# Patient Record
Sex: FEMALE | Race: WHITE | NOT HISPANIC OR LATINO | Employment: UNEMPLOYED | URBAN - METROPOLITAN AREA
[De-identification: names, ages, dates, MRNs, and addresses within clinical notes are randomized per-mention and may not be internally consistent; named-entity substitution may affect disease eponyms.]

---

## 2017-02-23 LAB
HPV APTIMA: NEGATIVE
PAP RECOMMENDATION EXT: NORMAL
PAP SMEAR: NORMAL

## 2023-05-17 ENCOUNTER — HOSPITAL ENCOUNTER (EMERGENCY)
Facility: HOSPITAL | Age: 43
Discharge: HOME | End: 2023-05-18
Attending: EMERGENCY MEDICINE
Payer: MEDICAID

## 2023-05-17 DIAGNOSIS — K29.00 ACUTE SUPERFICIAL GASTRITIS, PRESENCE OF BLEEDING UNSPECIFIED: ICD-10-CM

## 2023-05-17 DIAGNOSIS — R11.2 NAUSEA AND VOMITING, UNSPECIFIED VOMITING TYPE: ICD-10-CM

## 2023-05-17 DIAGNOSIS — R10.9 ABDOMINAL PAIN: ICD-10-CM

## 2023-05-17 DIAGNOSIS — R10.13 EPIGASTRIC ABDOMINAL PAIN: Primary | ICD-10-CM

## 2023-05-17 LAB
BASOPHILS # BLD AUTO: 0.02 X10(3)/MCL
BASOPHILS NFR BLD AUTO: 0.2 %
CLOSTRIDIUM DIFFICILE GDH ANTIGEN (OHS): NEGATIVE
CLOSTRIDIUM DIFFICILE TOXIN A/B (OHS): NEGATIVE
COLOR STL: NORMAL
CONSISTENCY STL: NORMAL
EOSINOPHIL # BLD AUTO: 0.11 X10(3)/MCL (ref 0–0.9)
EOSINOPHIL NFR BLD AUTO: 1.3 %
ERYTHROCYTE [DISTWIDTH] IN BLOOD BY AUTOMATED COUNT: 13.3 % (ref 11.5–17)
FECAL LEUKOCYTE (OHS): NEGATIVE
HCT VFR BLD AUTO: 39.1 % (ref 37–47)
HEMOCCULT SP1 STL QL: NEGATIVE
HGB BLD-MCNC: 13 G/DL (ref 12–16)
IMM GRANULOCYTES # BLD AUTO: 0.02 X10(3)/MCL (ref 0–0.04)
IMM GRANULOCYTES NFR BLD AUTO: 0.2 %
LYMPHOCYTES # BLD AUTO: 2.8 X10(3)/MCL (ref 0.6–4.6)
LYMPHOCYTES NFR BLD AUTO: 33.5 %
MCH RBC QN AUTO: 28.4 PG (ref 27–31)
MCHC RBC AUTO-ENTMCNC: 33.2 G/DL (ref 33–36)
MCV RBC AUTO: 85.6 FL (ref 80–94)
MONOCYTES # BLD AUTO: 0.61 X10(3)/MCL (ref 0.1–1.3)
MONOCYTES NFR BLD AUTO: 7.3 %
NEUTROPHILS # BLD AUTO: 4.81 X10(3)/MCL (ref 2.1–9.2)
NEUTROPHILS NFR BLD AUTO: 57.5 %
NRBC BLD AUTO-RTO: 0 %
PLATELET # BLD AUTO: 221 X10(3)/MCL (ref 130–400)
PMV BLD AUTO: 9.9 FL (ref 7.4–10.4)
RBC # BLD AUTO: 4.57 X10(6)/MCL (ref 4.2–5.4)
WBC # SPEC AUTO: 8.37 X10(3)/MCL (ref 4.5–11.5)

## 2023-05-17 PROCEDURE — 85025 COMPLETE CBC W/AUTO DIFF WBC: CPT | Performed by: EMERGENCY MEDICINE

## 2023-05-17 PROCEDURE — 83690 ASSAY OF LIPASE: CPT | Performed by: EMERGENCY MEDICINE

## 2023-05-17 PROCEDURE — 82962 GLUCOSE BLOOD TEST: CPT

## 2023-05-17 PROCEDURE — 80053 COMPREHEN METABOLIC PANEL: CPT | Performed by: EMERGENCY MEDICINE

## 2023-05-17 PROCEDURE — 87077 CULTURE AEROBIC IDENTIFY: CPT | Performed by: EMERGENCY MEDICINE

## 2023-05-17 PROCEDURE — 99285 EMERGENCY DEPT VISIT HI MDM: CPT

## 2023-05-17 PROCEDURE — 82272 OCCULT BLD FECES 1-3 TESTS: CPT | Performed by: EMERGENCY MEDICINE

## 2023-05-17 PROCEDURE — 86318 IA INFECTIOUS AGENT ANTIBODY: CPT | Performed by: EMERGENCY MEDICINE

## 2023-05-17 PROCEDURE — 93005 ELECTROCARDIOGRAM TRACING: CPT

## 2023-05-17 PROCEDURE — 89055 LEUKOCYTE ASSESSMENT FECAL: CPT | Performed by: EMERGENCY MEDICINE

## 2023-05-18 VITALS
BODY MASS INDEX: 27.28 KG/M2 | SYSTOLIC BLOOD PRESSURE: 140 MMHG | OXYGEN SATURATION: 96 % | WEIGHT: 180 LBS | HEART RATE: 59 BPM | TEMPERATURE: 98 F | HEIGHT: 68 IN | DIASTOLIC BLOOD PRESSURE: 92 MMHG | RESPIRATION RATE: 17 BRPM

## 2023-05-18 LAB
ALBUMIN SERPL-MCNC: 4.1 G/DL (ref 3.5–5)
ALBUMIN/GLOB SERPL: 1.6 RATIO (ref 1.1–2)
ALP SERPL-CCNC: 39 UNIT/L (ref 40–150)
ALT SERPL-CCNC: 15 UNIT/L (ref 0–55)
AST SERPL-CCNC: 16 UNIT/L (ref 5–34)
BILIRUBIN DIRECT+TOT PNL SERPL-MCNC: 0.6 MG/DL
BUN SERPL-MCNC: 8.5 MG/DL (ref 7–18.7)
CALCIUM SERPL-MCNC: 9.5 MG/DL (ref 8.4–10.2)
CHLORIDE SERPL-SCNC: 104 MMOL/L (ref 98–107)
CO2 SERPL-SCNC: 28 MMOL/L (ref 22–29)
CREAT SERPL-MCNC: 0.69 MG/DL (ref 0.55–1.02)
GFR SERPLBLD CREATININE-BSD FMLA CKD-EPI: >60 MLS/MIN/1.73/M2
GLOBULIN SER-MCNC: 2.5 GM/DL (ref 2.4–3.5)
GLUCOSE SERPL-MCNC: 98 MG/DL (ref 74–100)
LIPASE SERPL-CCNC: 9 U/L
POCT GLUCOSE: 105 MG/DL (ref 70–110)
POTASSIUM SERPL-SCNC: 3.2 MMOL/L (ref 3.5–5.1)
PROT SERPL-MCNC: 6.6 GM/DL (ref 6.4–8.3)
SODIUM SERPL-SCNC: 141 MMOL/L (ref 136–145)

## 2023-05-18 PROCEDURE — 25000003 PHARM REV CODE 250: Performed by: EMERGENCY MEDICINE

## 2023-05-18 RX ORDER — FAMOTIDINE 10 MG/ML
40 INJECTION INTRAVENOUS
Status: DISCONTINUED | OUTPATIENT
Start: 2023-05-18 | End: 2023-05-18

## 2023-05-18 RX ORDER — SUCRALFATE 1 G/1
1 TABLET ORAL
Qty: 56 TABLET | Refills: 0 | Status: SHIPPED | OUTPATIENT
Start: 2023-05-18 | End: 2023-06-01

## 2023-05-18 RX ORDER — SUCRALFATE 1 G/1
1 TABLET ORAL ONCE
Status: COMPLETED | OUTPATIENT
Start: 2023-05-18 | End: 2023-05-18

## 2023-05-18 RX ORDER — PANTOPRAZOLE SODIUM 40 MG/1
40 TABLET, DELAYED RELEASE ORAL DAILY
Qty: 30 TABLET | Refills: 0 | Status: SHIPPED | OUTPATIENT
Start: 2023-05-18 | End: 2023-07-02 | Stop reason: ALTCHOICE

## 2023-05-18 RX ORDER — ONDANSETRON 2 MG/ML
4 INJECTION INTRAMUSCULAR; INTRAVENOUS
Status: DISCONTINUED | OUTPATIENT
Start: 2023-05-18 | End: 2023-05-18

## 2023-05-18 RX ORDER — FAMOTIDINE 20 MG/1
40 TABLET, FILM COATED ORAL
Status: COMPLETED | OUTPATIENT
Start: 2023-05-18 | End: 2023-05-18

## 2023-05-18 RX ADMIN — FAMOTIDINE 40 MG: 20 TABLET, FILM COATED ORAL at 01:05

## 2023-05-18 RX ADMIN — SUCRALFATE 1 G: 1 TABLET ORAL at 01:05

## 2023-05-18 NOTE — DISCHARGE INSTRUCTIONS
Avoid spicy, acidic food. Do not take any nsaids (ibuprofen, aleve, aspirin)  Your x ray did not show any signs of blockage

## 2023-05-18 NOTE — ED PROVIDER NOTES
Encounter Date: 5/17/2023       History     Chief Complaint   Patient presents with    Abdominal Pain     Pt c/o LUQ ABD pain, nausea, vomiting, and diarrhea for two weeks. Pt states the pain gets worse after eating. Describes her stool as black and tarry, and 1 episode of vomiting today the was blood tinged. + decreased appetite, weakness, dizziness, HA. Pt states her PCP told her to come to the hospital after being seen at [facility] 3 times in the past 2 weeks. Past surgical history of gallbladder removal in 2003. PMH HTN and DM.      42-year-old female with a history of chronic constipation diabetes and hypertension presents ED for evaluation of abdominal pain.  She is been seen in an outside ER multiple times in the past several weeks for dehydration.  She is had left upper quadrant abdominal pain worsening with eating for the past several days.  She is had decreased appetite and loose stools for the past couple of weeks and was admitted for dehydration at an outside facility.  She is been worked up extensively including multiple CT scans without acute abnormality noted.  States her stool was dark today prompting her to come to the ED for evaluation.  She also had an episode of vomiting that she was unsure if there was blood.  She denies any NSAID use or steroid use      Review of patient's allergies indicates:   Allergen Reactions    Vancomycin analogues Rash     History reviewed. No pertinent past medical history.  History reviewed. No pertinent surgical history.  History reviewed. No pertinent family history.     Review of Systems   Constitutional:  Positive for appetite change and fatigue. Negative for activity change, diaphoresis and fever.   HENT:  Negative for congestion, postnasal drip, rhinorrhea, sinus pain, sneezing and sore throat.    Respiratory:  Negative for cough, chest tightness, shortness of breath and wheezing.    Cardiovascular:  Negative for chest pain, palpitations and leg swelling.    Gastrointestinal:  Positive for abdominal pain, nausea and vomiting. Negative for abdominal distention and blood in stool.   Genitourinary:  Negative for decreased urine volume, difficulty urinating and dysuria.   Musculoskeletal: Negative.    Skin:  Negative for color change and pallor.   Neurological:  Negative for dizziness, speech difficulty, weakness, light-headedness and numbness.   All other systems reviewed and are negative.    Physical Exam     Initial Vitals [05/17/23 2234]   BP Pulse Resp Temp SpO2   (!) 140/87 74 17 98.4 °F (36.9 °C) 99 %      MAP       --         Physical Exam    Nursing note and vitals reviewed.  Constitutional: She appears well-developed and well-nourished. She is not diaphoretic. No distress.   HENT:   Head: Normocephalic and atraumatic.   Nose: Nose normal.   Mouth/Throat: Oropharynx is clear and moist.   Eyes: Conjunctivae and EOM are normal. Pupils are equal, round, and reactive to light.   Neck: Trachea normal. Neck supple.   Normal range of motion.  Cardiovascular:  Normal rate, regular rhythm, normal heart sounds and intact distal pulses.           No murmur heard.  Pulmonary/Chest: Breath sounds normal. No respiratory distress. She has no wheezes. She has no rhonchi. She has no rales. She exhibits no tenderness.   Abdominal: Abdomen is soft. Bowel sounds are normal. She exhibits no distension and no mass. There is no abdominal tenderness. There is no rebound and no guarding.   Musculoskeletal:         General: No tenderness or edema. Normal range of motion.      Cervical back: Normal range of motion and neck supple.      Lumbar back: Normal. Normal range of motion.     Neurological: She is alert and oriented to person, place, and time. She has normal strength. No cranial nerve deficit or sensory deficit.   Skin: Skin is warm and dry. Capillary refill takes less than 2 seconds. No abscess noted. No erythema. No pallor.   Psychiatric: She has a normal mood and affect. Her  behavior is normal. Judgment and thought content normal.       ED Course   Procedures  Labs Reviewed   COMPREHENSIVE METABOLIC PANEL - Abnormal; Notable for the following components:       Result Value    Potassium Level 3.2 (*)     Alkaline Phosphatase 39 (*)     All other components within normal limits   CLOSTRIDIUM DIFFICILE TOXIN A AND B, EIA - Normal   LIPASE - Normal   FECAL LEUKOCYTES - LACTOFERRIN ON  STOOL - Normal   STOOL CULTURE [facility]   CBC W/ AUTO DIFFERENTIAL    Narrative:     The following orders were created for panel order CBC auto differential.  Procedure                               Abnormality         Status                     ---------                               -----------         ------                     CBC with Differential[[id number]]                            Final result                 Please view results for these tests on the individual orders.   CBC WITH DIFFERENTIAL   OCCULT BLOOD X 3, STOOL   URINALYSIS, REFLEX TO URINE CULTURE   OCCULT BLOOD,STOOL,DIAGNOSTIC (1-3)    Narrative:     The following orders were created for panel order Occult Blood, Stool, Diagnostic (1-3).  Procedure                               Abnormality         Status                     ---------                               -----------         ------                     Occult blood x 3, stool[[id number]]                          Final result               Occult Blood, Stool 2nd ...[[id number]]                                                   Please view results for these tests on the individual orders.   OCCULT BLOOD, STOOL 2ND SPECIMEN   POCT GLUCOSE          Imaging Results              X-Ray Abdomen Flat And Erect (Preliminary result)  Result time 05/18/23 01:58:48      Wet Read by Megan Barrios MD (05/18/23 01:58:48, [facility] - Emergency Dept, Emergency Medicine)    Nonobstructive bowel gas pattern                                  X-Rays:   Independently Interpreted Readings:    Abdomen: Nonspecific bowel gas.  No free air under diaphragm.  No air fluid levels or signs of obstruction.   Medications   famotidine tablet 40 mg (40 mg Oral Given 5/18/23 0141)   sucralfate tablet 1 g (1 g Oral Given 5/18/23 0142)   Medical Decision Making  Given patient's presentation, differential diagnosis includes but is not limited to anemia, gastritis, pancreatitis, constipation,d ehydration, ileus  To evaluate these  possible etiologies cbc, cmp, lipase, stool studies, ekg, flat and erect xr were ordered and reviewed  All labs including stool studies unremarkable other tahn mild hypokalemia which is chronic  S/s consistent with gastritis. Given protonix and carafate which she toelrated. Discused dietary changes, management, ndsaid avoidance and f/u  Xr without signs of obstruction  She tolerated po and is comfortable with dc. Given iinfo for gi     Problems Addressed:  Abdominal pain: acute illness or injury that poses a threat to life or bodily functions  Acute superficial gastritis, presence of bleeding unspecified: acute illness or injury that poses a threat to life or bodily functions  Epigastric abdominal pain: acute illness or injury that poses a threat to life or bodily functions  Nausea and vomiting, unspecified vomiting type: acute illness or injury that poses a threat to life or bodily functions    Amount and/or Complexity of Data Reviewed  External Data Reviewed: notes.  Labs: ordered.  Radiology: ordered and independent interpretation performed.  ECG/medicine tests: ordered and independent interpretation performed. Decision-making details documented in ED Course.    Risk  OTC drugs.  Prescription drug management.          Medical Decision Making:   History:   Old Medical Records: I decided to obtain old medical records.  Old Records Summarized: records from another hospital.       <> Summary of Records: Workup with dx enteritis  Initial Assessment:   See hpi  Independently Interpreted  Test(s):   I have ordered and independently interpreted X-rays - see prior notes.  Clinical Tests:   Lab Tests: Ordered and Reviewed  The following lab test(s) were unremarkable: CBC and Lipase       <> Summary of Lab: Stool negative for blood, cmp with mild hypokalemia, chronic per pt  Radiological Study: Ordered and Reviewed  Medical Tests: Reviewed           ED Course as of 05/18/23 0210   Thu May 18, 2023   0136 Pt requested iv be removed and take meds po [BS]   0136 EKG performed at 10:38 p.m. rate of 62 normal sinus rhythm [BS]      ED Course User Index  [BS] Megan Barrios MD                 Clinical Impression:   Final diagnoses:  [R10.13] Epigastric abdominal pain (Primary)  [R10.9] Abdominal pain  [R11.2] Nausea and vomiting, unspecified vomiting type  [K29.00] Acute superficial gastritis, presence of bleeding unspecified        ED Disposition Condition    Discharge Stable          ED Prescriptions       Medication Sig Dispense Start Date End Date Auth. Provider    pantoprazole (PROTONIX) 40 MG tablet Take 1 tablet (40 mg total) by mouth once daily. 30 tablet 5/18/2023 6/17/2023 Megan Barrios MD    sucralfate (CARAFATE) 1 gram tablet Take 1 tablet (1 g total) by mouth 4 (four) times daily before meals and nightly. for 14 days 56 tablet 5/18/2023 6/1/2023 Megan Barrios MD          Follow-up Information       Follow up With Specialties Details Why Contact Info    your primary care doctor  Call today      oTbias Gomez MD Gastroenterology Schedule an appointment as soon as possible for a visit   [facility] [address] [address]  [phone]      [facility]  Emergency Dept Emergency Medicine  As needed, If symptoms worsen [address] [facility] [id number] [phone]             Megan Barrios MD  05/18/23 0210

## 2023-05-21 LAB — BACTERIA SPEC CULT: NORMAL

## 2023-05-24 ENCOUNTER — TELEPHONE (OUTPATIENT)
Dept: GASTROENTEROLOGY | Facility: CLINIC | Age: 43
End: 2023-05-24
Payer: MEDICAID

## 2023-05-24 NOTE — TELEPHONE ENCOUNTER
----- Message from Yasmeen Al sent at 5/24/2023  3:47 PM CDT -----  Contact: jaskaran Su has been hospitalized more than one time in the past few weeks ,pls call [phone] with various stomach issues

## 2023-06-07 ENCOUNTER — HOSPITAL ENCOUNTER (OUTPATIENT)
Dept: RADIOLOGY | Facility: HOSPITAL | Age: 43
Discharge: HOME | End: 2023-06-07
Attending: SURGERY
Payer: MEDICAID

## 2023-06-07 DIAGNOSIS — R10.12 ABDOMINAL PAIN, LEFT UPPER QUADRANT: ICD-10-CM

## 2023-06-07 DIAGNOSIS — R11.2 NAUSEA WITH VOMITING: ICD-10-CM

## 2023-06-07 DIAGNOSIS — K21.9 GASTROESOPHAGEAL REFLUX DISEASE: ICD-10-CM

## 2023-06-07 PROCEDURE — A9698 NON-RAD CONTRAST MATERIALNOC: HCPCS | Performed by: SURGERY

## 2023-06-07 PROCEDURE — 25500020 PHARM REV CODE 255: Performed by: SURGERY

## 2023-06-07 PROCEDURE — 74246 X-RAY XM UPR GI TRC 2CNTRST: CPT | Mod: TC

## 2023-06-07 PROCEDURE — 74250 X-RAY XM SM INT 1CNTRST STD: CPT | Mod: TC

## 2023-06-07 RX ADMIN — BARIUM SULFATE 140 ML: 980 POWDER, FOR SUSPENSION ORAL at 07:06

## 2023-06-08 ENCOUNTER — OFFICE VISIT (OUTPATIENT)
Dept: FAMILY MEDICINE | Facility: CLINIC | Age: 43
End: 2023-06-08
Payer: MEDICAID

## 2023-06-08 VITALS
TEMPERATURE: 99 F | BODY MASS INDEX: 26.05 KG/M2 | DIASTOLIC BLOOD PRESSURE: 84 MMHG | HEART RATE: 59 BPM | WEIGHT: 171.88 LBS | OXYGEN SATURATION: 97 % | HEIGHT: 68 IN | SYSTOLIC BLOOD PRESSURE: 121 MMHG

## 2023-06-08 DIAGNOSIS — R10.84 GENERALIZED ABDOMINAL PAIN: ICD-10-CM

## 2023-06-08 DIAGNOSIS — Z86.39 HISTORY OF THYROID NODULE: ICD-10-CM

## 2023-06-08 DIAGNOSIS — E03.9 HYPOTHYROIDISM, UNSPECIFIED TYPE: ICD-10-CM

## 2023-06-08 DIAGNOSIS — Z79.4 TYPE 2 DIABETES MELLITUS WITHOUT COMPLICATION, WITH LONG-TERM CURRENT USE OF INSULIN: ICD-10-CM

## 2023-06-08 DIAGNOSIS — R10.2 PELVIC PAIN: ICD-10-CM

## 2023-06-08 DIAGNOSIS — R11.0 NAUSEA: Primary | ICD-10-CM

## 2023-06-08 DIAGNOSIS — E11.9 TYPE 2 DIABETES MELLITUS WITHOUT COMPLICATION, WITH LONG-TERM CURRENT USE OF INSULIN: ICD-10-CM

## 2023-06-08 DIAGNOSIS — R00.1 BRADYCARDIA: ICD-10-CM

## 2023-06-08 DIAGNOSIS — Z00.00 ENCOUNTER FOR WELLNESS EXAMINATION: ICD-10-CM

## 2023-06-08 LAB
ALBUMIN SERPL-MCNC: 4.2 G/DL (ref 3.5–5)
ALBUMIN/GLOB SERPL: 1.7 RATIO (ref 1.1–2)
ALP SERPL-CCNC: 40 UNIT/L (ref 40–150)
ALT SERPL-CCNC: 19 UNIT/L (ref 0–55)
AMYLASE SERPL-CCNC: 89 UNIT/L (ref 25–125)
AST SERPL-CCNC: 21 UNIT/L (ref 5–34)
BASOPHILS # BLD AUTO: 0.03 X10(3)/MCL
BASOPHILS NFR BLD AUTO: 0.5 %
BILIRUBIN DIRECT+TOT PNL SERPL-MCNC: 0.7 MG/DL
BUN SERPL-MCNC: 10.7 MG/DL (ref 7–18.7)
CALCIUM SERPL-MCNC: 9.5 MG/DL (ref 8.4–10.2)
CHLORIDE SERPL-SCNC: 99 MMOL/L (ref 98–107)
CHOLEST SERPL-MCNC: 122 MG/DL
CHOLEST/HDLC SERPL: 3 {RATIO} (ref 0–5)
CO2 SERPL-SCNC: 32 MMOL/L (ref 22–29)
CREAT SERPL-MCNC: 0.76 MG/DL (ref 0.55–1.02)
CREAT UR-MCNC: 158.2 MG/DL (ref 47–110)
DEPRECATED CALCIDIOL+CALCIFEROL SERPL-MC: 22.6 NG/ML (ref 30–80)
EOSINOPHIL # BLD AUTO: 0.08 X10(3)/MCL (ref 0–0.9)
EOSINOPHIL NFR BLD AUTO: 1.3 %
ERYTHROCYTE [DISTWIDTH] IN BLOOD BY AUTOMATED COUNT: 14.3 % (ref 11.5–17)
EST. AVERAGE GLUCOSE BLD GHB EST-MCNC: 111.2 MG/DL
GFR SERPLBLD CREATININE-BSD FMLA CKD-EPI: >60 MLS/MIN/1.73/M2
GLOBULIN SER-MCNC: 2.5 GM/DL (ref 2.4–3.5)
GLUCOSE SERPL-MCNC: 171 MG/DL (ref 74–100)
HAV IGM SERPL QL IA: NONREACTIVE
HBA1C MFR BLD: 5.5 %
HBV CORE IGM SERPL QL IA: NONREACTIVE
HBV SURFACE AG SERPL QL IA: NONREACTIVE
HCT VFR BLD AUTO: 42.6 % (ref 37–47)
HCV AB SERPL QL IA: NONREACTIVE
HDLC SERPL-MCNC: 35 MG/DL (ref 35–60)
HGB BLD-MCNC: 13.8 G/DL (ref 12–16)
HIV 1+2 AB+HIV1 P24 AG SERPL QL IA: NONREACTIVE
IMM GRANULOCYTES # BLD AUTO: 0.01 X10(3)/MCL (ref 0–0.04)
IMM GRANULOCYTES NFR BLD AUTO: 0.2 %
LDLC SERPL CALC-MCNC: 77 MG/DL (ref 50–140)
LIPASE SERPL-CCNC: 51 U/L
LYMPHOCYTES # BLD AUTO: 1.36 X10(3)/MCL (ref 0.6–4.6)
LYMPHOCYTES NFR BLD AUTO: 22.5 %
MCH RBC QN AUTO: 28 PG (ref 27–31)
MCHC RBC AUTO-ENTMCNC: 32.4 G/DL (ref 33–36)
MCV RBC AUTO: 86.4 FL (ref 80–94)
MICROALBUMIN UR-MCNC: 14.5 UG/ML
MICROALBUMIN/CREAT RATIO PNL UR: 9.2 MG/GM CR (ref 0–30)
MONOCYTES # BLD AUTO: 0.39 X10(3)/MCL (ref 0.1–1.3)
MONOCYTES NFR BLD AUTO: 6.4 %
NEUTROPHILS # BLD AUTO: 4.18 X10(3)/MCL (ref 2.1–9.2)
NEUTROPHILS NFR BLD AUTO: 69.1 %
NRBC BLD AUTO-RTO: 0 %
PLATELET # BLD AUTO: 214 X10(3)/MCL (ref 130–400)
PMV BLD AUTO: 10.4 FL (ref 7.4–10.4)
POTASSIUM SERPL-SCNC: 3.5 MMOL/L (ref 3.5–5.1)
PROT SERPL-MCNC: 6.7 GM/DL (ref 6.4–8.3)
RBC # BLD AUTO: 4.93 X10(6)/MCL (ref 4.2–5.4)
SODIUM SERPL-SCNC: 141 MMOL/L (ref 136–145)
T PALLIDUM AB SER QL: NONREACTIVE
T4 FREE SERPL-MCNC: 0.94 NG/DL (ref 0.7–1.48)
TRIGL SERPL-MCNC: 51 MG/DL (ref 37–140)
TSH SERPL-ACNC: 28.96 UIU/ML (ref 0.35–4.94)
VLDLC SERPL CALC-MCNC: 10 MG/DL
WBC # SPEC AUTO: 6.05 X10(3)/MCL (ref 4.5–11.5)

## 2023-06-08 PROCEDURE — 87389 HIV-1 AG W/HIV-1&-2 AB AG IA: CPT

## 2023-06-08 PROCEDURE — 85025 COMPLETE CBC W/AUTO DIFF WBC: CPT

## 2023-06-08 PROCEDURE — 86780 TREPONEMA PALLIDUM: CPT

## 2023-06-08 PROCEDURE — 99386 PREV VISIT NEW AGE 40-64: CPT | Mod: S$PBB,,,

## 2023-06-08 PROCEDURE — 36415 COLL VENOUS BLD VENIPUNCTURE: CPT

## 2023-06-08 PROCEDURE — 3074F SYST BP LT 130 MM HG: CPT | Mod: CPTII,,,

## 2023-06-08 PROCEDURE — 84443 ASSAY THYROID STIM HORMONE: CPT

## 2023-06-08 PROCEDURE — 83036 HEMOGLOBIN GLYCOSYLATED A1C: CPT

## 2023-06-08 PROCEDURE — 3079F PR MOST RECENT DIASTOLIC BLOOD PRESSURE 80-89 MM HG: ICD-10-PCS | Mod: CPTII,,,

## 2023-06-08 PROCEDURE — 80061 LIPID PANEL: CPT

## 2023-06-08 PROCEDURE — 84439 ASSAY OF FREE THYROXINE: CPT

## 2023-06-08 PROCEDURE — 86376 MICROSOMAL ANTIBODY EACH: CPT

## 2023-06-08 PROCEDURE — 80053 COMPREHEN METABOLIC PANEL: CPT

## 2023-06-08 PROCEDURE — 3008F BODY MASS INDEX DOCD: CPT | Mod: CPTII,,,

## 2023-06-08 PROCEDURE — 82043 UR ALBUMIN QUANTITATIVE: CPT

## 2023-06-08 PROCEDURE — 99386 PR PREVENTIVE VISIT,NEW,40-64: ICD-10-PCS | Mod: S$PBB,,,

## 2023-06-08 PROCEDURE — 1159F MED LIST DOCD IN RCRD: CPT | Mod: CPTII,,,

## 2023-06-08 PROCEDURE — 99215 OFFICE O/P EST HI 40 MIN: CPT | Mod: PBBFAC,PN

## 2023-06-08 PROCEDURE — 80074 ACUTE HEPATITIS PANEL: CPT

## 2023-06-08 PROCEDURE — 3074F PR MOST RECENT SYSTOLIC BLOOD PRESSURE < 130 MM HG: ICD-10-PCS | Mod: CPTII,,,

## 2023-06-08 PROCEDURE — 82306 VITAMIN D 25 HYDROXY: CPT

## 2023-06-08 PROCEDURE — 83690 ASSAY OF LIPASE: CPT

## 2023-06-08 PROCEDURE — 4010F PR ACE/ARB THEARPY RXD/TAKEN: ICD-10-PCS | Mod: CPTII,,,

## 2023-06-08 PROCEDURE — 1159F PR MEDICATION LIST DOCUMENTED IN MEDICAL RECORD: ICD-10-PCS | Mod: CPTII,,,

## 2023-06-08 PROCEDURE — 3008F PR BODY MASS INDEX (BMI) DOCUMENTED: ICD-10-PCS | Mod: CPTII,,,

## 2023-06-08 PROCEDURE — 1160F PR REVIEW ALL MEDS BY PRESCRIBER/CLIN PHARMACIST DOCUMENTED: ICD-10-PCS | Mod: CPTII,,,

## 2023-06-08 PROCEDURE — 1160F RVW MEDS BY RX/DR IN RCRD: CPT | Mod: CPTII,,,

## 2023-06-08 PROCEDURE — 82150 ASSAY OF AMYLASE: CPT

## 2023-06-08 PROCEDURE — 3079F DIAST BP 80-89 MM HG: CPT | Mod: CPTII,,,

## 2023-06-08 PROCEDURE — 4010F ACE/ARB THERAPY RXD/TAKEN: CPT | Mod: CPTII,,,

## 2023-06-08 RX ORDER — DOXYCYCLINE HYCLATE 100 MG
TABLET ORAL
COMMUNITY
Start: 2023-06-05 | End: 2023-07-02 | Stop reason: ALTCHOICE

## 2023-06-08 RX ORDER — BUSPIRONE HYDROCHLORIDE 5 MG/1
TABLET ORAL
COMMUNITY
Start: 2023-06-05 | End: 2023-07-02 | Stop reason: ALTCHOICE

## 2023-06-08 RX ORDER — PEN NEEDLE, DIABETIC 30 GX3/16"
NEEDLE, DISPOSABLE MISCELLANEOUS
COMMUNITY
End: 2023-10-03

## 2023-06-08 RX ORDER — LEVOTHYROXINE SODIUM 100 UG/1
100 TABLET ORAL
COMMUNITY
Start: 2023-06-05 | End: 2023-07-12 | Stop reason: SDUPTHER

## 2023-06-08 RX ORDER — INSULIN GLARGINE 100 [IU]/ML
40 INJECTION, SOLUTION SUBCUTANEOUS NIGHTLY
COMMUNITY
Start: 2023-05-09 | End: 2023-09-13 | Stop reason: ALTCHOICE

## 2023-06-08 RX ORDER — METRONIDAZOLE 500 MG/1
TABLET ORAL
COMMUNITY
Start: 2023-06-05 | End: 2023-07-02 | Stop reason: ALTCHOICE

## 2023-06-08 NOTE — PROGRESS NOTES
"Patient Name: Fred Berman   : 1980  MRN: [id number]     Subjective:   Patient ID: Fred Berman is a 42 y.o. female.    Chief Complaint:   Chief Complaint   Patient presents with    Establish Care        HPI: 2023:  Patient presents to clinic today to establish care, previously follows Dr. Bulmaro George (requesting records today), who she has been following for a few years.  He is managing her hypothyroidism and type 2 diabetes, patient endorses recent change in weight where she lost 30 lb in 2 weeks, states that she was then inpatient at our [facility] for another 2 weeks where she had multiple diagnostic workups performed for her abdominal pain and inability to tolerate food.  Patient denies getting a formal diagnosis other than gastritis, states that her symptoms have not resolved since her discharge.  Patient is very concerned as she states that she is not eating and 40 days, patient highly anxious that she will die because of this.  Further exam reveals that patient is able to eat but states that she feels very nauseous, denies vomiting.  Endorses intense abdominal pain intermittently that is not relieved by eating or not eating.  States that nothing makes her abdominal pain better.  Patient states that she is only taking 100 mcg of her Synthroid currently due to her recent weight loss, feels as though something is off with her thyroid.  Denies any recent ultrasound of thyroid.  Patient also complains of a "pulsing" feeling in her abdomen. CT abdomen performed during her recent hospital stay noted no aortic aneurysm, did show A complex cystic lesion is noted along the inferior aspect of the left ovary measuring 3.4 x 2.2 cm. Adjacent fat stranding is noted. The uterus and right ovary are normal.  Patient states that she does have a gynecologist but they refusing to perform any further workup, she is amenable to referral to secondary gynecologist.  States that she frequently " "gets abscesses and would like her uterus taken out.       ROS:  Review of Systems   Constitutional:  Negative for chills, fever and weight loss.   HENT:  Negative for ear discharge, nosebleeds and tinnitus.    Eyes:  Negative for blurred vision, photophobia and pain.   Respiratory:  Negative for cough, shortness of breath, wheezing and stridor.    Cardiovascular:  Negative for chest pain, palpitations and orthopnea.   Gastrointestinal:  Positive for abdominal pain, nausea and vomiting. Negative for blood in stool, constipation and heartburn.   Genitourinary:  Negative for dysuria, frequency, hematuria and urgency.        Pelvic pain   Musculoskeletal:  Negative for falls and myalgias.   Skin:  Negative for itching and rash.   Neurological:  Negative for dizziness, sensory change, speech change, focal weakness, seizures, weakness and headaches.   Endo/Heme/Allergies:  Negative for environmental allergies. Does not bruise/bleed easily.   Psychiatric/Behavioral:  Negative for hallucinations and suicidal ideas.     History:     Past Medical History:   Diagnosis Date    Diabetes mellitus, type 2       Past Surgical History:   Procedure Laterality Date     SECTION      CHOLECYSTECTOMY      TUBAL LIGATION       History reviewed. No pertinent family history.   Social History     Tobacco Use    Smoking status: Former     Types: Cigarettes    Smokeless tobacco: Never   Substance and Sexual Activity    Alcohol use: Not Currently    Drug use: Never    Sexual activity: Not on file        Allergies:   Review of patient's allergies indicates:   Allergen Reactions    Vancomycin analogues Rash     Objective:     Vitals:    23 0923   BP: 121/84   Pulse: (!) 59   Temp: 98.8 °F (37.1 °C)   TempSrc: Oral   SpO2: 97%   Weight: 78 kg (171 lb 14.4 oz)   Height: 5' 8" (1.727 m)     Body mass index is 26.14 kg/m².     Physical Examination:   Physical Exam  Vitals reviewed.   Constitutional:       Appearance: Normal appearance. " She is normal weight.   HENT:      Head: Normocephalic.      Right Ear: Tympanic membrane, ear canal and external ear normal.      Left Ear: Tympanic membrane, ear canal and external ear normal.      Nose: Nose normal.      Mouth/Throat:      Mouth: Mucous membranes are moist.      Pharynx: Oropharynx is clear.   Eyes:      Extraocular Movements: Extraocular movements intact.      Conjunctiva/sclera: Conjunctivae normal.      Pupils: Pupils are equal, round, and reactive to light.   Cardiovascular:      Rate and Rhythm: Normal rate and regular rhythm.      Pulses: Normal pulses.      Heart sounds: Normal heart sounds.   Pulmonary:      Effort: Pulmonary effort is normal.      Breath sounds: Normal breath sounds.   Abdominal:      General: Abdomen is flat. Bowel sounds are normal. There is no distension or abdominal bruit.      Palpations: Abdomen is soft.      Tenderness: There is no abdominal tenderness. There is no right CVA tenderness, left CVA tenderness or guarding. Negative signs include Escalera's sign and McBurney's sign.      Hernia: No hernia is present.   Musculoskeletal:         General: Normal range of motion.      Cervical back: Normal range of motion and neck supple.   Skin:     General: Skin is warm and dry.   Neurological:      General: No focal deficit present.      Mental Status: She is alert and oriented to person, place, and time.   Psychiatric:         Mood and Affect: Mood normal.         Behavior: Behavior normal.       Assessment:     Problem List Items Addressed This Visit    None  Visit Diagnoses       Nausea    -  Primary    Relevant Orders    Ambulatory referral/consult to Gastroenterology    Lipase    Amylase    NM Gastric Emptying    Generalized abdominal pain        Relevant Orders    Lipase    Amylase    Ambulatory referral/consult to Gynecology    NM Gastric Emptying    Pelvic pain        Relevant Orders    Ambulatory referral/consult to Gynecology    Type 2 diabetes mellitus without  complication, with long-term current use of insulin        Relevant Medications    LANTUS SOLOSTAR U-100 INSULIN glargine 100 units/mL SubQ pen    Other Relevant Orders    Microalbumin/Creatinine Ratio, Urine    History of thyroid nodule        Relevant Orders    US Thyroid    THYROID PEROXIDASE (TPO)    Hypothyroidism, unspecified type        Relevant Orders    THYROID PEROXIDASE (TPO)    Bradycardia        Relevant Orders    Echo    Holter monitor - 48 hour    Encounter for wellness examination        Relevant Orders    TSH    T4, Free    Hemoglobin A1C    SYPHILIS ANTIBODY (WITH REFLEX RPR)    Hepatitis Panel, Acute    Lipid Panel    CBC Auto Differential    Comprehensive Metabolic Panel    HIV 1/2 Ag/Ab (4th Gen)    Vitamin D            Plan:   Fred was seen today for establish care.    Diagnoses and all orders for this visit:    Nausea  -     Ambulatory referral/consult to Gastroenterology; Future  -     Lipase  -     Amylase  -     NM Gastric Emptying; Future    Generalized abdominal pain  -     Lipase  -     Amylase  -     Ambulatory referral/consult to Gynecology; Future  -     NM Gastric Emptying; Future    Pelvic pain  -     Ambulatory referral/consult to Gynecology; Future    Type 2 diabetes mellitus without complication, with long-term current use of insulin  -     Microalbumin/Creatinine Ratio, Urine    History of thyroid nodule  -     US Thyroid; Future  -     THYROID PEROXIDASE (TPO)    Hypothyroidism, unspecified type  -     THYROID PEROXIDASE (TPO)    Bradycardia  -     Echo; Future  -     Holter monitor - 48 hour; Future    Encounter for wellness examination  -     TSH  -     T4, Free  -     Hemoglobin A1C  -     SYPHILIS ANTIBODY (WITH REFLEX RPR)  -     Hepatitis Panel, Acute  -     Lipid Panel  -     CBC Auto Differential  -     Comprehensive Metabolic Panel  -     HIV 1/2 Ag/Ab (4th Gen)  -     Vitamin D       Follow up in about 2 weeks (around 6/22/2023), or if symptoms worsen or fail to  improve, for review labs.     This note was created with the assistance of Dragon voice recognition software or phone dictation. There may be transcription errors as a result of using this technology however minimal. Effort has been made to assure accuracy of transcription but any obvious errors or omissions should be clarified with the author of the document      RED FLAGS/WARNING SYMPTOMS DISCUSSED WITH PATIENT THAT WOULD WARRANT EMERGENT MEDICAL ATTENTION. PATIENT VERBALIZED UNDERSTANDING.

## 2023-06-09 LAB — PATH REV: NORMAL

## 2023-06-10 LAB — THYROID PEROXIDASE QUANT (OLG): 6 IU/ML

## 2023-06-12 ENCOUNTER — HOSPITAL ENCOUNTER (OUTPATIENT)
Dept: CARDIOLOGY | Facility: HOSPITAL | Age: 43
Discharge: HOME | End: 2023-06-12
Payer: MEDICAID

## 2023-06-12 DIAGNOSIS — R00.1 BRADYCARDIA: ICD-10-CM

## 2023-06-12 PROCEDURE — 93225 XTRNL ECG REC<48 HRS REC: CPT

## 2023-06-12 PROCEDURE — 93227 XTRNL ECG REC<48 HR R&I: CPT | Mod: ,,, | Performed by: INTERNAL MEDICINE

## 2023-06-12 PROCEDURE — 93227 HOLTER MONITOR - 48 HOUR (CUPID ONLY): ICD-10-PCS | Mod: ,,, | Performed by: INTERNAL MEDICINE

## 2023-06-13 ENCOUNTER — PATIENT MESSAGE (OUTPATIENT)
Dept: FAMILY MEDICINE | Facility: CLINIC | Age: 43
End: 2023-06-13
Payer: MEDICAID

## 2023-06-13 DIAGNOSIS — Z86.39 HISTORY OF THYROID NODULE: Primary | ICD-10-CM

## 2023-06-16 ENCOUNTER — HOSPITAL ENCOUNTER (OUTPATIENT)
Dept: RADIOLOGY | Facility: HOSPITAL | Age: 43
Discharge: HOME | End: 2023-06-16
Payer: MEDICAID

## 2023-06-16 ENCOUNTER — HOSPITAL ENCOUNTER (OUTPATIENT)
Dept: CARDIOLOGY | Facility: HOSPITAL | Age: 43
Discharge: HOME | End: 2023-06-16
Payer: MEDICAID

## 2023-06-16 DIAGNOSIS — R10.84 GENERALIZED ABDOMINAL PAIN: ICD-10-CM

## 2023-06-16 DIAGNOSIS — R00.1 BRADYCARDIA: ICD-10-CM

## 2023-06-16 DIAGNOSIS — R11.0 NAUSEA: ICD-10-CM

## 2023-06-16 LAB
AORTIC VALVE CUSP SEPERATION: 1.91 CM
ASCENDING AORTA: 2.44 CM
AV INDEX (PROSTH): 0.86
AV MEAN GRADIENT: 3 MMHG
AV PEAK GRADIENT: 7 MMHG
AV VALVE AREA: 2.64 CM2
AV VELOCITY RATIO: 0.79
CV ECHO LV RWT: 0.3 CM
DOP CALC AO PEAK VEL: 1.28 M/S
DOP CALC AO VTI: 29.7 CM
DOP CALC LVOT AREA: 3.1 CM2
DOP CALC LVOT DIAMETER: 1.98 CM
DOP CALC LVOT PEAK VEL: 1.01 M/S
DOP CALC LVOT STROKE VOLUME: 78.48 CM3
DOP CALCLVOT PEAK VEL VTI: 25.5 CM
E WAVE DECELERATION TIME: 127 MSEC
ECHO LV POSTERIOR WALL: 0.74 CM (ref 0.6–1.1)
EJECTION FRACTION: 55 %
FRACTIONAL SHORTENING: 28 % (ref 28–44)
INTERVENTRICULAR SEPTUM: 0.88 CM (ref 0.6–1.1)
IVC DIAMETER: 1.89 CM
LEFT ATRIUM SIZE: 3.65 CM
LEFT ATRIUM VOLUME MOD: 35.7 CM3
LEFT INTERNAL DIMENSION IN SYSTOLE: 3.55 CM (ref 2.1–4)
LEFT VENTRICLE DIASTOLIC VOLUME: 114.4 ML
LEFT VENTRICLE SYSTOLIC VOLUME: 52.6 ML
LEFT VENTRICULAR INTERNAL DIMENSION IN DIASTOLE: 4.93 CM (ref 3.5–6)
LEFT VENTRICULAR MASS: 134.71 G
LVOT MG: 2.2 MMHG
LVOT MV: 0.69 CM/S
MV PEAK A VEL: 0.48 M/S
PISA TR MAX VEL: 1.13 M/S
RA PRESSURE: 8 MMHG
TR MAX PG: 5 MMHG
TRICUSPID ANNULAR PLANE SYSTOLIC EXCURSION: 2.71 CM
TV REST PULMONARY ARTERY PRESSURE: 13 MMHG

## 2023-06-16 PROCEDURE — 93306 TTE W/DOPPLER COMPLETE: CPT

## 2023-06-16 PROCEDURE — 78264 GASTRIC EMPTYING IMG STUDY: CPT | Mod: TC

## 2023-06-20 ENCOUNTER — PATIENT MESSAGE (OUTPATIENT)
Dept: FAMILY MEDICINE | Facility: CLINIC | Age: 43
End: 2023-06-20
Payer: MEDICAID

## 2023-06-21 PROBLEM — N70.93 TOA (TUBO-OVARIAN ABSCESS): Status: ACTIVE | Noted: 2023-06-21

## 2023-06-21 PROBLEM — R63.4 UNEXPLAINED WEIGHT LOSS: Status: ACTIVE | Noted: 2023-06-21

## 2023-06-21 PROBLEM — N94.6 DYSMENORRHEA: Status: ACTIVE | Noted: 2023-06-21

## 2023-06-21 PROBLEM — N81.9 PELVIC PROLAPSE: Status: ACTIVE | Noted: 2023-06-21

## 2023-06-29 LAB
OHS CV EVENT MONITOR DAY: 0
OHS CV HOLTER LENGTH DECIMAL HOURS: 48
OHS CV HOLTER LENGTH HOURS: 48
OHS CV HOLTER LENGTH MINUTES: 0
OHS CV HOLTER SINUS AVERAGE HR: 58
OHS CV HOLTER SINUS MAX HR: 87
OHS CV HOLTER SINUS MIN HR: 47

## 2023-07-02 ENCOUNTER — HOSPITAL ENCOUNTER (INPATIENT)
Facility: HOSPITAL | Age: 43
LOS: 6 days | Discharge: HOME | DRG: 074 | End: 2023-07-08
Attending: EMERGENCY MEDICINE | Admitting: INTERNAL MEDICINE
Payer: MEDICAID

## 2023-07-02 DIAGNOSIS — R42 DIZZINESS: Primary | ICD-10-CM

## 2023-07-02 DIAGNOSIS — R07.9 CHEST PAIN: ICD-10-CM

## 2023-07-02 PROBLEM — K31.84 GASTROPARESIS: Status: ACTIVE | Noted: 2023-07-02

## 2023-07-02 LAB
ALBUMIN SERPL-MCNC: 4.1 G/DL (ref 3.5–5)
ALBUMIN/GLOB SERPL: 1.1 RATIO (ref 1.1–2)
ALP SERPL-CCNC: 68 UNIT/L (ref 40–150)
ALT SERPL-CCNC: 53 UNIT/L (ref 0–55)
APPEARANCE UR: CLEAR
AST SERPL-CCNC: 38 UNIT/L (ref 5–34)
BACTERIA #/AREA URNS AUTO: NORMAL /HPF
BASOPHILS # BLD AUTO: 0.01 X10(3)/MCL
BASOPHILS NFR BLD AUTO: 0.2 %
BILIRUB UR QL STRIP.AUTO: NEGATIVE MG/DL
BILIRUBIN DIRECT+TOT PNL SERPL-MCNC: 0.9 MG/DL
BUN SERPL-MCNC: 15.5 MG/DL (ref 7–18.7)
CALCIUM SERPL-MCNC: 9.5 MG/DL (ref 8.4–10.2)
CHLORIDE SERPL-SCNC: 98 MMOL/L (ref 98–107)
CO2 SERPL-SCNC: 32 MMOL/L (ref 22–29)
COLOR UR: ABNORMAL
CREAT SERPL-MCNC: 0.7 MG/DL (ref 0.55–1.02)
EOSINOPHIL # BLD AUTO: 0.03 X10(3)/MCL (ref 0–0.9)
EOSINOPHIL NFR BLD AUTO: 0.6 %
ERYTHROCYTE [DISTWIDTH] IN BLOOD BY AUTOMATED COUNT: 13.7 % (ref 11.5–17)
GFR SERPLBLD CREATININE-BSD FMLA CKD-EPI: >60 MLS/MIN/1.73/M2
GLOBULIN SER-MCNC: 3.8 GM/DL (ref 2.4–3.5)
GLUCOSE SERPL-MCNC: 126 MG/DL (ref 74–100)
GLUCOSE UR QL STRIP.AUTO: NEGATIVE MG/DL
HCT VFR BLD AUTO: 44.2 % (ref 37–47)
HGB BLD-MCNC: 14.1 G/DL (ref 12–16)
IMM GRANULOCYTES # BLD AUTO: 0.01 X10(3)/MCL (ref 0–0.04)
IMM GRANULOCYTES NFR BLD AUTO: 0.2 %
KETONES UR QL STRIP.AUTO: ABNORMAL MG/DL
LEUKOCYTE ESTERASE UR QL STRIP.AUTO: NEGATIVE UNIT/L
LIPASE SERPL-CCNC: 15 U/L
LYMPHOCYTES # BLD AUTO: 1 X10(3)/MCL (ref 0.6–4.6)
LYMPHOCYTES NFR BLD AUTO: 21.3 %
MAGNESIUM SERPL-MCNC: 1.8 MG/DL (ref 1.6–2.6)
MCH RBC QN AUTO: 28.3 PG (ref 27–31)
MCHC RBC AUTO-ENTMCNC: 31.9 G/DL (ref 33–36)
MCV RBC AUTO: 88.8 FL (ref 80–94)
MONOCYTES # BLD AUTO: 0.34 X10(3)/MCL (ref 0.1–1.3)
MONOCYTES NFR BLD AUTO: 7.2 %
NEUTROPHILS # BLD AUTO: 3.31 X10(3)/MCL (ref 2.1–9.2)
NEUTROPHILS NFR BLD AUTO: 70.5 %
NITRITE UR QL STRIP.AUTO: NEGATIVE
NRBC BLD AUTO-RTO: 0 %
PH UR STRIP.AUTO: 5.5 [PH]
PLATELET # BLD AUTO: 182 X10(3)/MCL (ref 130–400)
PMV BLD AUTO: 10.1 FL (ref 7.4–10.4)
POCT GLUCOSE: 123 MG/DL (ref 70–110)
POCT GLUCOSE: 134 MG/DL (ref 70–110)
POTASSIUM SERPL-SCNC: 5.3 MMOL/L (ref 3.5–5.1)
PROT SERPL-MCNC: 7.9 GM/DL (ref 6.4–8.3)
PROT UR QL STRIP.AUTO: NEGATIVE MG/DL
RBC # BLD AUTO: 4.98 X10(6)/MCL (ref 4.2–5.4)
RBC #/AREA URNS AUTO: <5 /HPF
RBC UR QL AUTO: NEGATIVE UNIT/L
SODIUM SERPL-SCNC: 140 MMOL/L (ref 136–145)
SP GR UR STRIP.AUTO: 1.02 (ref 1–1.03)
SQUAMOUS #/AREA URNS AUTO: <5 /HPF
TROPONIN I SERPL-MCNC: <0.01 NG/ML (ref 0–0.04)
TROPONIN I SERPL-MCNC: <0.01 NG/ML (ref 0–0.04)
UROBILINOGEN UR STRIP-ACNC: 1 MG/DL
WBC # SPEC AUTO: 4.7 X10(3)/MCL (ref 4.5–11.5)
WBC #/AREA URNS AUTO: <5 /HPF

## 2023-07-02 PROCEDURE — 25000242 PHARM REV CODE 250 ALT 637 W/ HCPCS: Performed by: NURSE PRACTITIONER

## 2023-07-02 PROCEDURE — 63600175 PHARM REV CODE 636 W HCPCS: Performed by: NURSE PRACTITIONER

## 2023-07-02 PROCEDURE — 83690 ASSAY OF LIPASE: CPT | Performed by: NURSE PRACTITIONER

## 2023-07-02 PROCEDURE — G0378 HOSPITAL OBSERVATION PER HR: HCPCS

## 2023-07-02 PROCEDURE — 93005 ELECTROCARDIOGRAM TRACING: CPT

## 2023-07-02 PROCEDURE — 96374 THER/PROPH/DIAG INJ IV PUSH: CPT

## 2023-07-02 PROCEDURE — 84484 ASSAY OF TROPONIN QUANT: CPT | Performed by: NURSE PRACTITIONER

## 2023-07-02 PROCEDURE — 93010 EKG 12-LEAD: ICD-10-PCS | Mod: ,,, | Performed by: INTERNAL MEDICINE

## 2023-07-02 PROCEDURE — 84484 ASSAY OF TROPONIN QUANT: CPT | Mod: 91 | Performed by: PHYSICIAN ASSISTANT

## 2023-07-02 PROCEDURE — 63600175 PHARM REV CODE 636 W HCPCS: Performed by: INTERNAL MEDICINE

## 2023-07-02 PROCEDURE — 25000003 PHARM REV CODE 250: Performed by: PHYSICIAN ASSISTANT

## 2023-07-02 PROCEDURE — 93010 ELECTROCARDIOGRAM REPORT: CPT | Mod: ,,, | Performed by: INTERNAL MEDICINE

## 2023-07-02 PROCEDURE — 96361 HYDRATE IV INFUSION ADD-ON: CPT

## 2023-07-02 PROCEDURE — 25000003 PHARM REV CODE 250: Performed by: INTERNAL MEDICINE

## 2023-07-02 PROCEDURE — 25000003 PHARM REV CODE 250: Performed by: NURSE PRACTITIONER

## 2023-07-02 PROCEDURE — C9113 INJ PANTOPRAZOLE SODIUM, VIA: HCPCS | Performed by: INTERNAL MEDICINE

## 2023-07-02 PROCEDURE — 11000001 HC ACUTE MED/SURG PRIVATE ROOM

## 2023-07-02 PROCEDURE — 81001 URINALYSIS AUTO W/SCOPE: CPT | Performed by: NURSE PRACTITIONER

## 2023-07-02 PROCEDURE — 85025 COMPLETE CBC W/AUTO DIFF WBC: CPT | Performed by: NURSE PRACTITIONER

## 2023-07-02 PROCEDURE — 25500020 PHARM REV CODE 255: Performed by: NURSE PRACTITIONER

## 2023-07-02 PROCEDURE — 83735 ASSAY OF MAGNESIUM: CPT | Performed by: INTERNAL MEDICINE

## 2023-07-02 PROCEDURE — 80053 COMPREHEN METABOLIC PANEL: CPT | Performed by: NURSE PRACTITIONER

## 2023-07-02 PROCEDURE — 82962 GLUCOSE BLOOD TEST: CPT | Mod: 91

## 2023-07-02 PROCEDURE — 99285 EMERGENCY DEPT VISIT HI MDM: CPT | Mod: 25

## 2023-07-02 PROCEDURE — 96375 TX/PRO/DX INJ NEW DRUG ADDON: CPT

## 2023-07-02 RX ORDER — DIPHENHYDRAMINE HYDROCHLORIDE 50 MG/ML
25 INJECTION INTRAMUSCULAR; INTRAVENOUS ONCE
Status: COMPLETED | OUTPATIENT
Start: 2023-07-02 | End: 2023-07-02

## 2023-07-02 RX ORDER — MECLIZINE HYDROCHLORIDE 25 MG/1
25 TABLET ORAL
Status: COMPLETED | OUTPATIENT
Start: 2023-07-02 | End: 2023-07-02

## 2023-07-02 RX ORDER — ONDANSETRON 2 MG/ML
4 INJECTION INTRAMUSCULAR; INTRAVENOUS EVERY 4 HOURS PRN
Status: DISCONTINUED | OUTPATIENT
Start: 2023-07-02 | End: 2023-07-08 | Stop reason: HOSPADM

## 2023-07-02 RX ORDER — IBUPROFEN 200 MG
16 TABLET ORAL
Status: DISCONTINUED | OUTPATIENT
Start: 2023-07-02 | End: 2023-07-08 | Stop reason: HOSPADM

## 2023-07-02 RX ORDER — LOSARTAN POTASSIUM 100 MG/1
100 TABLET ORAL DAILY
COMMUNITY
Start: 2023-07-02 | End: 2023-08-10 | Stop reason: SDUPTHER

## 2023-07-02 RX ORDER — ACETAMINOPHEN 325 MG/1
650 TABLET ORAL EVERY 4 HOURS PRN
Status: DISCONTINUED | OUTPATIENT
Start: 2023-07-02 | End: 2023-07-08 | Stop reason: HOSPADM

## 2023-07-02 RX ORDER — CITALOPRAM 10 MG/1
10 TABLET ORAL NIGHTLY
Status: DISCONTINUED | OUTPATIENT
Start: 2023-07-02 | End: 2023-07-02

## 2023-07-02 RX ORDER — LORAZEPAM 2 MG/ML
1 INJECTION INTRAMUSCULAR
Status: COMPLETED | OUTPATIENT
Start: 2023-07-02 | End: 2023-07-02

## 2023-07-02 RX ORDER — ACETAMINOPHEN 500 MG
1000 TABLET ORAL EVERY 6 HOURS PRN
Status: DISCONTINUED | OUTPATIENT
Start: 2023-07-02 | End: 2023-07-02

## 2023-07-02 RX ORDER — POLYETHYLENE GLYCOL 3350 17 G/17G
17 POWDER, FOR SOLUTION ORAL 2 TIMES DAILY PRN
Status: DISCONTINUED | OUTPATIENT
Start: 2023-07-02 | End: 2023-07-08 | Stop reason: HOSPADM

## 2023-07-02 RX ORDER — IBUPROFEN 200 MG
24 TABLET ORAL
Status: DISCONTINUED | OUTPATIENT
Start: 2023-07-02 | End: 2023-07-08 | Stop reason: HOSPADM

## 2023-07-02 RX ORDER — PANTOPRAZOLE SODIUM 40 MG/1
40 TABLET, DELAYED RELEASE ORAL
Status: DISCONTINUED | OUTPATIENT
Start: 2023-07-03 | End: 2023-07-08 | Stop reason: HOSPADM

## 2023-07-02 RX ORDER — CITALOPRAM 10 MG/1
10 TABLET, FILM COATED ORAL NIGHTLY
Status: ON HOLD | COMMUNITY
Start: 2023-06-27 | End: 2023-09-23 | Stop reason: CLARIF

## 2023-07-02 RX ORDER — METOCLOPRAMIDE HYDROCHLORIDE 10 MG/1
10 TABLET ORAL 4 TIMES DAILY
Status: ON HOLD | COMMUNITY
Start: 2023-06-26 | End: 2023-09-27 | Stop reason: SDUPTHER

## 2023-07-02 RX ORDER — ACETAMINOPHEN 325 MG/1
650 TABLET ORAL EVERY 4 HOURS PRN
Status: DISCONTINUED | OUTPATIENT
Start: 2023-07-02 | End: 2023-07-02

## 2023-07-02 RX ORDER — METOCLOPRAMIDE HYDROCHLORIDE 5 MG/ML
10 INJECTION INTRAMUSCULAR; INTRAVENOUS EVERY 8 HOURS
Status: DISCONTINUED | OUTPATIENT
Start: 2023-07-02 | End: 2023-07-03

## 2023-07-02 RX ORDER — SODIUM CHLORIDE 9 MG/ML
INJECTION, SOLUTION INTRAVENOUS CONTINUOUS
Status: DISCONTINUED | OUTPATIENT
Start: 2023-07-02 | End: 2023-07-08 | Stop reason: HOSPADM

## 2023-07-02 RX ORDER — BUPRENORPHINE HYDROCHLORIDE, NALOXONE HYDROCHLORIDE 8; 2 MG/1; MG/1
1 FILM, SOLUBLE BUCCAL; SUBLINGUAL 3 TIMES DAILY
COMMUNITY
Start: 2023-06-29 | End: 2023-09-20

## 2023-07-02 RX ORDER — INSULIN ASPART 100 [IU]/ML
1-10 INJECTION, SOLUTION INTRAVENOUS; SUBCUTANEOUS
Status: DISCONTINUED | OUTPATIENT
Start: 2023-07-02 | End: 2023-07-08 | Stop reason: HOSPADM

## 2023-07-02 RX ORDER — MAG HYDROX/ALUMINUM HYD/SIMETH 200-200-20
30 SUSPENSION, ORAL (FINAL DOSE FORM) ORAL 4 TIMES DAILY PRN
Status: DISCONTINUED | OUTPATIENT
Start: 2023-07-02 | End: 2023-07-08 | Stop reason: HOSPADM

## 2023-07-02 RX ORDER — MAG HYDROX/ALUMINUM HYD/SIMETH 200-200-20
30 SUSPENSION, ORAL (FINAL DOSE FORM) ORAL ONCE
Status: COMPLETED | OUTPATIENT
Start: 2023-07-02 | End: 2023-07-02

## 2023-07-02 RX ORDER — PROCHLORPERAZINE EDISYLATE 5 MG/ML
5 INJECTION INTRAMUSCULAR; INTRAVENOUS EVERY 6 HOURS PRN
Status: DISCONTINUED | OUTPATIENT
Start: 2023-07-02 | End: 2023-07-08 | Stop reason: HOSPADM

## 2023-07-02 RX ORDER — SODIUM CHLORIDE 0.9 % (FLUSH) 0.9 %
10 SYRINGE (ML) INJECTION
Status: DISCONTINUED | OUTPATIENT
Start: 2023-07-02 | End: 2023-07-08 | Stop reason: HOSPADM

## 2023-07-02 RX ORDER — GLUCAGON 1 MG
1 KIT INJECTION
Status: DISCONTINUED | OUTPATIENT
Start: 2023-07-02 | End: 2023-07-08 | Stop reason: HOSPADM

## 2023-07-02 RX ORDER — SODIUM CHLORIDE 0.9 % (FLUSH) 0.9 %
10 SYRINGE (ML) INJECTION
Status: DISCONTINUED | OUTPATIENT
Start: 2023-07-02 | End: 2023-07-02

## 2023-07-02 RX ORDER — ENOXAPARIN SODIUM 100 MG/ML
40 INJECTION SUBCUTANEOUS EVERY 24 HOURS
Status: DISCONTINUED | OUTPATIENT
Start: 2023-07-03 | End: 2023-07-08 | Stop reason: HOSPADM

## 2023-07-02 RX ORDER — FAMOTIDINE 10 MG/ML
40 INJECTION INTRAVENOUS ONCE
Status: COMPLETED | OUTPATIENT
Start: 2023-07-02 | End: 2023-07-02

## 2023-07-02 RX ORDER — NALOXONE HCL 0.4 MG/ML
0.02 VIAL (ML) INJECTION
Status: DISCONTINUED | OUTPATIENT
Start: 2023-07-02 | End: 2023-07-08 | Stop reason: HOSPADM

## 2023-07-02 RX ORDER — ACETAMINOPHEN 500 MG
1000 TABLET ORAL EVERY 6 HOURS PRN
Status: DISCONTINUED | OUTPATIENT
Start: 2023-07-02 | End: 2023-07-08 | Stop reason: HOSPADM

## 2023-07-02 RX ORDER — ASPIRIN 325 MG
325 TABLET, DELAYED RELEASE (ENTERIC COATED) ORAL
Status: COMPLETED | OUTPATIENT
Start: 2023-07-02 | End: 2023-07-02

## 2023-07-02 RX ORDER — NITROGLYCERIN 0.4 MG/1
0.4 TABLET SUBLINGUAL
Status: COMPLETED | OUTPATIENT
Start: 2023-07-02 | End: 2023-07-02

## 2023-07-02 RX ORDER — SEMAGLUTIDE 1.34 MG/ML
INJECTION, SOLUTION SUBCUTANEOUS
COMMUNITY
Start: 2023-06-05 | End: 2023-08-10 | Stop reason: SINTOL

## 2023-07-02 RX ORDER — TALC
6 POWDER (GRAM) TOPICAL NIGHTLY PRN
Status: DISCONTINUED | OUTPATIENT
Start: 2023-07-02 | End: 2023-07-08 | Stop reason: HOSPADM

## 2023-07-02 RX ORDER — PANTOPRAZOLE SODIUM 40 MG/10ML
40 INJECTION, POWDER, LYOPHILIZED, FOR SOLUTION INTRAVENOUS DAILY
Status: DISCONTINUED | OUTPATIENT
Start: 2023-07-03 | End: 2023-07-02

## 2023-07-02 RX ORDER — PANTOPRAZOLE SODIUM 40 MG/10ML
40 INJECTION, POWDER, LYOPHILIZED, FOR SOLUTION INTRAVENOUS ONCE
Status: COMPLETED | OUTPATIENT
Start: 2023-07-02 | End: 2023-07-02

## 2023-07-02 RX ORDER — BUPRENORPHINE HYDROCHLORIDE 8 MG/1
8 TABLET SUBLINGUAL 3 TIMES DAILY
Status: DISCONTINUED | OUTPATIENT
Start: 2023-07-02 | End: 2023-07-08 | Stop reason: HOSPADM

## 2023-07-02 RX ORDER — AMOXICILLIN 250 MG
2 CAPSULE ORAL 2 TIMES DAILY PRN
Status: DISCONTINUED | OUTPATIENT
Start: 2023-07-02 | End: 2023-07-08 | Stop reason: HOSPADM

## 2023-07-02 RX ORDER — SUCRALFATE 1 G/1
1 TABLET ORAL
Status: DISCONTINUED | OUTPATIENT
Start: 2023-07-02 | End: 2023-07-08 | Stop reason: HOSPADM

## 2023-07-02 RX ORDER — ACETAMINOPHEN 325 MG/1
650 TABLET ORAL EVERY 6 HOURS PRN
Status: DISCONTINUED | OUTPATIENT
Start: 2023-07-02 | End: 2023-07-02

## 2023-07-02 RX ORDER — LEVOTHYROXINE SODIUM 100 UG/1
100 TABLET ORAL
Status: DISCONTINUED | OUTPATIENT
Start: 2023-07-03 | End: 2023-07-03

## 2023-07-02 RX ADMIN — SUCRALFATE 1 G: 1 TABLET ORAL at 08:07

## 2023-07-02 RX ADMIN — ALUMINUM HYDROXIDE, MAGNESIUM HYDROXIDE, AND SIMETHICONE 30 ML: 200; 200; 20 SUSPENSION ORAL at 08:07

## 2023-07-02 RX ADMIN — PANTOPRAZOLE SODIUM 40 MG: 40 INJECTION, POWDER, LYOPHILIZED, FOR SOLUTION INTRAVENOUS at 06:07

## 2023-07-02 RX ADMIN — FAMOTIDINE 40 MG: 10 INJECTION, SOLUTION INTRAVENOUS at 08:07

## 2023-07-02 RX ADMIN — IOPAMIDOL 100 ML: 755 INJECTION, SOLUTION INTRAVENOUS at 04:07

## 2023-07-02 RX ADMIN — SODIUM CHLORIDE: 9 INJECTION, SOLUTION INTRAVENOUS at 08:07

## 2023-07-02 RX ADMIN — ACETAMINOPHEN 650 MG: 325 TABLET, FILM COATED ORAL at 06:07

## 2023-07-02 RX ADMIN — LORAZEPAM 1 MG: 2 INJECTION INTRAMUSCULAR; INTRAVENOUS at 06:07

## 2023-07-02 RX ADMIN — ASPIRIN 325 MG: 325 TABLET, COATED ORAL at 05:07

## 2023-07-02 RX ADMIN — METOCLOPRAMIDE 10 MG: 5 INJECTION, SOLUTION INTRAMUSCULAR; INTRAVENOUS at 09:07

## 2023-07-02 RX ADMIN — DIPHENHYDRAMINE HYDROCHLORIDE 25 MG: 50 INJECTION INTRAMUSCULAR; INTRAVENOUS at 09:07

## 2023-07-02 RX ADMIN — MECLIZINE HYDROCHLORIDE 25 MG: 25 TABLET ORAL at 05:07

## 2023-07-02 RX ADMIN — SODIUM CHLORIDE, POTASSIUM CHLORIDE, SODIUM LACTATE AND CALCIUM CHLORIDE 1000 ML: 600; 310; 30; 20 INJECTION, SOLUTION INTRAVENOUS at 04:07

## 2023-07-02 RX ADMIN — NITROGLYCERIN 0.4 MG: 0.4 TABLET, ORALLY DISINTEGRATING SUBLINGUAL at 05:07

## 2023-07-02 NOTE — Clinical Note
Diagnosis: Dizziness [[id number]]   Future Attending Provider: CATARINA OTERO [[id number]]   Admitting Provider:: CATARINA OTERO [[id number]]   Special Needs:: No Special Needs [1]

## 2023-07-02 NOTE — ED PROVIDER NOTES
Encounter Date: 2023       History     Chief Complaint   Patient presents with    Abdominal Pain     Pt c/o abd pain and head pain and chest pain x 1 month. Also c/o cough x 3 days.  Dx gastroporesis within last month. [id number]      See MDM    The history is provided by the patient. No  was used.   Review of patient's allergies indicates:   Allergen Reactions    Vancomycin analogues Rash     Past Medical History:   Diagnosis Date    Diabetes mellitus, type 2     Hypertension     Thyroid disease      Past Surgical History:   Procedure Laterality Date    ABLATION       SECTION      CHOLECYSTECTOMY      TUBAL LIGATION       History reviewed. No pertinent family history.  Social History     Tobacco Use    Smoking status: Former     Types: Cigarettes    Smokeless tobacco: Never   Substance Use Topics    Alcohol use: Not Currently    Drug use: Never     Review of Systems   Constitutional:  Negative for fever.   Respiratory:  Positive for cough. Negative for shortness of breath.    Cardiovascular:  Positive for chest pain.   Gastrointestinal:  Positive for abdominal pain.   Genitourinary:  Negative for difficulty urinating and dysuria.   Musculoskeletal:  Negative for gait problem.   Skin:  Negative for color change.   Neurological:  Positive for dizziness and headaches. Negative for speech difficulty.   Psychiatric/Behavioral:  Negative for hallucinations and suicidal ideas.    All other systems reviewed and are negative.    Physical Exam     Initial Vitals [23 1341]   BP Pulse Resp Temp SpO2   112/66 (!) 54 16 97.7 °F (36.5 °C) 97 %      MAP       --         Physical Exam    Nursing note and vitals reviewed.  Constitutional: She appears well-developed and well-nourished.   HENT:   Head: Normocephalic.   Eyes: EOM are normal.   Neck:   Normal range of motion.  Cardiovascular:  Normal rate, regular rhythm, normal heart sounds and intact distal pulses.           Pulmonary/Chest: Breath  sounds normal. No respiratory distress.   Abdominal: Abdomen is soft. Bowel sounds are normal. There is no abdominal tenderness.   Musculoskeletal:         General: Normal range of motion.      Cervical back: Normal range of motion.     Neurological: She is alert and oriented to person, place, and time. She has normal strength.   Skin: Skin is warm and dry.   Psychiatric: She has a normal mood and affect. Her behavior is normal. Judgment and thought content normal.       ED Course   Procedures  Labs Reviewed   COMPREHENSIVE METABOLIC PANEL - Abnormal; Notable for the following components:       Result Value    Potassium Level 5.3 (*)     Carbon Dioxide 32 (*)     Glucose Level 126 (*)     Globulin 3.8 (*)     Aspartate Aminotransferase 38 (*)     All other components within normal limits   URINALYSIS, REFLEX TO URINE CULTURE - Abnormal; Notable for the following components:    Ketones, UA 4+ (*)     All other components within normal limits   CBC WITH DIFFERENTIAL - Abnormal; Notable for the following components:    MCHC 31.9 (*)     All other components within normal limits   LIPASE - Normal   TROPONIN I - Normal   URINALYSIS, MICROSCOPIC - Normal   CBC W/ AUTO DIFFERENTIAL    Narrative:     The following orders were created for panel order CBC Auto Differential.  Procedure                               Abnormality         Status                     ---------                               -----------         ------                     CBC with Differential[[id number]]        Abnormal            Final result                 Please view results for these tests on the individual orders.          Imaging Results              CTA Chest Non-Coronary (PE Studies) (Final result)  Result time 07/02/23 17:01:41      Final result by Osiel Fuller MD (07/02/23 17:01:41)                   Impression:      No evidence for pulmonary embolism.  Other secondary findings as noted.      Electronically signed by: Osiel Fuller  MD  Date:    07/02/2023  Time:    17:01               Narrative:    EXAMINATION:  CTA CHEST NON CORONARY (PE STUDIES)    CLINICAL HISTORY:  Pulmonary embolism (PE) suspected, high prob;    TECHNIQUE:  Multiple cross-sectional images were obtained from the lung apices to the upper abdomen after the intravenous administration 100 mL of Omnipaque 350.  Coronal and sagittal reformatted images were obtained.  An automated dose exposure technique was utilized this limits radiation does the patient.  MIP images were obtained.    COMPARISON:  07/02/2023    FINDINGS:  Heart size within normal limits.  No evidence for reflux of contrast in the IVC.  No shift of the interventricular septum.  The course and caliber of the thoracic aorta is normal.  A 2 vessel aortic arch is identified with a great vessels being widely patent.  The main pulmonary artery is normal caliber.  Adequate opacification is identified without evidence for central or distal filling defect.  No evidence for hilar or mediastinal adenopathy.  Likely shotty lymph nodes are identified as well as remanent thymic tissue.    Dependent atelectatic changes lungs.  No area of consolidation.  No pulmonary infarct.  No pleural thickening or pleural effusion.  The trachea and airways are patent.    Hollow and solid viscera of the upper abdomen are grossly normal.  The liver is enlarged.  Spleen is also enlarged.  No suspicious osseous lesions.  Spondylotic changes are identified.  The soft tissues are grossly normal.                                       CT Head Without Contrast (Final result)  Result time 07/02/23 16:59:08      Final result by Osiel Fuller MD (07/02/23 16:59:08)                   Impression:      No acute intracranial abnormality.      Electronically signed by: Osiel Fuller MD  Date:    07/02/2023  Time:    16:59               Narrative:    EXAMINATION:  CT HEAD WITHOUT CONTRAST    CLINICAL HISTORY:  Dizziness, persistent/recurrent, cardiac or  vascular cause suspected;    TECHNIQUE:  Low dose axial CT images obtained throughout the head without intravenous contrast. Sagittal and coronal reconstructions were performed.  An automated dose exposure technique was utilized this limits radiation does the patient.    COMPARISON:  None.    FINDINGS:  Intracranial compartment:    Ventricles and sulci are normal in size for age without evidence of hydrocephalus. No extra-axial blood or fluid collections.    The brain parenchyma appears normal. No parenchymal mass, hemorrhage, edema or major vascular distribution infarct.    Skull/extracranial contents (limited evaluation): No fracture. Paranasal sinus disease is identified.                                       X-Ray Chest PA And Lateral (Final result)  Result time 07/02/23 14:15:50      Final result by Osiel Fuller MD (07/02/23 14:15:50)                   Impression:      No acute abnormality.      Electronically signed by: Osiel Fuller MD  Date:    07/02/2023  Time:    14:15               Narrative:    EXAMINATION:  XR CHEST PA AND LATERAL    CLINICAL HISTORY:  Chest pain, unspecified    TECHNIQUE:  PA and lateral views of the chest were performed.    COMPARISON:  None    FINDINGS:  The lungs are clear, with normal appearance of pulmonary vasculature and no pleural effusion or pneumothorax.    The cardiac silhouette is normal in size. The hilar and mediastinal contours are unremarkable.    Bones are intact.                                       Medications   lactated ringers bolus 1,000 mL (0 mLs Intravenous Stopped 7/2/23 1717)   iopamidoL (ISOVUE-370) injection 100 mL (100 mLs Intravenous Given 7/2/23 1658)   meclizine tablet 25 mg (25 mg Oral Given 7/2/23 1724)   aspirin EC tablet 325 mg (325 mg Oral Given 7/2/23 1724)   nitroGLYCERIN SL tablet 0.4 mg (0.4 mg Sublingual Given 7/2/23 1724)     Medical Decision Making:   Initial Assessment:   Historian:  Patient.  Patient is a 42-year-old female  that presents  with abdominal pain, chest pain, dizziness that has been present over 1 month. Associated symptoms patient states she is getting worse. Surrounding information is she has been seen by GI and had a colonoscopy and EGD and she is also had a CT scan of her abdomen and pelvis. Exacerbated by nothing. Relieved by nothing. Patient treatment prior to arrival none. Risk factors include none. Other history pertaining to this complaint nothing.   Assessment:  See physical exam.    Differential Diagnosis:   Gastritis, colitis, MI, acute coronary syndrome, vertigo  ED Management:  History was obtained.  Physical performed.  I did review the records from [facility].  Patient's lab studies unremarkable.  CT of head, CT PE protocol, and chest x-ray were without acute findings.  EKG did show bradycardia but normal sinus rhythm.  She did have mild orthostatic vital signs.  Fluids were given.  Due to her chest pain I will give her aspirin and nitroglycerin and put her in observation with [facility].  Spoke to Branden NIETO with Hospital Medicine and they accept the patient.  I did speak to Dr. Gomez the emergency room physician and she agrees with this plan and will perform a face-to-face interaction.  No social determinants that affect healthcare were noted.  Additional MDM:     ELIE Score:   Age over 65:                                    0.00   > or = to 3 CAD risk factors:           0.00  Established CAD:                            0.00  > or = to 2 anginal events in the past 24 hours: 0.00  Use of ASA in past 7 days:              0.00  Elevated Enzymes:                         0.00  ST Depression > or = to 0.05 mV:  0.00  ELIE score: 0        ED Course as of 07/02/23 1737   Sun Jul 02, 2023   1719 Chemistry with mild alkalosis, likely due to for oral intake, ketonuria with likely similar etiology, minimal hyperkalemia.  IV fluids have been given, no indication for specific treatment of electrolyte derangements.   Patient with intractable symptoms, now complaining of chest pain as well.  NP discussed with patient, will admit for symptom control, observation, trending cardiac enzymes, gradual progression of diet until tolerating oral intake. [KS]      ED Course User Index  [KS] Isabel Gomez MD                 Clinical Impression:   Final diagnoses:  [R07.9] Chest pain  [R42] Dizziness (Primary)        ED Disposition Condition    Observation Stable                ALIYA Adams  07/02/23 [id number]

## 2023-07-02 NOTE — FIRST PROVIDER EVALUATION
"Medical screening examination initiated.  I have conducted a focused provider triage encounter, findings are as follows:    Brief history of present illness:  43y/o F presents to the ED with abdominal pain/chest pain. Diaphoretic upon arrival.      Vitals:    07/02/23 1341   BP: 112/66   Pulse: (!) 54   Resp: 16   Temp: 97.7 °F (36.5 °C)   SpO2: 97%   Weight: 79.4 kg (175 lb)   Height: 5' 8" (1.727 m)       Pertinent physical exam:  AAA x     Brief workup plan:  Labs/EKG/Imaging    Preliminary workup initiated; this workup will be continued and followed by the physician or advanced practice provider that is assigned to the patient when roomed.  "

## 2023-07-03 PROBLEM — E86.9 VOLUME DEPLETION: Status: ACTIVE | Noted: 2023-07-03

## 2023-07-03 PROBLEM — R42 ORTHOSTATIC LIGHTHEADEDNESS: Status: ACTIVE | Noted: 2023-07-03

## 2023-07-03 LAB
ALBUMIN SERPL-MCNC: 3.4 G/DL (ref 3.5–5)
ALBUMIN/GLOB SERPL: 1.5 RATIO (ref 1.1–2)
ALP SERPL-CCNC: 54 UNIT/L (ref 40–150)
ALT SERPL-CCNC: 34 UNIT/L (ref 0–55)
AST SERPL-CCNC: 20 UNIT/L (ref 5–34)
BASOPHILS # BLD AUTO: 0.01 X10(3)/MCL
BASOPHILS NFR BLD AUTO: 0.3 %
BILIRUBIN DIRECT+TOT PNL SERPL-MCNC: 0.6 MG/DL
BUN SERPL-MCNC: 14.4 MG/DL (ref 7–18.7)
CALCIUM SERPL-MCNC: 8.6 MG/DL (ref 8.4–10.2)
CHLORIDE SERPL-SCNC: 104 MMOL/L (ref 98–107)
CO2 SERPL-SCNC: 32 MMOL/L (ref 22–29)
CREAT SERPL-MCNC: 0.62 MG/DL (ref 0.55–1.02)
EOSINOPHIL # BLD AUTO: 0.06 X10(3)/MCL (ref 0–0.9)
EOSINOPHIL NFR BLD AUTO: 1.6 %
ERYTHROCYTE [DISTWIDTH] IN BLOOD BY AUTOMATED COUNT: 13.4 % (ref 11.5–17)
EST. AVERAGE GLUCOSE BLD GHB EST-MCNC: 122.6 MG/DL
GFR SERPLBLD CREATININE-BSD FMLA CKD-EPI: >60 MLS/MIN/1.73/M2
GLOBULIN SER-MCNC: 2.3 GM/DL (ref 2.4–3.5)
GLUCOSE SERPL-MCNC: 91 MG/DL (ref 74–100)
HBA1C MFR BLD: 5.9 %
HCT VFR BLD AUTO: 34.8 % (ref 37–47)
HGB BLD-MCNC: 11.3 G/DL (ref 12–16)
IMM GRANULOCYTES # BLD AUTO: 0.01 X10(3)/MCL (ref 0–0.04)
IMM GRANULOCYTES NFR BLD AUTO: 0.3 %
LYMPHOCYTES # BLD AUTO: 1.54 X10(3)/MCL (ref 0.6–4.6)
LYMPHOCYTES NFR BLD AUTO: 40.7 %
MAGNESIUM SERPL-MCNC: 1.8 MG/DL (ref 1.6–2.6)
MCH RBC QN AUTO: 28.1 PG (ref 27–31)
MCHC RBC AUTO-ENTMCNC: 32.5 G/DL (ref 33–36)
MCV RBC AUTO: 86.6 FL (ref 80–94)
MONOCYTES # BLD AUTO: 0.34 X10(3)/MCL (ref 0.1–1.3)
MONOCYTES NFR BLD AUTO: 9 %
NEUTROPHILS # BLD AUTO: 1.82 X10(3)/MCL (ref 2.1–9.2)
NEUTROPHILS NFR BLD AUTO: 48.1 %
NRBC BLD AUTO-RTO: 0 %
PHOSPHATE SERPL-MCNC: 3.5 MG/DL (ref 2.3–4.7)
PLATELET # BLD AUTO: 164 X10(3)/MCL (ref 130–400)
PMV BLD AUTO: 10.2 FL (ref 7.4–10.4)
POCT GLUCOSE: 109 MG/DL (ref 70–110)
POCT GLUCOSE: 86 MG/DL (ref 70–110)
POCT GLUCOSE: 99 MG/DL (ref 70–110)
POTASSIUM SERPL-SCNC: 3.6 MMOL/L (ref 3.5–5.1)
PROT SERPL-MCNC: 5.7 GM/DL (ref 6.4–8.3)
RBC # BLD AUTO: 4.02 X10(6)/MCL (ref 4.2–5.4)
SODIUM SERPL-SCNC: 142 MMOL/L (ref 136–145)
T4 FREE SERPL-MCNC: 0.78 NG/DL (ref 0.7–1.48)
TSH SERPL-ACNC: 13.53 UIU/ML (ref 0.35–4.94)
WBC # SPEC AUTO: 3.78 X10(3)/MCL (ref 4.5–11.5)

## 2023-07-03 PROCEDURE — G0378 HOSPITAL OBSERVATION PER HR: HCPCS

## 2023-07-03 PROCEDURE — 83735 ASSAY OF MAGNESIUM: CPT | Performed by: INTERNAL MEDICINE

## 2023-07-03 PROCEDURE — 85025 COMPLETE CBC W/AUTO DIFF WBC: CPT | Performed by: PHYSICIAN ASSISTANT

## 2023-07-03 PROCEDURE — 80053 COMPREHEN METABOLIC PANEL: CPT | Performed by: PHYSICIAN ASSISTANT

## 2023-07-03 PROCEDURE — 83036 HEMOGLOBIN GLYCOSYLATED A1C: CPT | Performed by: INTERNAL MEDICINE

## 2023-07-03 PROCEDURE — 25000003 PHARM REV CODE 250: Performed by: NURSE PRACTITIONER

## 2023-07-03 PROCEDURE — 63600175 PHARM REV CODE 636 W HCPCS: Performed by: INTERNAL MEDICINE

## 2023-07-03 PROCEDURE — 25000003 PHARM REV CODE 250: Performed by: INTERNAL MEDICINE

## 2023-07-03 PROCEDURE — 84439 ASSAY OF FREE THYROXINE: CPT | Performed by: INTERNAL MEDICINE

## 2023-07-03 PROCEDURE — 84443 ASSAY THYROID STIM HORMONE: CPT | Performed by: INTERNAL MEDICINE

## 2023-07-03 PROCEDURE — 84100 ASSAY OF PHOSPHORUS: CPT | Performed by: INTERNAL MEDICINE

## 2023-07-03 PROCEDURE — 96372 THER/PROPH/DIAG INJ SC/IM: CPT | Performed by: INTERNAL MEDICINE

## 2023-07-03 PROCEDURE — 21400001 HC TELEMETRY ROOM

## 2023-07-03 RX ORDER — LEVOTHYROXINE SODIUM 125 UG/1
125 TABLET ORAL
Status: DISCONTINUED | OUTPATIENT
Start: 2023-07-04 | End: 2023-07-03

## 2023-07-03 RX ORDER — METOCLOPRAMIDE 10 MG/1
10 TABLET ORAL
Status: DISCONTINUED | OUTPATIENT
Start: 2023-07-03 | End: 2023-07-08 | Stop reason: HOSPADM

## 2023-07-03 RX ORDER — ALPRAZOLAM 0.25 MG/1
0.25 TABLET ORAL 3 TIMES DAILY PRN
Status: DISCONTINUED | OUTPATIENT
Start: 2023-07-03 | End: 2023-07-08 | Stop reason: HOSPADM

## 2023-07-03 RX ORDER — LEVOTHYROXINE SODIUM 125 UG/1
125 TABLET ORAL
Status: DISCONTINUED | OUTPATIENT
Start: 2023-07-03 | End: 2023-07-08 | Stop reason: HOSPADM

## 2023-07-03 RX ADMIN — SUCRALFATE 1 G: 1 TABLET ORAL at 11:07

## 2023-07-03 RX ADMIN — METOCLOPRAMIDE 10 MG: 10 TABLET ORAL at 03:07

## 2023-07-03 RX ADMIN — PANTOPRAZOLE SODIUM 40 MG: 40 TABLET, DELAYED RELEASE ORAL at 07:07

## 2023-07-03 RX ADMIN — SUCRALFATE 1 G: 1 TABLET ORAL at 07:07

## 2023-07-03 RX ADMIN — ENOXAPARIN SODIUM 40 MG: 40 INJECTION SUBCUTANEOUS at 05:07

## 2023-07-03 RX ADMIN — ALPRAZOLAM 0.25 MG: 0.25 TABLET ORAL at 03:07

## 2023-07-03 RX ADMIN — BUPRENORPHINE 8 MG: 8 TABLET SUBLINGUAL at 10:07

## 2023-07-03 RX ADMIN — ACETAMINOPHEN 1000 MG: 500 TABLET, FILM COATED ORAL at 03:07

## 2023-07-03 RX ADMIN — BUPRENORPHINE 8 MG: 8 TABLET SUBLINGUAL at 03:07

## 2023-07-03 RX ADMIN — METOCLOPRAMIDE 10 MG: 10 TABLET ORAL at 11:07

## 2023-07-03 RX ADMIN — SUCRALFATE 1 G: 1 TABLET ORAL at 09:07

## 2023-07-03 RX ADMIN — METOCLOPRAMIDE 10 MG: 10 TABLET ORAL at 07:07

## 2023-07-03 RX ADMIN — LEVOTHYROXINE SODIUM 125 MCG: 125 TABLET ORAL at 07:07

## 2023-07-03 RX ADMIN — PANTOPRAZOLE SODIUM 40 MG: 40 TABLET, DELAYED RELEASE ORAL at 03:07

## 2023-07-03 RX ADMIN — SUCRALFATE 1 G: 1 TABLET ORAL at 03:07

## 2023-07-03 RX ADMIN — SODIUM CHLORIDE: 9 INJECTION, SOLUTION INTRAVENOUS at 03:07

## 2023-07-03 NOTE — CONSULTS
Inpatient consult to Cardiology  Consult performed by: Natalie De Leon MD  Consult ordered by: Alex Basurto MD  Reason for consult: Chest Pain      [facility] - [facility]  Cardiology  Consult Note    Patient Name: Fred Berman  MRN: [id number]  Admission Date: 2023  Hospital Length of Stay: 0 days  Code Status: Full Code   Attending Provider: Natalie De Leon MD   Consulting Provider: Alex Basurto MD  Primary Care Physician: Emmanuelle Rich NP  Principal Problem:Gastroparesis    Patient information was obtained from patient, past medical records, ER records, and primary team.     Subjective:     Chief Complaint: Reason for Consult: Chest Pain     HPI: Ms. Berman is a 43 y/o female with a history of bradycardia, hypothyroidism, DM II, HTN, and Gastroparesis, who is unknown to [facility]. She presented to the ER on 23 with complaints of chest pain, abdominal pain, and head pain x1 month. Significant labs include H&H 11.3/34.8 and TSH 13.531. CT Head, CT Abd Pelvis, and CTA Chest all unremarkable. CXR negative. EKG shows sinus bradycardia. [facility] was consulted for Chest Pain.    PMH: Bradycardia, Hypothyroidism, DM II, HTN, Gastroparesis  PSH: Ablation, , Cholecystectomy, Tubal Ligation  Family History: Father - Heart disease; Sister - heart disease   Social History: Former Smoker. Denies illicit drug use and alcohol use.     Previous Cardiac Diagnostics:   ECHO (23):  Normal LV function EF 55%. Normal filling pressure. Insufficient TR jet for RVSP estimation.    Holter Monitor (23):    Sinus bradycardia. No significant arrhythmias, AV block, or pauses.    Review of patient's allergies indicates:   Allergen Reactions    Vancomycin analogues Rash     No current facility-administered medications on file prior to encounter.     Current Outpatient Medications on File Prior to Encounter   Medication Sig    levothyroxine (SYNTHROID) 100 MCG tablet Take 100 mcg by mouth  "before breakfast.    SUBOXONE 8-2 mg Place 1 Film under the tongue 3 (three) times daily.    CELEXA 10 mg tablet Take 10 mg by mouth every evening.    LANTUS SOLOSTAR U-100 INSULIN glargine 100 units/mL SubQ pen Inject 30 Units into the skin every evening.    losartan (COZAAR) 100 MG tablet Take 100 mg by mouth once daily.    OZEMPIC 1 mg/dose (4 mg/3 mL) Inject into the skin every 7 days.    pen needle, diabetic 31 gauge x 5/16" Ndle BD Ultra-Fine Short Pen Needle 31 gauge x 5/16"   USE 1 PENTIP WITH LANTUS ONCE A DAY AS DIRECTED    REGLAN 10 mg tablet Take 10 mg by mouth 4 (four) times daily.     Review of Systems   Respiratory:  Negative for chest tightness and shortness of breath.    Cardiovascular:  Positive for chest pain. Negative for palpitations and leg swelling.   Neurological:  Positive for dizziness and light-headedness.   All other systems reviewed and are negative.    Objective:     Vital Signs (Most Recent):  Temp: 98.2 °F (36.8 °C) (07/03/23 0801)  Pulse: (!) 54 (07/03/23 0801)  Resp: 18 (07/03/23 0801)  BP: 125/82 (07/03/23 0801)  SpO2: (!) 93 % (07/03/23 0801) Vital Signs (24h Range):  Temp:  [97.7 °F (36.5 °C)-98.2 °F (36.8 °C)] 98.2 °F (36.8 °C)  Pulse:  [49-67] 54  Resp:  [12-20] 18  SpO2:  [92 %-97 %] 93 %  BP: (104-134)/(65-84) 125/82     Weight: 79.3 kg (174 lb 13.2 oz)  Body mass index is 26.58 kg/m².    SpO2: (!) 93 %       No intake or output data in the 24 hours ending 07/03/23 0809    Lines/Drains/Airways       Peripheral Intravenous Line  Duration                  Peripheral IV - Single Lumen 07/02/23 1500 20 G Left Antecubital <1 day                  Significant Labs:  Recent Results (from the past 72 hour(s))   Comprehensive metabolic panel    Collection Time: 07/02/23  2:42 PM   Result Value Ref Range    Sodium Level 140 136 - 145 mmol/L    Potassium Level 5.3 (H) 3.5 - 5.1 mmol/L    Chloride 98 98 - 107 mmol/L    Carbon Dioxide 32 (H) 22 - 29 mmol/L    Glucose Level 126 (H) 74 - 100 " mg/dL    Blood Urea Nitrogen 15.5 7.0 - 18.7 mg/dL    Creatinine 0.70 0.55 - 1.02 mg/dL    Calcium Level Total 9.5 8.4 - 10.2 mg/dL    Protein Total 7.9 6.4 - 8.3 gm/dL    Albumin Level 4.1 3.5 - 5.0 g/dL    Globulin 3.8 (H) 2.4 - 3.5 gm/dL    Albumin/Globulin Ratio 1.1 1.1 - 2.0 ratio    Bilirubin Total 0.9 <=1.5 mg/dL    Alkaline Phosphatase 68 40 - 150 unit/L    Alanine Aminotransferase 53 0 - 55 unit/L    Aspartate Aminotransferase 38 (H) 5 - 34 unit/L    eGFR >60 mls/min/1.73/m2   Lipase    Collection Time: 07/02/23  2:42 PM   Result Value Ref Range    Lipase Level 15 <=60 U/L   Troponin I    Collection Time: 07/02/23  2:42 PM   Result Value Ref Range    Troponin-I <0.010 0.000 - 0.045 ng/mL   CBC with Differential    Collection Time: 07/02/23  2:42 PM   Result Value Ref Range    WBC 4.70 4.50 - 11.50 x10(3)/mcL    RBC 4.98 4.20 - 5.40 x10(6)/mcL    Hgb 14.1 12.0 - 16.0 g/dL    Hct 44.2 37.0 - 47.0 %    MCV 88.8 80.0 - 94.0 fL    MCH 28.3 27.0 - 31.0 pg    MCHC 31.9 (L) 33.0 - 36.0 g/dL    RDW 13.7 11.5 - 17.0 %    Platelet 182 130 - 400 x10(3)/mcL    MPV 10.1 7.4 - 10.4 fL    Neut % 70.5 %    Lymph % 21.3 %    Mono % 7.2 %    Eos % 0.6 %    Basophil % 0.2 %    Lymph # 1.00 0.6 - 4.6 x10(3)/mcL    Neut # 3.31 2.1 - 9.2 x10(3)/mcL    Mono # 0.34 0.1 - 1.3 x10(3)/mcL    Eos # 0.03 0 - 0.9 x10(3)/mcL    Baso # 0.01 <=0.2 x10(3)/mcL    IG# 0.01 0 - 0.04 x10(3)/mcL    IG% 0.2 %    NRBC% 0.0 %   Urinalysis, Reflex to Urine Culture    Collection Time: 07/02/23  2:46 PM    Specimen: Urine   Result Value Ref Range    Color, UA Dark Yellow Yellow, Light-Yellow, Dark Yellow, Gris, Straw    Appearance, UA Clear Clear    Specific Gravity, UA 1.025 1.005 - 1.030    pH, UA 5.5 5.0 - 8.5    Protein, UA Negative Negative mg/dL    Glucose, UA Negative Negative, Normal mg/dL    Ketones, UA 4+ (A) Negative mg/dL    Blood, UA Negative Negative unit/L    Bilirubin, UA Negative Negative mg/dL    Urobilinogen, UA 1.0 0.2, 1.0, Normal  mg/dL    Nitrites, UA Negative Negative    Leukocyte Esterase, UA Negative Negative unit/L   Urinalysis, Microscopic    Collection Time: 07/02/23  2:46 PM   Result Value Ref Range    RBC, UA <5 <=5 /HPF    WBC, UA <5 <=5 /HPF    Squamous Epithelial Cells, UA <5 <=5 /HPF    Bacteria, UA None Seen None Seen, Rare, Occasional /HPF   Troponin I    Collection Time: 07/02/23  6:29 PM   Result Value Ref Range    Troponin-I <0.010 0.000 - 0.045 ng/mL   Magnesium    Collection Time: 07/02/23  6:29 PM   Result Value Ref Range    Magnesium Level 1.80 1.60 - 2.60 mg/dL   POCT glucose    Collection Time: 07/02/23  6:32 PM   Result Value Ref Range    POCT Glucose 134 (H) 70 - 110 mg/dL   POCT glucose    Collection Time: 07/02/23  6:50 PM   Result Value Ref Range    POCT Glucose 123 (H) 70 - 110 mg/dL   Comprehensive Metabolic Panel    Collection Time: 07/03/23  5:15 AM   Result Value Ref Range    Sodium Level 142 136 - 145 mmol/L    Potassium Level 3.6 3.5 - 5.1 mmol/L    Chloride 104 98 - 107 mmol/L    Carbon Dioxide 32 (H) 22 - 29 mmol/L    Glucose Level 91 74 - 100 mg/dL    Blood Urea Nitrogen 14.4 7.0 - 18.7 mg/dL    Creatinine 0.62 0.55 - 1.02 mg/dL    Calcium Level Total 8.6 8.4 - 10.2 mg/dL    Protein Total 5.7 (L) 6.4 - 8.3 gm/dL    Albumin Level 3.4 (L) 3.5 - 5.0 g/dL    Globulin 2.3 (L) 2.4 - 3.5 gm/dL    Albumin/Globulin Ratio 1.5 1.1 - 2.0 ratio    Bilirubin Total 0.6 <=1.5 mg/dL    Alkaline Phosphatase 54 40 - 150 unit/L    Alanine Aminotransferase 34 0 - 55 unit/L    Aspartate Aminotransferase 20 5 - 34 unit/L    eGFR >60 mls/min/1.73/m2   Phosphorus    Collection Time: 07/03/23  5:15 AM   Result Value Ref Range    Phosphorus Level 3.5 2.3 - 4.7 mg/dL   Magnesium    Collection Time: 07/03/23  5:15 AM   Result Value Ref Range    Magnesium Level 1.80 1.60 - 2.60 mg/dL   Hemoglobin A1C    Collection Time: 07/03/23  5:15 AM   Result Value Ref Range    Hemoglobin A1c 5.9 <=7.0 %    Estimated Average Glucose 122.6 mg/dL    CBC with Differential    Collection Time: 07/03/23  5:15 AM   Result Value Ref Range    WBC 3.78 (L) 4.50 - 11.50 x10(3)/mcL    RBC 4.02 (L) 4.20 - 5.40 x10(6)/mcL    Hgb 11.3 (L) 12.0 - 16.0 g/dL    Hct 34.8 (L) 37.0 - 47.0 %    MCV 86.6 80.0 - 94.0 fL    MCH 28.1 27.0 - 31.0 pg    MCHC 32.5 (L) 33.0 - 36.0 g/dL    RDW 13.4 11.5 - 17.0 %    Platelet 164 130 - 400 x10(3)/mcL    MPV 10.2 7.4 - 10.4 fL    Neut % 48.1 %    Lymph % 40.7 %    Mono % 9.0 %    Eos % 1.6 %    Basophil % 0.3 %    Lymph # 1.54 0.6 - 4.6 x10(3)/mcL    Neut # 1.82 (L) 2.1 - 9.2 x10(3)/mcL    Mono # 0.34 0.1 - 1.3 x10(3)/mcL    Eos # 0.06 0 - 0.9 x10(3)/mcL    Baso # 0.01 <=0.2 x10(3)/mcL    IG# 0.01 0 - 0.04 x10(3)/mcL    IG% 0.3 %    NRBC% 0.0 %   TSH    Collection Time: 07/03/23  5:15 AM   Result Value Ref Range    Thyroid Stimulating Hormone 13.531 (H) 0.350 - 4.940 uIU/mL       Significant Imaging:  Imaging Results              CT Abdomen Pelvis  Without Contrast (Final result)  Result time 07/03/23 07:50:07      Final result by Bradley Perez MD (07/03/23 07:50:07)                   Impression:    Impression:    1. No acute intraabdominal or pelvic solid organ or bowel pathology identified. Details and other findings as discussed above.      Electronically signed by: Bradley Perez  Date:    07/03/2023  Time:    07:50               Narrative:    EXAMINATION:  CT ABDOMEN PELVIS WITHOUT CONTRAST    CLINICAL HISTORY:  Abdominal pain, acute, nonlocalized;    TECHNIQUE:  Multidetector non-contrast axial CT images of the abdomen and pelvis were obtained with coronal and sagittal reconstructions.    Automatic exposure control was utilized to reduce the patient's radiation dose.    DLP= 261    COMPARISON:  No prior imaging available for comparison.    FINDINGS:  Lines and Tubes: None.    Thorax:    Lungs: There is mild nonspecific dependent change at the lung bases.    Liver: The liver appears unremarkable.    Gallbladder: Surgical clips are  seen in the gallbladder fossa consistent with prior cholecystectomy.    Pancreas: The pancreas appears unremarkable.    Spleen: The spleen appears unremarkable.    Adrenals: A soft tissue nodule is seen in the lateral limb of the left adrenal gland measuring 14 x 10 mm (APxT). This may represent an adenoma. The right adrenal gland is unremarkable.    Kidneys: The kidneys appear unremarkable with no stones cysts masses or hydronephrosis There is contrast in the collecting systems decreasing sensitivity and specificity for stones.    Aorta: There is minimal calcification of the abdominal aorta.    IVC: Unremarkable.    Bowel:    Stomach: The stomach appears unremarkable.    Duodenum: Unremarkable appearing duodenum.    Small Bowel: The small bowel appears unremarkable.    Colon: There is moderate stool in the cecum ascending descending and sigmoid colon which could reflect an element of constipation.    Appendix: The appendix appears unremarkable and is seen on Image 53, Series 6 through Image 39, Series 6.    Peritoneum: No intraperitoneal free air or ascites is seen.    Pelvis:    Bladder: The bladder is distended by contrast precluding its evaluation.    Female:    Uterus: The uterus appears unremarkable.    Ovaries: The ovaries appear unremarkable.    Bony structures:    Dorsal Spine: There is moderate spondylosis of the visualized dorsal spine.                        Preliminary result by Bradley Perez MD (07/02/23 19:51:30)                   Narrative:    START OF REPORT:  Technique: CT of the abdomen and pelvis was performed with axial images as well as sagittal and coronal reconstruction images without intravenous contrast.    Comparison: None available.    Clinical History: Pt c/o abd pain and head pain and chest pain x 1 month. Also c/o cough x 3 days. Dx gastroporesis within last month.    Dosage Information: Automated Exposure Control was utilized.    Findings:  Lines and Tubes: None.  Thorax:  Lungs:  There is mild nonspecific dependent change at the lung bases.  Liver: The liver appears unremarkable.  Gallbladder: Surgical clips are seen in the gallbladder fossa consistent with prior cholecystectomy.  Pancreas: The pancreas appears unremarkable.  Spleen: The spleen appears unremarkable.  Adrenals: A soft tissue nodule is seen in the lateral limb of the left adrenal gland measuring 14 x 10 mm (APxT). This may represent an adenoma. The right adrenal gland is unremarkable.  Kidneys: The kidneys appear unremarkable with no stones cysts masses or hydronephrosis There is contrast in the collecting systems decreasing sensitivity and specificity for stones.  Aorta: There is minimal calcification of the abdominal aorta.  IVC: Unremarkable.  Bowel:  Stomach: The stomach appears unremarkable.  Duodenum: Unremarkable appearing duodenum.  Small Bowel: The small bowel appears unremarkable.  Colon: There is moderate stool in the cecum ascending descending and sigmoid colon which could reflect an element of constipation.  Appendix: The appendix appears unremarkable and is seen on Image 53, Series 6 through Image 39, Series 6.  Peritoneum: No intraperitoneal free air or ascites is seen.    Pelvis:  Bladder: The bladder is distended by contrast precluding its evaluation.  Female:  Uterus: The uterus appears unremarkable.  Ovaries: The ovaries appear unremarkable.    Bony structures:  Dorsal Spine: There is moderate spondylosis of the visualized dorsal spine.      Impression:  1. No acute intraabdominal or pelvic solid organ or bowel pathology identified. Details and other findings as discussed above.                                         CTA Chest Non-Coronary (PE Studies) (Final result)  Result time 07/02/23 17:01:41      Final result by Osiel Fuller MD (07/02/23 17:01:41)                   Impression:      No evidence for pulmonary embolism.  Other secondary findings as noted.      Electronically signed by: Osiel Fuller  MD  Date:    07/02/2023  Time:    17:01               Narrative:    EXAMINATION:  CTA CHEST NON CORONARY (PE STUDIES)    CLINICAL HISTORY:  Pulmonary embolism (PE) suspected, high prob;    TECHNIQUE:  Multiple cross-sectional images were obtained from the lung apices to the upper abdomen after the intravenous administration 100 mL of Omnipaque 350.  Coronal and sagittal reformatted images were obtained.  An automated dose exposure technique was utilized this limits radiation does the patient.  MIP images were obtained.    COMPARISON:  07/02/2023    FINDINGS:  Heart size within normal limits.  No evidence for reflux of contrast in the IVC.  No shift of the interventricular septum.  The course and caliber of the thoracic aorta is normal.  A 2 vessel aortic arch is identified with a great vessels being widely patent.  The main pulmonary artery is normal caliber.  Adequate opacification is identified without evidence for central or distal filling defect.  No evidence for hilar or mediastinal adenopathy.  Likely shotty lymph nodes are identified as well as remanent thymic tissue.    Dependent atelectatic changes lungs.  No area of consolidation.  No pulmonary infarct.  No pleural thickening or pleural effusion.  The trachea and airways are patent.    Hollow and solid viscera of the upper abdomen are grossly normal.  The liver is enlarged.  Spleen is also enlarged.  No suspicious osseous lesions.  Spondylotic changes are identified.  The soft tissues are grossly normal.                                       CT Head Without Contrast (Final result)  Result time 07/02/23 16:59:08      Final result by Osiel Fuller MD (07/02/23 16:59:08)                   Impression:      No acute intracranial abnormality.      Electronically signed by: Osiel Fuller MD  Date:    07/02/2023  Time:    16:59               Narrative:    EXAMINATION:  CT HEAD WITHOUT CONTRAST    CLINICAL HISTORY:  Dizziness, persistent/recurrent, cardiac or  vascular cause suspected;    TECHNIQUE:  Low dose axial CT images obtained throughout the head without intravenous contrast. Sagittal and coronal reconstructions were performed.  An automated dose exposure technique was utilized this limits radiation does the patient.    COMPARISON:  None.    FINDINGS:  Intracranial compartment:    Ventricles and sulci are normal in size for age without evidence of hydrocephalus. No extra-axial blood or fluid collections.    The brain parenchyma appears normal. No parenchymal mass, hemorrhage, edema or major vascular distribution infarct.    Skull/extracranial contents (limited evaluation): No fracture. Paranasal sinus disease is identified.                                       X-Ray Chest PA And Lateral (Final result)  Result time 07/02/23 14:15:50      Final result by Osiel Fuller MD (07/02/23 14:15:50)                   Impression:      No acute abnormality.      Electronically signed by: Osiel Fuller MD  Date:    07/02/2023  Time:    14:15               Narrative:    EXAMINATION:  XR CHEST PA AND LATERAL    CLINICAL HISTORY:  Chest pain, unspecified    TECHNIQUE:  PA and lateral views of the chest were performed.    COMPARISON:  None    FINDINGS:  The lungs are clear, with normal appearance of pulmonary vasculature and no pleural effusion or pneumothorax.    The cardiac silhouette is normal in size. The hilar and mediastinal contours are unremarkable.    Bones are intact.                                      EKG:        Telemetry:  SB    Physical Exam  Vitals reviewed.   Constitutional:       Appearance: Normal appearance.   HENT:      Head: Normocephalic.      Nose: Nose normal.      Mouth/Throat:      Mouth: Mucous membranes are moist.   Eyes:      Pupils: Pupils are equal, round, and reactive to light.   Cardiovascular:      Rate and Rhythm: Normal rate and regular rhythm.      Pulses: Normal pulses.      Heart sounds: Murmur heard.   Pulmonary:      Effort: Pulmonary effort  "is normal.      Breath sounds: Normal breath sounds.   Abdominal:      General: Bowel sounds are normal.      Palpations: Abdomen is soft.   Musculoskeletal:         General: Normal range of motion.   Skin:     General: Skin is warm and dry.      Capillary Refill: Capillary refill takes less than 2 seconds.   Neurological:      Mental Status: She is alert and oriented to person, place, and time.   Psychiatric:         Mood and Affect: Mood normal.         Behavior: Behavior normal.       Home Medications:   No current facility-administered medications on file prior to encounter.     Current Outpatient Medications on File Prior to Encounter   Medication Sig Dispense Refill    levothyroxine (SYNTHROID) 100 MCG tablet Take 100 mcg by mouth before breakfast.      SUBOXONE 8-2 mg Place 1 Film under the tongue 3 (three) times daily.      CELEXA 10 mg tablet Take 10 mg by mouth every evening.      LANTUS SOLOSTAR U-100 INSULIN glargine 100 units/mL SubQ pen Inject 30 Units into the skin every evening.      losartan (COZAAR) 100 MG tablet Take 100 mg by mouth once daily.      OZEMPIC 1 mg/dose (4 mg/3 mL) Inject into the skin every 7 days.      pen needle, diabetic 31 gauge x 5/16" Ndle BD Ultra-Fine Short Pen Needle 31 gauge x 5/16"   USE 1 PENTIP WITH LANTUS ONCE A DAY AS DIRECTED      REGLAN 10 mg tablet Take 10 mg by mouth 4 (four) times daily.       Current Inpatient Medications:  Current Facility-Administered Medications:     0.9%  NaCl infusion, , Intravenous, Continuous, Natalie De Leon MD, Last Rate: 150 mL/hr at 07/02/23 2011, New Bag at 07/02/23 2011    acetaminophen tablet 1,000 mg, 1,000 mg, Oral, Q6H PRN, Natalie De Leon MD    acetaminophen tablet 650 mg, 650 mg, Oral, Q4H PRN, Natalie De Leon MD    ALPRAZolam tablet 0.25 mg, 0.25 mg, Oral, TID PRN, Sherie Mcgill, FNP    aluminum-magnesium hydroxide-simethicone 200-200-20 mg/5 mL suspension 30 mL, 30 mL, Oral, QID PRN, Natalie De Leon MD    buprenorphine HCL SL " tablet 8 mg, 8 mg, Sublingual, TID, Natalie De Leon MD    dextrose 10% bolus 125 mL 125 mL, 12.5 g, Intravenous, PRN, Branden Cruz PA-C    dextrose 10% bolus 250 mL 250 mL, 25 g, Intravenous, PRN, Branden Cruz PA-C    enoxaparin injection 40 mg, 40 mg, Subcutaneous, Q24H (prophylaxis, 1700), Natalie De Leon MD    glucagon (human recombinant) injection 1 mg, 1 mg, Intramuscular, PRN, Branden Cruz PA-C    glucose chewable tablet 16 g, 16 g, Oral, PRN, Branden Cruz PA-C    glucose chewable tablet 24 g, 24 g, Oral, PRN, Branden Cruz PA-C    insulin aspart U-100 injection 1-10 Units, 1-10 Units, Subcutaneous, QID (AC + HS) PRN, Natalie De Leon MD    levothyroxine tablet 125 mcg, 125 mcg, Oral, Before breakfast, Natalie De Leon MD, 125 mcg at 07/03/23 0746    melatonin tablet 6 mg, 6 mg, Oral, Nightly PRN, Natalie De Leon MD    metoclopramide HCl tablet 10 mg, 10 mg, Oral, TID AC, Natalie De Leon MD, 10 mg at 07/03/23 0746    naloxone 0.4 mg/mL injection 0.02 mg, 0.02 mg, Intravenous, PRN, Sherie Mcgill, FNP    ondansetron injection 4 mg, 4 mg, Intravenous, Q4H PRN, Natalie De Leon MD    pantoprazole EC tablet 40 mg, 40 mg, Oral, BID AC, Natalie De Leon MD, 40 mg at 07/03/23 0745    polyethylene glycol packet 17 g, 17 g, Oral, BID PRN, Natalie De Leon MD    prochlorperazine injection Soln 5 mg, 5 mg, Intravenous, Q6H PRN, Natalie De Leon MD    senna-docusate 8.6-50 mg per tablet 2 tablet, 2 tablet, Oral, BID PRN, Natalie De Leon MD    sodium chloride 0.9% flush 10 mL, 10 mL, Intravenous, PRN, Natalie De Leon MD    sucralfate tablet 1 g, 1 g, Oral, QID (AC & HS), Natalie De Leon MD, 1 g at 07/03/23 0745      VTE Risk Mitigation (From admission, onward)           Ordered     enoxaparin injection 40 mg  Every 24 hours         07/02/23 1949     IP VTE LOW RISK PATIENT  Once         07/02/23 1932     Place sequential compression device  Until discontinued         07/02/23 1932                  Assessment:   Chest Pain  --troponin negative  x2  Bradycardia  Gastroparesis Exacerbation  --on Ozempic previously   Hypothyroidism  --TSH 13.531  Hyperkalemia  --resolved   Anxiety  DM II  HTN  Plan:   Chest Pain seems more anxiety related   Follow-up with Psychiatrist in the outpatient setting is recommended    Ok to be discharged from cardiac standpoint    Thank you for your consult.   We will sign off at this time. Please call with any questions on concerns.    I, Lizbeth Lundberg RN, transcribed this report in the presence of Alex Basurto MD.     Lizbeth Lundberg RN  Cardiology  [facility]  07/03/2023 8:09 AM

## 2023-07-03 NOTE — PROGRESS NOTES
[facility]  Hospital Medicine Progress Note        Chief Complaint: Inpatient Follow-up    HPI:     42-year-old female with significant history of type 2 diabetes mellitus, gastroparesis, hypothyroidism, severe generalized anxiety, chronic back pain resulting in opiate addiction/dependence currently on Suboxone presented to the ED with complaints of intractable nausea, vomiting, diffuse abdominal discomfort, dizziness and lightheadedness for the past 3 days.  Patient was afebrile, hemodynamically stable in the ED.  EKG-sinus rhythm/sinus bradycardia.  Lab significant for hemoconcentration/dehydration.  Urine with 4+ ketones . patient was admitted hospitalist medicine service initiated aggressive fluid resuscitation . she had an episode of panic attack in the ED characterized by chest tightness, resolved with Ativan.  patient had recent workup for bradycardia with echocardiogram, Holter monitor which was all with well within normal limits paid.  Orthostatic vitals negative.    Interval Hx:     Patient seen at bedside.  Still complaining of dizziness when she tries to get up.  Patient reports she can not stay still.  Bradycardic, otherwise hemodynamics stable.  on clear liquid, patient does not want to advance diet .     Objective/physical exam:  General:  Anxious   Chest: Clear to auscultation bilaterally  Heart: S1, S2, no appreciable murmur  Abdomen: Soft, nontender, BS +  MSK: Warm, no lower extremity edema, no clubbing or cyanosis  Neurologic: Alert and oriented x4, moving all extremities with good strength     VITAL SIGNS: 24 HRS MIN & MAX LAST   Temp  Min: 97.7 °F (36.5 °C)  Max: 98.2 °F (36.8 °C) 98.2 °F (36.8 °C)   BP  Min: 104/65  Max: 134/83 125/82   Pulse  Min: 49  Max: 67  (!) 54   Resp  Min: 12  Max: 20 18   SpO2  Min: 92 %  Max: 97 % (!) 93 %       Recent Labs   Lab 07/03/23  0515   WBC 3.78*   RBC 4.02*   HGB 11.3*   HCT 34.8*   MCV 86.6   MCH 28.1   MCHC 32.5*   RDW 13.4       MPV 10.2         Recent Labs   Lab 07/03/23  0515      K 3.6   CO2 32*   BUN 14.4   CREATININE 0.62   CALCIUM 8.6   MG 1.80   ALBUMIN 3.4*   ALKPHOS 54   ALT 34   AST 20   BILITOT 0.6          Microbiology Results (last 7 days)       ** No results found for the last 168 hours. **             Scheduled Med:   buprenorphine HCL  8 mg Sublingual TID    enoxparin  40 mg Subcutaneous Q24H (prophylaxis, 1700)    levothyroxine  125 mcg Oral Before breakfast    metoclopramide HCl  10 mg Oral TID AC    pantoprazole  40 mg Oral BID AC    sucralfate  1 g Oral QID (AC & HS)          Assessment/Plan:    Dizziness-unclear etiology  Acute dehydration on admit secondary to poor oral intake   Non intractable diffuse abdominal discomfort with associated nausea/vomiting secondary to gastroparesis flare-up  Type 2 diabetes mellitus-stable   Severe generalized anxiety-untreated   Hypothyroidism   History of opioid dependence currently on Suboxone  Chronic back pain   Prophylaxis    Still complaining of dizziness, etiology unclear  Bradycardic  But recent echocardiogram, Holter monitor was within normal limits   Orthostatics negative   Could have been secondary to dehydration  Continue IV fluids now   No focal deficits and CT head was negative on admit   If symptoms persist can consider MRI brain  Labs better today  Symptomatic management for abdominal symptoms   On clear liquid diet, reports she is not ready for diet advancement   Continue Reglan, ppi and Carafate  Continue home meds-Suboxone, levothyroxine   TSH high but T4 free is normal   Patient has severe generalized anxiety, I will consult psychiatry  DVT prophylaxis-Aden Jeong MD   07/03/2023

## 2023-07-03 NOTE — H&P
[facility] Hospital Medicine   History & Physical Note      Patient Name: Fred Berman  : 1980  MRN: [id number]  Patient Class: OP- Observation   Admission Date: 2023   Length of Stay: 0  Admitting Service:  Service  Attending Physician:  Dr. Natalie De Leon  PCP: Emmanuelle Rich NP  History Source: Patient, patient's family, and EMR.   Face-to-Face encounter date: 2023  Code status: Full code    Chief Complaint   Abdominal Pain (Pt c/o abd pain and head pain and chest pain x 1 month. Also c/o cough x 3 days.  Dx gastroporesis within last month. [id number]-129 )      History of Present Illness   Fred Berman is a 42 year old female who PMH includes HTN, thyroid disease, DM type II, gastroparesis;  presents to the ED at [facility] on 2023 with complaints of abdominal pain, headache and associated chest pain over the past month. Pt also reports cough over the past 3 days. Pt reports no N/V/D or fever.  Pt reports also she was diagnosed with gastroparesis last month. She also reports dizziness with her headaches, denies any syncope or LOC. Labs reviewed demonstrated K 5.3, glucose 126, other indicis unremarkable.  Chest x-ray impression reviewed demonstrated no acute abnormality.  CTA of the head without contrast impression reviewed demonstrated no acute intracranial abnormality.CTA chest PE  impression reviewed demonstrated no evidence of pulmonary embolism. Ct of abdomen and pelvis without contrast impression reviewed demonstrated no acute intra-abdominal or pelvic solid organ or bowel pathology identified, moderate stool in the colon.  Initial vital signs reviewed /66 pulse 64 respirations 16 temperature 97.7° F O2 saturation 97% on room air.  Patient did become anxious in the ED which she required lorazepam.  Patient is admitted to hospital medicine services for further management.     ROS   Except as documented, all other systems reviewed and negative  "    Past Medical History   DM Type II  HTN  Hypothyroidism  Gastroparesis    Past Surgical History     Past Surgical History:   Procedure Laterality Date    ABLATION       SECTION      CHOLECYSTECTOMY      TUBAL LIGATION         Social History   Denies any recent tobacco use  Denies any illicit drug use  Denies any alcohol use  Lives with family    Family History   Reviewed and negative    Allergies   Vancomycin analogues    Home Medications   As documented  Prior to Admission medications    Medication Sig Start Date End Date Taking? Authorizing Provider   levothyroxine (SYNTHROID) 100 MCG tablet Take 100 mcg by mouth before breakfast. 23  Yes Historical Provider   SUBOXONE 8-2 mg Place 1 Film under the tongue 3 (three) times daily. 23  Yes Historical Provider   CELEXA 10 mg tablet Take 10 mg by mouth every evening. 23   Historical Provider   LANTUS SOLOSTAR U-100 INSULIN glargine 100 units/mL SubQ pen Inject 30 Units into the skin every evening. 23   Historical Provider   losartan (COZAAR) 100 MG tablet Take 100 mg by mouth once daily. 23   Historical Provider   OZEMPIC 1 mg/dose (4 mg/3 mL) Inject into the skin every 7 days. 23   Historical Provider   pen needle, diabetic 31 gauge x 5/16" Ndle BD Ultra-Fine Short Pen Needle 31 gauge x 5/16"   USE 1 PENTIP WITH LANTUS ONCE A DAY AS DIRECTED    Historical Provider   REGLAN 10 mg tablet Take 10 mg by mouth 4 (four) times daily. 23   Historical Provider   busPIRone (BUSPAR) 5 MG Tab  23  Historical Provider   doxycycline (VIBRA-TABS) 100 MG tablet  23  Historical Provider   metroNIDAZOLE (FLAGYL) 500 MG tablet  23  Historical Provider   pantoprazole (PROTONIX) 40 MG tablet Take 1 tablet (40 mg total) by mouth once daily. 23  Megan Barrios MD        Inpatient Medications   Scheduled Meds   buprenorphine HCL  8 mg Sublingual TID    [START ON 7/3/2023] enoxparin  40 mg Subcutaneous " Q24H (prophylaxis, 1700)    [START ON 7/3/2023] levothyroxine  100 mcg Oral Before breakfast    metoclopramide HCl  10 mg Intravenous Q8H    [START ON 7/3/2023] pantoprazole  40 mg Oral BID AC    sucralfate  1 g Oral QID (AC & HS)     Continuous Infusions   sodium chloride 0.9% 150 mL/hr at 07/02/23 2011     PRN Meds  acetaminophen, acetaminophen, acetaminophen, acetaminophen, aluminum-magnesium hydroxide-simethicone, dextrose 10%, dextrose 10%, glucagon (human recombinant), glucose, glucose, insulin aspart U-100, melatonin, naloxone, ondansetron, polyethylene glycol, prochlorperazine, senna-docusate 8.6-50 mg, sodium chloride 0.9%, sodium chloride 0.9%    Physical Exam   Vital Signs  Temp:  [97.7 °F (36.5 °C)]   Pulse:  [54-67]   Resp:  [16-20]   BP: (104-131)/(66-84)   SpO2:  [95 %-97 %]    General: well developed well nourished, appears comfortable  HEENT: NC/AT  Neck:  No JVD  Chest: CTABL  CVS: Regular rhythm. Normal S1/S2, cap refill brisk.  Abdomen: nondistended, normoactive BS, mild generalized tenderness, soft, no rebound tenderness  MSK: No obvious deformity or joint swelling  Skin: Warm and dry  Neuro: drowsy, wakes briefly,  no focal neurological deficit  Psych: Cooperative, anxious    Labs     Recent Labs     07/02/23  1442   WBC 4.70   RBC 4.98   HGB 14.1   HCT 44.2   MCV 88.8   MCH 28.3   MCHC 31.9*   RDW 13.7        No results for input(s): LACTIC in the last 72 hours.  No results for input(s): INR, APTT, D-DIMER in the last 72 hours.  No results for input(s): HGBA1C, CHOL, TRIG, LDL, VLDL, HDL in the last 72 hours.   Recent Labs     07/02/23  1442 07/02/23  1829     --    K 5.3*  --    CHLORIDE 98  --    CO2 32*  --    BUN 15.5  --    CREATININE 0.70  --    GLUCOSE 126*  --    CALCIUM 9.5  --    MG  --  1.80   ALBUMIN 4.1  --    GLOBULIN 3.8*  --    ALKPHOS 68  --    ALT 53  --    AST 38*  --    BILITOT 0.9  --    LIPASE 15  --      Recent Labs     07/02/23  1442 07/02/23  1822    TROPONINI <0.010 <0.010          Microbiology Results (last 7 days)       ** No results found for the last 168 hours. **           Imaging     CT Abdomen Pelvis  Without Contrast         CTA Chest Non-Coronary (PE Studies)   Final Result      No evidence for pulmonary embolism.  Other secondary findings as noted.         Electronically signed by: Osiel Fuller MD   Date:    07/02/2023   Time:    17:01      CT Head Without Contrast   Final Result      No acute intracranial abnormality.         Electronically signed by: Osiel Fuller MD   Date:    07/02/2023   Time:    16:59      X-Ray Chest PA And Lateral   Final Result      No acute abnormality.         Electronically signed by: Osiel Fuller MD   Date:    07/02/2023   Time:    14:15        Assessment & Plan   ASSESSMENT:  Acute chest pain- rule out ACS- POA  Abdominal pain-generalized-POA   Hyperkalemia-POA  DM type 2 with hyperglycemia-POA   Gastroparesis-with abdominal pain- POA  Acute anxiety exacerbation- POA  Hypothyroidism- on synthroid- POA    PLAN:  PRN anxiolytic therapy  IV hydration  Resume home medication as deemed necessary  Fall precautions   Accu-Cheks and sliding scale  Resume home medication as deemed necessary   Monitor electrolytes   Serial troponin levels   Repeat lab work in a.m.            -VTE Prophylaxis: Lovenox    ISherie FNP have reviewed and discussed the case with Dr. De Leon. Please see the following addendum for further assessment and plan from there attending MD.  ALIYA Del Toro

## 2023-07-03 NOTE — NURSING
Nurses Note -- 4 Eyes      7/3/2023   7:52 AM      Skin assessed during: Admit      [x] No Altered Skin Integrity Present    []Prevention Measures Documented      [] Yes- Altered Skin Integrity Present or Discovered   [] LDA Added if Not in Epic (Describe Wound)   [] New Altered Skin Integrity was Present on Admit and Documented in LDA   [] Wound Image Taken    Wound Care Consulted? No    Attending Nurse:  Kristel Gonzalez RN     Second RN/Staff Member:  Kira Snatiago RN

## 2023-07-04 LAB
ALBUMIN SERPL-MCNC: 3.3 G/DL (ref 3.5–5)
ALBUMIN/GLOB SERPL: 1.6 RATIO (ref 1.1–2)
ALP SERPL-CCNC: 46 UNIT/L (ref 40–150)
ALT SERPL-CCNC: 26 UNIT/L (ref 0–55)
AST SERPL-CCNC: 16 UNIT/L (ref 5–34)
BASOPHILS # BLD AUTO: 0.01 X10(3)/MCL
BASOPHILS NFR BLD AUTO: 0.2 %
BILIRUBIN DIRECT+TOT PNL SERPL-MCNC: 0.6 MG/DL
BUN SERPL-MCNC: 10.5 MG/DL (ref 7–18.7)
CALCIUM SERPL-MCNC: 8.4 MG/DL (ref 8.4–10.2)
CHLORIDE SERPL-SCNC: 105 MMOL/L (ref 98–107)
CO2 SERPL-SCNC: 32 MMOL/L (ref 22–29)
CREAT SERPL-MCNC: 0.66 MG/DL (ref 0.55–1.02)
EOSINOPHIL # BLD AUTO: 0.08 X10(3)/MCL (ref 0–0.9)
EOSINOPHIL NFR BLD AUTO: 1.9 %
ERYTHROCYTE [DISTWIDTH] IN BLOOD BY AUTOMATED COUNT: 13.3 % (ref 11.5–17)
GFR SERPLBLD CREATININE-BSD FMLA CKD-EPI: >60 MLS/MIN/1.73/M2
GLOBULIN SER-MCNC: 2.1 GM/DL (ref 2.4–3.5)
GLUCOSE SERPL-MCNC: 99 MG/DL (ref 74–100)
HCT VFR BLD AUTO: 34.8 % (ref 37–47)
HGB BLD-MCNC: 11.2 G/DL (ref 12–16)
IMM GRANULOCYTES # BLD AUTO: 0.01 X10(3)/MCL (ref 0–0.04)
IMM GRANULOCYTES NFR BLD AUTO: 0.2 %
LYMPHOCYTES # BLD AUTO: 1.74 X10(3)/MCL (ref 0.6–4.6)
LYMPHOCYTES NFR BLD AUTO: 40.6 %
MCH RBC QN AUTO: 27.9 PG (ref 27–31)
MCHC RBC AUTO-ENTMCNC: 32.2 G/DL (ref 33–36)
MCV RBC AUTO: 86.6 FL (ref 80–94)
MONOCYTES # BLD AUTO: 0.33 X10(3)/MCL (ref 0.1–1.3)
MONOCYTES NFR BLD AUTO: 7.7 %
NEUTROPHILS # BLD AUTO: 2.12 X10(3)/MCL (ref 2.1–9.2)
NEUTROPHILS NFR BLD AUTO: 49.4 %
NRBC BLD AUTO-RTO: 0 %
PLATELET # BLD AUTO: 161 X10(3)/MCL (ref 130–400)
PMV BLD AUTO: 10 FL (ref 7.4–10.4)
POCT GLUCOSE: 155 MG/DL (ref 70–110)
POCT GLUCOSE: 96 MG/DL (ref 70–110)
POTASSIUM SERPL-SCNC: 3.4 MMOL/L (ref 3.5–5.1)
PROT SERPL-MCNC: 5.4 GM/DL (ref 6.4–8.3)
RBC # BLD AUTO: 4.02 X10(6)/MCL (ref 4.2–5.4)
SODIUM SERPL-SCNC: 142 MMOL/L (ref 136–145)
WBC # SPEC AUTO: 4.29 X10(3)/MCL (ref 4.5–11.5)

## 2023-07-04 PROCEDURE — 80053 COMPREHEN METABOLIC PANEL: CPT | Performed by: INTERNAL MEDICINE

## 2023-07-04 PROCEDURE — G0378 HOSPITAL OBSERVATION PER HR: HCPCS

## 2023-07-04 PROCEDURE — 96372 THER/PROPH/DIAG INJ SC/IM: CPT | Performed by: INTERNAL MEDICINE

## 2023-07-04 PROCEDURE — 63600175 PHARM REV CODE 636 W HCPCS: Performed by: INTERNAL MEDICINE

## 2023-07-04 PROCEDURE — 25000003 PHARM REV CODE 250: Performed by: INTERNAL MEDICINE

## 2023-07-04 PROCEDURE — 21400001 HC TELEMETRY ROOM

## 2023-07-04 PROCEDURE — 85025 COMPLETE CBC W/AUTO DIFF WBC: CPT | Performed by: INTERNAL MEDICINE

## 2023-07-04 PROCEDURE — 25000003 PHARM REV CODE 250: Performed by: NURSE PRACTITIONER

## 2023-07-04 RX ORDER — POTASSIUM CHLORIDE 20 MEQ/1
40 TABLET, EXTENDED RELEASE ORAL ONCE
Status: COMPLETED | OUTPATIENT
Start: 2023-07-04 | End: 2023-07-04

## 2023-07-04 RX ADMIN — PANTOPRAZOLE SODIUM 40 MG: 40 TABLET, DELAYED RELEASE ORAL at 05:07

## 2023-07-04 RX ADMIN — ACETAMINOPHEN 1000 MG: 500 TABLET, FILM COATED ORAL at 04:07

## 2023-07-04 RX ADMIN — POTASSIUM CHLORIDE 40 MEQ: 1500 TABLET, EXTENDED RELEASE ORAL at 09:07

## 2023-07-04 RX ADMIN — INSULIN ASPART 2 UNITS: 100 INJECTION, SOLUTION INTRAVENOUS; SUBCUTANEOUS at 01:07

## 2023-07-04 RX ADMIN — ENOXAPARIN SODIUM 40 MG: 40 INJECTION SUBCUTANEOUS at 04:07

## 2023-07-04 RX ADMIN — METOCLOPRAMIDE 10 MG: 10 TABLET ORAL at 07:07

## 2023-07-04 RX ADMIN — SUCRALFATE 1 G: 1 TABLET ORAL at 04:07

## 2023-07-04 RX ADMIN — ALPRAZOLAM 0.25 MG: 0.25 TABLET ORAL at 10:07

## 2023-07-04 RX ADMIN — SODIUM CHLORIDE: 9 INJECTION, SOLUTION INTRAVENOUS at 05:07

## 2023-07-04 RX ADMIN — SUCRALFATE 1 G: 1 TABLET ORAL at 10:07

## 2023-07-04 RX ADMIN — BUPRENORPHINE 8 MG: 8 TABLET SUBLINGUAL at 11:07

## 2023-07-04 RX ADMIN — METOCLOPRAMIDE 10 MG: 10 TABLET ORAL at 01:07

## 2023-07-04 RX ADMIN — PANTOPRAZOLE SODIUM 40 MG: 40 TABLET, DELAYED RELEASE ORAL at 04:07

## 2023-07-04 RX ADMIN — SUCRALFATE 1 G: 1 TABLET ORAL at 01:07

## 2023-07-04 RX ADMIN — BUPRENORPHINE 8 MG: 8 TABLET SUBLINGUAL at 07:07

## 2023-07-04 RX ADMIN — ALPRAZOLAM 0.25 MG: 0.25 TABLET ORAL at 04:07

## 2023-07-04 RX ADMIN — METOCLOPRAMIDE 10 MG: 10 TABLET ORAL at 05:07

## 2023-07-04 RX ADMIN — SUCRALFATE 1 G: 1 TABLET ORAL at 05:07

## 2023-07-04 RX ADMIN — LEVOTHYROXINE SODIUM 125 MCG: 125 TABLET ORAL at 05:07

## 2023-07-04 NOTE — PSYCH
"7/4/2023 1:30 PM   Fred Berman   1980   [id number]       Initial Psychiatric Consult    Chief complaint: "I just have a lot going on with my health and I feel no one is listening to me."      SUBJECTIVE:   HPI:   Fred Berman is a 42 y.o. female with past psychiatric history of anxiety disorder, currently admitted with Gastroparesis.  Pt has a medical history of Dm II, gastroparesis, hypothyroidism, CHINEDU, chronic back pain which led to opiate dependency currently on Suboxone, and bradycardia. Psychiatry has been consulted to address   Increased anxiety state.     Pt states that she was previously diagnosed with anxiety in her 20s. States that she was  treated with medications for a short period of time. States since then, 20+ years, her anxiety has been manageable without medications. Does admit that her baseline anxiety is increased due to external stressors (I.e. past 3 months dealing with GI issues- constipation,  diarrhea, nausea, and vomiting). Pt states her GI distress has led to her "passing out, getting dizzy if I stand up too long or too fast."  Pt begins crying while discussing her children. States she is in constant distress due to her GI issues that she's unable to tend to her youngest child. Pt continued stating that she has experienced the following sx regarding her anxious state: ruminating thoughts, stressed, on edge, tension, and internal restlessness. Pt denies that her sleep is interrupted. States that she is able to fall asleep and remain asleep. "I sleep too much if you ask me." Appetite is decreased. Pt denies any daily depressive symptoms. States that she is saddened by her medical most recent events. Denies suicidal ideation or plan, homicidal ideation or plan, or passive thoughts of death. Denies hallucination, delusions or illusions. Denies elevated or expansive mood, irritability, anger outbursts.     Pt denies need for medication management regarding daily anxiety " "symptoms. States she needs something PRN for panic attacks. States she was administered "a benzo I think" on yesterday when she experienced a panic attack. "It was bad. I started getting short of breath and everything." Provider educated pt on hydroxyzine, buspirone, clonidine and other SSRIs that can be prescribed to assist with her anxiety. But refuses, stating that she has to be careful w/ SSRIs due to past experiences. She cannot have buspirone because it caused tics while she was prescribed Suboxone, and hydroxyzine causes too much drowsiness and decrease her heart rate. Pt is able to notify staff of personal needs.       Past Psychiatric History:   Previous Psychiatric Hospitalizations: denies   Previous Medication Trials: Buspar (was prescribed concurrently w/ Suboxone and led to tics), Hydroxyzine (worsened her bradycardia and caused hyper insomnia), and SSRIs (has to be careful due to previous issues with group of meds; rather not start)  Previous Suicide Attempts: denies   History of Violence: denies   Outpatient psychiatrist: for suboxone treatment and management      Past Medical/Surgical History:   Past Medical History:   Diagnosis Date    Diabetes mellitus, type 2     Gastroparesis     Hypertension     Thyroid disease      Past Surgical History:   Procedure Laterality Date    ABLATION       SECTION      CHOLECYSTECTOMY      TUBAL LIGATION          Current Medications:   Scheduled Meds:    buprenorphine HCL  8 mg Sublingual TID    enoxparin  40 mg Subcutaneous Q24H (prophylaxis, 1700)    levothyroxine  125 mcg Oral Before breakfast    metoclopramide HCl  10 mg Oral TID AC    pantoprazole  40 mg Oral BID AC    sucralfate  1 g Oral QID (AC & HS)      PRN Meds: acetaminophen, acetaminophen, ALPRAZolam, aluminum-magnesium hydroxide-simethicone, dextrose 10%, dextrose 10%, glucagon (human recombinant), glucose, glucose, insulin aspart U-100, melatonin, naloxone, ondansetron, polyethylene glycol, " "prochlorperazine, senna-docusate 8.6-50 mg, sodium chloride 0.9%   Psychotherapeutics (From admission, onward)      Start     Stop Route Frequency Ordered    07/03/23 0411  ALPRAZolam tablet 0.25 mg         -- Oral 3 times daily PRN 07/03/23 0311            Allergies:   Review of patient's allergies indicates:   Allergen Reactions    Vancomycin analogues Rash          Substance Abuse History:   Tobacco Use:denies   Use of Alcohol: denies  Recreational Drugs:previous hx of opiate abuse/dependency   Rehab History:denies       Social History:  Housing Status: resides w/ her children  Relationship Status/Sexual Orientation: heterosexual   Children: 3  Employment Status/Info: 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, almost three months."     history: denies  Access to gun: denies        OBJECTIVE:   Vitals   Vitals:    07/04/23 0739   BP: 124/76   Pulse: (!) 49   Resp: 18   Temp: 97.8 °F (36.6 °C)        Labs/Imaging/Studies:   Recent Results (from the past 48 hour(s))   Comprehensive metabolic panel    Collection Time: 07/02/23  2:42 PM   Result Value Ref Range    Sodium Level 140 136 - 145 mmol/L    Potassium Level 5.3 (H) 3.5 - 5.1 mmol/L    Chloride 98 98 - 107 mmol/L    Carbon Dioxide 32 (H) 22 - 29 mmol/L    Glucose Level 126 (H) 74 - 100 mg/dL    Blood Urea Nitrogen 15.5 7.0 - 18.7 mg/dL    Creatinine 0.70 0.55 - 1.02 mg/dL    Calcium Level Total 9.5 8.4 - 10.2 mg/dL    Protein Total 7.9 6.4 - 8.3 gm/dL    Albumin Level 4.1 3.5 - 5.0 g/dL    Globulin 3.8 (H) 2.4 - 3.5 gm/dL    Albumin/Globulin Ratio 1.1 1.1 - 2.0 ratio    Bilirubin Total 0.9 <=1.5 mg/dL    Alkaline Phosphatase 68 40 - 150 unit/L    Alanine Aminotransferase 53 0 - 55 unit/L    Aspartate Aminotransferase 38 (H) 5 - 34 unit/L    eGFR >60 mls/min/1.73/m2   Lipase    Collection Time: 07/02/23  2:42 PM   Result Value Ref Range    Lipase Level 15 <=60 U/L   Troponin I    Collection Time: 07/02/23  2:42 PM "   Result Value Ref Range    Troponin-I <0.010 0.000 - 0.045 ng/mL   CBC with Differential    Collection Time: 07/02/23  2:42 PM   Result Value Ref Range    WBC 4.70 4.50 - 11.50 x10(3)/mcL    RBC 4.98 4.20 - 5.40 x10(6)/mcL    Hgb 14.1 12.0 - 16.0 g/dL    Hct 44.2 37.0 - 47.0 %    MCV 88.8 80.0 - 94.0 fL    MCH 28.3 27.0 - 31.0 pg    MCHC 31.9 (L) 33.0 - 36.0 g/dL    RDW 13.7 11.5 - 17.0 %    Platelet 182 130 - 400 x10(3)/mcL    MPV 10.1 7.4 - 10.4 fL    Neut % 70.5 %    Lymph % 21.3 %    Mono % 7.2 %    Eos % 0.6 %    Basophil % 0.2 %    Lymph # 1.00 0.6 - 4.6 x10(3)/mcL    Neut # 3.31 2.1 - 9.2 x10(3)/mcL    Mono # 0.34 0.1 - 1.3 x10(3)/mcL    Eos # 0.03 0 - 0.9 x10(3)/mcL    Baso # 0.01 <=0.2 x10(3)/mcL    IG# 0.01 0 - 0.04 x10(3)/mcL    IG% 0.2 %    NRBC% 0.0 %   Urinalysis, Reflex to Urine Culture    Collection Time: 07/02/23  2:46 PM    Specimen: Urine   Result Value Ref Range    Color, UA Dark Yellow Yellow, Light-Yellow, Dark Yellow, Gris, Straw    Appearance, UA Clear Clear    Specific Gravity, UA 1.025 1.005 - 1.030    pH, UA 5.5 5.0 - 8.5    Protein, UA Negative Negative mg/dL    Glucose, UA Negative Negative, Normal mg/dL    Ketones, UA 4+ (A) Negative mg/dL    Blood, UA Negative Negative unit/L    Bilirubin, UA Negative Negative mg/dL    Urobilinogen, UA 1.0 0.2, 1.0, Normal mg/dL    Nitrites, UA Negative Negative    Leukocyte Esterase, UA Negative Negative unit/L   Urinalysis, Microscopic    Collection Time: 07/02/23  2:46 PM   Result Value Ref Range    RBC, UA <5 <=5 /HPF    WBC, UA <5 <=5 /HPF    Squamous Epithelial Cells, UA <5 <=5 /HPF    Bacteria, UA None Seen None Seen, Rare, Occasional /HPF   Troponin I    Collection Time: 07/02/23  6:29 PM   Result Value Ref Range    Troponin-I <0.010 0.000 - 0.045 ng/mL   Magnesium    Collection Time: 07/02/23  6:29 PM   Result Value Ref Range    Magnesium Level 1.80 1.60 - 2.60 mg/dL   POCT glucose    Collection Time: 07/02/23  6:32 PM   Result Value Ref  Range    POCT Glucose 134 (H) 70 - 110 mg/dL   POCT glucose    Collection Time: 07/02/23  6:50 PM   Result Value Ref Range    POCT Glucose 123 (H) 70 - 110 mg/dL   Comprehensive Metabolic Panel    Collection Time: 07/03/23  5:15 AM   Result Value Ref Range    Sodium Level 142 136 - 145 mmol/L    Potassium Level 3.6 3.5 - 5.1 mmol/L    Chloride 104 98 - 107 mmol/L    Carbon Dioxide 32 (H) 22 - 29 mmol/L    Glucose Level 91 74 - 100 mg/dL    Blood Urea Nitrogen 14.4 7.0 - 18.7 mg/dL    Creatinine 0.62 0.55 - 1.02 mg/dL    Calcium Level Total 8.6 8.4 - 10.2 mg/dL    Protein Total 5.7 (L) 6.4 - 8.3 gm/dL    Albumin Level 3.4 (L) 3.5 - 5.0 g/dL    Globulin 2.3 (L) 2.4 - 3.5 gm/dL    Albumin/Globulin Ratio 1.5 1.1 - 2.0 ratio    Bilirubin Total 0.6 <=1.5 mg/dL    Alkaline Phosphatase 54 40 - 150 unit/L    Alanine Aminotransferase 34 0 - 55 unit/L    Aspartate Aminotransferase 20 5 - 34 unit/L    eGFR >60 mls/min/1.73/m2   Phosphorus    Collection Time: 07/03/23  5:15 AM   Result Value Ref Range    Phosphorus Level 3.5 2.3 - 4.7 mg/dL   Magnesium    Collection Time: 07/03/23  5:15 AM   Result Value Ref Range    Magnesium Level 1.80 1.60 - 2.60 mg/dL   Hemoglobin A1C    Collection Time: 07/03/23  5:15 AM   Result Value Ref Range    Hemoglobin A1c 5.9 <=7.0 %    Estimated Average Glucose 122.6 mg/dL   CBC with Differential    Collection Time: 07/03/23  5:15 AM   Result Value Ref Range    WBC 3.78 (L) 4.50 - 11.50 x10(3)/mcL    RBC 4.02 (L) 4.20 - 5.40 x10(6)/mcL    Hgb 11.3 (L) 12.0 - 16.0 g/dL    Hct 34.8 (L) 37.0 - 47.0 %    MCV 86.6 80.0 - 94.0 fL    MCH 28.1 27.0 - 31.0 pg    MCHC 32.5 (L) 33.0 - 36.0 g/dL    RDW 13.4 11.5 - 17.0 %    Platelet 164 130 - 400 x10(3)/mcL    MPV 10.2 7.4 - 10.4 fL    Neut % 48.1 %    Lymph % 40.7 %    Mono % 9.0 %    Eos % 1.6 %    Basophil % 0.3 %    Lymph # 1.54 0.6 - 4.6 x10(3)/mcL    Neut # 1.82 (L) 2.1 - 9.2 x10(3)/mcL    Mono # 0.34 0.1 - 1.3 x10(3)/mcL    Eos # 0.06 0 - 0.9 x10(3)/mcL     Baso # 0.01 <=0.2 x10(3)/mcL    IG# 0.01 0 - 0.04 x10(3)/mcL    IG% 0.3 %    NRBC% 0.0 %   TSH    Collection Time: 07/03/23  5:15 AM   Result Value Ref Range    Thyroid Stimulating Hormone 13.531 (H) 0.350 - 4.940 uIU/mL   T4, Free    Collection Time: 07/03/23  5:15 AM   Result Value Ref Range    Thyroxine Free 0.78 0.70 - 1.48 ng/dL   POCT glucose    Collection Time: 07/03/23  6:31 AM   Result Value Ref Range    POCT Glucose 86 70 - 110 mg/dL   POCT glucose    Collection Time: 07/03/23 11:35 AM   Result Value Ref Range    POCT Glucose 109 70 - 110 mg/dL   POCT glucose    Collection Time: 07/03/23  9:12 PM   Result Value Ref Range    POCT Glucose 99 70 - 110 mg/dL   Comprehensive Metabolic Panel    Collection Time: 07/04/23  5:54 AM   Result Value Ref Range    Sodium Level 142 136 - 145 mmol/L    Potassium Level 3.4 (L) 3.5 - 5.1 mmol/L    Chloride 105 98 - 107 mmol/L    Carbon Dioxide 32 (H) 22 - 29 mmol/L    Glucose Level 99 74 - 100 mg/dL    Blood Urea Nitrogen 10.5 7.0 - 18.7 mg/dL    Creatinine 0.66 0.55 - 1.02 mg/dL    Calcium Level Total 8.4 8.4 - 10.2 mg/dL    Protein Total 5.4 (L) 6.4 - 8.3 gm/dL    Albumin Level 3.3 (L) 3.5 - 5.0 g/dL    Globulin 2.1 (L) 2.4 - 3.5 gm/dL    Albumin/Globulin Ratio 1.6 1.1 - 2.0 ratio    Bilirubin Total 0.6 <=1.5 mg/dL    Alkaline Phosphatase 46 40 - 150 unit/L    Alanine Aminotransferase 26 0 - 55 unit/L    Aspartate Aminotransferase 16 5 - 34 unit/L    eGFR >60 mls/min/1.73/m2   CBC with Differential    Collection Time: 07/04/23  5:54 AM   Result Value Ref Range    WBC 4.29 (L) 4.50 - 11.50 x10(3)/mcL    RBC 4.02 (L) 4.20 - 5.40 x10(6)/mcL    Hgb 11.2 (L) 12.0 - 16.0 g/dL    Hct 34.8 (L) 37.0 - 47.0 %    MCV 86.6 80.0 - 94.0 fL    MCH 27.9 27.0 - 31.0 pg    MCHC 32.2 (L) 33.0 - 36.0 g/dL    RDW 13.3 11.5 - 17.0 %    Platelet 161 130 - 400 x10(3)/mcL    MPV 10.0 7.4 - 10.4 fL    Neut % 49.4 %    Lymph % 40.6 %    Mono % 7.7 %    Eos % 1.9 %    Basophil % 0.2 %    Lymph #  1.74 0.6 - 4.6 x10(3)/mcL    Neut # 2.12 2.1 - 9.2 x10(3)/mcL    Mono # 0.33 0.1 - 1.3 x10(3)/mcL    Eos # 0.08 0 - 0.9 x10(3)/mcL    Baso # 0.01 <=0.2 x10(3)/mcL    IG# 0.01 0 - 0.04 x10(3)/mcL    IG% 0.2 %    NRBC% 0.0 %      No results found for: PHENYTOIN, PHENOBARB, VALPROATE, CBMZ    Psychiatric Mental Status Exam:  General Appearance: unremarkable, appears stated age, well-developed, dressed in casual attire, lying in bed  Arousal: alert  Behavior: cooperative, pleasant, polite, appropriate eye-contact, tearful momentarily during assessment   Movements and Motor Activity: no abnormal involuntary movements noted; no tics, no tremors, no akathisia, no dystonia, no evidence of tardive dyskinesia; no psychomotor agitation or retardation  Orientation: intact; oriented fully to person, place, time and situation  Speech: intact; normal rate, rhythm, volume, tone and pitch; conversational, spontaneous, and coherent  Mood: Anxious  Affect: euthymic, appropriate to situation and context  Thought Process: linear  Associations: intact, no loosening of associations  Thought Content and Perceptions: no suicidal or homicidal ideation, no auditory or visual hallucinations, no paranoid ideation, no ideas of reference, no evidence of delusions or psychosis  Recent and Remote Memory: grossly intact  Attention and Concentration: intact  Fund of Knowledge: intact  Insight: intact  Judgment: intact    ASSESSMENT/PLAN:   Panic Disorder F41.0  Generalized Anxiety Disorder F41.1  Personal hx of other drug therapy Z92.29       Past Medical History:   Diagnosis Date    Diabetes mellitus, type 2     Gastroparesis     Hypertension     Thyroid disease         Recommendations:   Medication Management  Recommend Buspar 10mg BID-TID.  Monitor for dizziness or headache   Per pt, she's been off of Suboxone for a few months due to GI symptoms   Pt is currently ordered Xanax 0.25mg TID by attending group  States that she would prefer this  medication to manage her panic attacks  Continue to monitor pts mental status for any change  Psych will sign off. Reconsult if needed.            LITO CRUZ

## 2023-07-04 NOTE — PROGRESS NOTES
[facility]  Hospital Medicine Progress Note        Chief Complaint: Inpatient Follow-up    HPI:     42-year-old female with significant history of type 2 diabetes mellitus, gastroparesis, hypothyroidism, severe generalized anxiety, chronic back pain resulting in opiate addiction/dependence currently on Suboxone presented to the ED with complaints of intractable nausea, vomiting, diffuse abdominal discomfort, dizziness and lightheadedness for the past 3 days.  Patient was afebrile, hemodynamically stable in the ED.  EKG-sinus rhythm/sinus bradycardia.  Lab significant for hemoconcentration/dehydration.  Urine with 4+ ketones . patient was admitted hospitalist medicine service initiated aggressive fluid resuscitation . she had an episode of panic attack in the ED characterized by chest tightness, resolved with Ativan.  patient had recent workup for bradycardia with echocardiogram, Holter monitor which was all with well within normal limits .  Orthostatic vitals negative.  Dehydration better on 07/03.  IV fluids continued given poor oral intake .  patient with multiple nonspecific symptoms.    Interval Hx:     Patient seen at bedside.   Heart rate borderline low.  Normotensive . patient refuses to get out of bed given dizziness and fear that she will fall.  Does not eat much.  Refuses diet advancement as she say she hardly can tolerate liquids.  Not much cooperative, not so receptive.  Complaints of purulent ear discharge    Objective/physical exam:  General:  Anxious   Chest: Clear to auscultation bilaterally  Heart: S1, S2, no appreciable murmur  Abdomen: Soft, nontender, BS +  MSK: Warm, no lower extremity edema, no clubbing or cyanosis  Neurologic: Alert and oriented x4, moving all extremities with good strength     VITAL SIGNS: 24 HRS MIN & MAX LAST   Temp  Min: 97.8 °F (36.6 °C)  Max: 98.3 °F (36.8 °C) 97.8 °F (36.6 °C)   BP  Min: 105/67  Max: 165/98 124/76   Pulse  Min: 45  Max: 60   (!) 49   Resp  Min: 16  Max: 18 18   SpO2  Min: 92 %  Max: 99 % (!) 93 %       Recent Labs   Lab 07/04/23  0554   WBC 4.29*   RBC 4.02*   HGB 11.2*   HCT 34.8*   MCV 86.6   MCH 27.9   MCHC 32.2*   RDW 13.3      MPV 10.0         Recent Labs   Lab 07/03/23  0515 07/04/23  0554    142   K 3.6 3.4*   CO2 32* 32*   BUN 14.4 10.5   CREATININE 0.62 0.66   CALCIUM 8.6 8.4   MG 1.80  --    ALBUMIN 3.4* 3.3*   ALKPHOS 54 46   ALT 34 26   AST 20 16   BILITOT 0.6 0.6          Microbiology Results (last 7 days)       ** No results found for the last 168 hours. **             Scheduled Med:   buprenorphine HCL  8 mg Sublingual TID    enoxparin  40 mg Subcutaneous Q24H (prophylaxis, 1700)    levothyroxine  125 mcg Oral Before breakfast    metoclopramide HCl  10 mg Oral TID AC    pantoprazole  40 mg Oral BID AC    potassium chloride  40 mEq Oral Once    sucralfate  1 g Oral QID (AC & HS)          Assessment/Plan:    Acute on chronic Dizziness-unclear etiology  Suspected otitis media  Acute dehydration on admit secondary to poor oral intake  Hypokalemia  Non intractable diffuse abdominal discomfort with associated nausea/vomiting secondary to gastroparesis flare-up  Sinus bradycardia  Type 2 diabetes mellitus-stable   Severe generalized anxiety-untreated   Hypothyroidism   History of opioid dependence currently on Suboxone  Chronic back pain   Prophylaxis      Dizziness ongoing for almost a month now   Orthostatics negative  Patient refuses to get out of the bed given fear of fall   Evaluated by Cardiology for bradycardia   MCT monitoring, echocardiogram within normal limits, no additional recommendations   I will obtain MRI brain without contrast given chronicity   Also complaining of ear discharge   Concern for otitis media  I have initiated Augmentin  Given her dizziness, ear discharge decided to consult ENT, on-call ENT-Dr. Scanlon recommending outpatient follow-up  Patient reports that she was told by her dental  "physician that she could possibly have throat cancer or lung cancer because of some "lesions".  Will arrange outpatient ENT follow-up   Labs better except for hypokalemia which was appropriately replaced   Patient refuses to eat or drink, reports she can hardly tolerate liquids, refuses diet advancement   Her abdominal examination is completely benign  Keep clear liquid, encouraged oral intake   Continue Reglan, ppi and Carafate  Continue home meds-Suboxone, levothyroxine   TSH high but T4 free is normal   Patient has severe generalized anxiety and I think most of her symptoms could be related to this   I have consulted psych, await recommendations  DVT prophylaxis-Marynox        Niya Jeong MD   07/04/2023                "

## 2023-07-05 LAB
ANION GAP SERPL CALC-SCNC: 5 MEQ/L
BUN SERPL-MCNC: 8.6 MG/DL (ref 7–18.7)
CALCIUM SERPL-MCNC: 8.4 MG/DL (ref 8.4–10.2)
CHLORIDE SERPL-SCNC: 107 MMOL/L (ref 98–107)
CO2 SERPL-SCNC: 31 MMOL/L (ref 22–29)
CREAT SERPL-MCNC: 0.67 MG/DL (ref 0.55–1.02)
CREAT/UREA NIT SERPL: 13
GFR SERPLBLD CREATININE-BSD FMLA CKD-EPI: >60 MLS/MIN/1.73/M2
GLUCOSE SERPL-MCNC: 98 MG/DL (ref 74–100)
POCT GLUCOSE: 107 MG/DL (ref 70–110)
POCT GLUCOSE: 166 MG/DL (ref 70–110)
POCT GLUCOSE: 99 MG/DL (ref 70–110)
POTASSIUM SERPL-SCNC: 4.1 MMOL/L (ref 3.5–5.1)
SODIUM SERPL-SCNC: 143 MMOL/L (ref 136–145)

## 2023-07-05 PROCEDURE — 25000003 PHARM REV CODE 250: Performed by: INTERNAL MEDICINE

## 2023-07-05 PROCEDURE — 97162 PT EVAL MOD COMPLEX 30 MIN: CPT

## 2023-07-05 PROCEDURE — 96372 THER/PROPH/DIAG INJ SC/IM: CPT | Performed by: INTERNAL MEDICINE

## 2023-07-05 PROCEDURE — 25000003 PHARM REV CODE 250: Performed by: NURSE PRACTITIONER

## 2023-07-05 PROCEDURE — 21400001 HC TELEMETRY ROOM

## 2023-07-05 PROCEDURE — G0378 HOSPITAL OBSERVATION PER HR: HCPCS

## 2023-07-05 PROCEDURE — 80048 BASIC METABOLIC PNL TOTAL CA: CPT | Performed by: INTERNAL MEDICINE

## 2023-07-05 PROCEDURE — 63600175 PHARM REV CODE 636 W HCPCS: Performed by: INTERNAL MEDICINE

## 2023-07-05 RX ORDER — AMOXICILLIN AND CLAVULANATE POTASSIUM 875; 125 MG/1; MG/1
1 TABLET, FILM COATED ORAL EVERY 12 HOURS
Status: DISCONTINUED | OUTPATIENT
Start: 2023-07-05 | End: 2023-07-06

## 2023-07-05 RX ORDER — BUSPIRONE HYDROCHLORIDE 5 MG/1
10 TABLET ORAL 3 TIMES DAILY
Status: DISCONTINUED | OUTPATIENT
Start: 2023-07-05 | End: 2023-07-06

## 2023-07-05 RX ADMIN — ALPRAZOLAM 0.25 MG: 0.25 TABLET ORAL at 03:07

## 2023-07-05 RX ADMIN — SUCRALFATE 1 G: 1 TABLET ORAL at 11:07

## 2023-07-05 RX ADMIN — METOCLOPRAMIDE 10 MG: 10 TABLET ORAL at 11:07

## 2023-07-05 RX ADMIN — AMOXICILLIN AND CLAVULANATE POTASSIUM 1 TABLET: 875; 125 TABLET ORAL at 11:07

## 2023-07-05 RX ADMIN — METOCLOPRAMIDE 10 MG: 10 TABLET ORAL at 05:07

## 2023-07-05 RX ADMIN — BUPRENORPHINE 8 MG: 8 TABLET SUBLINGUAL at 11:07

## 2023-07-05 RX ADMIN — PANTOPRAZOLE SODIUM 40 MG: 40 TABLET, DELAYED RELEASE ORAL at 03:07

## 2023-07-05 RX ADMIN — ENOXAPARIN SODIUM 40 MG: 40 INJECTION SUBCUTANEOUS at 07:07

## 2023-07-05 RX ADMIN — BUPRENORPHINE 8 MG: 8 TABLET SUBLINGUAL at 07:07

## 2023-07-05 RX ADMIN — SUCRALFATE 1 G: 1 TABLET ORAL at 05:07

## 2023-07-05 RX ADMIN — SODIUM CHLORIDE: 9 INJECTION, SOLUTION INTRAVENOUS at 09:07

## 2023-07-05 RX ADMIN — METOCLOPRAMIDE 10 MG: 10 TABLET ORAL at 03:07

## 2023-07-05 RX ADMIN — AMOXICILLIN AND CLAVULANATE POTASSIUM 1 TABLET: 875; 125 TABLET ORAL at 08:07

## 2023-07-05 RX ADMIN — PANTOPRAZOLE SODIUM 40 MG: 40 TABLET, DELAYED RELEASE ORAL at 05:07

## 2023-07-05 RX ADMIN — SUCRALFATE 1 G: 1 TABLET ORAL at 03:07

## 2023-07-05 RX ADMIN — SODIUM CHLORIDE: 9 INJECTION, SOLUTION INTRAVENOUS at 11:07

## 2023-07-05 RX ADMIN — LEVOTHYROXINE SODIUM 125 MCG: 125 TABLET ORAL at 05:07

## 2023-07-05 NOTE — PLAN OF CARE
07/05/23 1550   Discharge Assessment   Assessment Type Discharge Planning Assessment   Confirmed/corrected address, phone number and insurance Yes   Confirmed Demographics Correct on Facesheet   Source of Information patient  (Chinedu Byrd (Friend)   [phone] (Mobile))   Does patient/caregiver understand observation status Yes   Communicated ALEJANDRA with patient/caregiver Date not available/Unable to determine   Reason For Admission R42 Dizziness  R07.9 Chest pain   People in Home child(dae), adult  (Patient lives with her friend: Chinedu Kelley: [phone] and (2) dependent children.)   Facility Arrived From: Private residence.   Do you expect to return to your current living situation? Yes   Do you have help at home or someone to help you manage your care at home? Yes   Who are your caregiver(s) and their phone number(s)? Chinedu Byrd (Friend)   [phone] (Mobile)   Prior to hospitilization cognitive status: Alert/Oriented   Current cognitive status: Alert/Oriented   Home Layout Able to live on 1st floor   Equipment Currently Used at Home glucometer   Readmission within 30 days? No   Patient currently being followed by outpatient case management? No   Do you currently have service(s) that help you manage your care at home? No   Do you take prescription medications? Yes   Do you have prescription coverage? Yes   Coverage Payor:  MEDICAID - [facility] COMMUNITY PLAN [facility] ([place] MEDICAID)   Do you have any problems affording any of your prescribed medications? No   Is the patient taking medications as prescribed? yes   Who is going to help you get home at discharge? OzziecheriChinedu (Friend)   [phone] (Mobile)   How do you get to doctors appointments? family or friend will provide   Are you on dialysis? No   Do you take coumadin? No   Discharge Plan A Home with family   Discharge Plan B Home with family   DME Needed Upon Discharge  none   Discharge Plan discussed with: Patient   Transition of Care  Barriers None   Social Connections   How often do you attend Congregational or Confucianism services? More than 4  (Patient belongs to [employer] [facility] [employer]   Do you belong to any clubs or organizations such as Congregational groups, unions, fraternal or athletic groups, or school groups? Yes   How often do you attend meetings of the clubs or organizations you belong to? More than 4   Are you , , , , never , or living with a partner?    Alcohol Use   Q1: How often do you have a drink containing alcohol? Never   Q2: How many drinks containing alcohol do you have on a typical day when you are drinking? None   Q3: How often do you have six or more drinks on one occasion? Never   OTHER   Name(s) of People in Home Chinedu Byrd (Friend)   [phone] (Mobile) and (2) dependent children.

## 2023-07-05 NOTE — PLAN OF CARE
Problem: Physical Therapy  Goal: Physical Therapy Goal  Description: Pt will be seen for the following goals:  1. Pt will be mod ind with a rw for all transfers  2. Pt will be mod ind with aRw 200ft mod ind  Outcome: Ongoing, Progressing

## 2023-07-05 NOTE — PROGRESS NOTES
[facility]  Hospital Medicine Progress Note        Chief Complaint: Inpatient Follow-up    HPI:     42-year-old female with significant history of type 2 diabetes mellitus, gastroparesis, hypothyroidism, severe generalized anxiety, chronic back pain resulting in opiate addiction/dependence currently on Suboxone presented to the ED with complaints of intractable nausea, vomiting, diffuse abdominal discomfort, dizziness and lightheadedness for the past 3 days.  Patient was afebrile, hemodynamically stable in the ED.  EKG-sinus rhythm/sinus bradycardia.  Lab significant for hemoconcentration/dehydration.  Urine with 4+ ketones . patient was admitted hospitalist medicine service initiated aggressive fluid resuscitation . she had an episode of panic attack in the ED characterized by chest tightness, resolved with Ativan.  patient had recent workup for bradycardia with echocardiogram, Holter monitor which was all with well within normal limits .  Orthostatic vitals negative.  Dehydration better on 07/03.  IV fluids continued given poor oral intake .  patient with multiple nonspecific symptoms.  Anxiety likely contributing to most of her symptoms.  Psych evaluation pending.  Given persistent dizziness ongoing for 1 month decided to obtain MRI on 07/04.    Interval Hx:     Patient seen at bedside.   Borderline bradycardic.  Patient still with same complaints, refuses to get out of the bed because of dizziness, she is worried if she would fall.  Oral intake is still not adequate.    Objective/physical exam:  General:  Anxious   Chest: Clear to auscultation bilaterally  Heart: S1, S2, no appreciable murmur  Abdomen: Soft, nontender, BS +  MSK: Warm, no lower extremity edema, no clubbing or cyanosis  Neurologic: Alert and oriented x4, moving all extremities with good strength     VITAL SIGNS: 24 HRS MIN & MAX LAST   Temp  Min: 97.8 °F (36.6 °C)  Max: 98.2 °F (36.8 °C) 98.1 °F (36.7 °C)   BP  Min:  99/64  Max: 137/83 115/71   Pulse  Min: 42  Max: 51  (!) 45   Resp  Min: 18  Max: 18 18   SpO2  Min: 93 %  Max: 98 % 96 %       Recent Labs   Lab 07/04/23  0554   WBC 4.29*   RBC 4.02*   HGB 11.2*   HCT 34.8*   MCV 86.6   MCH 27.9   MCHC 32.2*   RDW 13.3      MPV 10.0         Recent Labs   Lab 07/03/23  0515 07/04/23  0554    142   K 3.6 3.4*   CO2 32* 32*   BUN 14.4 10.5   CREATININE 0.62 0.66   CALCIUM 8.6 8.4   MG 1.80  --    ALBUMIN 3.4* 3.3*   ALKPHOS 54 46   ALT 34 26   AST 20 16   BILITOT 0.6 0.6          Microbiology Results (last 7 days)       ** No results found for the last 168 hours. **             Scheduled Med:   amoxicillin-clavulanate 875-125mg  1 tablet Oral Q12H    buprenorphine HCL  8 mg Sublingual TID    enoxparin  40 mg Subcutaneous Q24H (prophylaxis, 1700)    levothyroxine  125 mcg Oral Before breakfast    metoclopramide HCl  10 mg Oral TID AC    pantoprazole  40 mg Oral BID AC    sucralfate  1 g Oral QID (AC & HS)          Assessment/Plan:    Acute on chronic Dizziness  Pansinusitis/suspected mastoiditis contributing to above  Acute dehydration on admit secondary to poor oral intake  Non intractable diffuse abdominal discomfort with associated nausea/vomiting secondary to gastroparesis flare-up  Sinus bradycardia  Type 2 diabetes mellitus-stable   Severe generalized anxiety  Hypothyroidism   History of opioid dependence currently on Suboxone  Chronic back pain   Prophylaxis      Most of patient's symptoms likely related to anxiety   She has multiple nonspecific complaints   It is possible that pansinusitis/suspected mastoiditis could be contributing to her dizziness   I have added Augmentin   MRI negative otherwise  Orthostatics negative  Patient refuses to get out of the bed given fear of fall   Evaluated by Cardiology for bradycardia   MCT monitoring, echocardiogram within normal limits, no additional recommendations   Have consulted Physical therapy  ENT consulted yesterday,  "they recommended outpatient follow-up, no indication for inpatient evaluation   Patient reports that she was told by her dental physician that she could possibly have throat cancer or lung cancer because of some "lesions".  Will arrange outpatient ENT follow-up   Abdominal examination is completely benign  She is on Reglan, ppi, Carafate  I have advanced her diet to regular today  She refuses to eat and thinks she needs a PEG tube placement   Continue home meds-Suboxone, levothyroxine   TSH high but T4 free is normal   Psych evaluated, recommending BuSpar 10 mg t.i.d. which was added today   DVT prophylaxis-Lovenox    Plan to DC home after PT evaluation and also if tolerating p.o. diet      Niya Jeong MD   07/05/2023                "

## 2023-07-05 NOTE — PT/OT/SLP EVAL
Physical Therapy Evaluation    Patient Name:  Fred Berman   MRN:  [id number]    Recommendations:     Discharge Recommendations: rehabilitation facility, outpatient PT  Discharge Equipment Recommendations: walker, rolling   Barriers to discharge:  slight decrease in fxn'l mobility    Assessment:     Fred Berman is a 42 y.o. female admitted with a medical diagnosis of Gastroparesis. .  At this time, patient shows BLE proximal weakness , and a slight decrease in her ability to transfer and walk.     Recent Surgery: * No surgery found *      Plan:     During this hospitalization, Pt will be seen 5x/week  for gait trainingn transfer training, and strengthening       Subjective     Chief Complaint: pain all over and weakness  Patient/Family Comments/goals:  Pain/Comfort:  Pain Rating 1: 5/10  Location 1:  (pain generalized all over)    Patients cultural, spiritual, Quaker conflicts given the current situation:      Living Environment:  Pt lives with family in a house with no steps  Prior to admission, patients level of function was ind.  Equipment used at home: none.  DME owned (not currently used): none.  Upon discharge, patient will have assistance from family.    Objective:     Communicated with nurse prior to session.  Patient found right sidelying with blood pressure cuff, peripheral IV upon PT entry to room.    General Precautions: Standard,      Orthopedic Precautions:N/A   Braces: N/A  Respiratory Status: Room air  Blood Pressure:     Exams:  RLE ROM: WFL  RLE Strength: 3+/5 hips but rest of leg is wnl  LLE ROM: WFL  LLE Strength: 3+/5 hips but rest of leg is wnl    Functional Mobility:  Bed Mobility:     Scooting: independence  Supine to Sit: independence  Sit to Supine: independence  Transfers:     Sit to Stand:  minimum assistance with rolling walker  Bed to Chair: minimum assistance with  rolling walker  using  Step Transfer  Gait: ambulated 100ft cga with rw   with pt walking slowly with  abbreviated steps bilaterally    AM-PAC 6 CLICK MOBILITY  Total Score:18       Treatment and Education:      Patient provided with verbal education regarding plan of care.  Understanding was verbalized.     Patient left supine with call button in reach.    GOALS:   Multidisciplinary Problems       Physical Therapy Goals          Problem: Physical Therapy    Goal Priority Disciplines Outcome Goal Variances Interventions   Physical Therapy Goal     PT, PT/OT Ongoing, Progressing     Description: Pt will be seen for the following goals:  1. Pt will be mod ind with a rw for all transfers  2. Pt will be mod ind with aRw 200ft mod ind                       History:     Past Medical History:   Diagnosis Date    Diabetes mellitus, type 2     Gastroparesis     Hypertension     Thyroid disease        Past Surgical History:   Procedure Laterality Date    ABLATION       SECTION      CHOLECYSTECTOMY      TUBAL LIGATION         Time Tracking:     PT Received On:    PT Start Time: 1530     PT Stop Time: 1553  PT Total Time (min): 23 min     Billable Minutes: Evaluation 2023

## 2023-07-05 NOTE — PLAN OF CARE
Spoke with Roshni () at [facility] regarding psych consult for: increased anxiety.

## 2023-07-06 LAB
ANION GAP SERPL CALC-SCNC: 8 MEQ/L
BUN SERPL-MCNC: 6.7 MG/DL (ref 7–18.7)
CALCIUM SERPL-MCNC: 8.2 MG/DL (ref 8.4–10.2)
CHLORIDE SERPL-SCNC: 105 MMOL/L (ref 98–107)
CO2 SERPL-SCNC: 28 MMOL/L (ref 22–29)
CREAT SERPL-MCNC: 0.65 MG/DL (ref 0.55–1.02)
CREAT/UREA NIT SERPL: 10
GFR SERPLBLD CREATININE-BSD FMLA CKD-EPI: >60 MLS/MIN/1.73/M2
GLUCOSE SERPL-MCNC: 123 MG/DL (ref 74–100)
POCT GLUCOSE: 111 MG/DL (ref 70–110)
POCT GLUCOSE: 116 MG/DL (ref 70–110)
POCT GLUCOSE: 118 MG/DL (ref 70–110)
POCT GLUCOSE: 149 MG/DL (ref 70–110)
POTASSIUM SERPL-SCNC: 3.2 MMOL/L (ref 3.5–5.1)
SODIUM SERPL-SCNC: 141 MMOL/L (ref 136–145)

## 2023-07-06 PROCEDURE — 25000003 PHARM REV CODE 250: Performed by: INTERNAL MEDICINE

## 2023-07-06 PROCEDURE — 21400001 HC TELEMETRY ROOM

## 2023-07-06 PROCEDURE — 63600175 PHARM REV CODE 636 W HCPCS: Performed by: INTERNAL MEDICINE

## 2023-07-06 PROCEDURE — 25000003 PHARM REV CODE 250: Performed by: NURSE PRACTITIONER

## 2023-07-06 PROCEDURE — 80048 BASIC METABOLIC PNL TOTAL CA: CPT | Performed by: INTERNAL MEDICINE

## 2023-07-06 PROCEDURE — 97116 GAIT TRAINING THERAPY: CPT | Mod: CQ

## 2023-07-06 RX ORDER — POTASSIUM CHLORIDE 14.9 MG/ML
40 INJECTION INTRAVENOUS EVERY 4 HOURS
Status: DISCONTINUED | OUTPATIENT
Start: 2023-07-06 | End: 2023-07-06

## 2023-07-06 RX ORDER — POTASSIUM CHLORIDE 20 MEQ/1
40 TABLET, EXTENDED RELEASE ORAL EVERY 4 HOURS
Status: COMPLETED | OUTPATIENT
Start: 2023-07-06 | End: 2023-07-06

## 2023-07-06 RX ORDER — QUETIAPINE FUMARATE 25 MG/1
25 TABLET, FILM COATED ORAL DAILY
Status: DISCONTINUED | OUTPATIENT
Start: 2023-07-06 | End: 2023-07-07

## 2023-07-06 RX ADMIN — LEVOTHYROXINE SODIUM 125 MCG: 125 TABLET ORAL at 05:07

## 2023-07-06 RX ADMIN — POTASSIUM CHLORIDE 40 MEQ: 14.9 INJECTION, SOLUTION INTRAVENOUS at 03:07

## 2023-07-06 RX ADMIN — QUETIAPINE FUMARATE 25 MG: 25 TABLET ORAL at 09:07

## 2023-07-06 RX ADMIN — METOCLOPRAMIDE 10 MG: 10 TABLET ORAL at 03:07

## 2023-07-06 RX ADMIN — METOCLOPRAMIDE 10 MG: 10 TABLET ORAL at 11:07

## 2023-07-06 RX ADMIN — ENOXAPARIN SODIUM 40 MG: 40 INJECTION SUBCUTANEOUS at 05:07

## 2023-07-06 RX ADMIN — PANTOPRAZOLE SODIUM 40 MG: 40 TABLET, DELAYED RELEASE ORAL at 03:07

## 2023-07-06 RX ADMIN — POTASSIUM CHLORIDE 40 MEQ: 1500 TABLET, EXTENDED RELEASE ORAL at 11:07

## 2023-07-06 RX ADMIN — BUPRENORPHINE 8 MG: 8 TABLET SUBLINGUAL at 11:07

## 2023-07-06 RX ADMIN — POTASSIUM CHLORIDE 40 MEQ: 1500 TABLET, EXTENDED RELEASE ORAL at 05:07

## 2023-07-06 RX ADMIN — SUCRALFATE 1 G: 1 TABLET ORAL at 10:07

## 2023-07-06 RX ADMIN — METOCLOPRAMIDE 10 MG: 10 TABLET ORAL at 05:07

## 2023-07-06 RX ADMIN — SUCRALFATE 1 G: 1 TABLET ORAL at 03:07

## 2023-07-06 RX ADMIN — PANTOPRAZOLE SODIUM 40 MG: 40 TABLET, DELAYED RELEASE ORAL at 05:07

## 2023-07-06 RX ADMIN — SUCRALFATE 1 G: 1 TABLET ORAL at 11:07

## 2023-07-06 RX ADMIN — SODIUM CHLORIDE: 9 INJECTION, SOLUTION INTRAVENOUS at 03:07

## 2023-07-06 RX ADMIN — AMOXICILLIN AND CLAVULANATE POTASSIUM 1 TABLET: 875; 125 TABLET ORAL at 09:07

## 2023-07-06 NOTE — PROGRESS NOTES
[facility] Hospital Medicine Progress Note        Chief Complaint: Inpatient follow-up on diabetic gastroparesis    HPI:   Fred Berman is a 42 year old female who PMH includes HTN, thyroid disease, DM type II, gastroparesis who resents to the ED at [facility] on 7/2/2023 with complaints of abdominal pain, headache and associated chest pain over the past month. Pt also reports cough over the past 3 days. Pt reports no N/V/D or fever.  Pt reports also she was diagnosed with gastroparesis last month. She also reports dizziness with her headaches, denies any syncope or LOC. Labs reviewed demonstrated K 5.3, glucose 126, other indicis unremarkable.  Chest x-ray impression reviewed demonstrated no acute abnormality.  CTA of the head without contrast impression reviewed demonstrated no acute intracranial abnormality.CTA chest PE  impression reviewed demonstrated no evidence of pulmonary embolism. Ct of abdomen and pelvis without contrast impression reviewed demonstrated no acute intra-abdominal or pelvic solid organ or bowel pathology identified, moderate stool in the colon.  Initial vital signs reviewed /66 pulse 64 respirations 16 temperature 97.7° F O2 saturation 97% on room air.  Patient became anxious in the ED and required lorazepam.  Patient is admitted to hospital medicine services for further management.    Interval Hx:   Patient with multiple somatic complaints.  No significant changes.  Patient is afebrile, on room air, and hemodynamically stable.        Objective/physical exam:  General: in no acute distress  HENT: normocephalic, atraumatic  Eye: PERRL, EOMI, clear conjunctiva  Neck: full ROM, no thyromegaly, no JVD  Respiratory: clear to auscultation bilaterally  Cardiovascular: regular rate and rhythm  Gastrointestinal: non-distended, positive bowel sounds, non-tender  Musculoskeletal: no gross deformity  Integumentary: warm, dry, intact, no rashes  Neurological: cranial  nerves grossly intact, no focal neurological deficit  Psychiatric: cooperative, flat affect, anxious and depressed      VITAL SIGNS: 24 HRS MIN & MAX LAST   Temp  Min: 98.1 °F (36.7 °C)  Max: 98.6 °F (37 °C) 98.1 °F (36.7 °C)   BP  Min: 113/70  Max: 148/87 118/69   Pulse  Min: 46  Max: 60  (!) 46   No data recorded 18   SpO2  Min: 94 %  Max: 95 % (!) 94 %     I have reviewed the following labs:    Recent Labs   Lab 07/02/23  1442 07/03/23  0515 07/04/23  0554   WBC 4.70 3.78* 4.29*   RBC 4.98 4.02* 4.02*   HGB 14.1 11.3* 11.2*   HCT 44.2 34.8* 34.8*   MCV 88.8 86.6 86.6   MCH 28.3 28.1 27.9   MCHC 31.9* 32.5* 32.2*   RDW 13.7 13.4 13.3    164 161   MPV 10.1 10.2 10.0       Recent Labs   Lab 07/02/23  1442 07/02/23  1829 07/03/23  0515 07/04/23  0554 07/05/23  0740 07/06/23  0550     --  142 142 143 141   K 5.3*  --  3.6 3.4* 4.1 3.2*   CO2 32*  --  32* 32* 31* 28   BUN 15.5  --  14.4 10.5 8.6 6.7*   CREATININE 0.70  --  0.62 0.66 0.67 0.65   CALCIUM 9.5  --  8.6 8.4 8.4 8.2*   MG  --  1.80 1.80  --   --   --    ALBUMIN 4.1  --  3.4* 3.3*  --   --    ALKPHOS 68  --  54 46  --   --    ALT 53  --  34 26  --   --    AST 38*  --  20 16  --   --    BILITOT 0.9  --  0.6 0.6  --   --           Microbiology Results (last 7 days)       ** No results found for the last 168 hours. **             See below for Radiology    Scheduled Med:   amoxicillin-clavulanate 875-125mg  1 tablet Oral Q12H    buprenorphine HCL  8 mg Sublingual TID    enoxparin  40 mg Subcutaneous Q24H (prophylaxis, 1700)    levothyroxine  125 mcg Oral Before breakfast    metoclopramide HCl  10 mg Oral TID AC    pantoprazole  40 mg Oral BID AC    QUEtiapine  25 mg Oral Daily    sucralfate  1 g Oral QID (AC & HS)        Continuous Infusions:   sodium chloride 0.9% 75 mL/hr at 07/05/23 1130        PRN Meds:  acetaminophen, acetaminophen, ALPRAZolam, aluminum-magnesium hydroxide-simethicone, dextrose 10%, dextrose 10%, glucagon (human recombinant),  glucose, glucose, insulin aspart U-100, melatonin, naloxone, ondansetron, polyethylene glycol, prochlorperazine, senna-docusate 8.6-50 mg, sodium chloride 0.9%       Assessment/Plan:  Diabetic gastroparesis exacerbation  Insulin-dependent type 2 diabetes mellitus  Hypothyroidism  Generalized anxiety disorder  Panic attacks  History of opioid dependence on Suboxone  Morbid obesity  Hypokalemia        Plan:   Replace potassium  Psychiatry evaluation and recommendations noted.  They recommended BuSpar 10 mg twice daily but apparently the patient prefers as needed Xanax  Discontinue Augmentin   Continue physical therapy   Cardiology evaluation and recommendations noted regarding bradycardia.  They recommended MCT monitoring  Otorhinolaryngology evaluation and recommendations noted.  They recommended outpatient follow-up  Continue current medical therapy and other supportive care  Patient reports an upcoming appointment with Dr. Agustin Yang with Gastroenterology    VTE prophylaxis:  Lovenox    Patient condition:  Stable    Anticipated discharge and Disposition:   Home      All diagnosis and differential diagnosis have been reviewed; assessment and plan has been documented; I have personally reviewed the labs and test results that are presently available; I have reviewed the patients medication list; I have reviewed the consulting providers response and recommendations. I have reviewed or attempted to review medical records based upon their availability    All of the patient's questions have been  addressed and answered. Patient's is agreeable to the above stated plan. I will continue to monitor closely and make adjustments to medical management as needed.  _____________________________________________________________________    Nutrition Status:    Radiology:  I have personally reviewed the following imaging and agree with the radiologist.     MRI Brain Without Contrast  Narrative: EXAMINATION:  MRI BRAIN WITHOUT  CONTRAST    CLINICAL HISTORY:  Dizziness x1 month;    TECHNIQUE:  Multiplanar multisequence MR imaging of the brain was performed without contrast.    COMPARISON:  None    FINDINGS:  No intracranial mass or lesion is seen.  No hemorrhage is seen.  No acute infarct is seen.  No diffusion abnormality seen.  No parenchymal abnormality is seen.  Gyri and sulci appear normal.  Ventricles and basilar cisterns appear grossly unremarkable.  No abnormal white matter changes are seen..  Posterior fossa appears normal.  Calvarium is intact.  There is evidence of mucosal thickening in all the paranasal sinuses..    .  There is some fluid in the mastoid air cells on the left.  Mastoiditis should be excluded.  Impression: Pansinusitis    There is a small amount of fluid in the mastoid air cells on the left.  Mastoiditis should be excluded.    Otherwise unremarkable    Electronically signed by: Marysol Ray  Date:    07/04/2023  Time:    12:27      Chuy Barrera MD   07/06/2023

## 2023-07-06 NOTE — PT/OT/SLP PROGRESS
Physical Therapy Treatment    Patient Name:  Fred Berman   MRN:  [id number]    Recommendations:     Discharge Recommendations: rehabilitation facility, outpatient PT  Discharge Equipment Recommendations: walker, rolling  Barriers to discharge: Ongoing medical needs    Assessment:     Fred Berman is a 42 y.o. female admitted with a medical diagnosis of Gastroparesis.  She presents with the following impairments/functional limitations: weakness, impaired endurance .    Rehab Prognosis: Good; patient would benefit from acute skilled PT services to address these deficits and reach maximum level of function.    Recent Surgery: * No surgery found *      Plan:     During this hospitalization, patient to be seen 5 x/week to address the identified rehab impairments via gait training, therapeutic activities, therapeutic exercises and progress toward the following goals:    Plan of Care Expires:  07/12/23    Subjective     Chief Complaint: Dizziness  Patient/Family Comments/goals:   Pain/Comfort:         Objective:     Communicated with RN prior to session.  Patient found supine with   upon PT entry to room.     General Precautions: Standard,    Orthopedic Precautions: N/A  Braces: N/A  Respiratory Status: Room air  Blood Pressure: 138/70  Skin Integrity: Visible skin intact      Functional Mobility:  Bed Mobility:     Supine to Sit: independence  Sit to Supine: independence  Transfers:     Sit to Stand:  modified independence with rolling walker  Gait: Pt amb 100ft with Rw CGA    Therapeutic Activities/Exercises:  Pt sat EOB and performed LE therex 10x1    Education:  Patient provided with verbal education regarding POC.  Understanding was verbalized, however additional teaching warranted.     Patient left HOB elevated with all lines intact and call button in reach..    GOALS:   Multidisciplinary Problems       Physical Therapy Goals          Problem: Physical Therapy    Goal Priority Disciplines Outcome Goal  Variances Interventions   Physical Therapy Goal     PT, PT/OT Ongoing, Progressing     Description: Pt will be seen for the following goals:  1. Pt will be mod ind with a rw for all transfers  2. Pt will be mod ind with aRw 200ft mod ind                       Time Tracking:     PT Received On: 07/06/23  PT Start Time: 1609     PT Stop Time: 1620  PT Total Time (min): 11 min     Billable Minutes: Gait Training 11    Treatment Type: Treatment  PT/PTA: PTA     Number of PTA visits since last PT visit: 1 07/06/2023

## 2023-07-06 NOTE — PROGRESS NOTES
"7/6/2023  Fred Berman   1980   [id number]        Psychiatry Progress Note     Chief Complaint: "I am feeling the same".    SUBJECTIVE:   Fred Berman is a 42 y.o. female with past psychiatric history of anxiety disorder, currently admitted with Gastroparesis.  Pt has a medical history of Dm II, gastroparesis, hypothyroidism, CHINEDU, chronic back pain which led to opiate dependency currently on Suboxone, and bradycardia. Psychiatry has been consulted to address   Increased anxiety state. Re-consult for increased anxiety.     At time of visit, she was in bed interacting with staff. She reports she has feelings of being sick all of the time. She reports she has been feeling nauseated. She has not had any vomiting episodes since yesterday. She reports her anxiety is the same. She does not feel anything is assisting to relieve her anxiety. Staff reports she refused Buspar on yesterday. She reports her anxiety will remain elevated until an exact diagnosis is identified for her symptoms. She reports she has increased heart rate and SOB when her anxiety is elevated. She reports her anxiety comes and goes. She reports she is not sure which med works the best for managing her anxiety. She reports she cannot tolerated a SSRI due to the need for Reglan to be included in her medication regimen. She reports she feels "terrible chest pain" with vistaril. She reports she is generally a happy person. She reports she is sleeping well. Energy is fair. She reports she is not sure if she has ever been treated with Seroquel, Zyprexa, or Trazodone for anxiety. Explained purpose and benefits for each of the 3 meds.        Current Medications:   Scheduled Meds:    amoxicillin-clavulanate 875-125mg  1 tablet Oral Q12H    buprenorphine HCL  8 mg Sublingual TID    busPIRone  10 mg Oral TID    enoxparin  40 mg Subcutaneous Q24H (prophylaxis, 1700)    levothyroxine  125 mcg Oral Before breakfast    metoclopramide HCl  10 mg Oral " TID AC    pantoprazole  40 mg Oral BID AC    sucralfate  1 g Oral QID (AC & HS)      PRN Meds: acetaminophen, acetaminophen, ALPRAZolam, aluminum-magnesium hydroxide-simethicone, dextrose 10%, dextrose 10%, glucagon (human recombinant), glucose, glucose, insulin aspart U-100, melatonin, naloxone, ondansetron, polyethylene glycol, prochlorperazine, senna-docusate 8.6-50 mg, sodium chloride 0.9%   Psychotherapeutics (From admission, onward)      Start     Stop Route Frequency Ordered    07/05/23 0900  busPIRone tablet 10 mg         -- Oral 3 times daily 07/05/23 0726    07/03/23 0411  ALPRAZolam tablet 0.25 mg         -- Oral 3 times daily PRN 07/03/23 0311            Allergies:   Review of patient's allergies indicates:   Allergen Reactions    Vancomycin analogues Rash        OBJECTIVE:   Vitals   Vitals:    07/06/23 0527   BP: 115/70   Pulse: (!) 47   Resp:    Temp: 98.1 °F (36.7 °C)        Labs/Imaging/Studies:   Recent Results (from the past 36 hour(s))   Basic Metabolic Panel    Collection Time: 07/05/23  7:40 AM   Result Value Ref Range    Sodium Level 143 136 - 145 mmol/L    Potassium Level 4.1 3.5 - 5.1 mmol/L    Chloride 107 98 - 107 mmol/L    Carbon Dioxide 31 (H) 22 - 29 mmol/L    Glucose Level 98 74 - 100 mg/dL    Blood Urea Nitrogen 8.6 7.0 - 18.7 mg/dL    Creatinine 0.67 0.55 - 1.02 mg/dL    BUN/Creatinine Ratio 13     Calcium Level Total 8.4 8.4 - 10.2 mg/dL    Anion Gap 5.0 mEq/L    eGFR >60 mls/min/1.73/m2   POCT glucose    Collection Time: 07/05/23 11:50 AM   Result Value Ref Range    POCT Glucose 107 70 - 110 mg/dL   POCT glucose    Collection Time: 07/05/23  4:09 PM   Result Value Ref Range    POCT Glucose 99 70 - 110 mg/dL   POCT glucose    Collection Time: 07/05/23 11:11 PM   Result Value Ref Range    POCT Glucose 166 (H) 70 - 110 mg/dL   POCT glucose    Collection Time: 07/06/23  5:24 AM   Result Value Ref Range    POCT Glucose 111 (H) 70 - 110 mg/dL   Basic Metabolic Panel    Collection Time:  07/06/23  5:50 AM   Result Value Ref Range    Sodium Level 141 136 - 145 mmol/L    Potassium Level 3.2 (L) 3.5 - 5.1 mmol/L    Chloride 105 98 - 107 mmol/L    Carbon Dioxide 28 22 - 29 mmol/L    Glucose Level 123 (H) 74 - 100 mg/dL    Blood Urea Nitrogen 6.7 (L) 7.0 - 18.7 mg/dL    Creatinine 0.65 0.55 - 1.02 mg/dL    BUN/Creatinine Ratio 10     Calcium Level Total 8.2 (L) 8.4 - 10.2 mg/dL    Anion Gap 8.0 mEq/L    eGFR >60 mls/min/1.73/m2        Psychiatric Mental Status Exam:  General Appearance: appears stated age, dressed in hospital garb, lying in bed, in no acute distress  Arousal: alert  Behavior:  withdrawn  Movements and Motor Activity: no abnormal involuntary movements noted; no tics, no tremors, no akathisia, no dystonia, no evidence of tardive dyskinesia; no psychomotor agitation or retardation  Orientation: oriented to person, place, time, and situation  Speech: normal rate, rhythm, volume, tone and pitch  Mood: Depressed and Anxious  Affect: flat  Thought Process: linear  Associations: no loosening of associations  Thought Content and Perceptions: no suicidal or homicidal ideation, no auditory or visual hallucinations, no paranoid ideation, no ideas of reference, no evidence of delusions or psychosis  Recent and Remote Memory: recent memory intact, remote memory intact; per interview/observation with patient  Attention and Concentration: attentive to conversation; per interview/observation with patient  Fund of Knowledge: aware of current events; based on history, vocabulary, fund of knowledge, syntax, grammar, and content  Insight:  fair ; based on understanding of severity of illness and HPI  Judgment:  fair ; based on patient's behavior and HPI    ASSESSMENT/PLAN:   Problems Addressed/Diagnoses:  MOOD DISORDERS; Major Depressive Disorder, Recurrent: 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

## 2023-07-07 PROBLEM — E44.0 MODERATE MALNUTRITION: Status: ACTIVE | Noted: 2023-07-07

## 2023-07-07 LAB
ALBUMIN SERPL-MCNC: 3.1 G/DL (ref 3.5–5)
ALBUMIN/GLOB SERPL: 1.6 RATIO (ref 1.1–2)
ALP SERPL-CCNC: 42 UNIT/L (ref 40–150)
ALT SERPL-CCNC: 13 UNIT/L (ref 0–55)
AST SERPL-CCNC: 12 UNIT/L (ref 5–34)
BILIRUBIN DIRECT+TOT PNL SERPL-MCNC: 0.6 MG/DL
BUN SERPL-MCNC: 5.4 MG/DL (ref 7–18.7)
CALCIUM SERPL-MCNC: 8.3 MG/DL (ref 8.4–10.2)
CHLORIDE SERPL-SCNC: 109 MMOL/L (ref 98–107)
CO2 SERPL-SCNC: 32 MMOL/L (ref 22–29)
CREAT SERPL-MCNC: 0.68 MG/DL (ref 0.55–1.02)
GFR SERPLBLD CREATININE-BSD FMLA CKD-EPI: >60 MLS/MIN/1.73/M2
GLOBULIN SER-MCNC: 1.9 GM/DL (ref 2.4–3.5)
GLUCOSE SERPL-MCNC: 101 MG/DL (ref 74–100)
POCT GLUCOSE: 101 MG/DL (ref 70–110)
POCT GLUCOSE: 94 MG/DL (ref 70–110)
POTASSIUM SERPL-SCNC: 3.7 MMOL/L (ref 3.5–5.1)
PROT SERPL-MCNC: 5 GM/DL (ref 6.4–8.3)
SODIUM SERPL-SCNC: 145 MMOL/L (ref 136–145)

## 2023-07-07 PROCEDURE — 25000003 PHARM REV CODE 250: Performed by: NURSE PRACTITIONER

## 2023-07-07 PROCEDURE — 63600175 PHARM REV CODE 636 W HCPCS: Performed by: INTERNAL MEDICINE

## 2023-07-07 PROCEDURE — 80053 COMPREHEN METABOLIC PANEL: CPT | Performed by: INTERNAL MEDICINE

## 2023-07-07 PROCEDURE — 25000003 PHARM REV CODE 250: Performed by: INTERNAL MEDICINE

## 2023-07-07 PROCEDURE — 21400001 HC TELEMETRY ROOM

## 2023-07-07 PROCEDURE — 97530 THERAPEUTIC ACTIVITIES: CPT | Mod: CQ

## 2023-07-07 RX ORDER — QUETIAPINE FUMARATE 25 MG/1
50 TABLET, FILM COATED ORAL NIGHTLY
Status: DISCONTINUED | OUTPATIENT
Start: 2023-07-07 | End: 2023-07-08 | Stop reason: HOSPADM

## 2023-07-07 RX ADMIN — SUCRALFATE 1 G: 1 TABLET ORAL at 04:07

## 2023-07-07 RX ADMIN — ENOXAPARIN SODIUM 40 MG: 40 INJECTION SUBCUTANEOUS at 04:07

## 2023-07-07 RX ADMIN — SUCRALFATE 1 G: 1 TABLET ORAL at 11:07

## 2023-07-07 RX ADMIN — PANTOPRAZOLE SODIUM 40 MG: 40 TABLET, DELAYED RELEASE ORAL at 04:07

## 2023-07-07 RX ADMIN — QUETIAPINE FUMARATE 50 MG: 25 TABLET ORAL at 10:07

## 2023-07-07 RX ADMIN — METOCLOPRAMIDE 10 MG: 10 TABLET ORAL at 11:07

## 2023-07-07 RX ADMIN — SUCRALFATE 1 G: 1 TABLET ORAL at 08:07

## 2023-07-07 RX ADMIN — METOCLOPRAMIDE 10 MG: 10 TABLET ORAL at 04:07

## 2023-07-07 RX ADMIN — BUPRENORPHINE 8 MG: 8 TABLET SUBLINGUAL at 08:07

## 2023-07-07 RX ADMIN — ALPRAZOLAM 0.25 MG: 0.25 TABLET ORAL at 04:07

## 2023-07-07 RX ADMIN — SUCRALFATE 1 G: 1 TABLET ORAL at 09:07

## 2023-07-07 RX ADMIN — LEVOTHYROXINE SODIUM 125 MCG: 125 TABLET ORAL at 04:07

## 2023-07-07 RX ADMIN — BUPRENORPHINE 8 MG: 8 TABLET SUBLINGUAL at 04:07

## 2023-07-07 RX ADMIN — BUPRENORPHINE 8 MG: 8 TABLET SUBLINGUAL at 09:07

## 2023-07-07 NOTE — PT/OT/SLP PROGRESS
Physical Therapy Treatment    Patient Name:  Fred Berman   MRN:  [id number]    Recommendations:     Discharge Recommendations: rehabilitation facility, outpatient PT  Discharge Equipment Recommendations: walker, rolling  Barriers to discharge:  placement    Assessment:     Fred Berman is a 42 y.o. female admitted with a medical diagnosis of Gastroparesis.  She presents with the following impairments/functional limitations: weakness, impaired endurance, impaired balance .    Rehab Prognosis: Good; patient would benefit from acute skilled PT services to address these deficits and reach maximum level of function.    Recent Surgery: * No surgery found *      Plan:     During this hospitalization, patient to be seen 5 x/week to address the identified rehab impairments via gait training, therapeutic activities, therapeutic exercises and progress toward the following goals:    Plan of Care Expires:  07/12/23    Subjective     Chief Complaint: dizziness  Patient/Family Comments/goals:   Pain/Comfort:         Objective:     Communicated with RN prior to session.  Patient found sitting edge of bed with   upon PT entry to room.     General Precautions: Standard,    Orthopedic Precautions: N/A  Braces: N/A  Respiratory Status: Room air  Skin Integrity: Visible skin intact      Functional Mobility:  Bed Mobility:     Sit to Supine: independence  Transfers:     Sit to Stand:  stand by assistance with no AD  Toilet Transfer: stand by assistance with  rolling walker  using  Step Transfer  Gait: Pt amb from EOB to bathroom with IV pole       Education:  Patient provided with verbal education regarding POC.  Understanding was verbalized, however additional teaching warranted.     Patient left HOB elevated with all lines intact and call button in reach..    GOALS:   Multidisciplinary Problems       Physical Therapy Goals          Problem: Physical Therapy    Goal Priority Disciplines Outcome Goal Variances Interventions    Physical Therapy Goal     PT, PT/OT Ongoing, Progressing     Description: Pt will be seen for the following goals:  1. Pt will be mod ind with a rw for all transfers  2. Pt will be mod ind with aRw 200ft mod ind                       Time Tracking:     PT Received On: 07/07/23  PT Start Time: 1105     PT Stop Time: 1115  PT Total Time (min): 10 min     Billable Minutes: Therapeutic Activity 10    Treatment Type: Treatment  PT/PTA: PTA     Number of PTA visits since last PT visit: 2     07/07/2023

## 2023-07-07 NOTE — PROGRESS NOTES
[facility] Hospital Medicine Progress Note        Chief Complaint: Inpatient follow-up on diabetic gastroparesis    HPI:   Fred Berman is a 42 year old female who PMH includes HTN, thyroid disease, DM type II, gastroparesis who resents to the ED at [facility] on 7/2/2023 with complaints of abdominal pain, headache and associated chest pain over the past month. Pt also reports cough over the past 3 days. Pt reports no N/V/D or fever.  Pt reports also she was diagnosed with gastroparesis last month. She also reports dizziness with her headaches, denies any syncope or LOC. Labs reviewed demonstrated K 5.3, glucose 126, other indicis unremarkable.  Chest x-ray impression reviewed demonstrated no acute abnormality.  CTA of the head without contrast impression reviewed demonstrated no acute intracranial abnormality.CTA chest PE  impression reviewed demonstrated no evidence of pulmonary embolism. Ct of abdomen and pelvis without contrast impression reviewed demonstrated no acute intra-abdominal or pelvic solid organ or bowel pathology identified, moderate stool in the colon.  Initial vital signs reviewed /66 pulse 64 respirations 16 temperature 97.7° F O2 saturation 97% on room air.  Patient became anxious in the ED and required lorazepam.  Patient is admitted to hospital medicine services for further management.    Interval Hx:   Patient reports tolerance only to liquid intake.  No active vomiting but remains with nausea.  Patient is afebrile, on room air, and hemodynamically stable.        Objective/physical exam:  General: in no acute distress  HENT: normocephalic, atraumatic  Eye: PERRL, EOMI, clear conjunctiva  Neck: full ROM, no thyromegaly, no JVD  Respiratory: clear to auscultation bilaterally  Cardiovascular: regular rate and rhythm  Gastrointestinal: non-distended, positive bowel sounds, non-tender  Musculoskeletal: no gross deformity  Integumentary: warm, dry, intact, no  rashcedric  Neurological: cranial nerves grossly intact, no focal neurological deficit  Psychiatric: cooperative, flat affect, anxious and depressed      VITAL SIGNS: 24 HRS MIN & MAX LAST   Temp  Min: 98.1 °F (36.7 °C)  Max: 98.5 °F (36.9 °C) 98.3 °F (36.8 °C)   BP  Min: 124/72  Max: 150/87 (!) 147/87   Pulse  Min: 45  Max: 52  (!) 47   Resp  Min: 18  Max: 18 18   SpO2  Min: 93 %  Max: 97 % 95 %     I have reviewed the following labs:    Recent Labs   Lab 07/02/23  1442 07/03/23  0515 07/04/23  0554   WBC 4.70 3.78* 4.29*   RBC 4.98 4.02* 4.02*   HGB 14.1 11.3* 11.2*   HCT 44.2 34.8* 34.8*   MCV 88.8 86.6 86.6   MCH 28.3 28.1 27.9   MCHC 31.9* 32.5* 32.2*   RDW 13.7 13.4 13.3    164 161   MPV 10.1 10.2 10.0       Recent Labs   Lab 07/02/23  1829 07/03/23  0515 07/04/23  0554 07/05/23  0740 07/06/23  0550 07/07/23  0359   NA  --  142 142 143 141 145   K  --  3.6 3.4* 4.1 3.2* 3.7   CO2  --  32* 32* 31* 28 32*   BUN  --  14.4 10.5 8.6 6.7* 5.4*   CREATININE  --  0.62 0.66 0.67 0.65 0.68   CALCIUM  --  8.6 8.4 8.4 8.2* 8.3*   MG 1.80 1.80  --   --   --   --    ALBUMIN  --  3.4* 3.3*  --   --  3.1*   ALKPHOS  --  54 46  --   --  42   ALT  --  34 26  --   --  13   AST  --  20 16  --   --  12   BILITOT  --  0.6 0.6  --   --  0.6          Microbiology Results (last 7 days)       ** No results found for the last 168 hours. **             See below for Radiology    Scheduled Med:   buprenorphine HCL  8 mg Sublingual TID    enoxparin  40 mg Subcutaneous Q24H (prophylaxis, 1700)    levothyroxine  125 mcg Oral Before breakfast    metoclopramide HCl  10 mg Oral TID AC    pantoprazole  40 mg Oral BID AC    QUEtiapine  25 mg Oral Daily    sucralfate  1 g Oral QID (AC & HS)        Continuous Infusions:   sodium chloride 0.9% 75 mL/hr at 07/06/23 1538        PRN Meds:  acetaminophen, acetaminophen, ALPRAZolam, aluminum-magnesium hydroxide-simethicone, dextrose 10%, dextrose 10%, glucagon (human recombinant), glucose, glucose,  insulin aspart U-100, melatonin, naloxone, ondansetron, polyethylene glycol, prochlorperazine, senna-docusate 8.6-50 mg, sodium chloride 0.9%       Assessment/Plan:  Diabetic gastroparesis exacerbation  Insulin-dependent type 2 diabetes mellitus  Hypothyroidism  Generalized anxiety disorder  Panic attacks  History of opioid dependence on Suboxone  Morbid obesity  Hypokalemia, resolved        Plan:   Psychiatry evaluation and recommendations noted.  They recommended BuSpar 10 mg twice daily but apparently the patient prefers as needed Xanax  Continue physical therapy   Cardiology evaluation and recommendations noted regarding bradycardia.  They recommended MCT monitoring.  Otorhinolaryngology evaluation and recommendations noted.  They recommended outpatient follow-up.  Continue current medical therapy and other supportive care  Patient reports an upcoming appointment with Dr. Agustin Yang with Gastroenterology however she insists on being evaluated by gastroenterology while here so a consultation has been requested    VTE prophylaxis:  Lovenox    Patient condition:  Stable    Anticipated discharge and Disposition:   Home      All diagnosis and differential diagnosis have been reviewed; assessment and plan has been documented; I have personally reviewed the labs and test results that are presently available; I have reviewed the patients medication list; I have reviewed the consulting providers response and recommendations. I have reviewed or attempted to review medical records based upon their availability    All of the patient's questions have been  addressed and answered. Patient's is agreeable to the above stated plan. I will continue to monitor closely and make adjustments to medical management as needed.  _____________________________________________________________________        Radiology:  I have personally reviewed the following imaging and agree with the radiologist.     MRI Brain Without  Contrast  Narrative: EXAMINATION:  MRI BRAIN WITHOUT CONTRAST    CLINICAL HISTORY:  Dizziness x1 month;    TECHNIQUE:  Multiplanar multisequence MR imaging of the brain was performed without contrast.    COMPARISON:  None    FINDINGS:  No intracranial mass or lesion is seen.  No hemorrhage is seen.  No acute infarct is seen.  No diffusion abnormality seen.  No parenchymal abnormality is seen.  Gyri and sulci appear normal.  Ventricles and basilar cisterns appear grossly unremarkable.  No abnormal white matter changes are seen..  Posterior fossa appears normal.  Calvarium is intact.  There is evidence of mucosal thickening in all the paranasal sinuses..    .  There is some fluid in the mastoid air cells on the left.  Mastoiditis should be excluded.  Impression: Pansinusitis    There is a small amount of fluid in the mastoid air cells on the left.  Mastoiditis should be excluded.    Otherwise unremarkable    Electronically signed by: Marysol Ray  Date:    07/04/2023  Time:    12:27      Chuy Barrera MD   07/07/2023

## 2023-07-07 NOTE — PLAN OF CARE
Problem: Adult Inpatient Plan of Care  Goal: Plan of Care Review  7/7/2023 0500 by Martin Mcdaniel RN  Outcome: Ongoing, Progressing  7/7/2023 0459 by Martin Mcdaniel RN  Outcome: Ongoing, Progressing  7/7/2023 0458 by Martin Mcdaniel RN  Outcome: Ongoing, Progressing  7/7/2023 0307 by Martin Mcdaniel RN  Outcome: Ongoing, Progressing  Goal: Patient-Specific Goal (Individualized)  7/7/2023 0500 by Martin Mcdaniel RN  Outcome: Ongoing, Progressing  7/7/2023 0459 by Martin Mcdaniel RN  Outcome: Ongoing, Progressing  7/7/2023 0458 by Martin Mcdaniel RN  Outcome: Ongoing, Progressing  7/7/2023 0307 by Martin Mcdaniel RN  Outcome: Ongoing, Progressing  Goal: Absence of Hospital-Acquired Illness or Injury  7/7/2023 0500 by Martin Mcdaniel RN  Outcome: Ongoing, Progressing  7/7/2023 0459 by Martin Mcdaniel RN  Outcome: Ongoing, Progressing  7/7/2023 0458 by Martin Mcdaniel RN  Outcome: Ongoing, Progressing  7/7/2023 0307 by Martin Mcdaniel RN  Outcome: Ongoing, Progressing  Goal: Optimal Comfort and Wellbeing  7/7/2023 0500 by Martin Mcdaniel RN  Outcome: Ongoing, Progressing  7/7/2023 0459 by Martin Mcdaniel RN  Outcome: Ongoing, Progressing  7/7/2023 0458 by Martin Mcdaniel RN  Outcome: Ongoing, Progressing  7/7/2023 0307 by Martin Mcdaniel RN  Outcome: Ongoing, Progressing  Goal: Readiness for Transition of Care  7/7/2023 0500 by Martin Mcdaniel RN  Outcome: Ongoing, Progressing  7/7/2023 0459 by Martin Mcdaniel RN  Outcome: Ongoing, Progressing  7/7/2023 0458 by Martin Mcdaniel RN  Outcome: Ongoing, Progressing  7/7/2023 0307 by Martin Mcdaniel RN  Outcome: Ongoing, Progressing     Problem: Fall Injury Risk  Goal: Absence of Fall and Fall-Related Injury  7/7/2023 0500 by Martin Mcdaniel RN  Outcome: Ongoing, Progressing  7/7/2023 0459 by Martin Mcdaniel RN  Outcome: Ongoing, Progressing  7/7/2023 0458 by Martin Mcdaniel RN  Outcome: Ongoing,  Progressing  7/7/2023 0307 by Martin Mcdaniel RN  Outcome: Ongoing, Progressing     Problem: Skin Injury Risk Increased  Goal: Skin Health and Integrity  7/7/2023 0500 by Martin Mcdaniel RN  Outcome: Ongoing, Progressing  7/7/2023 0459 by Martin Mcdaniel RN  Outcome: Ongoing, Progressing  7/7/2023 0458 by Martin Mcdaniel RN  Outcome: Ongoing, Progressing  7/7/2023 0307 by Martin Mcdaniel RN  Outcome: Ongoing, Progressing

## 2023-07-07 NOTE — CONSULTS
Gastroenterology Consultation Note    Reason for Consult:  Persistent Nausea    PCP:   Emmanuelle Rich NP      History of Present Illness (HPI):  42-year-old female known to Dr. Macias  in the outpatient setting only with medical history of polysubstance abuse on Suboxone, C diff colitis, DM II and recently diagnosed gastroparesis who presents to the ED with complaints of abdominal pain and headache associated with chest pain.  GI consulted for gastroparesis and persistent nausea.    Since arrival, patient with mild hypertension and bradycardia.  Labs notable for mild anemia without any blood transfusion requirements, glucose 126 on arrival--101 today.  X-ray chest without acute abnormality.  CT head without abnormalities and CTA negative for PE.  CT abdomen with evidence of prior cholecystectomy and evidence of constipation.    Patient reports inability to tolerate p.o. associated with with weight loss and diffuse abdominal discomfort, nausea and vomiting for the past 2 months.  Symptoms sudden in onset with reported weight loss of 30 lb in the past 2 months.  Patient able to tolerate liquids without issues however when tolerating solids she will begin to experience diaphoresis, weakness, and vomiting of recently diagnosed food after a few bites.  Denies hematemesis.  At baseline, bowel movements are irregular and unpredictable, ranging from diarrhea to constipation.  She denies any associated hematochezia or melena.  Denies mucus/oily stools.  Abdominal pain is described to be diffuse and and throbbing, constantly throughout the day.  Denies nocturnal symptoms.    Patient states the symptoms have become severe enough to move her bed into her living room and has recently lost her job.   GE was ordered by her PCP with evidence of gastroparesis.  She was started on Reglan 1 month ago but denies any alleviation symptoms and states that medication causes migraines.  She was referred to Dr. Yang and was seen  by his NP with an upcoming appointment scheduled with Dr. Yang.     Previous records reviewed.    Patient was recently seen by a group at [facility] 2023 with abdominal pain, weight loss, vomiting and constipation with concern for enteritis on CT.  EGD 2023 with gastric erythema and scattered erosions, normal-appearing small bowel mucosa with otherwise unremarkable EGD.  Pathology with mild inflammation of the stomach.  Colonoscopy 2023 due to persistent abdominal discomfort was slightly tortuous but otherwise normal colonoscopy.  Normal appearing TI, no mucosal changes within the colon, no evidence of pseudomembranous colitis or diverticulitis or IBD.  Random colon biopsies all benign.      ROS:  Review of Systems   Constitutional:  Positive for chills, diaphoresis, malaise/fatigue and weight loss. Negative for fever.   Respiratory:  Negative for shortness of breath, wheezing and stridor.    Gastrointestinal:  Positive for abdominal pain, nausea and vomiting. Negative for blood in stool, heartburn and melena.   Skin:  Negative for itching and rash.   Neurological:  Positive for weakness. Negative for seizures and loss of consciousness.   Psychiatric/Behavioral:  The patient is nervous/anxious.      Medical History:   Past Medical History:   Diagnosis Date    Diabetes mellitus, type 2     Gastroparesis     Hypertension     Thyroid disease        Surgical History:   Past Surgical History:   Procedure Laterality Date    ABLATION       SECTION      CHOLECYSTECTOMY      TUBAL LIGATION         Family History:   History reviewed. No pertinent family history..     Social History:   Social History     Tobacco Use    Smoking status: Former     Types: Cigarettes    Smokeless tobacco: Never   Substance Use Topics    Alcohol use: Not Currently       Allergies:  Review of patient's allergies indicates:   Allergen Reactions    Vancomycin analogues Rash       Medications Prior to Admission   Medication Sig  "Dispense Refill Last Dose    levothyroxine (SYNTHROID) 100 MCG tablet Take 100 mcg by mouth before breakfast.   7/2/2023    SUBOXONE 8-2 mg Place 1 Film under the tongue 3 (three) times daily.   7/2/2023    CELEXA 10 mg tablet Take 10 mg by mouth every evening.       LANTUS SOLOSTAR U-100 INSULIN glargine 100 units/mL SubQ pen Inject 30 Units into the skin every evening.       losartan (COZAAR) 100 MG tablet Take 100 mg by mouth once daily.       OZEMPIC 1 mg/dose (4 mg/3 mL) Inject into the skin every 7 days.       pen needle, diabetic 31 gauge x 5/16" Ndle BD Ultra-Fine Short Pen Needle 31 gauge x 5/16"   USE 1 PENTIP WITH LANTUS ONCE A DAY AS DIRECTED       REGLAN 10 mg tablet Take 10 mg by mouth 4 (four) times daily.            Objective Findings:    Vital Signs:  /72   Pulse (!) 51   Temp 98.1 °F (36.7 °C) (Oral)   Resp 18   Ht 5' 4" (1.626 m)   Wt 79.3 kg (174 lb 13.2 oz)   LMP  (LMP Unknown)   SpO2 95%   Breastfeeding No   BMI 30.01 kg/m²   Body mass index is 30.01 kg/m².    Physical Exam:  Physical Exam  Constitutional:       General: She is not in acute distress.  HENT:      Head: Normocephalic and atraumatic.      Nose: Nose normal.      Mouth/Throat:      Pharynx: Posterior oropharyngeal erythema: diffuse tenderness.   Eyes:      Extraocular Movements: Extraocular movements intact.   Cardiovascular:      Pulses: Normal pulses.   Pulmonary:      Effort: No respiratory distress.      Breath sounds: No stridor.   Abdominal:      General: Abdomen is flat. Bowel sounds are normal. There is no distension.      Palpations: Abdomen is soft. There is no mass.      Tenderness: There is abdominal tenderness. There is no guarding.      Hernia: No hernia is present.   Skin:     General: Skin is warm and dry.      Coloration: Skin is not jaundiced or pale.   Neurological:      General: No focal deficit present.      Mental Status: She is alert and oriented to person, place, and time. Mental status is at " baseline.   Psychiatric:         Mood and Affect: Mood is anxious.      Comments: Pt tearful on exam       Labs:  Recent Results (from the past 24 hour(s))   POCT glucose    Collection Time: 07/06/23 11:45 AM   Result Value Ref Range    POCT Glucose 118 (H) 70 - 110 mg/dL   POCT glucose    Collection Time: 07/06/23  5:02 PM   Result Value Ref Range    POCT Glucose 116 (H) 70 - 110 mg/dL   POCT glucose    Collection Time: 07/06/23 10:53 PM   Result Value Ref Range    POCT Glucose 149 (H) 70 - 110 mg/dL   Comprehensive Metabolic Panel    Collection Time: 07/07/23  3:59 AM   Result Value Ref Range    Sodium Level 145 136 - 145 mmol/L    Potassium Level 3.7 3.5 - 5.1 mmol/L    Chloride 109 (H) 98 - 107 mmol/L    Carbon Dioxide 32 (H) 22 - 29 mmol/L    Glucose Level 101 (H) 74 - 100 mg/dL    Blood Urea Nitrogen 5.4 (L) 7.0 - 18.7 mg/dL    Creatinine 0.68 0.55 - 1.02 mg/dL    Calcium Level Total 8.3 (L) 8.4 - 10.2 mg/dL    Protein Total 5.0 (L) 6.4 - 8.3 gm/dL    Albumin Level 3.1 (L) 3.5 - 5.0 g/dL    Globulin 1.9 (L) 2.4 - 3.5 gm/dL    Albumin/Globulin Ratio 1.6 1.1 - 2.0 ratio    Bilirubin Total 0.6 <=1.5 mg/dL    Alkaline Phosphatase 42 40 - 150 unit/L    Alanine Aminotransferase 13 0 - 55 unit/L    Aspartate Aminotransferase 12 5 - 34 unit/L    eGFR >60 mls/min/1.73/m2       MRI Brain Without Contrast   Final Result      Pansinusitis      There is a small amount of fluid in the mastoid air cells on the left.  Mastoiditis should be excluded.      Otherwise unremarkable         Electronically signed by: Marysol Ray   Date:    07/04/2023   Time:    12:27      CT Abdomen Pelvis  Without Contrast   Final Result   Impression:      1. No acute intraabdominal or pelvic solid organ or bowel pathology identified. Details and other findings as discussed above.         Electronically signed by: Bradley Perez   Date:    07/03/2023   Time:    07:50      CTA Chest Non-Coronary (PE Studies)   Final Result      No evidence  for pulmonary embolism.  Other secondary findings as noted.         Electronically signed by: Osiel Fuller MD   Date:    07/02/2023   Time:    17:01      CT Head Without Contrast   Final Result      No acute intracranial abnormality.         Electronically signed by: Osiel Fuller MD   Date:    07/02/2023   Time:    16:59      X-Ray Chest PA And Lateral   Final Result      No acute abnormality.         Electronically signed by: Osiel Fuller MD   Date:    07/02/2023   Time:    14:15            Assessment/Plan:  42-year-old female known to Dr. Macias with medical history of polysubstance abuse on Suboxone, C diff colitis, DM II and recently diagnosed gastroparesis who presents to the ED with complaints of abdominal pain and headache associated with chest pain.  GI consulted for gastroparesis and persistent nausea.    Diffuse abdominal pain  N/V, acute on chronic   Recent dx of gastroparesis per pt  - on reglan x1 month without improvement per pt   - Patient with ongoing symptoms for the past 2 months, s/p EGD and colonoscopy 06/2023 without findings to explain current symptoms  - continue PPI, Reglan, carafate   - will change diet to full liquids   - supp care, no plans for endoscopy  4. Bradycardia  - cardiology on board  5. Generalized anxiety disorder  - likely contributing to current symptoms   - psych has been pt with recommendation of seroquel, pt declined buspar   6. Polysubstance abuse on Suboxone   - pt on for past 1- 1 1/2, starting to taper   - noted side effects of headache, diaphoresis, anxiety while taking this medication as well as anxiety, N/V, sweating and headache with withdrawal     Thank you for allowing us to participate in the care of Fred Berman.    Nadege Nichols, PA-C  Gastroenterology  [facility]

## 2023-07-08 VITALS
SYSTOLIC BLOOD PRESSURE: 143 MMHG | HEART RATE: 60 BPM | BODY MASS INDEX: 29.84 KG/M2 | WEIGHT: 174.81 LBS | DIASTOLIC BLOOD PRESSURE: 85 MMHG | OXYGEN SATURATION: 95 % | HEIGHT: 64 IN | RESPIRATION RATE: 18 BRPM | TEMPERATURE: 99 F

## 2023-07-08 LAB
POCT GLUCOSE: 101 MG/DL (ref 70–110)
POCT GLUCOSE: 107 MG/DL (ref 70–110)

## 2023-07-08 PROCEDURE — 25000003 PHARM REV CODE 250: Performed by: NURSE PRACTITIONER

## 2023-07-08 PROCEDURE — 25000003 PHARM REV CODE 250: Performed by: INTERNAL MEDICINE

## 2023-07-08 RX ORDER — QUETIAPINE FUMARATE 50 MG/1
50 TABLET, FILM COATED ORAL NIGHTLY
Qty: 30 TABLET | Refills: 11 | Status: SHIPPED | OUTPATIENT
Start: 2023-07-08 | End: 2023-08-10

## 2023-07-08 RX ORDER — PANTOPRAZOLE SODIUM 40 MG/1
40 TABLET, DELAYED RELEASE ORAL
Qty: 60 TABLET | Refills: 11 | Status: SHIPPED | OUTPATIENT
Start: 2023-07-08 | End: 2023-08-10 | Stop reason: ALTCHOICE

## 2023-07-08 RX ADMIN — PANTOPRAZOLE SODIUM 40 MG: 40 TABLET, DELAYED RELEASE ORAL at 06:07

## 2023-07-08 RX ADMIN — ALPRAZOLAM 0.25 MG: 0.25 TABLET ORAL at 12:07

## 2023-07-08 RX ADMIN — LEVOTHYROXINE SODIUM 125 MCG: 125 TABLET ORAL at 05:07

## 2023-07-08 RX ADMIN — METOCLOPRAMIDE 10 MG: 10 TABLET ORAL at 06:07

## 2023-07-08 RX ADMIN — BUPRENORPHINE 8 MG: 8 TABLET SUBLINGUAL at 09:07

## 2023-07-08 RX ADMIN — METOCLOPRAMIDE 10 MG: 10 TABLET ORAL at 02:07

## 2023-07-08 RX ADMIN — SUCRALFATE 1 G: 1 TABLET ORAL at 06:07

## 2023-07-08 RX ADMIN — SUCRALFATE 1 G: 1 TABLET ORAL at 02:07

## 2023-07-08 RX ADMIN — BUPRENORPHINE 8 MG: 8 TABLET SUBLINGUAL at 02:07

## 2023-07-08 RX ADMIN — ACETAMINOPHEN 650 MG: 325 TABLET, FILM COATED ORAL at 12:07

## 2023-07-08 NOTE — PSYCH
[facility] Medical Telemetry  Consult Note  Psychiatry        Referring Physician: Chuy Barrera MD    Examiner: Patricia Pratt NP  Date: 7/7/2023  Patient Status: Inpt  patient seen individually, chart reviewed, and case discussed with staff    Pt lying in bed, watching TV alone in room. Cooperative and compliant, takes meds PO without incident. Reports daytime Seroquel caused extreme drowsiness and she would just like to take it at night. Reports daytime Xanax helping with moods. Appetite and sleep fair otherwise. Able to make needs known, ambulates independently. Denies using dreams/cravings.     Mental Status Exam:     Eye Contact: good    Attitude toward examiner: cooperative    Motor Activity: normal    Speech: normal rate, tone and rhythm    Cognitions: normal    PERCEPTIONS: denies hallucinations    THOUGHT PROCESSES: linear    THOUGHT CONTENT: DENIES suicidal ideation and DENIES homicidal ideation    AFFECT/MOOD: anxious    INSIGHT: adequate    JUDGEMENT: adequate      Recommendations:   1.) move Seroquel to PM at 50mg, DC daytime dose  2.) reconsider Xanax use with Suboxone (per covering physician)  3.) psych f/u OP  4.) psych sign off

## 2023-07-10 NOTE — PLAN OF CARE
Received an case management consult to set-up Outpatient Psych Servies referral for this patient who was discharged over the weekend. All clinical notes, progress notes and MD orders were faxed to Ena Herzog LPN ([facility] Director) at the [facility]. This patient was informed of the referral and set-up to receive outpatient psych services on: 7/13/2023.

## 2023-07-12 ENCOUNTER — PATIENT MESSAGE (OUTPATIENT)
Dept: ADMINISTRATIVE | Facility: OTHER | Age: 43
End: 2023-07-12
Payer: MEDICAID

## 2023-07-12 ENCOUNTER — DOCUMENTATION ONLY (OUTPATIENT)
Dept: ADMINISTRATIVE | Facility: HOSPITAL | Age: 43
End: 2023-07-12
Payer: MEDICAID

## 2023-07-12 ENCOUNTER — OFFICE VISIT (OUTPATIENT)
Dept: FAMILY MEDICINE | Facility: CLINIC | Age: 43
End: 2023-07-12
Payer: MEDICAID

## 2023-07-12 ENCOUNTER — TELEPHONE (OUTPATIENT)
Dept: ADMINISTRATIVE | Facility: CLINIC | Age: 43
End: 2023-07-12
Payer: MEDICAID

## 2023-07-12 VITALS
SYSTOLIC BLOOD PRESSURE: 127 MMHG | HEART RATE: 60 BPM | TEMPERATURE: 98 F | RESPIRATION RATE: 18 BRPM | HEIGHT: 64 IN | BODY MASS INDEX: 30.22 KG/M2 | DIASTOLIC BLOOD PRESSURE: 78 MMHG | OXYGEN SATURATION: 99 % | WEIGHT: 177 LBS

## 2023-07-12 DIAGNOSIS — E03.9 HYPOTHYROIDISM, UNSPECIFIED TYPE: Primary | ICD-10-CM

## 2023-07-12 DIAGNOSIS — Z12.31 ENCOUNTER FOR SCREENING MAMMOGRAM FOR MALIGNANT NEOPLASM OF BREAST: ICD-10-CM

## 2023-07-12 DIAGNOSIS — H66.92 LEFT OTITIS MEDIA, UNSPECIFIED OTITIS MEDIA TYPE: ICD-10-CM

## 2023-07-12 DIAGNOSIS — K31.84 GASTROPARESIS: ICD-10-CM

## 2023-07-12 PROCEDURE — 3044F PR MOST RECENT HEMOGLOBIN A1C LEVEL <7.0%: ICD-10-PCS | Mod: CPTII,,,

## 2023-07-12 PROCEDURE — 1111F DSCHRG MED/CURRENT MED MERGE: CPT | Mod: CPTII,,,

## 2023-07-12 PROCEDURE — 4010F ACE/ARB THERAPY RXD/TAKEN: CPT | Mod: CPTII,,,

## 2023-07-12 PROCEDURE — 1111F PR DISCHARGE MEDS RECONCILED W/ CURRENT OUTPATIENT MED LIST: ICD-10-PCS | Mod: CPTII,,,

## 2023-07-12 PROCEDURE — 99214 OFFICE O/P EST MOD 30 MIN: CPT | Mod: S$PBB,,,

## 2023-07-12 PROCEDURE — 1160F PR REVIEW ALL MEDS BY PRESCRIBER/CLIN PHARMACIST DOCUMENTED: ICD-10-PCS | Mod: CPTII,,,

## 2023-07-12 PROCEDURE — 3044F HG A1C LEVEL LT 7.0%: CPT | Mod: CPTII,,,

## 2023-07-12 PROCEDURE — 1159F PR MEDICATION LIST DOCUMENTED IN MEDICAL RECORD: ICD-10-PCS | Mod: CPTII,,,

## 2023-07-12 PROCEDURE — 3078F PR MOST RECENT DIASTOLIC BLOOD PRESSURE < 80 MM HG: ICD-10-PCS | Mod: CPTII,,,

## 2023-07-12 PROCEDURE — 3078F DIAST BP <80 MM HG: CPT | Mod: CPTII,,,

## 2023-07-12 PROCEDURE — 1160F RVW MEDS BY RX/DR IN RCRD: CPT | Mod: CPTII,,,

## 2023-07-12 PROCEDURE — 1159F MED LIST DOCD IN RCRD: CPT | Mod: CPTII,,,

## 2023-07-12 PROCEDURE — 3074F PR MOST RECENT SYSTOLIC BLOOD PRESSURE < 130 MM HG: ICD-10-PCS | Mod: CPTII,,,

## 2023-07-12 PROCEDURE — 99214 OFFICE O/P EST MOD 30 MIN: CPT | Mod: PBBFAC,PN

## 2023-07-12 PROCEDURE — 3074F SYST BP LT 130 MM HG: CPT | Mod: CPTII,,,

## 2023-07-12 PROCEDURE — 4010F PR ACE/ARB THEARPY RXD/TAKEN: ICD-10-PCS | Mod: CPTII,,,

## 2023-07-12 PROCEDURE — 3008F PR BODY MASS INDEX (BMI) DOCUMENTED: ICD-10-PCS | Mod: CPTII,,,

## 2023-07-12 PROCEDURE — 99214 PR OFFICE/OUTPT VISIT, EST, LEVL IV, 30-39 MIN: ICD-10-PCS | Mod: S$PBB,,,

## 2023-07-12 PROCEDURE — 3008F BODY MASS INDEX DOCD: CPT | Mod: CPTII,,,

## 2023-07-12 RX ORDER — LEVOTHYROXINE SODIUM 125 UG/1
125 TABLET ORAL
Qty: 90 TABLET | Refills: 0 | Status: SHIPPED | OUTPATIENT
Start: 2023-07-12 | End: 2023-08-10 | Stop reason: SDUPTHER

## 2023-07-12 RX ORDER — AMOXICILLIN 500 MG/1
500 TABLET, FILM COATED ORAL EVERY 12 HOURS
Qty: 10 TABLET | Refills: 0 | Status: SHIPPED | OUTPATIENT
Start: 2023-07-12 | End: 2023-07-17

## 2023-07-12 NOTE — PROGRESS NOTES
Patient Name: Fred Berman   : 1980  MRN: [id number]     Subjective:   Patient ID: Fred Berman is a 42 y.o. female.    Chief Complaint:   Chief Complaint   Patient presents with    Hospital Follow Up    Nausea    Medication Management     Possible Synthroid increase        HPI: 2023: Patient cancelled 2 previous appoints to follow up from initial visit. Patient has been admitted to hospital for persistant nausea since last office visit, seems as though she was discharged on .  During hospital admission it was noted that patient would need referral to [facility] director (recieved by Ena Herzog LPN).  During hospital admission patient was seen by gastroenterologist for her gastroparesis and persistent nausea. Patient with ongoing symptoms for the past 2 months, s/p EGD and colonoscopy 2023 without findings to explain current symptoms.  GI provided supportive care with no further workup, patient has established with Dr. Yang and has seen the NP. Per ED note she was to have an appointment with Dr. Yang but insisted on being seen in Hospital. Otorhinolaryngology was also consulted due to patient stating she had lesions on her throat that were cancer. They recommended outpatient follow-up and per hostpitalist note this was going to be set up. TSH originally elevated at 28.9 to which we changed her medications, recent TSH while inpatient was decreased to 13, she is improving. States was started on synthroid 125 mcg but only took one dose. Will increase today.  To have new provider secondary to wanting her provider to have admitting privileges.  She was told by emergency department provider that her PCP should be admitting her to give her IV fluids.  Patient has established with Dr. Swann, states that she will be having hysterectomy once her nausea is better managed.    2023:  Patient presents to clinic today to establish care, previously follows Dr. Bulmaro George  "(requesting records today), who she has been following for a few years.  He is managing her hypothyroidism and type 2 diabetes, patient endorses recent change in weight where she lost 30 lb in 2 weeks, states that she was then inpatient at our [facility] for another 2 weeks where she had multiple diagnostic workups performed for her abdominal pain and inability to tolerate food.  Patient denies getting a formal diagnosis other than gastritis, states that her symptoms have not resolved since her discharge.  Patient is very concerned as she states that she is not eating and 40 days, patient highly anxious that she will die because of this.  Further exam reveals that patient is able to eat but states that she feels very nauseous, denies vomiting.  Endorses intense abdominal pain intermittently that is not relieved by eating or not eating.  States that nothing makes her abdominal pain better.  Patient states that she is only taking 100 mcg of her Synthroid currently due to her recent weight loss, feels as though something is off with her thyroid.  Denies any recent ultrasound of thyroid.  Patient also complains of a "pulsing" feeling in her abdomen. CT abdomen performed during her recent hospital stay noted no aortic aneurysm, did show A complex cystic lesion is noted along the inferior aspect of the left ovary measuring 3.4 x 2.2 cm. Adjacent fat stranding is noted. The uterus and right ovary are normal.  Patient states that she does have a gynecologist but they refusing to perform any further workup, she is amenable to referral to secondary gynecologist.  States that she frequently gets abscesses and would like her uterus taken out.       ROS:  Review of Systems   Constitutional:  Negative for chills, fever and weight loss.   HENT:  Negative for ear discharge, nosebleeds and tinnitus.    Eyes:  Negative for blurred vision, photophobia and pain.   Respiratory:  Negative for cough, shortness of breath, " "wheezing and stridor.    Cardiovascular:  Negative for chest pain, palpitations and orthopnea.   Gastrointestinal:  Positive for abdominal pain, nausea and vomiting. Negative for blood in stool, constipation and heartburn.   Genitourinary:  Negative for dysuria, frequency, hematuria and urgency.        Pelvic pain   Musculoskeletal:  Negative for falls and myalgias.   Skin:  Negative for itching and rash.   Neurological:  Negative for dizziness, sensory change, speech change, focal weakness, seizures, weakness and headaches.   Endo/Heme/Allergies:  Negative for environmental allergies. Does not bruise/bleed easily.   Psychiatric/Behavioral:  Negative for hallucinations and suicidal ideas. The patient is nervous/anxious.     History:     Past Medical History:   Diagnosis Date    Diabetes mellitus, type 2     Gastroparesis     Hypertension     Thyroid disease       Past Surgical History:   Procedure Laterality Date    ABLATION       SECTION      CHOLECYSTECTOMY      TUBAL LIGATION       No family history on file.   Social History     Tobacco Use    Smoking status: Former     Types: Cigarettes    Smokeless tobacco: Never   Substance and Sexual Activity    Alcohol use: Not Currently    Drug use: Never    Sexual activity: Not Currently        Allergies:   Review of patient's allergies indicates:   Allergen Reactions    Vancomycin analogues Rash     Objective:     Vitals:    23 0911   BP: 127/78   Pulse: 60   Resp: 18   Temp: 98.1 °F (36.7 °C)   TempSrc: Oral   SpO2: 99%   Weight: 80.3 kg (177 lb)   Height: 5' 4.02" (1.626 m)   PainSc:   6     Body mass index is 30.37 kg/m².     Physical Examination:   Physical Exam  Constitutional:       General: She is not in acute distress.     Appearance: Normal appearance. She is not ill-appearing.   Cardiovascular:      Rate and Rhythm: Normal rate and regular rhythm.      Heart sounds: Normal heart sounds.   Pulmonary:      Effort: Pulmonary effort is normal. No " respiratory distress.      Breath sounds: Normal breath sounds.   Musculoskeletal:      Cervical back: Normal range of motion.   Skin:     General: Skin is warm and dry.   Neurological:      Mental Status: She is alert and oriented to person, place, and time.   Psychiatric:         Mood and Affect: Mood normal.         Behavior: Behavior normal.       Assessment and Plan     Problem List Items Addressed This Visit          GI    Gastroparesis    Current Assessment & Plan     Keep follow-up appointments with Dr. Yang          Other Visit Diagnoses       Hypothyroidism, unspecified type    -  Primary    Increase Synthroid to 125 mcg, TSH T4 in 6 weeks    Relevant Medications    levothyroxine (SYNTHROID) 125 MCG tablet    Other Relevant Orders    TSH    T4, Free    Left otitis media, unspecified otitis media type        States she misplaces script that was given to her in hospital in only took 1 dose of her amoxicillin     Relevant Medications    amoxicillin (AMOXIL) 500 MG Tab    Encounter for screening mammogram for malignant neoplasm of breast        Relevant Orders    Mammo Digital Screening Jossy Ibarra was seen today for hospital follow up, nausea and medication management.    Diagnoses and all orders for this visit:    Hypothyroidism, unspecified type  Comments:  Increase Synthroid to 125 mcg, TSH T4 in 6 weeks  Orders:  -     levothyroxine (SYNTHROID) 125 MCG tablet; Take 1 tablet (125 mcg total) by mouth before breakfast.  -     TSH; Future  -     T4, Free; Future    Left otitis media, unspecified otitis media type  Comments:  States she misplaces script that was given to her in hospital in only took 1 dose of her amoxicillin   Orders:  -     amoxicillin (AMOXIL) 500 MG Tab; Take 1 tablet (500 mg total) by mouth every 12 (twelve) hours. for 5 days    Encounter for screening mammogram for malignant neoplasm of breast  -     Mammo Digital Screening Bilat; Future    Gastroparesis         Follow up  in about 4 weeks (around 8/9/2023), or if symptoms worsen or fail to improve, follow up. She does not need to follow up with me once she is set up with internal medicine physician.     This note was created with the assistance of Dragon voice recognition software or phone dictation. There may be transcription errors as a result of using this technology however minimal. Effort has been made to assure accuracy of transcription but any obvious errors or omissions should be clarified with the author of the document

## 2023-08-04 ENCOUNTER — HOSPITAL ENCOUNTER (OUTPATIENT)
Dept: RADIOLOGY | Facility: HOSPITAL | Age: 43
Discharge: HOME | End: 2023-08-04
Payer: MEDICAID

## 2023-08-04 DIAGNOSIS — Z86.39 HISTORY OF THYROID NODULE: ICD-10-CM

## 2023-08-04 PROCEDURE — 70491 CT SOFT TISSUE NECK W/DYE: CPT | Mod: TC

## 2023-08-04 PROCEDURE — 25500020 PHARM REV CODE 255

## 2023-08-04 RX ADMIN — IOPAMIDOL 100 ML: 755 INJECTION, SOLUTION INTRAVENOUS at 10:08

## 2023-08-07 NOTE — DISCHARGE SUMMARY
[facility] Hospital Medicine Discharge Summary    Admit Date: 7/2/2023  Discharge Date and Time:  7/08/2023 5:19 PM  Admitting Physician: VLADISLAV Team  Discharging Physician: Chuy Barrera MD.  Primary Care Physician: Filomena, Primary Doctor  Consults: Gastroenterology and Psychiatry    Discharge Diagnoses:  Diabetic gastroparesis exacerbation  Insulin-dependent type 2 diabetes mellitus  Hypothyroidism  Generalized anxiety disorder  Panic attacks  History of opioid dependence on Suboxone  Morbid obesity  Hypokalemia, resolved       Hospital Course:   Fred Berman is a 42 year old female who PMH includes HTN, thyroid disease, DM type II, gastroparesis who resents to the ED at [facility] on 7/2/2023 with complaints of abdominal pain, headache and associated chest pain over the past month. Pt also reports cough over the past 3 days. Pt reports no N/V/D or fever.  Pt reports also she was diagnosed with gastroparesis last month. She also reports dizziness with her headaches, denies any syncope or LOC. Labs reviewed demonstrated K 5.3, glucose 126, other indicis unremarkable.  Chest x-ray impression reviewed demonstrated no acute abnormality.  CTA of the head without contrast impression reviewed demonstrated no acute intracranial abnormality.CTA chest PE  impression reviewed demonstrated no evidence of pulmonary embolism. Ct of abdomen and pelvis without contrast impression reviewed demonstrated no acute intra-abdominal or pelvic solid organ or bowel pathology identified, moderate stool in the colon.  Initial vital signs reviewed /66 pulse 64 respirations 16 temperature 97.7° F O2 saturation 97% on room air.  Patient became anxious in the ED and required lorazepam.  Patient was very promptly admitted to hospital medicine services for further management.    Psychiatry evaluated the patient and recommended BuSpar 10 mg twice daily but the patient preferred as needed Xanax.  Cardiology  "evaluated the patient regarding bradycardia and recommended MCT monitoring.  Otorhinolaryngology evaluated the patient and recommended outpatient follow-up.  Gastroenterology evaluated the patient and recommended proton pump inhibitor, Reglan, Carafate, full liquid diet, and supportive care with no plans for endoscopy.  Patient has reached the maximum benefit of her acute care hospital stay and therefore will be discharged home.    Patient was seen and examined on the day of discharge.      Vitals:  VITAL SIGNS: 24 HRS MIN & MAX LAST   No data recorded 98.7 °F (37.1 °C)   No data recorded (!) 143/85   No data recorded  60   No data recorded 18   No data recorded 95 %       Physical Exam:  General: in no acute distress  HENT: normocephalic, atraumatic  Eye: PERRL, EOMI, clear conjunctiva  Neck: full ROM, no thyromegaly, no JVD  Respiratory: clear to auscultation bilaterally  Cardiovascular: regular rate and rhythm  Gastrointestinal: non-distended, positive bowel sounds, non-tender  Musculoskeletal: no gross deformity  Integumentary: warm, dry, intact, no rashes  Neurological: cranial nerves grossly intact, no focal neurological deficit  Psychiatric: cooperative, flat affect, anxious and depressed    Procedures Performed: No admission procedures for hospital encounter.     Significant Diagnostic Studies: See Full reports for all details    No results for input(s): "WBC", "RBC", "HGB", "HCT", "MCV", "MCH", "MCHC", "RDW", "PLT", "MPV", "GRAN", "LYMPH", "MONO", "BASO", "NRBC" in the last 168 hours.    No results for input(s): "NA", "K", "CL", "CO2", "ANIONGAP", "BUN", "CREATININE", "GLU", "CALCIUM", "PH", "MG", "ALBUMIN", "PROT", "ALKPHOS", "ALT", "AST", "BILITOT" in the last 168 hours.     Microbiology Results (last 7 days)       ** No results found for the last 168 hours. **             CT Neck Chest With Contrast (XPD)  Narrative: EXAMINATION:  STUDY: CT NECK CHEST WITH CONTRAST (XPD)    CLINICAL HISTORY AND " TECHNIQUE:  Taisha Edwards, RT on 8/4/2023 11:05 AM    PT STATUS: OP    PROCEDURE: CT NECK/CHEST W/ IV    CLINICAL HX : EVALUATE THYROID MASS    PMH: SM, HTN    IV CONTRAST: 100CC ISOVUE 300    ORAL CONTRAST: NONE    RECTAL CONTRAST: NONE    AXIAL IMAGES @ 5MM INTERVALS WITH MULTIPLANAR RECONSTRUCTION    TOTAL IMAGE NUMBER: [id number]    NUMBER OF CT SCANS IN PAST 12 MONTHS: 4    CTDIvol(mGy): HEAD:     BODY: 8.1    DLP(mGycm): HEAD:     BODY: 409.7    TECH: AK    PT TRANSPORTED W/O INCIDENT    This patient has had 4 CT and nuclear medicine scans performed within the last 12 months.    The following DOSE REDUCTION TECHNIQUES are used for all CT scans at [facility]:    1. Automated exposure control.  2. Adjustment of the mA and/or kv according to patient size.  3. Use of iterative reconstruction technique.    COMPARISON:  No recent thyroid imaging is available for comparison    FINDINGS:  I see no worrisome soft tissue masses or matted/significantly enlarged lymph nodes. The right lobe of the thyroid gland appears markedly atrophic and the left lobe of the thyroid gland has a mildly heterogeneous appearance.  The parotid glands and submandibular glands appear grossly unremarkable. The airway appears unremarkable with no worrisome intrinsic masses or displacement by extrinsic masses. The vocal cords, where visualized, appear grossly unremarkable. No significant vascular or musculoskeletal abnormalities are appreciated.  The lungs are well expanded with no worrisome parenchymal masses/nodules or focal consolidation.  The airways appear adequately patent.  No worrisome hilar mediastinal masses or matted lymphadenopathy are appreciated.  No significant vascular or musculoskeletal abnormalities are appreciated.  Impression: 1. The right lobe of the thyroid gland appears markedly atrophic.  The left lobe of the thyroid gland demonstrates a heterogeneous appearance with heterogeneous contrast enhancement  "with no obvious, dominant or worrisome mass appreciated, however, further workup with ultrasound may be helpful for better evaluation of the thyroid gland if felt to be clinically necessary.    Electronically signed by: Alma Molina  Date:    08/04/2023  Time:    11:26         Medication List        START taking these medications      pantoprazole 40 MG tablet  Commonly known as: PROTONIX  Take 1 tablet (40 mg total) by mouth 2 (two) times daily before meals.     QUEtiapine 50 MG tablet  Commonly known as: SEROQUEL  Take 1 tablet (50 mg total) by mouth every evening.            CONTINUE taking these medications      CeleXA 10 MG tablet  Generic drug: citalopram     LANTUS SOLOSTAR U-100 INSULIN glargine 100 units/mL SubQ pen  Generic drug: insulin     losartan 100 MG tablet  Commonly known as: COZAAR     OZEMPIC 1 mg/dose (4 mg/3 mL)  Generic drug: semaglutide     pen needle, diabetic 31 gauge x 5/16" Ndle     REGLAN 10 MG tablet  Generic drug: metoclopramide HCl     SUBOXONE 8-2 mg  Generic drug: buprenorphine-naloxone 8-2 mg               Where to Get Your Medications        These medications were sent to [id number] - [place] - [address]  [address] [place] [address]      Phone: [phone]   pantoprazole 40 MG tablet  QUEtiapine 50 MG tablet          Explained in detail to the patient about the discharge plan, medications, and follow-up visits. Pt understands and agrees with the treatment plan  Discharge Disposition: Home or Self Care   Discharged Condition: stable     Follow-up Information       Lio Scanlon Jr., MD. Go on 7/12/2023.    Specialty: Otolaryngology  Why: Appointment: 09:45  Contact information:  [address]  [place] [address] [phone]               Psychiatry Follow up in 1 week(s).    Why: Someone will call you on Monday with date & time of appointment                           Discharge time 35 minutes    For worsening symptoms, chest pain, shortness of breath, " increased abdominal pain, high grade fever, stroke or stroke like symptoms, immediately go to the nearest Emergency Room or call 911 as soon as possible.      Chuy Mcintosh M.D, on 8/7/2023. at 3:07 PM.

## 2023-08-10 ENCOUNTER — OFFICE VISIT (OUTPATIENT)
Dept: INTERNAL MEDICINE | Facility: CLINIC | Age: 43
End: 2023-08-10
Payer: MEDICAID

## 2023-08-10 ENCOUNTER — LAB VISIT (OUTPATIENT)
Dept: LAB | Facility: HOSPITAL | Age: 43
End: 2023-08-10
Attending: STUDENT IN AN ORGANIZED HEALTH CARE EDUCATION/TRAINING PROGRAM
Payer: MEDICAID

## 2023-08-10 VITALS
HEART RATE: 60 BPM | OXYGEN SATURATION: 97 % | RESPIRATION RATE: 18 BRPM | BODY MASS INDEX: 32.89 KG/M2 | TEMPERATURE: 98 F | SYSTOLIC BLOOD PRESSURE: 137 MMHG | DIASTOLIC BLOOD PRESSURE: 80 MMHG | WEIGHT: 192.63 LBS | HEIGHT: 64 IN

## 2023-08-10 DIAGNOSIS — E11.8 CONTROLLED TYPE 2 DIABETES MELLITUS WITH COMPLICATION, WITH LONG-TERM CURRENT USE OF INSULIN: Primary | ICD-10-CM

## 2023-08-10 DIAGNOSIS — E03.9 HYPOTHYROIDISM, UNSPECIFIED TYPE: ICD-10-CM

## 2023-08-10 DIAGNOSIS — Z00.00 HEALTHCARE MAINTENANCE: ICD-10-CM

## 2023-08-10 DIAGNOSIS — Z79.4 CONTROLLED TYPE 2 DIABETES MELLITUS WITH COMPLICATION, WITH LONG-TERM CURRENT USE OF INSULIN: Primary | ICD-10-CM

## 2023-08-10 DIAGNOSIS — G89.4 CHRONIC PAIN SYNDROME: ICD-10-CM

## 2023-08-10 DIAGNOSIS — E66.09 CLASS 1 OBESITY DUE TO EXCESS CALORIES WITH BODY MASS INDEX (BMI) OF 33.0 TO 33.9 IN ADULT, UNSPECIFIED WHETHER SERIOUS COMORBIDITY PRESENT: ICD-10-CM

## 2023-08-10 DIAGNOSIS — I10 HYPERTENSION, UNSPECIFIED TYPE: ICD-10-CM

## 2023-08-10 DIAGNOSIS — F41.1 GENERALIZED ANXIETY DISORDER: ICD-10-CM

## 2023-08-10 DIAGNOSIS — E27.8 ADRENAL NODULE: ICD-10-CM

## 2023-08-10 DIAGNOSIS — E55.9 VITAMIN D DEFICIENCY: ICD-10-CM

## 2023-08-10 PROBLEM — E66.811 CLASS 1 OBESITY IN ADULT: Status: ACTIVE | Noted: 2023-08-10

## 2023-08-10 PROBLEM — E66.9 CLASS 1 OBESITY IN ADULT: Status: ACTIVE | Noted: 2023-08-10

## 2023-08-10 LAB
CHOLEST SERPL-MCNC: 198 MG/DL
CHOLEST/HDLC SERPL: 3 {RATIO} (ref 0–5)
FERRITIN SERPL-MCNC: 79.88 NG/ML (ref 4.63–204)
HDLC SERPL-MCNC: 67 MG/DL (ref 35–60)
IRON SATN MFR SERPL: 39 % (ref 20–50)
IRON SERPL-MCNC: 126 UG/DL (ref 50–170)
LDLC SERPL CALC-MCNC: 120 MG/DL (ref 50–140)
T4 FREE SERPL-MCNC: 0.84 NG/DL (ref 0.7–1.48)
TIBC SERPL-MCNC: 196 UG/DL (ref 70–310)
TIBC SERPL-MCNC: 322 UG/DL (ref 250–450)
TRANSFERRIN SERPL-MCNC: 272 MG/DL (ref 180–382)
TRIGL SERPL-MCNC: 55 MG/DL (ref 37–140)
TSH SERPL-ACNC: 15.21 UIU/ML (ref 0.35–4.94)
VLDLC SERPL CALC-MCNC: 11 MG/DL

## 2023-08-10 PROCEDURE — 82728 ASSAY OF FERRITIN: CPT

## 2023-08-10 PROCEDURE — 84439 ASSAY OF FREE THYROXINE: CPT

## 2023-08-10 PROCEDURE — 80061 LIPID PANEL: CPT

## 2023-08-10 PROCEDURE — 36415 COLL VENOUS BLD VENIPUNCTURE: CPT

## 2023-08-10 PROCEDURE — 99215 OFFICE O/P EST HI 40 MIN: CPT | Mod: PBBFAC | Performed by: STUDENT IN AN ORGANIZED HEALTH CARE EDUCATION/TRAINING PROGRAM

## 2023-08-10 PROCEDURE — 84443 ASSAY THYROID STIM HORMONE: CPT

## 2023-08-10 PROCEDURE — 83550 IRON BINDING TEST: CPT

## 2023-08-10 RX ORDER — LOSARTAN POTASSIUM 100 MG/1
50 TABLET ORAL DAILY
Qty: 45 TABLET | Refills: 1 | Status: SHIPPED | OUTPATIENT
Start: 2023-08-10 | End: 2023-09-13 | Stop reason: ALTCHOICE

## 2023-08-10 RX ORDER — METFORMIN HYDROCHLORIDE 500 MG/1
500 TABLET ORAL 2 TIMES DAILY WITH MEALS
Qty: 180 TABLET | Refills: 3 | Status: SHIPPED | OUTPATIENT
Start: 2023-08-10 | End: 2023-09-13 | Stop reason: SINTOL

## 2023-08-10 RX ORDER — LEVOTHYROXINE SODIUM 150 UG/1
150 TABLET ORAL
Qty: 30 TABLET | Refills: 11 | Status: ON HOLD | OUTPATIENT
Start: 2023-08-10 | End: 2023-09-27 | Stop reason: HOSPADM

## 2023-08-10 RX ORDER — LEVOTHYROXINE SODIUM 125 UG/1
125 TABLET ORAL
Qty: 90 TABLET | Refills: 0 | Status: SHIPPED | OUTPATIENT
Start: 2023-08-10 | End: 2023-08-10 | Stop reason: ALTCHOICE

## 2023-08-10 NOTE — PROGRESS NOTES
"[facility] INTERNAL MEDICINE  OUTPATIENT OFFICE VISIT NOTE    SUBJECTIVE:      HPI: Fred Berman is a 42 y.o. yo female w/ PMH of DM II, HTN, gastroparesis, hypothyroidism, who presents today to establish care. She was hospital on 7/2/2023 for abdominal pain and headache; cardiology evaluated the patient regarding bradycardia and recommended no additional intervention; GI recommended PPI, Reglan, Carafate, and supportive care with no plans for endoscopy. At this time, she states feeling well overall but continues to have chronic joint pain; pain is dull and achy; it is intermittent; usually aggravated by physical exertion and improves with OTC pain med.     Social HX: 1 PPD x 10+ years, quit in April 2023; denies alcohol intake; distant polysubstance abuse in her 20's  Family HX: Mother (polysubstance abuse)  Surgical HX: cholecystectomy, tubal ligation  Previous hospitalization: 7/2/2023  Home meds:  Current Outpatient Medications   Medication Instructions    CeleXA 10 mg, Oral, Nightly    LANTUS SOLOSTAR U-100 INSULIN 40 Units, Subcutaneous, Nightly    levothyroxine (SYNTHROID) 125 mcg, Oral, Before breakfast    losartan (COZAAR) 50 mg, Oral, Daily    pantoprazole (PROTONIX) 40 mg, Oral, 2 times daily before meals    pen needle, diabetic 31 gauge x 5/16" Ndle BD Ultra-Fine Short Pen Needle 31 gauge x 5/16"   USE 1 PENTIP WITH LANTUS ONCE A DAY AS DIRECTED    REGLAN 10 mg, Oral, 4 times daily    SUBOXONE 8-2 mg 1 Film, Sublingual, 3 times daily     ROS:  (+) Chronic joint pain  (-) Chest pain, palpitations, SOB, fever, night sweats, chills, diarrhea, constipation.    OBJECTIVE:     Vital signs:   /80 (BP Location: Left arm, Patient Position: Sitting, BP Method: Medium (Automatic))   Pulse 60   Temp 98.4 °F (36.9 °C) (Oral)   Resp 18   Ht 5' 4" (1.626 m)   Wt 87.4 kg (192 lb 9.6 oz)   SpO2 97%   BMI 33.06 kg/m²      Physical Examination:  General: Obese w/o distress  HEENT: NC/AT; PERRL; nasal and oral " mucosa moist and clear  Pulm: CTA bilaterally, normal work of breathing on room air  CV: S1, S2 w/o murmurs or gallops; no edema noted  GI: Soft with normal bowel sounds in all quadrants, no masses on palpation  MSK: Full ROM of all extremities  Neuro: AAOx4; motor/sensory function intact  Psych: Cooperative; appropriate mood and affect    Significant findings:  6/12/2023 - U/S thyroid: 2.6 cm lobulated hypoechoic mass inferior and lateral to the left lobe of the thyroid  6/16/2023 - NM gastric emptying test: abnormal gastric emptying suggesting gastroparesis  5/30/2023 - CT ab/pelvis w/ contrast: fatty liver, nonspecific complex cystic structure along the inferior aspect of left ovary, small amount of nonspecific free fluid in the pelvis, nonspecific heterogeneous nodule in the left adrenal gland    ASSESSMENT & PLAN:     DM II  Gastroparesis  -A1c 5.9 (7/3/2023)  -Continue lantus 40 units QHS, and reglan 10mg 4 times daily  -Started Metformin 500mg BID with the plan to titrate off of lantus if A1c remains controlled    Adrenal lesions  -CT ab/pelvis 5/30/203: nonspecific heterogeneous nodule in the left adrenal gland  -Will obtain adrenal glad MRI     Fatty liver   -CT ab/pelvis 5/30/2023: Hepatomegaly with fat infiltration  -Will obtain lipid panel     HTN  -Normotensive at this time  -Continue losartan 50mg daily    Hypothyroidism  -TSH 13.531; T4 0.78 (7/3/2023); will recheck TSH and titrate levothyroxine as needed  -Continue levothyroxine 125 mcg before breakfast    Anemia  -Will obtain iron studies and initiate PO iron supplement if needed    Generalized anxiety disorder   -Referral to behavioral health clinic placed 8/10/2023    Chronic pain syndrome  -Has been having pain in shoulders, hips, and knees for past 5 years  -Currently F/U with suboxone clinic  -Referral to outpatient physical therapy placed 8/10/2023    Vitamin D deficiency   -Vitamin D level 22.6 (6/6/2023)  -Patient refuses to start daily  vitamin D supplement at this time     Obesity  -Instructed patient on the importance of weight loss    Health Maintenance:  -HIV screening and hepatitis negative (6/8/2023)  -Currently seeing outside gynecologist  -Vaccines:     -Tetanus, refuses    -COVID, refuses    Return to clinic in 2 months. F/U on TSH, T4, iron studies, lipid panel, adrenal gland MRI.     Juan Del Valle DO   [facility] Internal Medicine, PGY-II

## 2023-08-11 ENCOUNTER — TELEPHONE (OUTPATIENT)
Dept: INTERNAL MEDICINE | Facility: CLINIC | Age: 43
End: 2023-08-11

## 2023-08-11 NOTE — TELEPHONE ENCOUNTER
Pt called stating at his office visit Dr Del Valle was supposed increase or decrease her Losartan. Did not quite understand what patient was trying to say. Pt requesting a call back from Nurse concerning her medication because she states there was a mix up. Pt can be reached at   [phone]

## 2023-08-23 ENCOUNTER — TELEPHONE (OUTPATIENT)
Dept: INTERNAL MEDICINE | Facility: CLINIC | Age: 43
End: 2023-08-23
Payer: MEDICAID

## 2023-08-23 NOTE — TELEPHONE ENCOUNTER
Pt called stating dr Del Valle wanted her to try Metformin and she tried it for a wk or so but she was not able to eat or drink anything and her GI Dr suggest she doesn't take it. Pt asking what would be her next step and requesting a call back from nurse or provider. Pt can be reached at

## 2023-08-23 NOTE — TELEPHONE ENCOUNTER
Called patient ID and  verified, patient states she stop taking the Metformin per her GI Dr. And would like to know what else can she try and take. Please advise.

## 2023-08-25 NOTE — TELEPHONE ENCOUNTER
Her A1c was 5.9 on 7/3/2023. Stop Metformin, will discuss options with her the next time I see her in clinic.     Juan Del Valle, DO  [facility] Internal Medicine PGY-II

## 2023-08-25 NOTE — TELEPHONE ENCOUNTER
Called patient but no answer, I left a message explaining to stop Metformin and you would discuss with her on next visit.

## 2023-08-29 ENCOUNTER — HOSPITAL ENCOUNTER (OUTPATIENT)
Dept: RADIOLOGY | Facility: HOSPITAL | Age: 43
Discharge: HOME | End: 2023-08-29
Attending: STUDENT IN AN ORGANIZED HEALTH CARE EDUCATION/TRAINING PROGRAM
Payer: MEDICAID

## 2023-08-29 DIAGNOSIS — E27.8 ADRENAL NODULE: ICD-10-CM

## 2023-08-29 LAB
CREAT SERPL-MCNC: 0.75 MG/DL (ref 0.55–1.02)
GFR SERPLBLD CREATININE-BSD FMLA CKD-EPI: >60 MLS/MIN/1.73/M2

## 2023-08-29 PROCEDURE — 74183 MRI ABD W/O CNTR FLWD CNTR: CPT | Mod: TC

## 2023-08-29 PROCEDURE — 82565 ASSAY OF CREATININE: CPT | Performed by: STUDENT IN AN ORGANIZED HEALTH CARE EDUCATION/TRAINING PROGRAM

## 2023-08-29 PROCEDURE — A9577 INJ MULTIHANCE: HCPCS

## 2023-08-29 PROCEDURE — 25500020 PHARM REV CODE 255

## 2023-08-29 RX ADMIN — GADOBENATE DIMEGLUMINE 20 ML: 529 INJECTION, SOLUTION INTRAVENOUS at 12:08

## 2023-09-01 ENCOUNTER — PATIENT MESSAGE (OUTPATIENT)
Dept: ADMINISTRATIVE | Facility: HOSPITAL | Age: 43
End: 2023-09-01
Payer: MEDICAID

## 2023-09-08 ENCOUNTER — PATIENT OUTREACH (OUTPATIENT)
Dept: ADMINISTRATIVE | Facility: HOSPITAL | Age: 43
End: 2023-09-08
Payer: MEDICAID

## 2023-09-08 NOTE — PROGRESS NOTES
Message sent to patient via [facility] patient portal as part of Population Health Bulk Outreach for DM eye exam. Pt has pcp f/u appt sched 11/2/2023. Informed pt her diabetes eye exam can be completed at next clinic appointment with Dr Del Valle on 11/2/2023. I have added a reminder to the appointment note.     Population Health Chart Review & Patient Outreach Details:     Reason for Outreach Encounter:     []  Non-Compliant Report   []  Payor Report ([employer], PHN, BCBS, MSSP, MCIP, C, etc.)   [x]  Pre-Visit Chart Review     Updates Requested / Reviewed:     []  Care Everywhere    []     []  External Sources ([facility], [facility], [facility], etc.)   [x]  Care Team Updated    Patient Outreach Method:    []  Telephone Outreach Completed   [] Successful   [] Left Voicemail   [] Unable to Contact (wrong number, no voicemail)  [x]  [facility] Portal Outreach Sent  []  Letter Outreach Mailed  []  Fax Sent for External Records  []  External Records Upload    Health Maintenance Topics Addressed and Outreach Outcomes / Actions Taken:        []      Breast Cancer Screening []  Mammo Scheduled      []  External Records Requested     []  Added Reminder to Complete to Upcoming Primary Care Appt Notes     []  Patient Declined     []  Patient Will Call Back to Schedule     []  Patient Will Schedule with External Provider / Order Routed if Applicable             []       Cervical Cancer Screening []  Pap Scheduled      []  External Records Requested     []  Added Reminder to Complete to Upcoming Primary Care Appt Notes     []  Patient Declined     []  Patient Will Call Back to Schedule     []  Patient Will Schedule with External Provider               []          Colorectal Cancer Screening []  Colonoscopy Case Request or Referral Placed     []  External Records Requested     []  Added Reminder to Complete to Upcoming Primary Care Appt Notes     []  Patient Declined     []  Patient Will Call Back to Schedule     []  Patient Will Schedule  with External Provider     []  Fit Kit Mailed (add the SmartPhrase under additional notes)     []  Reminded Patient to Complete Home Test             [x]      Diabetic Eye Exam []  Eye Camera Scheduled or Optometry Referral Placed     []  External Records Requested     [x]  Added Reminder to Complete to Upcoming Primary Care Appt Notes     []  Patient Declined     []  Patient Will Call Back to Schedule     []  Patient Will Schedule with External Provider             []      Blood Pressure Control []  Primary Care Follow Up Visit Scheduled     []  Remote Blood Pressure Reading Captured     []  Added Reminder to Complete to Upcoming Primary Care Appt Notes     []  Patient Declined     []  Patient Will Call Back / Patient Will Send Portal Message with Reading     []  Patient Will Call Back to Schedule Provider Visit             []       HbA1c & Other Labs []  Lab Appt Scheduled for Due Labs     []  Primary Care Follow Up Visit Scheduled      []  Reminded Patient to Complete Home Test     []  Added Reminder to Complete to Upcoming Primary Care Appt Notes     []  Patient Declined     []  Patient Will Call Back to Schedule     []  Patient Will Schedule with External Provider / Order Routed if Applicable           []    Schedule Primary Care Appt []  Primary Care Appt Scheduled     []  Patient Declined     []  Patient Will Call Back to Schedule     []  Pt Established with External Provider & Updated Care Team             []      Medication Adherence []  Primary Care Appointment Scheduled     []  Added Reminder to Upcoming Primary Care Appt Notes     []  Patient Reminded to  Prescription     []  Patient Declined, Provider Notified if Needed     []  Sent Provider Message to Review and/or Add Exclusion to Problem List             []      Osteoporosis Screening []  DXA Appointment Scheduled     []  External Records Requested     []  Added Reminder to Complete to Upcoming Primary Care Appt Notes     []  Patient Declined      []  Patient Will Call Back to Schedule     []  Patient Will Schedule with External Provider / Order Routed if Applicable     Additional Care Coordinator Notes:         Further Action Needed If Patient Returns Outreach:

## 2023-09-13 ENCOUNTER — TELEPHONE (OUTPATIENT)
Dept: ADMINISTRATIVE | Facility: HOSPITAL | Age: 43
End: 2023-09-13
Payer: MEDICAID

## 2023-09-13 RX ORDER — LOSARTAN POTASSIUM 50 MG/1
50 TABLET ORAL DAILY
Qty: 90 TABLET | Refills: 3 | Status: SHIPPED | OUTPATIENT
Start: 2023-09-13 | End: 2024-01-23 | Stop reason: SDUPTHER

## 2023-09-13 NOTE — TELEPHONE ENCOUNTER
GOOD MORNING MS. BOSWELL,     MS. TEJADA CALLED ASKING TO SPEAK WITH YOU REGARDING HYSTERECTOMY.  I TRIED CALLING HER YESTERDAY AND AGAIN THIS MORNING, NO ANSWER.  PLEASE ADVISE.

## 2023-09-13 NOTE — TELEPHONE ENCOUNTER
Called patient to discuss the following:     Blood pressure. Instructed patient to keep a BP log and bring it with her next clinic visit. Continue Losartan 50mg daily.   Shared decision made with patient to stop lantus QHS and metformin (d/t diarrhea) and to start Jardiance 10mg daily. Instructed patient on the side effects of Jardiance. Previous A1c of 5.5.     Juan Del Valle DO  [facility] Internal Medicine PGY-II

## 2023-09-13 NOTE — TELEPHONE ENCOUNTER
Called patient ID and  verified, patient stated that she is scheduled to have hysterectomy 2023, also she stated that she thinks her blood glucose goes up to 200 at night but she does not know exactly what it be because she does not always check it, so she thinks that she should be on medication to control it, I informed patient to watch what she eats, also she stated that she feels like her blood pressure is dropping because of how she feels but does not know what it drops to because she does not always check it, she states that the Losartan 100 mg is too much of a high dose for her, I informed patient to keep a log of both her blood glucose and blood pressure and to state if medication was taken prior to taking blood pressure. Patient verbalized an understanding. Please advise!

## 2023-09-23 ENCOUNTER — HOSPITAL ENCOUNTER (INPATIENT)
Facility: HOSPITAL | Age: 43
LOS: 4 days | Discharge: HOME | DRG: 074 | End: 2023-09-27
Attending: EMERGENCY MEDICINE | Admitting: STUDENT IN AN ORGANIZED HEALTH CARE EDUCATION/TRAINING PROGRAM
Payer: MEDICAID

## 2023-09-23 DIAGNOSIS — R07.9 CHEST PAIN: ICD-10-CM

## 2023-09-23 DIAGNOSIS — R07.9 CHEST PAIN, UNSPECIFIED TYPE: ICD-10-CM

## 2023-09-23 DIAGNOSIS — F41.9 ANXIETY: ICD-10-CM

## 2023-09-23 DIAGNOSIS — N70.93 TOA (TUBO-OVARIAN ABSCESS): ICD-10-CM

## 2023-09-23 DIAGNOSIS — R19.7 DIARRHEA, UNSPECIFIED TYPE: ICD-10-CM

## 2023-09-23 DIAGNOSIS — K31.84 GASTROPARESIS: Primary | ICD-10-CM

## 2023-09-23 DIAGNOSIS — K58.9 IRRITABLE BOWEL SYNDROME, UNSPECIFIED TYPE: ICD-10-CM

## 2023-09-23 DIAGNOSIS — N94.6 DYSMENORRHEA: ICD-10-CM

## 2023-09-23 DIAGNOSIS — R68.84 JAW PAIN: ICD-10-CM

## 2023-09-23 DIAGNOSIS — R68.83 CHILLS: ICD-10-CM

## 2023-09-23 LAB
ALBUMIN SERPL-MCNC: 5 G/DL (ref 3.5–5)
ALBUMIN/GLOB SERPL: 1.6 RATIO (ref 1.1–2)
ALP SERPL-CCNC: 52 UNIT/L (ref 40–150)
ALT SERPL-CCNC: 24 UNIT/L (ref 0–55)
AMPHET UR QL SCN: NEGATIVE
APPEARANCE UR: CLEAR
AST SERPL-CCNC: 25 UNIT/L (ref 5–34)
BACTERIA #/AREA URNS AUTO: ABNORMAL /HPF
BARBITURATE SCN PRESENT UR: NEGATIVE
BASOPHILS # BLD AUTO: 0.03 X10(3)/MCL
BASOPHILS NFR BLD AUTO: 0.5 %
BENZODIAZ UR QL SCN: NEGATIVE
BILIRUB SERPL-MCNC: 1.2 MG/DL
BILIRUB UR QL STRIP.AUTO: NEGATIVE
BUN SERPL-MCNC: 15.8 MG/DL (ref 7–18.7)
CALCIUM SERPL-MCNC: 10 MG/DL (ref 8.4–10.2)
CANNABINOIDS UR QL SCN: NEGATIVE
CHLORIDE SERPL-SCNC: 99 MMOL/L (ref 98–107)
CO2 SERPL-SCNC: 27 MMOL/L (ref 22–29)
COCAINE UR QL SCN: NEGATIVE
COLOR UR AUTO: YELLOW
CREAT SERPL-MCNC: 0.72 MG/DL (ref 0.55–1.02)
EOSINOPHIL # BLD AUTO: 0.07 X10(3)/MCL (ref 0–0.9)
EOSINOPHIL NFR BLD AUTO: 1.1 %
ERYTHROCYTE [DISTWIDTH] IN BLOOD BY AUTOMATED COUNT: 13.3 % (ref 11.5–17)
ETHANOL SERPL-MCNC: <10 MG/DL
FENTANYL UR QL SCN: NEGATIVE
FLUAV AG UPPER RESP QL IA.RAPID: NOT DETECTED
FLUBV AG UPPER RESP QL IA.RAPID: NOT DETECTED
GFR SERPLBLD CREATININE-BSD FMLA CKD-EPI: >60 MLS/MIN/1.73/M2
GLOBULIN SER-MCNC: 3.1 GM/DL (ref 2.4–3.5)
GLUCOSE SERPL-MCNC: 195 MG/DL (ref 74–100)
GLUCOSE UR QL STRIP.AUTO: ABNORMAL
HCT VFR BLD AUTO: 42.3 % (ref 37–47)
HGB BLD-MCNC: 14.3 G/DL (ref 12–16)
IMM GRANULOCYTES # BLD AUTO: 0.02 X10(3)/MCL (ref 0–0.04)
IMM GRANULOCYTES NFR BLD AUTO: 0.3 %
KETONES UR QL STRIP.AUTO: ABNORMAL
LEUKOCYTE ESTERASE UR QL STRIP.AUTO: NEGATIVE
LIPASE SERPL-CCNC: 10 U/L
LYMPHOCYTES # BLD AUTO: 1.65 X10(3)/MCL (ref 0.6–4.6)
LYMPHOCYTES NFR BLD AUTO: 25.4 %
MAGNESIUM SERPL-MCNC: 2 MG/DL (ref 1.6–2.6)
MCH RBC QN AUTO: 28.4 PG (ref 27–31)
MCHC RBC AUTO-ENTMCNC: 33.8 G/DL (ref 33–36)
MCV RBC AUTO: 84.1 FL (ref 80–94)
MDMA UR QL SCN: NEGATIVE
MONOCYTES # BLD AUTO: 0.33 X10(3)/MCL (ref 0.1–1.3)
MONOCYTES NFR BLD AUTO: 5.1 %
NEUTROPHILS # BLD AUTO: 4.39 X10(3)/MCL (ref 2.1–9.2)
NEUTROPHILS NFR BLD AUTO: 67.6 %
NITRITE UR QL STRIP.AUTO: NEGATIVE
NRBC BLD AUTO-RTO: 0 %
OPIATES UR QL SCN: NEGATIVE
PCP UR QL: NEGATIVE
PH UR STRIP.AUTO: 5.5 [PH]
PH UR: 5.5 [PH] (ref 3–11)
PLATELET # BLD AUTO: 228 X10(3)/MCL (ref 130–400)
PMV BLD AUTO: 9.8 FL (ref 7.4–10.4)
POCT GLUCOSE: 161 MG/DL (ref 70–110)
POTASSIUM SERPL-SCNC: 3.7 MMOL/L (ref 3.5–5.1)
PROT SERPL-MCNC: 8.1 GM/DL (ref 6.4–8.3)
PROT UR QL STRIP.AUTO: NEGATIVE
RBC # BLD AUTO: 5.03 X10(6)/MCL (ref 4.2–5.4)
RBC #/AREA URNS AUTO: ABNORMAL /HPF
RBC UR QL AUTO: NEGATIVE
SARS-COV-2 RNA RESP QL NAA+PROBE: NOT DETECTED
SODIUM SERPL-SCNC: 138 MMOL/L (ref 136–145)
SP GR UR STRIP.AUTO: >=1.04 (ref 1–1.03)
SQUAMOUS #/AREA URNS AUTO: ABNORMAL /HPF
TROPONIN I SERPL-MCNC: <0.01 NG/ML (ref 0–0.04)
TSH SERPL-ACNC: 1.38 UIU/ML (ref 0.35–4.94)
UROBILINOGEN UR STRIP-ACNC: 0.2
WBC # SPEC AUTO: 6.49 X10(3)/MCL (ref 4.5–11.5)
WBC #/AREA URNS AUTO: ABNORMAL /HPF
YEAST URNS QL MICRO: ABNORMAL /HPF

## 2023-09-23 PROCEDURE — 63600175 PHARM REV CODE 636 W HCPCS: Performed by: PHYSICIAN ASSISTANT

## 2023-09-23 PROCEDURE — 93010 ELECTROCARDIOGRAM REPORT: CPT | Mod: ,,, | Performed by: INTERNAL MEDICINE

## 2023-09-23 PROCEDURE — 11000001 HC ACUTE MED/SURG PRIVATE ROOM

## 2023-09-23 PROCEDURE — 93010 EKG 12-LEAD: ICD-10-PCS | Mod: ,,, | Performed by: INTERNAL MEDICINE

## 2023-09-23 PROCEDURE — 99285 EMERGENCY DEPT VISIT HI MDM: CPT | Mod: 25

## 2023-09-23 PROCEDURE — 25000003 PHARM REV CODE 250: Performed by: STUDENT IN AN ORGANIZED HEALTH CARE EDUCATION/TRAINING PROGRAM

## 2023-09-23 PROCEDURE — 25000003 PHARM REV CODE 250: Performed by: EMERGENCY MEDICINE

## 2023-09-23 PROCEDURE — 80307 DRUG TEST PRSMV CHEM ANLYZR: CPT | Performed by: EMERGENCY MEDICINE

## 2023-09-23 PROCEDURE — 25000003 PHARM REV CODE 250: Performed by: PHYSICIAN ASSISTANT

## 2023-09-23 PROCEDURE — 0240U COVID/FLU A&B PCR: CPT | Performed by: EMERGENCY MEDICINE

## 2023-09-23 PROCEDURE — 63600175 PHARM REV CODE 636 W HCPCS: Performed by: STUDENT IN AN ORGANIZED HEALTH CARE EDUCATION/TRAINING PROGRAM

## 2023-09-23 PROCEDURE — 84443 ASSAY THYROID STIM HORMONE: CPT | Performed by: NURSE PRACTITIONER

## 2023-09-23 PROCEDURE — 93005 ELECTROCARDIOGRAM TRACING: CPT

## 2023-09-23 PROCEDURE — 84484 ASSAY OF TROPONIN QUANT: CPT | Performed by: EMERGENCY MEDICINE

## 2023-09-23 PROCEDURE — 83690 ASSAY OF LIPASE: CPT | Performed by: EMERGENCY MEDICINE

## 2023-09-23 PROCEDURE — 96361 HYDRATE IV INFUSION ADD-ON: CPT

## 2023-09-23 PROCEDURE — 81001 URINALYSIS AUTO W/SCOPE: CPT | Performed by: EMERGENCY MEDICINE

## 2023-09-23 PROCEDURE — 80053 COMPREHEN METABOLIC PANEL: CPT | Performed by: EMERGENCY MEDICINE

## 2023-09-23 PROCEDURE — 96374 THER/PROPH/DIAG INJ IV PUSH: CPT

## 2023-09-23 PROCEDURE — 82962 GLUCOSE BLOOD TEST: CPT

## 2023-09-23 PROCEDURE — 82077 ASSAY SPEC XCP UR&BREATH IA: CPT | Performed by: EMERGENCY MEDICINE

## 2023-09-23 PROCEDURE — 85025 COMPLETE CBC W/AUTO DIFF WBC: CPT | Performed by: EMERGENCY MEDICINE

## 2023-09-23 PROCEDURE — 83735 ASSAY OF MAGNESIUM: CPT | Performed by: EMERGENCY MEDICINE

## 2023-09-23 PROCEDURE — 83036 HEMOGLOBIN GLYCOSYLATED A1C: CPT | Performed by: NURSE PRACTITIONER

## 2023-09-23 RX ORDER — IBUPROFEN 200 MG
16 TABLET ORAL
Status: DISCONTINUED | OUTPATIENT
Start: 2023-09-23 | End: 2023-09-27 | Stop reason: HOSPADM

## 2023-09-23 RX ORDER — METOCLOPRAMIDE HYDROCHLORIDE 5 MG/ML
10 INJECTION INTRAMUSCULAR; INTRAVENOUS
Status: COMPLETED | OUTPATIENT
Start: 2023-09-23 | End: 2023-09-23

## 2023-09-23 RX ORDER — DIPHENOXYLATE HYDROCHLORIDE AND ATROPINE SULFATE 2.5; .025 MG/1; MG/1
2 TABLET ORAL
Status: ACTIVE | OUTPATIENT
Start: 2023-09-23 | End: 2023-09-23

## 2023-09-23 RX ORDER — BUPRENORPHINE HYDROCHLORIDE 8 MG/1
8 TABLET SUBLINGUAL 3 TIMES DAILY
Status: DISCONTINUED | OUTPATIENT
Start: 2023-09-24 | End: 2023-09-24

## 2023-09-23 RX ORDER — ONDANSETRON 2 MG/ML
4 INJECTION INTRAMUSCULAR; INTRAVENOUS EVERY 6 HOURS PRN
Status: DISCONTINUED | OUTPATIENT
Start: 2023-09-23 | End: 2023-09-24

## 2023-09-23 RX ORDER — INSULIN ASPART 100 [IU]/ML
0-10 INJECTION, SOLUTION INTRAVENOUS; SUBCUTANEOUS EVERY 6 HOURS PRN
Status: DISCONTINUED | OUTPATIENT
Start: 2023-09-23 | End: 2023-09-27 | Stop reason: HOSPADM

## 2023-09-23 RX ORDER — SODIUM CHLORIDE 9 MG/ML
1000 INJECTION, SOLUTION INTRAVENOUS
Status: COMPLETED | OUTPATIENT
Start: 2023-09-23 | End: 2023-09-23

## 2023-09-23 RX ORDER — PROCHLORPERAZINE EDISYLATE 5 MG/ML
5 INJECTION INTRAMUSCULAR; INTRAVENOUS EVERY 6 HOURS PRN
Status: DISCONTINUED | OUTPATIENT
Start: 2023-09-23 | End: 2023-09-23

## 2023-09-23 RX ORDER — GLUCAGON 1 MG
1 KIT INJECTION
Status: DISCONTINUED | OUTPATIENT
Start: 2023-09-23 | End: 2023-09-27 | Stop reason: HOSPADM

## 2023-09-23 RX ORDER — IBUPROFEN 200 MG
24 TABLET ORAL
Status: DISCONTINUED | OUTPATIENT
Start: 2023-09-23 | End: 2023-09-27 | Stop reason: HOSPADM

## 2023-09-23 RX ORDER — LOSARTAN POTASSIUM 50 MG/1
50 TABLET ORAL DAILY
Status: DISCONTINUED | OUTPATIENT
Start: 2023-09-24 | End: 2023-09-27 | Stop reason: HOSPADM

## 2023-09-23 RX ORDER — SODIUM CHLORIDE 9 MG/ML
INJECTION, SOLUTION INTRAVENOUS CONTINUOUS
Status: DISCONTINUED | OUTPATIENT
Start: 2023-09-23 | End: 2023-09-27 | Stop reason: HOSPADM

## 2023-09-23 RX ORDER — DIPHENHYDRAMINE HYDROCHLORIDE 50 MG/ML
25 INJECTION INTRAMUSCULAR; INTRAVENOUS
Status: DISCONTINUED | OUTPATIENT
Start: 2023-09-23 | End: 2023-09-23

## 2023-09-23 RX ORDER — PANTOPRAZOLE SODIUM 40 MG/1
40 TABLET, DELAYED RELEASE ORAL EVERY MORNING
Status: DISCONTINUED | OUTPATIENT
Start: 2023-09-24 | End: 2023-09-27 | Stop reason: HOSPADM

## 2023-09-23 RX ORDER — LEVOTHYROXINE SODIUM 150 UG/1
150 TABLET ORAL
Status: DISCONTINUED | OUTPATIENT
Start: 2023-09-24 | End: 2023-09-27 | Stop reason: HOSPADM

## 2023-09-23 RX ORDER — METOCLOPRAMIDE 10 MG/1
10 TABLET ORAL 4 TIMES DAILY
Status: DISCONTINUED | OUTPATIENT
Start: 2023-09-24 | End: 2023-09-27 | Stop reason: HOSPADM

## 2023-09-23 RX ADMIN — SODIUM CHLORIDE 1000 ML: 9 INJECTION, SOLUTION INTRAVENOUS at 08:09

## 2023-09-23 RX ADMIN — SODIUM CHLORIDE: 9 INJECTION, SOLUTION INTRAVENOUS at 08:09

## 2023-09-23 RX ADMIN — SODIUM CHLORIDE 1000 ML: 9 INJECTION, SOLUTION INTRAVENOUS at 11:09

## 2023-09-23 RX ADMIN — METOCLOPRAMIDE 10 MG: 5 INJECTION, SOLUTION INTRAMUSCULAR; INTRAVENOUS at 03:09

## 2023-09-23 RX ADMIN — ONDANSETRON 4 MG: 2 INJECTION INTRAMUSCULAR; INTRAVENOUS at 09:09

## 2023-09-23 NOTE — ED PROVIDER NOTES
"Encounter Date: 2023       History     Chief Complaint   Patient presents with    Chest Pain     Chest pain and chills for 3 days. Pt had dm and gi medication change 1 week ago. Pt is anxious and irritable. [id number]     42-year-old female with a history of gastroparesis, diabetes, generalized anxiety disorder, panic attacks, history of opioid dependence on Suboxone presents emergency department with chills sweats diarrhea onset last night.  Also reports chest tightness around her whole chest like someone is "hugging me too tight." She states she chronically has abdominal pain has that today upper abdomen but states it is always like that with her history of gastroparesis.  She is not having nausea or vomiting.  She states anytime she eats or drinks she has diarrhea immediately.  Symptoms have been going on for past few days.  She states she would medication changes a week half ago her Ozempic and Lantus was discontinued and she was started on Jardiance and Amitiza.  She is concerned her symptoms are a medication side effect.  States she also has a history of ovarian cysts chronic pelvic pain in his scheduled to have a hysterectomy on Thursday of this week.  She reports compliance with her Suboxone has not missed doses        Review of patient's allergies indicates:   Allergen Reactions    Vancomycin analogues Rash     Past Medical History:   Diagnosis Date    Adrenal mass     Anxiety     Arthritis     Bradycardia     Depression     Diabetes mellitus, type 2     Gastroparesis     General anesthetics causing adverse effect in therapeutic use     "hard time waking up"    GERD (gastroesophageal reflux disease)     Hip pain     Hypertension     Menstrual cramps     Ovarian cyst     Palpitations     Pyosalpingitis     Thyroid disease      Past Surgical History:   Procedure Laterality Date    ABLATION OF VAGINAL TISSUE USING LASER       SECTION      CHOLECYSTECTOMY      TUBAL LIGATION       History reviewed. No " pertinent family history.  Social History     Tobacco Use    Smoking status: Former     Types: Cigarettes    Smokeless tobacco: Never   Substance Use Topics    Alcohol use: Not Currently    Drug use: Never     Review of Systems   Constitutional:  Positive for chills and diaphoresis. Negative for fever.   Respiratory:  Positive for chest tightness. Negative for cough and shortness of breath.    Cardiovascular:  Negative for chest pain.   Gastrointestinal:  Positive for abdominal pain, diarrhea and nausea. Negative for vomiting.   Musculoskeletal:  Negative for myalgias and neck pain.   Neurological:  Negative for syncope.   All other systems reviewed and are negative.      Physical Exam     Initial Vitals [09/23/23 0823]   BP Pulse Resp Temp SpO2   120/78 (!) 56 20 98.6 °F (37 °C) 98 %      MAP       --         Physical Exam    Nursing note and vitals reviewed.  Constitutional: She appears well-developed and well-nourished. No distress.   HENT:   Head: Normocephalic and atraumatic.   Eyes: Conjunctivae are normal.   Cardiovascular:  Normal rate and intact distal pulses.           Pulmonary/Chest: No respiratory distress. She has no rhonchi.   Abdominal: Abdomen is soft. Bowel sounds are normal. There is no abdominal tenderness. There is no rebound and no guarding.   Musculoskeletal:         General: No edema.     Neurological: She is alert. She has normal strength.   Skin: Skin is warm and dry.   Psychiatric:   She is anxious but relatively calm and cooperative         ED Course   Procedures  Labs Reviewed   COMPREHENSIVE METABOLIC PANEL - Abnormal; Notable for the following components:       Result Value    Glucose Level 195 (*)     All other components within normal limits   URINALYSIS, REFLEX TO URINE CULTURE - Abnormal; Notable for the following components:    Specific Gravity, UA >=1.040 (*)     Glucose, UA 3+ (*)     Ketones, UA 2+ (*)     All other components within normal limits   URINALYSIS, MICROSCOPIC -  Abnormal; Notable for the following components:    Yeast, UA Few (*)     All other components within normal limits   POCT GLUCOSE - Abnormal; Notable for the following components:    POCT Glucose 161 (*)     All other components within normal limits   COVID/FLU A&B PCR - Normal    Narrative:     The Xpert Xpress SARS-CoV-2/FLU/RSV plus is a rapid, multiplexed real-time PCR test intended for the simultaneous qualitative detection and differentiation of SARS-CoV-2, Influenza A, Influenza B, and respiratory syncytial virus (RSV) viral RNA in either nasopharyngeal swab or nasal swab specimens.         LIPASE - Normal   MAGNESIUM - Normal   TROPONIN I - Normal   DRUG SCREEN, URINE (BEAKER) - Normal    Narrative:     Cut off concentrations:    Amphetamines - 1000 ng/ml  Barbiturates - 200 ng/ml  Benzodiazepine - 200 ng/ml  Cannabinoids (THC) - 50 ng/ml  Cocaine - 300 ng/ml  Fentanyl - 1.0 ng/ml  MDMA - 500 ng/ml  Opiates - 300 ng/ml   Phencyclidine (PCP) - 25 ng/ml    Specimen submitted for drug analysis and tested for pH and specific gravity in order to evaluate sample integrity. Suspect tampering if specific gravity is <1.003 and/or pH is not within the range of 4.5 - 8.0  False negatives may result form substances such as bleach added to urine.  False positives may result for the presence of a substance with similar chemical structure to the drug or its metabolite.    This test provides only a PRELIMINARY analytical test result. A more specific alternate chemical method must be used in order to obtain a confirmed analytical result. Gas chromatography/mass spectrometry (GC/MS) is the preferred confirmatory method. Other chemical confirmation methods are available. Clinical consideration and professional judgement should be applied to any drug of abuse test result, particularly when preliminary positive results are used.    Positive results will be confirmed only at the physicians request. Unconfirmed screening results  are to be used only for medical purposes (treatment).        ALCOHOL,MEDICAL (ETHANOL) - Normal   CBC W/ AUTO DIFFERENTIAL    Narrative:     The following orders were created for panel order CBC auto differential.  Procedure                               Abnormality         Status                     ---------                               -----------         ------                     CBC with Differential[[id number]]                           Final result                 Please view results for these tests on the individual orders.   CBC WITH DIFFERENTIAL        ECG Results              EKG 12-lead (Final result)  Result time 09/23/23 14:09:16      Final result by Interface, [facility] (09/23/23 14:09:16)                   Narrative:    Test Reason : R07.9,    Vent. Rate : 064 BPM     Atrial Rate : 064 BPM     P-R Int : 152 ms          QRS Dur : 092 ms      QT Int : 428 ms       P-R-T Axes : 063 070 073 degrees     QTc Int : 441 ms    Normal sinus rhythm  Confirmed by Alex Basurto MD ([id number]) on 9/23/2023 2:09:03 PM    Referred By: CARMEN   SELF           Confirmed By:Alex Basurto MD                                  Imaging Results              X-Ray Chest AP Portable (Final result)  Result time 09/23/23 09:39:09      Final result by Maurisio Morales MD (09/23/23 09:39:09)                   Impression:      NO ACUTE CARDIOPULMONARY PROCESS IDENTIFIED.      Electronically signed by: Maurisio Morales  Date:    09/23/2023  Time:    09:39               Narrative:    EXAMINATION:  XR CHEST AP PORTABLE    CLINICAL HISTORY:  chest pain;    TECHNIQUE:  One view    COMPARISON:  July 2, 2023.    FINDINGS:  Cardiopericardial silhouette is within normal limits. Lungs are without dense focal or segmental consolidation, congestive process, pleural effusions or pneumothorax.                                       Medications   diphenoxylate-atropine 2.5-0.025 mg per tablet 2 tablet (2 tablets Oral Not Given 9/23/23 0914)   0.9%   NaCl infusion (has no administration in time range)   ondansetron injection 4 mg (has no administration in time range)   glucagon (human recombinant) injection 1 mg (has no administration in time range)   insulin aspart U-100 injection 0-10 Units (has no administration in time range)   dextrose 10% bolus 125 mL 125 mL (has no administration in time range)   dextrose 10% bolus 250 mL 250 mL (has no administration in time range)   sodium chloride 0.9% bolus 1,000 mL 1,000 mL (0 mLs Intravenous Stopped 9/23/23 1000)   0.9%  NaCl infusion (0 mLs Intravenous Stopped 9/23/23 1824)   metoclopramide HCl injection 10 mg (10 mg Intravenous Given 9/23/23 1544)     Medical Decision Making  Differential diagnosis hyperglycemia dehydration diarrhea gastroenteritis gastroparesis medication side effect, mi or dysrhythmia (felt to be unlikely). She is concerned about the recent medication changes.  Will check labs give IV fluids glucose was 160s EN route.  Hemodynamically stable anxious but otherwise in no distress.    This is Dr. Springer I took over care of patient pending reassessment.  Patient unable to tolerate p.o. and department reports that she has to be admitted every time she has these symptoms will admit for observation and GI    Amount and/or Complexity of Data Reviewed  Labs: ordered.  Radiology: ordered.  Discussion of management or test interpretation with external provider(s): Hospitalist will admit     Risk  Prescription drug management.  Decision regarding hospitalization.               ED Course as of 09/23/23 1923   Sat Sep 23, 2023   0830 EKG at 0811.  64 beats per minute normal sinus rhythm no ST segment elevations or depressions.  The EKG is unchanged compared to 07/02/2023 [LF]   0854 Ordered Compazine and Benadryl to help with the nausea but patient has declined.  She states she does not want any nausea medicine or any pain medicine will take the IV fluids. [LF]   0941 Chest x-ray by my interpretation no  infiltrate no free air no other acute findings [LF]   0955 Patient feels that her symptoms are a side effect of the Amitiza.  I explained to her she can hold the medication follow up with her PCP and gastroenterologist see if symptoms improve with holding the medication [LF]   1614 Failed PO challenge after antiemetics. Will admit for GI, further workup.  [AC]   1658 Admitted to Dr. Gifford [AC]      ED Course User Index  [AC] Jonas Springer IV, MD  [LF] Roderick Hayes MD                    Clinical Impression:   Final diagnoses:  [R68.83] Chills  [R07.9] Chest pain, unspecified type  [F41.9] Anxiety  [K31.84] Gastroparesis (Primary)  [R19.7] Diarrhea, unspecified type  [K58.9] Irritable bowel syndrome, unspecified type        ED Disposition Condition    Admit Stable                Jonas Springer IV, MD  09/23/23 [id number]

## 2023-09-24 PROBLEM — F41.0 PANIC ATTACK: Status: ACTIVE | Noted: 2023-09-24

## 2023-09-24 LAB
ALBUMIN SERPL-MCNC: 3.7 G/DL (ref 3.5–5)
ALBUMIN/GLOB SERPL: 1.5 RATIO (ref 1.1–2)
ALP SERPL-CCNC: 37 UNIT/L (ref 40–150)
ALT SERPL-CCNC: 18 UNIT/L (ref 0–55)
AST SERPL-CCNC: 20 UNIT/L (ref 5–34)
BASOPHILS # BLD AUTO: 0.02 X10(3)/MCL
BASOPHILS NFR BLD AUTO: 0.3 %
BILIRUB SERPL-MCNC: 0.9 MG/DL
BUN SERPL-MCNC: 18.8 MG/DL (ref 7–18.7)
CALCIUM SERPL-MCNC: 8.6 MG/DL (ref 8.4–10.2)
CHLORIDE SERPL-SCNC: 106 MMOL/L (ref 98–107)
CO2 SERPL-SCNC: 24 MMOL/L (ref 22–29)
CREAT SERPL-MCNC: 0.63 MG/DL (ref 0.55–1.02)
EOSINOPHIL # BLD AUTO: 0.07 X10(3)/MCL (ref 0–0.9)
EOSINOPHIL NFR BLD AUTO: 1 %
ERYTHROCYTE [DISTWIDTH] IN BLOOD BY AUTOMATED COUNT: 13.2 % (ref 11.5–17)
EST. AVERAGE GLUCOSE BLD GHB EST-MCNC: 145.6 MG/DL
GFR SERPLBLD CREATININE-BSD FMLA CKD-EPI: >60 MLS/MIN/1.73/M2
GLOBULIN SER-MCNC: 2.4 GM/DL (ref 2.4–3.5)
GLUCOSE SERPL-MCNC: 133 MG/DL (ref 74–100)
HBA1C MFR BLD: 6.7 %
HCT VFR BLD AUTO: 34.7 % (ref 37–47)
HGB BLD-MCNC: 11.7 G/DL (ref 12–16)
IMM GRANULOCYTES # BLD AUTO: 0.01 X10(3)/MCL (ref 0–0.04)
IMM GRANULOCYTES NFR BLD AUTO: 0.1 %
LYMPHOCYTES # BLD AUTO: 2.25 X10(3)/MCL (ref 0.6–4.6)
LYMPHOCYTES NFR BLD AUTO: 33.5 %
MCH RBC QN AUTO: 28.3 PG (ref 27–31)
MCHC RBC AUTO-ENTMCNC: 33.7 G/DL (ref 33–36)
MCV RBC AUTO: 84 FL (ref 80–94)
MONOCYTES # BLD AUTO: 0.52 X10(3)/MCL (ref 0.1–1.3)
MONOCYTES NFR BLD AUTO: 7.7 %
NEUTROPHILS # BLD AUTO: 3.85 X10(3)/MCL (ref 2.1–9.2)
NEUTROPHILS NFR BLD AUTO: 57.4 %
NRBC BLD AUTO-RTO: 0 %
PLATELET # BLD AUTO: 210 X10(3)/MCL (ref 130–400)
PMV BLD AUTO: 9.6 FL (ref 7.4–10.4)
POCT GLUCOSE: 101 MG/DL (ref 70–110)
POCT GLUCOSE: 127 MG/DL (ref 70–110)
POCT GLUCOSE: 129 MG/DL (ref 70–110)
POCT GLUCOSE: 139 MG/DL (ref 70–110)
POCT GLUCOSE: 98 MG/DL (ref 70–110)
POTASSIUM SERPL-SCNC: 3.4 MMOL/L (ref 3.5–5.1)
PROT SERPL-MCNC: 6.1 GM/DL (ref 6.4–8.3)
RBC # BLD AUTO: 4.13 X10(6)/MCL (ref 4.2–5.4)
SODIUM SERPL-SCNC: 138 MMOL/L (ref 136–145)
WBC # SPEC AUTO: 6.72 X10(3)/MCL (ref 4.5–11.5)

## 2023-09-24 PROCEDURE — 63600175 PHARM REV CODE 636 W HCPCS: Performed by: NURSE PRACTITIONER

## 2023-09-24 PROCEDURE — 25000003 PHARM REV CODE 250: Performed by: INTERNAL MEDICINE

## 2023-09-24 PROCEDURE — 93010 EKG 12-LEAD: ICD-10-PCS | Mod: ,,, | Performed by: INTERNAL MEDICINE

## 2023-09-24 PROCEDURE — 11000001 HC ACUTE MED/SURG PRIVATE ROOM

## 2023-09-24 PROCEDURE — 25000003 PHARM REV CODE 250: Performed by: NURSE PRACTITIONER

## 2023-09-24 PROCEDURE — 25000003 PHARM REV CODE 250: Performed by: PHYSICIAN ASSISTANT

## 2023-09-24 PROCEDURE — 93005 ELECTROCARDIOGRAM TRACING: CPT

## 2023-09-24 PROCEDURE — 63600175 PHARM REV CODE 636 W HCPCS: Performed by: INTERNAL MEDICINE

## 2023-09-24 PROCEDURE — 85025 COMPLETE CBC W/AUTO DIFF WBC: CPT | Performed by: PHYSICIAN ASSISTANT

## 2023-09-24 PROCEDURE — 93010 ELECTROCARDIOGRAM REPORT: CPT | Mod: ,,, | Performed by: INTERNAL MEDICINE

## 2023-09-24 PROCEDURE — 80053 COMPREHEN METABOLIC PANEL: CPT | Performed by: PHYSICIAN ASSISTANT

## 2023-09-24 RX ORDER — ONDANSETRON 2 MG/ML
4 INJECTION INTRAMUSCULAR; INTRAVENOUS EVERY 4 HOURS PRN
Status: DISCONTINUED | OUTPATIENT
Start: 2023-09-24 | End: 2023-09-24

## 2023-09-24 RX ORDER — PROMETHAZINE HYDROCHLORIDE 12.5 MG/1
12.5 SUPPOSITORY RECTAL EVERY 8 HOURS PRN
Status: DISCONTINUED | OUTPATIENT
Start: 2023-09-24 | End: 2023-09-27 | Stop reason: HOSPADM

## 2023-09-24 RX ORDER — ACETAMINOPHEN 325 MG/1
650 TABLET ORAL EVERY 4 HOURS PRN
Status: DISCONTINUED | OUTPATIENT
Start: 2023-09-24 | End: 2023-09-27 | Stop reason: HOSPADM

## 2023-09-24 RX ORDER — METOCLOPRAMIDE HYDROCHLORIDE 5 MG/ML
10 INJECTION INTRAMUSCULAR; INTRAVENOUS ONCE
Status: COMPLETED | OUTPATIENT
Start: 2023-09-24 | End: 2023-09-24

## 2023-09-24 RX ORDER — ONDANSETRON 4 MG/1
4 TABLET, ORALLY DISINTEGRATING ORAL EVERY 6 HOURS PRN
Status: DISCONTINUED | OUTPATIENT
Start: 2023-09-24 | End: 2023-09-26

## 2023-09-24 RX ORDER — PROCHLORPERAZINE EDISYLATE 5 MG/ML
5 INJECTION INTRAMUSCULAR; INTRAVENOUS EVERY 6 HOURS PRN
Status: DISCONTINUED | OUTPATIENT
Start: 2023-09-24 | End: 2023-09-24

## 2023-09-24 RX ORDER — AMOXICILLIN 250 MG
2 CAPSULE ORAL 2 TIMES DAILY PRN
Status: DISCONTINUED | OUTPATIENT
Start: 2023-09-24 | End: 2023-09-27 | Stop reason: HOSPADM

## 2023-09-24 RX ORDER — TALC
6 POWDER (GRAM) TOPICAL NIGHTLY PRN
Status: DISCONTINUED | OUTPATIENT
Start: 2023-09-24 | End: 2023-09-27 | Stop reason: HOSPADM

## 2023-09-24 RX ORDER — SODIUM CHLORIDE 0.9 % (FLUSH) 0.9 %
10 SYRINGE (ML) INJECTION
Status: DISCONTINUED | OUTPATIENT
Start: 2023-09-24 | End: 2023-09-27 | Stop reason: HOSPADM

## 2023-09-24 RX ORDER — BUPRENORPHINE HYDROCHLORIDE 8 MG/1
8 TABLET SUBLINGUAL 3 TIMES DAILY
Status: DISCONTINUED | OUTPATIENT
Start: 2023-09-24 | End: 2023-09-27 | Stop reason: HOSPADM

## 2023-09-24 RX ORDER — ACETAMINOPHEN 500 MG
1000 TABLET ORAL EVERY 6 HOURS PRN
Status: DISCONTINUED | OUTPATIENT
Start: 2023-09-24 | End: 2023-09-27 | Stop reason: HOSPADM

## 2023-09-24 RX ORDER — POLYETHYLENE GLYCOL 3350 17 G/17G
17 POWDER, FOR SOLUTION ORAL 2 TIMES DAILY PRN
Status: DISCONTINUED | OUTPATIENT
Start: 2023-09-24 | End: 2023-09-26

## 2023-09-24 RX ORDER — MAG HYDROX/ALUMINUM HYD/SIMETH 200-200-20
30 SUSPENSION, ORAL (FINAL DOSE FORM) ORAL 4 TIMES DAILY PRN
Status: DISCONTINUED | OUTPATIENT
Start: 2023-09-24 | End: 2023-09-27 | Stop reason: HOSPADM

## 2023-09-24 RX ORDER — ENOXAPARIN SODIUM 100 MG/ML
40 INJECTION SUBCUTANEOUS EVERY 24 HOURS
Status: DISCONTINUED | OUTPATIENT
Start: 2023-09-24 | End: 2023-09-27 | Stop reason: HOSPADM

## 2023-09-24 RX ORDER — ALPRAZOLAM 0.25 MG/1
0.25 TABLET ORAL 3 TIMES DAILY PRN
Status: DISCONTINUED | OUTPATIENT
Start: 2023-09-24 | End: 2023-09-27 | Stop reason: HOSPADM

## 2023-09-24 RX ADMIN — SODIUM CHLORIDE: 9 INJECTION, SOLUTION INTRAVENOUS at 04:09

## 2023-09-24 RX ADMIN — METOCLOPRAMIDE 10 MG: 10 TABLET ORAL at 11:09

## 2023-09-24 RX ADMIN — ACETAMINOPHEN 1000 MG: 500 TABLET, FILM COATED ORAL at 12:09

## 2023-09-24 RX ADMIN — PROCHLORPERAZINE EDISYLATE 5 MG: 5 INJECTION INTRAMUSCULAR; INTRAVENOUS at 05:09

## 2023-09-24 RX ADMIN — ONDANSETRON 4 MG: 2 INJECTION INTRAMUSCULAR; INTRAVENOUS at 06:09

## 2023-09-24 RX ADMIN — ONDANSETRON 4 MG: 4 TABLET, ORALLY DISINTEGRATING ORAL at 01:09

## 2023-09-24 RX ADMIN — ENOXAPARIN SODIUM 40 MG: 40 INJECTION SUBCUTANEOUS at 04:09

## 2023-09-24 RX ADMIN — ALPRAZOLAM 0.25 MG: 0.25 TABLET ORAL at 06:09

## 2023-09-24 RX ADMIN — BUPRENORPHINE 8 MG: 8 TABLET SUBLINGUAL at 06:09

## 2023-09-24 RX ADMIN — ONDANSETRON 4 MG: 2 INJECTION INTRAMUSCULAR; INTRAVENOUS at 01:09

## 2023-09-24 RX ADMIN — ALPRAZOLAM 0.25 MG: 0.25 TABLET ORAL at 10:09

## 2023-09-24 RX ADMIN — METOCLOPRAMIDE 10 MG: 10 TABLET ORAL at 08:09

## 2023-09-24 RX ADMIN — LEVOTHYROXINE SODIUM 150 MCG: 150 TABLET ORAL at 06:09

## 2023-09-24 RX ADMIN — PANTOPRAZOLE SODIUM 40 MG: 40 TABLET, DELAYED RELEASE ORAL at 06:09

## 2023-09-24 RX ADMIN — METOCLOPRAMIDE 10 MG: 5 INJECTION, SOLUTION INTRAMUSCULAR; INTRAVENOUS at 02:09

## 2023-09-24 RX ADMIN — LOSARTAN POTASSIUM 50 MG: 50 TABLET, FILM COATED ORAL at 11:09

## 2023-09-24 RX ADMIN — BUPRENORPHINE 8 MG: 8 TABLET SUBLINGUAL at 02:09

## 2023-09-24 RX ADMIN — METOCLOPRAMIDE 10 MG: 10 TABLET ORAL at 04:09

## 2023-09-24 RX ADMIN — BUPRENORPHINE 8 MG: 8 TABLET SUBLINGUAL at 08:09

## 2023-09-24 RX ADMIN — ONDANSETRON 4 MG: 4 TABLET, ORALLY DISINTEGRATING ORAL at 08:09

## 2023-09-24 RX ADMIN — Medication 6 MG: at 12:09

## 2023-09-24 RX ADMIN — ALPRAZOLAM 0.25 MG: 0.25 TABLET ORAL at 02:09

## 2023-09-24 NOTE — PLAN OF CARE
Problem: Adult Inpatient Plan of Care  Goal: Plan of Care Review  Outcome: Ongoing, Progressing  Goal: Patient-Specific Goal (Individualized)  Outcome: Ongoing, Progressing  Goal: Absence of Hospital-Acquired Illness or Injury  Outcome: Ongoing, Progressing  Goal: Optimal Comfort and Wellbeing  Outcome: Ongoing, Progressing  Goal: Readiness for Transition of Care  Outcome: Ongoing, Progressing     Problem: Diabetes Comorbidity  Goal: Blood Glucose Level Within Targeted Range  Outcome: Ongoing, Progressing     Problem: Nausea and Vomiting  Goal: Fluid and Electrolyte Balance  Outcome: Ongoing, Progressing     Problem: Pain Acute  Goal: Acceptable Pain Control and Functional Ability  Outcome: Ongoing, Progressing

## 2023-09-24 NOTE — PROGRESS NOTES
"[facility] Hospital Medicine Progress Note        Chief Complaint: Inpatient Follow-up for     HPI: 42 yr old female whose history includes HTN, DM, hypothyroidism and diabetic gastroparesis. Gastric empty study performed 6/16/23 confirmed abnormal gastric emptying. Reports 3 day history of persistent nausea. Denies vomiting only because she has kept herself NPO to avoid. When given fluids in the ED she vomited them. Also for 3 days she reports frequent episodes of experiencing generalized "jerky" movements accompanied by extreme diaphoresis, chest pain and SOB. Symptoms last 2-3 minutes and resolve. Glucose during these episodes reveal no hyper or hypoglycemia. Reports chronic constipation. Recently started on Amitizia.     Interval Hx:   Laying in bed,  has multiple complaints.  Reported her abdomen is hurting.  Informed patient that if she will refuse her Reglan, that will not help though she was able to take her so Butrans as well as Xanax this morning.  Patient reports her stomach gets upset with medication.  Informed that if she continue to take the Reglan, her stomach may not get upset.  Patient reports this is her gastroparesis flare but it can also be from constipation because the pain started after Amitiza or it could also be coming from her ovarian cyst that she gets from time to time and she is scheduled for hysterectomy with oophorectomy on Thursday 9/28.    Patient also reports that they are working her for possible gastric pacemaker for gastroparesis   Reported long-term history of dependence on pain medications for her back and then for the last 2 years she is 1 Subutex but has not requested weaning off from Subutex stating the physician continue to fill for it.  Advised patient to talk to her physician and to wean off.  Discuss there are multiple causes for her gastroparesis starting from narcotics in the past then Subutex now than diabetes than recent Ozempic use.  " Discussed she needs to write down in her phone everything she puts in her mouth  so she can track back with what made her sick.  Probably she is consuming something that triggers the gastroparesis flare.  Verbalized understanding   I did inform patient that I will not be able to help her if she will refused treatment.  Patient wants everything given through IV and I informed her that for long-term purposes, it is better she stays on oral and go from there.  Encourage patient to consume clear liquids in small amounts at a time but throughout the day. Discussed will change her Zofran to disintegrating 1 and if that does not help, will give her Phenergan suppository   Informed patient that I can not give her any narcotics/ opioids given it will make her gastroparesis  As well as constipation worse if that is the reason for her pain,  patient verbalized understanding   No family is at bedside  Case was discussed with patient's nurse on the floor.    Objective/physical exam:  General:  laying in bed, seems uncomfortable  Chest: Clear to auscultation bilaterally anteriorly  Heart: RRR, +S1, S2, no appreciable murmur  Abdomen: Soft, nontender, BS + x4  MSK: Warm, no lower extremity edema, no clubbing or cyanosis  Neurologic: Alert and oriented x4, Cranial nerve II-XII intact, Strength 5/5 in all 4 extremities    VITAL SIGNS: 24 HRS MIN & MAX LAST   Temp  Min: 97.9 °F (36.6 °C)  Max: 98.6 °F (37 °C) 98.5 °F (36.9 °C)   BP  Min: 108/60  Max: 161/75 (!) 149/83   Pulse  Min: 55  Max: 76  74   Resp  Min: 10  Max: 20 19   SpO2  Min: 95 %  Max: 100 % 96 %     I reviewed the labs below:  Recent Labs   Lab 09/23/23  0848 09/24/23  0444   WBC 6.49 6.72   RBC 5.03 4.13*   HGB 14.3 11.7*   HCT 42.3 34.7*   MCV 84.1 84.0   MCH 28.4 28.3   MCHC 33.8 33.7   RDW 13.3 13.2    210   MPV 9.8 9.6     Recent Labs   Lab 09/23/23  0848 09/24/23  0444    138   K 3.7 3.4*   CO2 27 24   BUN 15.8 18.8*   CREATININE 0.72 0.63   CALCIUM  10.0 8.6   MG 2.00  --    ALBUMIN 5.0 3.7   ALKPHOS 52 37*   ALT 24 18   AST 25 20   BILITOT 1.2 0.9     Microbiology Results (last 7 days)       ** No results found for the last 168 hours. **             See below for Radiology    Scheduled Med:   buprenorphine HCL  8 mg Sublingual TID    levothyroxine  150 mcg Oral Before breakfast    losartan  50 mg Oral Daily    metoclopramide HCl  10 mg Oral QID    pantoprazole  40 mg Oral QAM      Continuous Infusions:   sodium chloride 0.9% 100 mL/hr at 09/23/23 2040      PRN Meds:  acetaminophen, acetaminophen, ALPRAZolam, aluminum-magnesium hydroxide-simethicone, dextrose 10%, dextrose 10%, dextrose 10%, dextrose 10%, glucagon (human recombinant), glucagon (human recombinant), glucose, glucose, insulin aspart U-100, melatonin, ondansetron, polyethylene glycol, prochlorperazine, senna-docusate 8.6-50 mg, sodium chloride 0.9%     Assessment/Plan:  Intractable nausea and vomiting secondary to diabetic gastroparesis vs  Subutex versus chronic constipation versus recent Ozempic use  Diabetes mellitus, type 2-->  A1c 6.7 on 9/23   Severe anxiety   Chronic constipation -on Amitiza   Ovarian cyst      This morning patient took her Xanax and Subutex but refused losartan and Reglan.  Encourage patient to take her medications as prescribed or we will not be able to help her.  Agreeable   Encouraged to continue sipping on Clear liquids as tolerated  Continue reglan  p.o. that his ideal  Currently on normal saline at 100 cc/hour  Switch Zofran to disintegrating tablet 4 mg q.6 p.r.n., if no relief, give Phenergan suppository  NO IV MEDS  NO OPIOIDS/NARCOTICS  Patient is being under process for gastric pacemaker for her gastroparesis  Encourage patient to write down anything and everything she puts in her mouth so she can track back and see which foods or liquids bothers her more than the others  Also discussed her abdominal pain and cramping can be multifactorial including gastroparesis  versus chronic constipation given she is on Amitiza, previous history of narcotic dependence, recent Ozempic use on her ovarian cyst that comes and go and she will undergo hysterectomy/possible oophorectomy with Dr. Osiel Swann on 09/28/2023  Advised patient to talk to her physician regarding weaning off Subutex given that can also be contributing to her abdominal pain with chronic constipation.  Patient also mentioned that all of a sudden she becomes diaphoretic, which could also be the side effect of  Subutex given her blood sugars has been in 100+ so can not be hypoglycemia  Monitor accuchecks with SS  Her A1c is 6.7 on 09/23/2023  Continue Xanax PRN  TSH  1.3, continue Levothyroxine 150mg, home dose  Continue ARB for renal protection   All her labs are near normal    VTE prophylaxis:  Lovenox 40 subQ daily    Patient condition:  Stable    Anticipated discharge and Disposition:    home tomorrow if able to tolerate clears, pt has elective hysterectomy +/-oophorectomy/ ovarian cystectomy scheduled for 9/28 with Dr Osiel Swann       All diagnosis and differential diagnosis have been reviewed; assessment and plan has been documented; I have personally reviewed the labs and test results that are presently available; I have reviewed the patients medication list; I have reviewed the consulting providers response and recommendations. I have reviewed or attempted to review medical records based upon their availability    All of the patient's questions have been  addressed and answered. Patient's is agreeable to the above stated plan. I will continue to monitor closely and make adjustments to medical management as needed.  _____________________________________________________________________    Nutrition Status:    Radiology:   I have personally reviewed the images and agree with radiologist report  X-Ray Chest AP Portable  Narrative: EXAMINATION:  XR CHEST AP PORTABLE    CLINICAL HISTORY:  chest pain;    TECHNIQUE:  One  view    COMPARISON:  July 2, 2023.    FINDINGS:  Cardiopericardial silhouette is within normal limits. Lungs are without dense focal or segmental consolidation, congestive process, pleural effusions or pneumothorax.  Impression: NO ACUTE CARDIOPULMONARY PROCESS IDENTIFIED.    Electronically signed by: Maurisio Morales  Date:    09/23/2023  Time:    09:39      Joaquin Gill MD  Department of Hospital Medicine   [facility]   09/24/2023

## 2023-09-24 NOTE — PLAN OF CARE
Problem: Adult Inpatient Plan of Care  Goal: Plan of Care Review  Outcome: Ongoing, Not Progressing  Goal: Patient-Specific Goal (Individualized)  Outcome: Ongoing, Not Progressing  Goal: Absence of Hospital-Acquired Illness or Injury  Outcome: Ongoing, Not Progressing  Goal: Optimal Comfort and Wellbeing  Outcome: Ongoing, Not Progressing  Goal: Readiness for Transition of Care  Outcome: Ongoing, Not Progressing     Problem: Diabetes Comorbidity  Goal: Blood Glucose Level Within Targeted Range  Outcome: Ongoing, Not Progressing     Problem: Nausea and Vomiting  Goal: Fluid and Electrolyte Balance  Outcome: Ongoing, Not Progressing     Problem: Pain Acute  Goal: Acceptable Pain Control and Functional Ability  Outcome: Ongoing, Not Progressing

## 2023-09-24 NOTE — H&P
"[facility] Hospital Medicine - H&P Note    Patient Name: Fred Berman  : 1980  MRN: [id number]  PCP: Juan Del Valle DO  Admitting Physician:   Admission Class: IP- Inpatient   Code status: --    Allergies   Vancomycin analogues    Chief Complaint   Nausea, vomiting    History of Present Illness   42 yr old female whose history includes HTN, DM, hypothyroidism and diabetic gastroparesis. Gastric empty study performed 23 confirmed abnormal gastric emptying. Reports 3 day history of persistent nausea. Denies vomiting only because she has kept herself NPO to avoid. When given fluids in the ED she vomited them. Also for 3 days she reports frequent episodes of experiencing generalized "jerky" movements accompanied by extreme diaphoresis, chest pain and SOB. Symptoms last 2-3 minutes and resolve. Glucose during these episodes reveal no hyper or hypoglycemia. Reports chronic constipation. Recently started on Amitizia.     ROS   Except as documented, all other systems reviewed and negative     Past Medical History   Hypertension  Diabetes, type 2  Diabetic gastroparesis  GERD  Hypothyroidism  Ovarian Cyst  Chronic back pain secondary to benign lumbar mass - per patient report and no images available for review  Hx Opioid dependency secondary to opioids used to treat chronic back pain - currently on Buprenorphine  Chronic constipation  Past Surgical History   Cholecystectomy  Bilateral tubal ligation  Tonsillectomy  Polypectomy  Social History   Denies alcohol or tobacco use. Hx of opioid dependency and currently on Buprenorphine.  Family History   Reviewed and negative    Home Medications     No current facility-administered medications on file prior to encounter.     Current Outpatient Medications on File Prior to Encounter   Medication Sig Dispense Refill    buprenorphine HCL (SUBUTEX) 8 mg Subl Place 8 mg under the tongue 3 (three) times daily.      empagliflozin (JARDIANCE) " "10 mg tablet Take 1 tablet (10 mg total) by mouth once daily. 30 tablet 1    levothyroxine (SYNTHROID) 150 MCG tablet Take 1 tablet (150 mcg total) by mouth before breakfast. 30 tablet 11    losartan (COZAAR) 50 MG tablet Take 1 tablet (50 mg total) by mouth once daily. 90 tablet 3    lubiprostone (AMITIZA) 24 MCG Cap Take 24 mcg by mouth daily with breakfast.      pantoprazole (PROTONIX) 40 MG tablet Take 40 mg by mouth every morning.      QUEtiapine (SEROQUEL) 25 MG Tab Take 25 mg by mouth every evening.      REGLAN 10 mg tablet Take 10 mg by mouth 4 (four) times daily.      calcium carbonate/vitamin D3 (CALCIUM 600 + D,3, ORAL) Take 1 tablet by mouth 2 (two) times a day.      pen needle, diabetic 31 gauge x 5/16" Ndle BD Ultra-Fine Short Pen Needle 31 gauge x 5/16"   USE 1 PENTIP WITH LANTUS ONCE A DAY AS DIRECTED      [DISCONTINUED] CELEXA 10 mg tablet Take 10 mg by mouth every evening.           Physical Exam   Vital Signs  Temp:  [97.9 °F (36.6 °C)-98.1 °F (36.7 °C)]   Pulse:  [64-72]   Resp:  [18]   BP: (122-161)/(73-95)   SpO2:  [95 %-98 %]    General: Appears comfortable  HEENT: NC/AT  Neck:  No JVD  Chest: CTABL  CVS: Regular rhythm. Normal S1/S2.  Abdomen: nondistended, normoactive BS, soft and non-tender.  MSK: No obvious deformity or joint swelling  Skin: Warm and dry  Neuro: AAOx3, no focal neurological deficit  Psych: Cooperative    Labs     Recent Labs     09/23/23  0848   WBC 6.49   RBC 5.03   HGB 14.3   HCT 42.3   MCV 84.1   MCH 28.4   MCHC 33.8   RDW 13.3        No results for input(s): "PROTIME", "INR", "PTT", "D-DIMER", "FERRITIN", "IRON", "TRANS", "TIBC", "LABIRON", "QUFURKTZ16", "FOLATE", "LDH", "HAPTOGLOBIN", "RETICCNTAUTO", "RETABS", "PERIPSMEAREV" in the last 72 hours.   Recent Labs     09/23/23  0848      K 3.7   CHLORIDE 99   CO2 27   BUN 15.8   CREATININE 0.72   EGFRNORACEVR >60   GLUCOSE 195*   CALCIUM 10.0   MG 2.00   ALBUMIN 5.0   GLOBULIN 3.1   ALKPHOS 52   ALT 24   AST " "25   BILITOT 1.2   LIPASE 10     No results for input(s): "LACTIC" in the last 72 hours.  Recent Labs     09/23/23  0848   TROPONINI <0.010        Microbiology Results (last 7 days)       ** No results found for the last 168 hours. **           Imaging   X-Ray Chest AP Portable  Narrative: EXAMINATION:  XR CHEST AP PORTABLE    CLINICAL HISTORY:  chest pain;    TECHNIQUE:  One view    COMPARISON:  July 2, 2023.    FINDINGS:  Cardiopericardial silhouette is within normal limits. Lungs are without dense focal or segmental consolidation, congestive process, pleural effusions or pneumothorax.  Impression: NO ACUTE CARDIOPULMONARY PROCESS IDENTIFIED.    Electronically signed by: Maurisio Morales  Date:    09/23/2023  Time:    09:39    Assessment & Plan   Intractable nausea and vomiting secondary to diabetic gastroparesis  - Clear liquids as tolerated  - continue reglan - can switch to IV if needed (reglan was just recently resumed by her gastroenterologist)  - NS at 100ml/hr  - Zofran PRN    Diabetes mellitus, type 2  - Monitor accuchecks with SS  - Last Hba1c on 7/2/23 was 5.9. Patient states she was essentially NPO for two months prior to this result and attributes normal results to this. Will recheck.     Probable panic attacks  - Xanax PRN    Hypothyroidism  - TSH pending  - Levothyroxine 150mg, home dose, resumed    Proteinuria secondary to DM  - continue ARB for renal protection  - recommend outpatient referral to nephrology     Ovarian cyst  - is scheduled for a hysterectomy with Dr. Osiel Swann 9/28/23      VTE Prophylaxis: SCDs while in bed. Encourage ambulation.      IDarlene, KYLAHP-BC have discussed this patients case with Dr. De Leon who agrees with the diagnosis and treatment plan.    "

## 2023-09-25 LAB
ANION GAP SERPL CALC-SCNC: 10 MEQ/L
APPEARANCE UR: CLEAR
BACTERIA #/AREA URNS AUTO: NORMAL /HPF
BILIRUB UR QL STRIP.AUTO: NEGATIVE
BUN SERPL-MCNC: 12.8 MG/DL (ref 7–18.7)
CALCIUM SERPL-MCNC: 8.5 MG/DL (ref 8.4–10.2)
CHLORIDE SERPL-SCNC: 106 MMOL/L (ref 98–107)
CO2 SERPL-SCNC: 25 MMOL/L (ref 22–29)
COLOR UR AUTO: YELLOW
CREAT SERPL-MCNC: 0.63 MG/DL (ref 0.55–1.02)
CREAT/UREA NIT SERPL: 20
GFR SERPLBLD CREATININE-BSD FMLA CKD-EPI: >60 MLS/MIN/1.73/M2
GLUCOSE SERPL-MCNC: 102 MG/DL (ref 74–100)
GLUCOSE UR QL STRIP.AUTO: ABNORMAL
KETONES UR QL STRIP.AUTO: ABNORMAL
LEUKOCYTE ESTERASE UR QL STRIP.AUTO: NEGATIVE
NITRITE UR QL STRIP.AUTO: NEGATIVE
PH UR STRIP.AUTO: 5.5 [PH]
POCT GLUCOSE: 105 MG/DL (ref 70–110)
POCT GLUCOSE: 118 MG/DL (ref 70–110)
POCT GLUCOSE: 118 MG/DL (ref 70–110)
POCT GLUCOSE: 96 MG/DL (ref 70–110)
POTASSIUM SERPL-SCNC: 3.6 MMOL/L (ref 3.5–5.1)
PROT UR QL STRIP.AUTO: NEGATIVE
RBC #/AREA URNS AUTO: NORMAL /HPF
RBC UR QL AUTO: NEGATIVE
SODIUM SERPL-SCNC: 141 MMOL/L (ref 136–145)
SP GR UR STRIP.AUTO: 1.02 (ref 1–1.03)
SQUAMOUS #/AREA URNS AUTO: NORMAL /HPF
UROBILINOGEN UR STRIP-ACNC: 1
WBC #/AREA URNS AUTO: <5 /HPF

## 2023-09-25 PROCEDURE — 25000003 PHARM REV CODE 250: Performed by: PHYSICIAN ASSISTANT

## 2023-09-25 PROCEDURE — 25000003 PHARM REV CODE 250: Performed by: NURSE PRACTITIONER

## 2023-09-25 PROCEDURE — 25000003 PHARM REV CODE 250: Performed by: INTERNAL MEDICINE

## 2023-09-25 PROCEDURE — 81001 URINALYSIS AUTO W/SCOPE: CPT | Performed by: STUDENT IN AN ORGANIZED HEALTH CARE EDUCATION/TRAINING PROGRAM

## 2023-09-25 PROCEDURE — 63600175 PHARM REV CODE 636 W HCPCS: Performed by: INTERNAL MEDICINE

## 2023-09-25 PROCEDURE — 80048 BASIC METABOLIC PNL TOTAL CA: CPT | Performed by: INTERNAL MEDICINE

## 2023-09-25 PROCEDURE — 11000001 HC ACUTE MED/SURG PRIVATE ROOM

## 2023-09-25 RX ADMIN — SODIUM CHLORIDE: 9 INJECTION, SOLUTION INTRAVENOUS at 02:09

## 2023-09-25 RX ADMIN — BUPRENORPHINE 8 MG: 8 TABLET SUBLINGUAL at 08:09

## 2023-09-25 RX ADMIN — ALPRAZOLAM 0.25 MG: 0.25 TABLET ORAL at 05:09

## 2023-09-25 RX ADMIN — BUPRENORPHINE 8 MG: 8 TABLET SUBLINGUAL at 02:09

## 2023-09-25 RX ADMIN — ALPRAZOLAM 0.25 MG: 0.25 TABLET ORAL at 08:09

## 2023-09-25 RX ADMIN — METOCLOPRAMIDE 10 MG: 10 TABLET ORAL at 01:09

## 2023-09-25 RX ADMIN — METOCLOPRAMIDE 10 MG: 10 TABLET ORAL at 09:09

## 2023-09-25 RX ADMIN — BUPRENORPHINE 8 MG: 8 TABLET SUBLINGUAL at 09:09

## 2023-09-25 RX ADMIN — ONDANSETRON 4 MG: 4 TABLET, ORALLY DISINTEGRATING ORAL at 01:09

## 2023-09-25 RX ADMIN — SODIUM CHLORIDE: 9 INJECTION, SOLUTION INTRAVENOUS at 11:09

## 2023-09-25 RX ADMIN — PANTOPRAZOLE SODIUM 40 MG: 40 TABLET, DELAYED RELEASE ORAL at 05:09

## 2023-09-25 RX ADMIN — METOCLOPRAMIDE 10 MG: 10 TABLET ORAL at 08:09

## 2023-09-25 RX ADMIN — ENOXAPARIN SODIUM 40 MG: 40 INJECTION SUBCUTANEOUS at 05:09

## 2023-09-25 RX ADMIN — SODIUM CHLORIDE: 9 INJECTION, SOLUTION INTRAVENOUS at 08:09

## 2023-09-25 RX ADMIN — LEVOTHYROXINE SODIUM 150 MCG: 150 TABLET ORAL at 05:09

## 2023-09-25 RX ADMIN — ONDANSETRON 4 MG: 4 TABLET, ORALLY DISINTEGRATING ORAL at 05:09

## 2023-09-25 RX ADMIN — METOCLOPRAMIDE 10 MG: 10 TABLET ORAL at 05:09

## 2023-09-25 RX ADMIN — ONDANSETRON 4 MG: 4 TABLET, ORALLY DISINTEGRATING ORAL at 08:09

## 2023-09-25 RX ADMIN — ALPRAZOLAM 0.25 MG: 0.25 TABLET ORAL at 02:09

## 2023-09-25 RX ADMIN — LOSARTAN POTASSIUM 50 MG: 50 TABLET, FILM COATED ORAL at 09:09

## 2023-09-25 NOTE — PLAN OF CARE
09/25/23 0956   Discharge Assessment   Assessment Type Discharge Planning Assessment   Confirmed/corrected address, phone number and insurance Yes   Confirmed Demographics Correct on Facesheet   Source of Information patient   Communicated ALEJANDRA with patient/caregiver Date not available/Unable to determine   Reason For Admission panic attack   People in Home significant other;child(dae), dependent  (Pt lives with her boy friend, Chinedu and 2 children, ages 5 & 18y/o in a single story home with no steps to enter)   Do you expect to return to your current living situation? Yes   Do you have help at home or someone to help you manage your care at home? No   Prior to hospitilization cognitive status: Alert/Oriented   Current cognitive status: Alert/Oriented   Walking or Climbing Stairs   (independent with mobility)   Home Layout Able to live on 1st floor   Equipment Currently Used at Home none   Patient currently being followed by outpatient case management? No   Do you currently have service(s) that help you manage your care at home? No   Who is going to help you get home at discharge? boy friend, Chinedu   How do you get to doctors appointments? car, drives self   Are you on dialysis? No   DME Needed Upon Discharge  none   Discharge Plan discussed with: Patient   Transition of Care Barriers None   Discharge Plan A Home   Discharge Plan B Home   Housing Stability   In the last 12 months, was there a time when you were not able to pay the mortgage or rent on time? N   Transportation Needs   In the past 12 months, has lack of transportation kept you from medical appointments or from getting medications? no   Food Insecurity   Within the past 12 months, you worried that your food would run out before you got the money to buy more. Never true   OTHER   Name(s) of People in Home boy friend, Chinedu and 2 children     Pt's PCP is Dr Juan Del Valle at [facility]. Her  is her boy friend, Chinedu. She has never had HH  services. She uses [facility] in [facility] in [place]. Pt does drive but does not work. Pt has no dc needs at this time.

## 2023-09-25 NOTE — CONSULTS
Consult Note    Reason for Consult:      We were consulted by Dr. Gill to evaluate this patient for gastroparesis.    HPI:     42-year-old  female known to Dr. Macias in the inpatient setting only (primary GI is Dr. Yang).  Patient with a pmhx of polysubstance abuse on Suboxone, C diff colitis, cholecystectomy, DM II, and gastroparesis.  Presented with multiple complaints of cold sweats and worsening abdominal pain and nausea.       Patient reports inability to tolerate p.o. associated with with weight loss and epigastric abdominal discomfort, nausea and vomiting for the past few months.  Reports 30 lb weight loss.  Was on ozempic until a couple of months ago. When she is able to eat well, she has a BM about once weekly and often stool is very hard requiring manual disimpaction. She has been working with Dr. Yang to try to find a bowel regimen that works well for her.  She feels a lot of these meds make her symptoms worse.  Has tired lactulose and amitiza, both she feels caused worsening pain and nausea.  Linzess caused diarrhea, but she is unable to tell me the dose with certainly; she thinks it was 145 mcg.  She has no eaten in about a week due her to current symptoms and last BM was over a week ago.  Currently only able to tolerate ice chips and water.  She has been on reglan 10 mg QID at home.     Previous records reviewed...  Patient was seen by a group at [facility] 05/31/2023 with abdominal pain, weight loss, vomiting and constipation with concern for enteritis on CT.  EGD 06/01/2023: gastric erythema and scattered erosions otherwise unremarkable.  Pathology with mild inflammation of the stomach.  Colonoscopy 06/05/2023 due to persistent abdominal discomfort: slightly tortuous but otherwise normal colon and TI.  No mucosal changes within the colon, no evidence of pseudomembranous colitis or diverticulitis or IBD.  Random colon biopsies negative.    NM GES 6/16/23: Abnormal gastric emptying scan  "suggesting changes likely related to gastroparesis.     Patient states she had another EGD and colonoscopy by Dr. Yang a few weeks ago.  Unsure of EGD findings.  Colonoscopy found a "precancerous polyp" and hemorrhoids were banded.     She has a known ovarian cyst and is scheduled for hysterectomy with oophorectomy on Thursday 9/28.     PCP:  Juan Del Valle DO    Review of patient's allergies indicates:   Allergen Reactions    Vancomycin analogues Rash        Current Facility-Administered Medications   Medication Dose Route Frequency Provider Last Rate Last Admin    0.9%  NaCl infusion   Intravenous Continuous Branden Cruz PA-C 100 mL/hr at 09/25/23 1140 New Bag at 09/25/23 1140    acetaminophen tablet 1,000 mg  1,000 mg Oral Q6H PRN Natalie De Leon MD   1,000 mg at 09/24/23 0056    acetaminophen tablet 650 mg  650 mg Oral Q4H PRN Natalie De Leon MD        ALPRAZolam tablet 0.25 mg  0.25 mg Oral TID PRN Darlene Tijerina, FNP   0.25 mg at 09/25/23 0548    aluminum-magnesium hydroxide-simethicone 200-200-20 mg/5 mL suspension 30 mL  30 mL Oral QID PRN Natalie De Leon MD        buprenorphine HCL SL tablet 8 mg  8 mg Sublingual TID Natalie De Leon MD   8 mg at 09/25/23 0912    dextrose 10% bolus 125 mL 125 mL  12.5 g Intravenous PRN Branden Cruz PA-C        dextrose 10% bolus 125 mL 125 mL  12.5 g Intravenous PRN Darlene Tijerina, FNP        dextrose 10% bolus 250 mL 250 mL  25 g Intravenous PRN Branden Cruz PA-C        dextrose 10% bolus 250 mL 250 mL  25 g Intravenous PRN Darlene Tijerina L, FNP        enoxaparin injection 40 mg  40 mg Subcutaneous Q24H (prophylaxis, 1700) Joaquin Gill MD   40 mg at 09/24/23 1605    glucagon (human recombinant) injection 1 mg  1 mg Intramuscular PRN Branden Cruz PA-C        glucagon (human recombinant) injection 1 mg  1 mg Intramuscular PRN Darlene Tijerina, FNP        glucose chewable tablet 16 g  16 g Oral PRN Darlene Tijerina, FNP        glucose chewable tablet 24 g  " 24 g Oral PRN Darlene Tijerina, FNP        insulin aspart U-100 injection 0-10 Units  0-10 Units Subcutaneous Q6H PRN Branden Cruz PA-C        levothyroxine tablet 150 mcg  150 mcg Oral Before breakfast Lilliana Darlene HUANG, FNP   150 mcg at 09/25/23 0548    losartan tablet 50 mg  50 mg Oral Daily MagdaleanDarlene feng, FNP   50 mg at 09/25/23 0912    melatonin tablet 6 mg  6 mg Oral Nightly PRN Natalie De Leon MD   6 mg at 09/24/23 0056    metoclopramide HCl tablet 10 mg  10 mg Oral QID Darlene Tijerina FNP   10 mg at 09/25/23 0912    ondansetron disintegrating tablet 4 mg  4 mg Oral Q6H PRN Joaquin Gill MD   4 mg at 09/25/23 0547    pantoprazole EC tablet 40 mg  40 mg Oral QAM Darlene Tijerina, FNP   40 mg at 09/25/23 0548    polyethylene glycol packet 17 g  17 g Oral BID PRN Natalie De Leon MD        promethazine suppository 12.5 mg  12.5 mg Rectal Q8H PRN Joaquin Gill MD        senna-docusate 8.6-50 mg per tablet 2 tablet  2 tablet Oral BID PRN Natalie De Leon MD        sodium chloride 0.9% flush 10 mL  10 mL Intravenous PRN Natalie De Leon MD         Medications Prior to Admission   Medication Sig Dispense Refill Last Dose    buprenorphine HCL (SUBUTEX) 8 mg Subl Place 8 mg under the tongue 3 (three) times daily.   9/23/2023    empagliflozin (JARDIANCE) 10 mg tablet Take 1 tablet (10 mg total) by mouth once daily. 30 tablet 1     levothyroxine (SYNTHROID) 150 MCG tablet Take 1 tablet (150 mcg total) by mouth before breakfast. 30 tablet 11     losartan (COZAAR) 50 MG tablet Take 1 tablet (50 mg total) by mouth once daily. 90 tablet 3     lubiprostone (AMITIZA) 24 MCG Cap Take 24 mcg by mouth daily with breakfast.       pantoprazole (PROTONIX) 40 MG tablet Take 40 mg by mouth every morning.       QUEtiapine (SEROQUEL) 25 MG Tab Take 25 mg by mouth every evening.   9/22/2023    REGLAN 10 mg tablet Take 10 mg by mouth 4 (four) times daily.       calcium carbonate/vitamin D3 (CALCIUM 600 + D,3, ORAL) Take 1 tablet by mouth  "2 (two) times a day.       pen needle, diabetic 31 gauge x 5/16" Ndle BD Ultra-Fine Short Pen Needle 31 gauge x 5/16"   USE 1 PENTIP WITH LANTUS ONCE A DAY AS DIRECTED          Past Medical History:  Past Medical History:   Diagnosis Date    Adrenal mass     Anxiety     Arthritis     Bradycardia     Depression     Diabetes mellitus, type 2     Gastroparesis     General anesthetics causing adverse effect in therapeutic use     "hard time waking up"    GERD (gastroesophageal reflux disease)     Hip pain     Hypertension     Menstrual cramps     Ovarian cyst     Palpitations     Pyosalpingitis     Thyroid disease       Past Surgical History:  Past Surgical History:   Procedure Laterality Date    ABLATION OF VAGINAL TISSUE USING LASER       SECTION      CHOLECYSTECTOMY      TUBAL LIGATION        Family History:  History reviewed. No pertinent family history.  Social History:  Social History     Tobacco Use    Smoking status: Former     Types: Cigarettes    Smokeless tobacco: Never   Substance Use Topics    Alcohol use: Not Currently       Review of Systems:     Review of Systems   Constitutional:  Positive for chills and diaphoresis. Negative for appetite change, fatigue, fever and unexpected weight change.   HENT:  Negative for trouble swallowing.    Respiratory:  Negative for cough, chest tightness and shortness of breath.    Cardiovascular:  Negative for chest pain, palpitations and leg swelling.   Gastrointestinal:  Positive for abdominal pain, constipation, nausea and vomiting. Negative for abdominal distention, blood in stool, diarrhea and rectal pain.   Skin:  Negative for color change and pallor.   Neurological:  Negative for dizziness, weakness and light-headedness.   Psychiatric/Behavioral:  Negative for confusion.        Objective:     VITAL SIGNS: 24 HR MIN & MAX LAST    Temp  Min: 98 °F (36.7 °C)  Max: 98.7 °F (37.1 °C)  98 °F (36.7 °C)        BP  Min: 108/62  Max: 145/74  128/77     Pulse  Min: 55  " Max: 65  (!) 57     Resp  Min: 18  Max: 20  18    SpO2  Min: 94 %  Max: 98 %  98 %        Intake/Output Summary (Last 24 hours) at 9/25/2023 1200  Last data filed at 9/25/2023 0549  Gross per 24 hour   Intake 2272.68 ml   Output --   Net 2272.68 ml       Physical Exam  Constitutional:       General: She is not in acute distress.     Appearance: She is obese. She is not ill-appearing.   HENT:      Head: Normocephalic and atraumatic.   Eyes:      General: No scleral icterus.     Extraocular Movements: Extraocular movements intact.   Cardiovascular:      Rate and Rhythm: Normal rate and regular rhythm.   Pulmonary:      Effort: Pulmonary effort is normal. No respiratory distress.   Abdominal:      General: Bowel sounds are normal. There is no distension.      Palpations: Abdomen is soft. There is no mass.      Tenderness: There is abdominal tenderness (diffuse mild nonfocal). There is no guarding or rebound.   Musculoskeletal:         General: Normal range of motion.      Right lower leg: No edema.      Left lower leg: No edema.   Skin:     General: Skin is warm and dry.   Neurological:      Mental Status: She is alert and oriented to person, place, and time.   Psychiatric:         Mood and Affect: Mood and affect normal.           Recent Results (from the past 48 hour(s))   Hemoglobin A1C    Collection Time: 09/23/23 11:55 PM   Result Value Ref Range    Hemoglobin A1c 6.7 <=7.0 %    Estimated Average Glucose 145.6 mg/dL   Comprehensive Metabolic Panel    Collection Time: 09/24/23  4:44 AM   Result Value Ref Range    Sodium Level 138 136 - 145 mmol/L    Potassium Level 3.4 (L) 3.5 - 5.1 mmol/L    Chloride 106 98 - 107 mmol/L    Carbon Dioxide 24 22 - 29 mmol/L    Glucose Level 133 (H) 74 - 100 mg/dL    Blood Urea Nitrogen 18.8 (H) 7.0 - 18.7 mg/dL    Creatinine 0.63 0.55 - 1.02 mg/dL    Calcium Level Total 8.6 8.4 - 10.2 mg/dL    Protein Total 6.1 (L) 6.4 - 8.3 gm/dL    Albumin Level 3.7 3.5 - 5.0 g/dL    Globulin 2.4  2.4 - 3.5 gm/dL    Albumin/Globulin Ratio 1.5 1.1 - 2.0 ratio    Bilirubin Total 0.9 <=1.5 mg/dL    Alkaline Phosphatase 37 (L) 40 - 150 unit/L    Alanine Aminotransferase 18 0 - 55 unit/L    Aspartate Aminotransferase 20 5 - 34 unit/L    eGFR >60 mls/min/1.73/m2   CBC with Differential    Collection Time: 09/24/23  4:44 AM   Result Value Ref Range    WBC 6.72 4.50 - 11.50 x10(3)/mcL    RBC 4.13 (L) 4.20 - 5.40 x10(6)/mcL    Hgb 11.7 (L) 12.0 - 16.0 g/dL    Hct 34.7 (L) 37.0 - 47.0 %    MCV 84.0 80.0 - 94.0 fL    MCH 28.3 27.0 - 31.0 pg    MCHC 33.7 33.0 - 36.0 g/dL    RDW 13.2 11.5 - 17.0 %    Platelet 210 130 - 400 x10(3)/mcL    MPV 9.6 7.4 - 10.4 fL    Neut % 57.4 %    Lymph % 33.5 %    Mono % 7.7 %    Eos % 1.0 %    Basophil % 0.3 %    Lymph # 2.25 0.6 - 4.6 x10(3)/mcL    Neut # 3.85 2.1 - 9.2 x10(3)/mcL    Mono # 0.52 0.1 - 1.3 x10(3)/mcL    Eos # 0.07 0 - 0.9 x10(3)/mcL    Baso # 0.02 <=0.2 x10(3)/mcL    IG# 0.01 0 - 0.04 x10(3)/mcL    IG% 0.1 %    NRBC% 0.0 %   POCT glucose    Collection Time: 09/24/23  4:50 AM   Result Value Ref Range    POCT Glucose 127 (H) 70 - 110 mg/dL   POCT glucose    Collection Time: 09/24/23  6:24 AM   Result Value Ref Range    POCT Glucose 139 (H) 70 - 110 mg/dL   POCT glucose    Collection Time: 09/24/23 11:22 AM   Result Value Ref Range    POCT Glucose 129 (H) 70 - 110 mg/dL   POCT glucose    Collection Time: 09/24/23  5:00 PM   Result Value Ref Range    POCT Glucose 101 70 - 110 mg/dL   POCT glucose    Collection Time: 09/24/23  8:28 PM   Result Value Ref Range    POCT Glucose 98 70 - 110 mg/dL   Basic Metabolic Panel    Collection Time: 09/25/23  5:25 AM   Result Value Ref Range    Sodium Level 141 136 - 145 mmol/L    Potassium Level 3.6 3.5 - 5.1 mmol/L    Chloride 106 98 - 107 mmol/L    Carbon Dioxide 25 22 - 29 mmol/L    Glucose Level 102 (H) 74 - 100 mg/dL    Blood Urea Nitrogen 12.8 7.0 - 18.7 mg/dL    Creatinine 0.63 0.55 - 1.02 mg/dL    BUN/Creatinine Ratio 20     Calcium  Level Total 8.5 8.4 - 10.2 mg/dL    Anion Gap 10.0 mEq/L    eGFR >60 mls/min/1.73/m2   POCT glucose    Collection Time: 09/25/23  5:45 AM   Result Value Ref Range    POCT Glucose 96 70 - 110 mg/dL   POCT glucose    Collection Time: 09/25/23 11:13 AM   Result Value Ref Range    POCT Glucose 118 (H) 70 - 110 mg/dL       X-Ray Chest AP Portable    Result Date: 9/23/2023  EXAMINATION: XR CHEST AP PORTABLE CLINICAL HISTORY: chest pain; TECHNIQUE: One view COMPARISON: July 2, 2023. FINDINGS: Cardiopericardial silhouette is within normal limits. Lungs are without dense focal or segmental consolidation, congestive process, pleural effusions or pneumothorax.     NO ACUTE CARDIOPULMONARY PROCESS IDENTIFIED. Electronically signed by: Maurisio Morales Date:    09/23/2023 Time:    09:39    MRI Abdomen W WO Contrast    Result Date: 8/29/2023  EXAMINATION: MRI ABDOMEN W WO CONTRAST CLINICAL HISTORY: Other specified disorders of adrenal glandAdrenal gland protocol; TECHNIQUE: Multiplanar multisequence MRI of the abdomen performed without and with gadolinium based IV contrast. COMPARISON: CT 2 July 2023 FINDINGS: There is a 1 cm left adrenal nodule which is stable compared to last month.  Given its small size it is difficult to obtain accurate measurements to evaluate for washout, however, on the prior noncontrast CT Hounsfield measurements were compatible with adenoma. The liver is mildly enlarged.  There is mild hepatic steatosis.  The gallbladder is surgically absent.  No significant biliary ductal dilatation.  There is no significant abnormality of the spleen pancreas.  No hydronephrosis or suspicious renal lesion. No dilated bowel loops.  No ascites.  Abdominal aorta normal in caliber.  No retroperitoneal adenopathy.     Small left adrenal nodule is stable and most compatible with adenoma.  No specific imaging follow-up is needed. Electronically signed by: Elijah Sosa Date:    08/29/2023 Time:    13:50    Assessment / Plan:     42  yo CF known to Dr. Macias in the inpatient setting only (primary GI is Dr. Yang).  Patient with a pmhx of polysubstance abuse on Suboxone, C diff colitis, cholecystectomy, DM II, and gastroparesis.  Admitted with worsening abdominal pain, nausea, and inability to tolerate PO.    Gastroparesis in setting of chronic suboxone  - No plans for repeat endoscopy at this time as she was just scoped as outpatient. Will try to obtain those records.   - Continue reglan 10 mg QAC/QHS.   - Continue symptomatic treatment with prn antiemetics.   - Cannot use erythromycin due to severe DDI.    2.  Chronic constipation - likely exacerbating above  Possible diffuse hypomotility of GI tract.   - Needs bowel regimen.  Would benefit from motegrity but nonformulary here.   - Check KUB now. Will like given enema.       Thank you for allowing us to participate in this patient's care.

## 2023-09-26 LAB
ALBUMIN SERPL-MCNC: 3.5 G/DL (ref 3.5–5)
ALBUMIN/GLOB SERPL: 1.8 RATIO (ref 1.1–2)
ALP SERPL-CCNC: 36 UNIT/L (ref 40–150)
ALT SERPL-CCNC: 15 UNIT/L (ref 0–55)
AST SERPL-CCNC: 17 UNIT/L (ref 5–34)
BILIRUB SERPL-MCNC: 0.9 MG/DL
BUN SERPL-MCNC: 9 MG/DL (ref 7–18.7)
CALCIUM SERPL-MCNC: 8.3 MG/DL (ref 8.4–10.2)
CHLORIDE SERPL-SCNC: 109 MMOL/L (ref 98–107)
CO2 SERPL-SCNC: 25 MMOL/L (ref 22–29)
CREAT SERPL-MCNC: 0.58 MG/DL (ref 0.55–1.02)
GFR SERPLBLD CREATININE-BSD FMLA CKD-EPI: >60 MLS/MIN/1.73/M2
GLOBULIN SER-MCNC: 1.9 GM/DL (ref 2.4–3.5)
GLUCOSE SERPL-MCNC: 114 MG/DL (ref 74–100)
MAGNESIUM SERPL-MCNC: 1.5 MG/DL (ref 1.6–2.6)
POCT GLUCOSE: 117 MG/DL (ref 70–110)
POCT GLUCOSE: 123 MG/DL (ref 70–110)
POCT GLUCOSE: 134 MG/DL (ref 70–110)
POCT GLUCOSE: 95 MG/DL (ref 70–110)
POTASSIUM SERPL-SCNC: 3.3 MMOL/L (ref 3.5–5.1)
PROT SERPL-MCNC: 5.4 GM/DL (ref 6.4–8.3)
SODIUM SERPL-SCNC: 143 MMOL/L (ref 136–145)

## 2023-09-26 PROCEDURE — 25000003 PHARM REV CODE 250: Performed by: INTERNAL MEDICINE

## 2023-09-26 PROCEDURE — 80053 COMPREHEN METABOLIC PANEL: CPT | Performed by: STUDENT IN AN ORGANIZED HEALTH CARE EDUCATION/TRAINING PROGRAM

## 2023-09-26 PROCEDURE — 11000001 HC ACUTE MED/SURG PRIVATE ROOM

## 2023-09-26 PROCEDURE — 25000003 PHARM REV CODE 250: Performed by: STUDENT IN AN ORGANIZED HEALTH CARE EDUCATION/TRAINING PROGRAM

## 2023-09-26 PROCEDURE — 83735 ASSAY OF MAGNESIUM: CPT | Performed by: STUDENT IN AN ORGANIZED HEALTH CARE EDUCATION/TRAINING PROGRAM

## 2023-09-26 PROCEDURE — 25000003 PHARM REV CODE 250: Performed by: NURSE PRACTITIONER

## 2023-09-26 PROCEDURE — 63600175 PHARM REV CODE 636 W HCPCS: Performed by: INTERNAL MEDICINE

## 2023-09-26 PROCEDURE — 63600175 PHARM REV CODE 636 W HCPCS: Performed by: STUDENT IN AN ORGANIZED HEALTH CARE EDUCATION/TRAINING PROGRAM

## 2023-09-26 PROCEDURE — 25000003 PHARM REV CODE 250: Performed by: PHYSICIAN ASSISTANT

## 2023-09-26 RX ORDER — DIPHENHYDRAMINE HCL 12.5MG/5ML
12.5 ELIXIR ORAL EVERY 6 HOURS PRN
Status: DISCONTINUED | OUTPATIENT
Start: 2023-09-26 | End: 2023-09-27 | Stop reason: HOSPADM

## 2023-09-26 RX ORDER — ONDANSETRON 4 MG/1
4 TABLET, ORALLY DISINTEGRATING ORAL EVERY 8 HOURS PRN
Status: DISCONTINUED | OUTPATIENT
Start: 2023-09-26 | End: 2023-09-27 | Stop reason: HOSPADM

## 2023-09-26 RX ORDER — POLYETHYLENE GLYCOL 3350 17 G/17G
17 POWDER, FOR SOLUTION ORAL 2 TIMES DAILY
Status: DISCONTINUED | OUTPATIENT
Start: 2023-09-26 | End: 2023-09-26

## 2023-09-26 RX ORDER — POTASSIUM CHLORIDE 20 MEQ/1
40 TABLET, EXTENDED RELEASE ORAL ONCE
Status: COMPLETED | OUTPATIENT
Start: 2023-09-26 | End: 2023-09-26

## 2023-09-26 RX ORDER — MAGNESIUM SULFATE HEPTAHYDRATE 40 MG/ML
2 INJECTION, SOLUTION INTRAVENOUS ONCE
Status: COMPLETED | OUTPATIENT
Start: 2023-09-26 | End: 2023-09-26

## 2023-09-26 RX ORDER — POLYETHYLENE GLYCOL 3350 17 G/17G
17 POWDER, FOR SOLUTION ORAL DAILY
Status: DISCONTINUED | OUTPATIENT
Start: 2023-09-27 | End: 2023-09-27

## 2023-09-26 RX ADMIN — BUPRENORPHINE 8 MG: 8 TABLET SUBLINGUAL at 03:09

## 2023-09-26 RX ADMIN — SODIUM CHLORIDE: 9 INJECTION, SOLUTION INTRAVENOUS at 07:09

## 2023-09-26 RX ADMIN — NALOXEGOL OXALATE 25 MG: 25 TABLET, FILM COATED ORAL at 03:09

## 2023-09-26 RX ADMIN — LOSARTAN POTASSIUM 50 MG: 50 TABLET, FILM COATED ORAL at 09:09

## 2023-09-26 RX ADMIN — METOCLOPRAMIDE 10 MG: 10 TABLET ORAL at 05:09

## 2023-09-26 RX ADMIN — ALPRAZOLAM 0.25 MG: 0.25 TABLET ORAL at 05:09

## 2023-09-26 RX ADMIN — BUPRENORPHINE 8 MG: 8 TABLET SUBLINGUAL at 08:09

## 2023-09-26 RX ADMIN — LEVOTHYROXINE SODIUM 150 MCG: 150 TABLET ORAL at 05:09

## 2023-09-26 RX ADMIN — ALPRAZOLAM 0.25 MG: 0.25 TABLET ORAL at 01:09

## 2023-09-26 RX ADMIN — ONDANSETRON 4 MG: 4 TABLET, ORALLY DISINTEGRATING ORAL at 01:09

## 2023-09-26 RX ADMIN — POTASSIUM CHLORIDE 40 MEQ: 1500 TABLET, EXTENDED RELEASE ORAL at 09:09

## 2023-09-26 RX ADMIN — ENOXAPARIN SODIUM 40 MG: 40 INJECTION SUBCUTANEOUS at 05:09

## 2023-09-26 RX ADMIN — METOCLOPRAMIDE 10 MG: 10 TABLET ORAL at 08:09

## 2023-09-26 RX ADMIN — METOCLOPRAMIDE 10 MG: 10 TABLET ORAL at 12:09

## 2023-09-26 RX ADMIN — POLYETHYLENE GLYCOL 3350 17 G: 17 POWDER, FOR SOLUTION ORAL at 12:09

## 2023-09-26 RX ADMIN — SODIUM CHLORIDE: 9 INJECTION, SOLUTION INTRAVENOUS at 06:09

## 2023-09-26 RX ADMIN — MAGNESIUM SULFATE 2 G: 2 INJECTION INTRAVENOUS at 09:09

## 2023-09-26 RX ADMIN — BUPRENORPHINE 8 MG: 8 TABLET SUBLINGUAL at 09:09

## 2023-09-26 RX ADMIN — ALPRAZOLAM 0.25 MG: 0.25 TABLET ORAL at 08:09

## 2023-09-26 RX ADMIN — ONDANSETRON 4 MG: 4 TABLET, ORALLY DISINTEGRATING ORAL at 05:09

## 2023-09-26 RX ADMIN — PANTOPRAZOLE SODIUM 40 MG: 40 TABLET, DELAYED RELEASE ORAL at 05:09

## 2023-09-26 RX ADMIN — METOCLOPRAMIDE 10 MG: 10 TABLET ORAL at 09:09

## 2023-09-26 RX ADMIN — ONDANSETRON 4 MG: 4 TABLET, ORALLY DISINTEGRATING ORAL at 10:09

## 2023-09-26 NOTE — PROGRESS NOTES
"[facility] Hospital Medicine Progress Note        Chief Complaint: Inpatient Follow-up for     HPI:   42 yr old female with PMH of HTN, DM, hypothyroidism and diabetic gastroparesis. Gastric empty study performed 6/16/23 confirmed abnormal gastric emptying. Reports 3 day history of persistent nausea. Denies vomiting only because she has kept herself NPO to avoid. When given fluids in the ED she vomited them. Also for 3 days she reports frequent episodes of experiencing generalized "jerky" movements accompanied by extreme diaphoresis, chest pain and SOB. Symptoms last 2-3 minutes and resolve. Glucose during these episodes reveal no hyper or hypoglycemia. Reports chronic constipation. Recently started on Amitizia. Was refusing Reglan but was counselled regarding importance of taking it in order to improve symptoms of gastroparesis. Continues to have persistent symptoms of abdominal pain, nausea & chills on swallowing food/medications. GI consulted; following recs. XR Abdomen showed nonspecific gas pattern.    Interval Hx:   Today, Mrs Berman reported improved tolerance of liquids & medications. Started on Miralax BID & will also be given Soap Suds enema. Encouraged patient to eat & drink more.  Will plan for DC once tolerating PO intake better & having BMs. Will add Linzess tomorrow.    Case was discussed with patient's nurse on the floor.    Objective/physical exam:  General:  laying in bed, seems uncomfortable  Chest: Clear to auscultation bilaterally anteriorly  Heart: RRR, +S1, S2, no appreciable murmur  Abdomen: Soft, nontender, BS + x4  MSK: Warm, no lower extremity edema, no clubbing or cyanosis  Neurologic: Alert and oriented x4, Cranial nerve II-XII intact, Strength 5/5 in all 4 extremities    VITAL SIGNS: 24 HRS MIN & MAX LAST   Temp  Min: 98.1 °F (36.7 °C)  Max: 98.4 °F (36.9 °C) 98.2 °F (36.8 °C)   BP  Min: 132/76  Max: 144/85 (!) 142/84   Pulse  Min: 51  Max: 65  (!) " 54   Resp  Min: 17  Max: 18 17   SpO2  Min: 92 %  Max: 98 % 95 %     I reviewed the labs below:  Recent Labs   Lab 09/23/23  0848 09/24/23  0444   WBC 6.49 6.72   RBC 5.03 4.13*   HGB 14.3 11.7*   HCT 42.3 34.7*   MCV 84.1 84.0   MCH 28.4 28.3   MCHC 33.8 33.7   RDW 13.3 13.2    210   MPV 9.8 9.6       Recent Labs   Lab 09/23/23  0848 09/24/23  0444 09/25/23  0525 09/26/23  0611    138 141 143   K 3.7 3.4* 3.6 3.3*   CO2 27 24 25 25   BUN 15.8 18.8* 12.8 9.0   CREATININE 0.72 0.63 0.63 0.58   CALCIUM 10.0 8.6 8.5 8.3*   MG 2.00  --   --  1.50*   ALBUMIN 5.0 3.7  --  3.5   ALKPHOS 52 37*  --  36*   ALT 24 18  --  15   AST 25 20  --  17   BILITOT 1.2 0.9  --  0.9       Assessment/Plan:  Intractable N/V 2/2 diabetic gastroparesis vs  Subutex  DM type 2-->  A1c 6.7 on 9/23  Severe anxiety  Chronic constipation -on Amitiza  Ovarian cyst  Hypokalemia  Hypomagnesemia    Patient continues to be admitted for close monitoring  Reports improvement in symptoms since yesterday  Will start Miralax BID & give soap suds enema  GI consulted for persistent symptoms; will follow recommendations  On Clear Liquids; tolerating PO intake better  Currently on IV NS at 100 cc/hour  Patient is being worked up for gastric pacemaker for her gastroparesis  Due for outpatient Hysterectomy & Salpingo-oophorectomy with OB/GYN on 09/28  Monitor accuchecks with SS  Patient continued on buprenorphine 8 mg t.i.d., levothyroxine 150 mcg, losartan 50 mg daily, Reglan 10 mg q.i.d., Protonix 40 mg daily  ISS LD ordered  Replaced K & Mg  Continue Xanax PRN  Continue monitoring patient's symptoms    VTE prophylaxis:  Lovenox 40 subQ daily    Patient condition:  Stable    Anticipated discharge and Disposition:   Pending GI recs      All diagnosis and differential diagnosis have been reviewed; assessment and plan has been documented; I have personally reviewed the labs and test results that are presently available; I have reviewed the patients  medication list; I have reviewed the consulting providers response and recommendations. I have reviewed or attempted to review medical records based upon their availability    All of the patient's questions have been  addressed and answered. Patient's is agreeable to the above stated plan. I will continue to monitor closely and make adjustments to medical management as needed.  _____________________________________________________________________    Radiology:   I have personally reviewed the images and agree with radiologist report  X-Ray Abdomen Flat And Erect  EXAMINATION  XR ABDOMEN FLAT AND ERECT    CLINICAL HISTORY  abdominal pain, n/v, constipation;    TECHNIQUE  A total of 3 images submitted of the abdomen.    COMPARISON  18 May 2023    FINDINGS  Lines/tubes/devices: none present    There is no evidence of free intraperitoneal air beneath the hemidiaphragms.  Nonspecific bowel gas pattern is present, with no abnormal air-fluid levels or findings to suggest mechanical bowel obstruction.  No intra-abdominal mass effect is evident.    The visualized lung bases and cardiac contour are unremarkable. Included osseous structures are without acute abnormality.    IMPRESSION  No acute intra-abdominal abnormality.    Electronically signed by: Izaiah Figueroa  Date:    09/25/2023  Time:    14:38      Ryan Soriano MD  Department of Hospital Medicine   [facility]   09/26/2023

## 2023-09-26 NOTE — PROGRESS NOTES
"Gastroenterology Progress Note    Subjective/Interval History:  Tolerated some broth last night.  She has her medications at the bedside and he is hesitant to take them inferior that it will cause abdominal discomfort.  Tolerating water and ice chips today but fearful to eat.  Abdominal pain has improved.  States that she is in the longer getting "sweats" when having PO intake as she typically does during a flare.  Still no BM.     Also of note, she is not taking antiemetics at all.     K/Mag low.  Being replaced by primary.     She previously reported that gastric pacemaker work-up was underway.  D/W patient today.  There are considerations by her primary GI to [facility], but that has not been done yet.     Vital Signs:  /76 (BP Location: Right arm, Patient Position: Lying)   Pulse 65   Temp 98.1 °F (36.7 °C) (Oral)   Resp 18   Ht 5' 8" (1.727 m)   Wt 84 kg (185 lb 3 oz)   LMP  (LMP Unknown)   SpO2 (!) 94%   Breastfeeding No   BMI 28.16 kg/m²   Body mass index is 28.16 kg/m².    Physical Exam:  Physical Exam  Constitutional:       General: She is not in acute distress.     Appearance: She is obese. She is not ill-appearing.   HENT:      Head: Normocephalic and atraumatic.   Eyes:      General: No scleral icterus.     Extraocular Movements: Extraocular movements intact.   Cardiovascular:      Rate and Rhythm: Normal rate and regular rhythm.   Pulmonary:      Effort: Pulmonary effort is normal. No respiratory distress.   Abdominal:      General: Bowel sounds are normal. There is no distension.      Palpations: Abdomen is soft. There is no mass.      Tenderness: There is no abdominal tenderness. There is no guarding or rebound.   Musculoskeletal:         General: Normal range of motion.      Right lower leg: No edema.      Left lower leg: No edema.   Skin:     General: Skin is warm and dry.   Neurological:      Mental Status: She is alert and oriented to person, place, and time. "   Psychiatric:         Mood and Affect: Mood and affect normal.     Labs:  Recent Results (from the past 48 hour(s))   POCT glucose    Collection Time: 09/24/23 11:22 AM   Result Value Ref Range    POCT Glucose 129 (H) 70 - 110 mg/dL   POCT glucose    Collection Time: 09/24/23  5:00 PM   Result Value Ref Range    POCT Glucose 101 70 - 110 mg/dL   POCT glucose    Collection Time: 09/24/23  8:28 PM   Result Value Ref Range    POCT Glucose 98 70 - 110 mg/dL   Basic Metabolic Panel    Collection Time: 09/25/23  5:25 AM   Result Value Ref Range    Sodium Level 141 136 - 145 mmol/L    Potassium Level 3.6 3.5 - 5.1 mmol/L    Chloride 106 98 - 107 mmol/L    Carbon Dioxide 25 22 - 29 mmol/L    Glucose Level 102 (H) 74 - 100 mg/dL    Blood Urea Nitrogen 12.8 7.0 - 18.7 mg/dL    Creatinine 0.63 0.55 - 1.02 mg/dL    BUN/Creatinine Ratio 20     Calcium Level Total 8.5 8.4 - 10.2 mg/dL    Anion Gap 10.0 mEq/L    eGFR >60 mls/min/1.73/m2   POCT glucose    Collection Time: 09/25/23  5:45 AM   Result Value Ref Range    POCT Glucose 96 70 - 110 mg/dL   POCT glucose    Collection Time: 09/25/23 11:13 AM   Result Value Ref Range    POCT Glucose 118 (H) 70 - 110 mg/dL   Urinalysis, Reflex to Urine Culture    Collection Time: 09/25/23 11:43 AM    Specimen: Urine   Result Value Ref Range    Color, UA Yellow Yellow, Light-Yellow, Dark Yellow, Gris, Straw    Appearance, UA Clear Clear    Specific Gravity, UA 1.022 1.005 - 1.030    pH, UA 5.5 5.0 - 8.5    Protein, UA Negative Negative    Glucose, UA 2+ (A) Negative, Normal    Ketones, UA 4+ (A) Negative    Blood, UA Negative Negative    Bilirubin, UA Negative Negative    Urobilinogen, UA 1.0 0.2, 1.0, Normal    Nitrites, UA Negative Negative    Leukocyte Esterase, UA Negative Negative   Urinalysis, Microscopic    Collection Time: 09/25/23 11:43 AM   Result Value Ref Range    RBC, UA None Seen None Seen, 0-2, 3-5, 0-5 /HPF    WBC, UA <5 <=5 /HPF    Squamous Epithelial Cells, UA 0-4 0-4, None  Seen /HPF    Bacteria, UA Rare None Seen, Rare, Occasional /HPF   POCT glucose    Collection Time: 09/25/23  4:18 PM   Result Value Ref Range    POCT Glucose 105 70 - 110 mg/dL   POCT glucose    Collection Time: 09/25/23  7:45 PM   Result Value Ref Range    POCT Glucose 118 (H) 70 - 110 mg/dL   Comprehensive Metabolic Panel    Collection Time: 09/26/23  6:11 AM   Result Value Ref Range    Sodium Level 143 136 - 145 mmol/L    Potassium Level 3.3 (L) 3.5 - 5.1 mmol/L    Chloride 109 (H) 98 - 107 mmol/L    Carbon Dioxide 25 22 - 29 mmol/L    Glucose Level 114 (H) 74 - 100 mg/dL    Blood Urea Nitrogen 9.0 7.0 - 18.7 mg/dL    Creatinine 0.58 0.55 - 1.02 mg/dL    Calcium Level Total 8.3 (L) 8.4 - 10.2 mg/dL    Protein Total 5.4 (L) 6.4 - 8.3 gm/dL    Albumin Level 3.5 3.5 - 5.0 g/dL    Globulin 1.9 (L) 2.4 - 3.5 gm/dL    Albumin/Globulin Ratio 1.8 1.1 - 2.0 ratio    Bilirubin Total 0.9 <=1.5 mg/dL    Alkaline Phosphatase 36 (L) 40 - 150 unit/L    Alanine Aminotransferase 15 0 - 55 unit/L    Aspartate Aminotransferase 17 5 - 34 unit/L    eGFR >60 mls/min/1.73/m2   Magnesium    Collection Time: 09/26/23  6:11 AM   Result Value Ref Range    Magnesium Level 1.50 (L) 1.60 - 2.60 mg/dL       Imaging:  X-Ray Abdomen Flat And Erect    Result Date: 9/25/2023  EXAMINATION XR ABDOMEN FLAT AND ERECT CLINICAL HISTORY abdominal pain, n/v, constipation; TECHNIQUE A total of 3 images submitted of the abdomen. COMPARISON 18 May 2023 FINDINGS Lines/tubes/devices: none present There is no evidence of free intraperitoneal air beneath the hemidiaphragms.  Nonspecific bowel gas pattern is present, with no abnormal air-fluid levels or findings to suggest mechanical bowel obstruction.  No intra-abdominal mass effect is evident. The visualized lung bases and cardiac contour are unremarkable. Included osseous structures are without acute abnormality. IMPRESSION No acute intra-abdominal abnormality. Electronically signed by: Izaiah Figueroa  Date:    09/25/2023 Time:    14:38      Assessment/Plan:  43 yo CF known to Dr. Macias in the inpatient setting only (primary GI is Dr. Yang).  Patient with a pmhx of polysubstance abuse on Suboxone, C diff colitis, cholecystectomy, DM II, and gastroparesis.  Admitted with worsening abdominal pain, nausea, and inability to tolerate PO.     Gastroparesis in setting of chronic suboxone  18% retention at 240 min is considered moderate  - No plans for repeat endoscopy at this time as she was just scoped as outpatient. Will try to obtain those records.   - Continue reglan 10 mg QAC/QHS.   - Continue symptomatic treatment with prn antiemetics: zofran and benadryl.   - Cannot use erythromycin due to severe DDI.  - ADAT with Dietary modification:  Blenderized food, high caloric liquids.  Encouraged her to try the liquids today.      2.  Chronic constipation - likely exacerbating above  Possible diffuse hypomotility of GI tract.   XR abd 9/25:  No abnormal air-fluid levels.  Nonspecific bowel gas pattern.  No acute abnormality.  - Needs bowel regimen.  Would benefit from motegrity but nonformulary here (she will d/w Dr. Yang outpatient). Reufses lactulose.  Linzess 145 reportedly caused diarrhea (71 non formulary). Will give enema now and started on miralax BID.        Nubia Mathias PA-C

## 2023-09-26 NOTE — PROGRESS NOTES
"[facility] Hospital Medicine Progress Note        Chief Complaint: Inpatient Follow-up for     HPI:   42 yr old female with PMH of HTN, DM, hypothyroidism and diabetic gastroparesis. Gastric empty study performed 6/16/23 confirmed abnormal gastric emptying. Reports 3 day history of persistent nausea. Denies vomiting only because she has kept herself NPO to avoid. When given fluids in the ED she vomited them. Also for 3 days she reports frequent episodes of experiencing generalized "jerky" movements accompanied by extreme diaphoresis, chest pain and SOB. Symptoms last 2-3 minutes and resolve. Glucose during these episodes reveal no hyper or hypoglycemia. Reports chronic constipation. Recently started on Amitizia. Was refusing Reglan but was counselled regarding importance of taking it in order to improve symptoms of gastroparesis. Continues to have persistent symptoms of abdominal pain, nausea & chills on swallowing food/medications. GI consulted.    Interval Hx:   Today, Mrs Berman continued to endorse abdominal pain, nausea, chills upon swallowing food/water/medications.  Continues to not be able to eat.  GI consulted; will follow recommendations.  Continues to be on Reglan.  Will consider starting bowel regimen.    Case was discussed with patient's nurse on the floor.    Objective/physical exam:  General:  laying in bed, seems uncomfortable  Chest: Clear to auscultation bilaterally anteriorly  Heart: RRR, +S1, S2, no appreciable murmur  Abdomen: Soft, nontender, BS + x4  MSK: Warm, no lower extremity edema, no clubbing or cyanosis  Neurologic: Alert and oriented x4, Cranial nerve II-XII intact, Strength 5/5 in all 4 extremities    VITAL SIGNS: 24 HRS MIN & MAX LAST   Temp  Min: 98 °F (36.7 °C)  Max: 98.5 °F (36.9 °C) 98.5 °F (36.9 °C)   BP  Min: 124/72  Max: 145/74 124/72   Pulse  Min: 55  Max: 65  (!) 59   Resp  Min: 18  Max: 20 18   SpO2  Min: 94 %  Max: 98 % 95 %     I " reviewed the labs below:  Recent Labs   Lab 09/23/23  0848 09/24/23  0444   WBC 6.49 6.72   RBC 5.03 4.13*   HGB 14.3 11.7*   HCT 42.3 34.7*   MCV 84.1 84.0   MCH 28.4 28.3   MCHC 33.8 33.7   RDW 13.3 13.2    210   MPV 9.8 9.6       Recent Labs   Lab 09/23/23  0848 09/24/23  0444 09/25/23  0525    138 141   K 3.7 3.4* 3.6   CO2 27 24 25   BUN 15.8 18.8* 12.8   CREATININE 0.72 0.63 0.63   CALCIUM 10.0 8.6 8.5   MG 2.00  --   --    ALBUMIN 5.0 3.7  --    ALKPHOS 52 37*  --    ALT 24 18  --    AST 25 20  --    BILITOT 1.2 0.9  --        Assessment/Plan:  Intractable N/V 2/2 diabetic gastroparesis vs  Subutex  DM type 2-->  A1c 6.7 on 9/23  Severe anxiety  Chronic constipation -on Amitiza  Ovarian cyst    Patient continues to be admitted for close monitoring  Reports persistent abdominal pain, nausea, chills on swallowing food/water/medications  GI consulted for persistent symptoms; will follow recommendations  On Clear Liquids  Currently on IV NS at 100 cc/hour  NO IV MEDS  NO OPIOIDS/NARCOTICS  Patient is being under process for gastric pacemaker for her gastroparesis  Due for outpatient Hysterectomy & Salpingo-oophorectomy with OB/GYN on 09/28  Monitor accuchecks with SS  Patient continued on buprenorphine 8 mg t.i.d., levothyroxine 150 mcg, losartan 50 mg daily, Reglan 10 mg q.i.d., Protonix 40 mg daily  ISS LD ordered  Continue Xanax PRN  Continue monitoring patient's symptoms    VTE prophylaxis:  Lovenox 40 subQ daily    Patient condition:  Stable    Anticipated discharge and Disposition:   Pending GI recs      All diagnosis and differential diagnosis have been reviewed; assessment and plan has been documented; I have personally reviewed the labs and test results that are presently available; I have reviewed the patients medication list; I have reviewed the consulting providers response and recommendations. I have reviewed or attempted to review medical records based upon their availability    All of  the patient's questions have been  addressed and answered. Patient's is agreeable to the above stated plan. I will continue to monitor closely and make adjustments to medical management as needed.  _____________________________________________________________________    Radiology:   I have personally reviewed the images and agree with radiologist report  X-Ray Abdomen Flat And Erect  EXAMINATION  XR ABDOMEN FLAT AND ERECT    CLINICAL HISTORY  abdominal pain, n/v, constipation;    TECHNIQUE  A total of 3 images submitted of the abdomen.    COMPARISON  18 May 2023    FINDINGS  Lines/tubes/devices: none present    There is no evidence of free intraperitoneal air beneath the hemidiaphragms.  Nonspecific bowel gas pattern is present, with no abnormal air-fluid levels or findings to suggest mechanical bowel obstruction.  No intra-abdominal mass effect is evident.    The visualized lung bases and cardiac contour are unremarkable. Included osseous structures are without acute abnormality.    IMPRESSION  No acute intra-abdominal abnormality.    Electronically signed by: Izaiah Figueroa  Date:    09/25/2023  Time:    14:38      Ryan Soriano MD  Department of Hospital Medicine   [facility]   09/25/2023

## 2023-09-26 NOTE — PROGRESS NOTES
"Gastroenterology Progress Note    Subjective/Interval History:  Tolerated some broth last night.  She has her medications at the bedside and he is hesitant to take them inferior that it will cause abdominal discomfort.  Tolerating water and ice chips today but fearful to eat.  Abdominal pain has improved.  States that she is in the longer getting "sweats" when having PO intake as she typically does during a flare.  Still no BM.     K/Mag low.  Being replaced by primary.     She previously reported that gastric pacemaker work-up was underway.  D/W patient today.  There are considerations by her primary GI to refer her to [facility], but that has not been done yet.     Vital Signs:  /76 (BP Location: Right arm, Patient Position: Lying)   Pulse 65   Temp 98.1 °F (36.7 °C) (Oral)   Resp 18   Ht 5' 8" (1.727 m)   Wt 84 kg (185 lb 3 oz)   LMP  (LMP Unknown)   SpO2 (!) 94%   Breastfeeding No   BMI 28.16 kg/m²   Body mass index is 28.16 kg/m².    Physical Exam:  Physical Exam  Constitutional:       General: She is not in acute distress.     Appearance: She is obese. She is not ill-appearing.   HENT:      Head: Normocephalic and atraumatic.   Eyes:      General: No scleral icterus.     Extraocular Movements: Extraocular movements intact.   Cardiovascular:      Rate and Rhythm: Normal rate and regular rhythm.   Pulmonary:      Effort: Pulmonary effort is normal. No respiratory distress.   Abdominal:      General: Bowel sounds are normal. There is no distension.      Palpations: Abdomen is soft. There is no mass.      Tenderness: There is no abdominal tenderness. There is no guarding or rebound.   Musculoskeletal:         General: Normal range of motion.      Right lower leg: No edema.      Left lower leg: No edema.   Skin:     General: Skin is warm and dry.   Neurological:      Mental Status: She is alert and oriented to person, place, and time.   Psychiatric:         Mood and Affect: Mood and affect normal. "     Labs:  Recent Results (from the past 48 hour(s))   POCT glucose    Collection Time: 09/24/23 11:22 AM   Result Value Ref Range    POCT Glucose 129 (H) 70 - 110 mg/dL   POCT glucose    Collection Time: 09/24/23  5:00 PM   Result Value Ref Range    POCT Glucose 101 70 - 110 mg/dL   POCT glucose    Collection Time: 09/24/23  8:28 PM   Result Value Ref Range    POCT Glucose 98 70 - 110 mg/dL   Basic Metabolic Panel    Collection Time: 09/25/23  5:25 AM   Result Value Ref Range    Sodium Level 141 136 - 145 mmol/L    Potassium Level 3.6 3.5 - 5.1 mmol/L    Chloride 106 98 - 107 mmol/L    Carbon Dioxide 25 22 - 29 mmol/L    Glucose Level 102 (H) 74 - 100 mg/dL    Blood Urea Nitrogen 12.8 7.0 - 18.7 mg/dL    Creatinine 0.63 0.55 - 1.02 mg/dL    BUN/Creatinine Ratio 20     Calcium Level Total 8.5 8.4 - 10.2 mg/dL    Anion Gap 10.0 mEq/L    eGFR >60 mls/min/1.73/m2   POCT glucose    Collection Time: 09/25/23  5:45 AM   Result Value Ref Range    POCT Glucose 96 70 - 110 mg/dL   POCT glucose    Collection Time: 09/25/23 11:13 AM   Result Value Ref Range    POCT Glucose 118 (H) 70 - 110 mg/dL   Urinalysis, Reflex to Urine Culture    Collection Time: 09/25/23 11:43 AM    Specimen: Urine   Result Value Ref Range    Color, UA Yellow Yellow, Light-Yellow, Dark Yellow, Gris, Straw    Appearance, UA Clear Clear    Specific Gravity, UA 1.022 1.005 - 1.030    pH, UA 5.5 5.0 - 8.5    Protein, UA Negative Negative    Glucose, UA 2+ (A) Negative, Normal    Ketones, UA 4+ (A) Negative    Blood, UA Negative Negative    Bilirubin, UA Negative Negative    Urobilinogen, UA 1.0 0.2, 1.0, Normal    Nitrites, UA Negative Negative    Leukocyte Esterase, UA Negative Negative   Urinalysis, Microscopic    Collection Time: 09/25/23 11:43 AM   Result Value Ref Range    RBC, UA None Seen None Seen, 0-2, 3-5, 0-5 /HPF    WBC, UA <5 <=5 /HPF    Squamous Epithelial Cells, UA 0-4 0-4, None Seen /HPF    Bacteria, UA Rare None Seen, Rare, Occasional /HPF    POCT glucose    Collection Time: 09/25/23  4:18 PM   Result Value Ref Range    POCT Glucose 105 70 - 110 mg/dL   POCT glucose    Collection Time: 09/25/23  7:45 PM   Result Value Ref Range    POCT Glucose 118 (H) 70 - 110 mg/dL   Comprehensive Metabolic Panel    Collection Time: 09/26/23  6:11 AM   Result Value Ref Range    Sodium Level 143 136 - 145 mmol/L    Potassium Level 3.3 (L) 3.5 - 5.1 mmol/L    Chloride 109 (H) 98 - 107 mmol/L    Carbon Dioxide 25 22 - 29 mmol/L    Glucose Level 114 (H) 74 - 100 mg/dL    Blood Urea Nitrogen 9.0 7.0 - 18.7 mg/dL    Creatinine 0.58 0.55 - 1.02 mg/dL    Calcium Level Total 8.3 (L) 8.4 - 10.2 mg/dL    Protein Total 5.4 (L) 6.4 - 8.3 gm/dL    Albumin Level 3.5 3.5 - 5.0 g/dL    Globulin 1.9 (L) 2.4 - 3.5 gm/dL    Albumin/Globulin Ratio 1.8 1.1 - 2.0 ratio    Bilirubin Total 0.9 <=1.5 mg/dL    Alkaline Phosphatase 36 (L) 40 - 150 unit/L    Alanine Aminotransferase 15 0 - 55 unit/L    Aspartate Aminotransferase 17 5 - 34 unit/L    eGFR >60 mls/min/1.73/m2   Magnesium    Collection Time: 09/26/23  6:11 AM   Result Value Ref Range    Magnesium Level 1.50 (L) 1.60 - 2.60 mg/dL       Imaging:  X-Ray Abdomen Flat And Erect    Result Date: 9/25/2023  EXAMINATION XR ABDOMEN FLAT AND ERECT CLINICAL HISTORY abdominal pain, n/v, constipation; TECHNIQUE A total of 3 images submitted of the abdomen. COMPARISON 18 May 2023 FINDINGS Lines/tubes/devices: none present There is no evidence of free intraperitoneal air beneath the hemidiaphragms.  Nonspecific bowel gas pattern is present, with no abnormal air-fluid levels or findings to suggest mechanical bowel obstruction.  No intra-abdominal mass effect is evident. The visualized lung bases and cardiac contour are unremarkable. Included osseous structures are without acute abnormality. IMPRESSION No acute intra-abdominal abnormality. Electronically signed by: Izaiah Figueroa Date:    09/25/2023 Time:    14:38      Assessment/Plan:  43 yo CF known  to Dr. Macias in the inpatient setting only (primary GI is Dr. Yang).  Patient with a pmhx of polysubstance abuse on Suboxone, C diff colitis, cholecystectomy, DM II, and gastroparesis.  Admitted with worsening abdominal pain, nausea, and inability to tolerate PO.     Gastroparesis in setting of chronic suboxone  18% retention at 240 min is considered moderate  - No plans for repeat endoscopy at this time as she was just scoped as outpatient. Will try to obtain those records.   - Continue reglan 10 mg QAC/QHS.   - Continue symptomatic treatment with prn antiemetics: zofran and benadryl.   - Cannot use erythromycin due to severe DDI.  - ADAT with Dietary modification:  Blenderized food, high caloric liquids.  Encouraged her to try the liquids today.      2.  Chronic constipation - likely exacerbating above  Possible diffuse hypomotility of GI tract.   XR abd 9/25:  No abnormal air-fluid levels.  Nonspecific bowel gas pattern.  No acute abnormality.  - Needs bowel regimen.  Would benefit from motegrity but nonformulary here (she will d/w Dr. Yang outpatient). Reufses lactulose.  Linzess 145 reportedly caused diarrhea (71 non formulary). Will give enema now and started on miralax BID.        Nubia Mathias PA-C

## 2023-09-27 VITALS
WEIGHT: 185.19 LBS | SYSTOLIC BLOOD PRESSURE: 173 MMHG | HEIGHT: 68 IN | BODY MASS INDEX: 28.07 KG/M2 | HEART RATE: 55 BPM | DIASTOLIC BLOOD PRESSURE: 95 MMHG | OXYGEN SATURATION: 97 % | RESPIRATION RATE: 18 BRPM | TEMPERATURE: 99 F

## 2023-09-27 LAB
ANION GAP SERPL CALC-SCNC: 6 MEQ/L
BUN SERPL-MCNC: 5.1 MG/DL (ref 7–18.7)
CALCIUM SERPL-MCNC: 8.5 MG/DL (ref 8.4–10.2)
CHLORIDE SERPL-SCNC: 108 MMOL/L (ref 98–107)
CO2 SERPL-SCNC: 28 MMOL/L (ref 22–29)
CREAT SERPL-MCNC: 0.59 MG/DL (ref 0.55–1.02)
CREAT/UREA NIT SERPL: 9
GFR SERPLBLD CREATININE-BSD FMLA CKD-EPI: >60 MLS/MIN/1.73/M2
GLUCOSE SERPL-MCNC: 126 MG/DL (ref 74–100)
MAGNESIUM SERPL-MCNC: 1.8 MG/DL (ref 1.6–2.6)
POCT GLUCOSE: 125 MG/DL (ref 70–110)
POTASSIUM SERPL-SCNC: 3.3 MMOL/L (ref 3.5–5.1)
SODIUM SERPL-SCNC: 142 MMOL/L (ref 136–145)

## 2023-09-27 PROCEDURE — 25000003 PHARM REV CODE 250: Performed by: STUDENT IN AN ORGANIZED HEALTH CARE EDUCATION/TRAINING PROGRAM

## 2023-09-27 PROCEDURE — 25000003 PHARM REV CODE 250: Performed by: PHYSICIAN ASSISTANT

## 2023-09-27 PROCEDURE — 25000003 PHARM REV CODE 250: Performed by: INTERNAL MEDICINE

## 2023-09-27 PROCEDURE — 83735 ASSAY OF MAGNESIUM: CPT | Performed by: STUDENT IN AN ORGANIZED HEALTH CARE EDUCATION/TRAINING PROGRAM

## 2023-09-27 PROCEDURE — 25000003 PHARM REV CODE 250: Performed by: NURSE PRACTITIONER

## 2023-09-27 PROCEDURE — 80048 BASIC METABOLIC PNL TOTAL CA: CPT | Performed by: STUDENT IN AN ORGANIZED HEALTH CARE EDUCATION/TRAINING PROGRAM

## 2023-09-27 RX ORDER — POTASSIUM CHLORIDE 20 MEQ/1
40 TABLET, EXTENDED RELEASE ORAL 2 TIMES DAILY
Qty: 12 TABLET | Refills: 0 | Status: SHIPPED | OUTPATIENT
Start: 2023-09-27 | End: 2023-09-30

## 2023-09-27 RX ORDER — METOCLOPRAMIDE HYDROCHLORIDE 10 MG/1
10 TABLET ORAL 4 TIMES DAILY
Qty: 120 TABLET | Refills: 1 | Status: SHIPPED | OUTPATIENT
Start: 2023-09-27

## 2023-09-27 RX ORDER — POTASSIUM CHLORIDE 20 MEQ/1
40 TABLET, EXTENDED RELEASE ORAL 2 TIMES DAILY
Status: DISCONTINUED | OUTPATIENT
Start: 2023-09-27 | End: 2023-09-27 | Stop reason: HOSPADM

## 2023-09-27 RX ORDER — ONDANSETRON 4 MG/1
4 TABLET, ORALLY DISINTEGRATING ORAL EVERY 8 HOURS PRN
Qty: 30 TABLET | Refills: 1 | Status: SHIPPED | OUTPATIENT
Start: 2023-09-27

## 2023-09-27 RX ORDER — POLYETHYLENE GLYCOL 3350 17 G/17G
17 POWDER, FOR SOLUTION ORAL 2 TIMES DAILY
Status: DISCONTINUED | OUTPATIENT
Start: 2023-09-27 | End: 2023-09-27 | Stop reason: HOSPADM

## 2023-09-27 RX ORDER — POLYETHYLENE GLYCOL 3350 17 G/17G
17 POWDER, FOR SOLUTION ORAL 2 TIMES DAILY
Qty: 30 EACH | Refills: 1 | Status: SHIPPED | OUTPATIENT
Start: 2023-09-27

## 2023-09-27 RX ORDER — LEVOTHYROXINE SODIUM 150 UG/1
150 TABLET ORAL
Qty: 30 TABLET | Refills: 11 | Status: SHIPPED | OUTPATIENT
Start: 2023-09-28 | End: 2024-01-23 | Stop reason: ALTCHOICE

## 2023-09-27 RX ADMIN — LEVOTHYROXINE SODIUM 150 MCG: 150 TABLET ORAL at 06:09

## 2023-09-27 RX ADMIN — METOCLOPRAMIDE 10 MG: 10 TABLET ORAL at 08:09

## 2023-09-27 RX ADMIN — POTASSIUM CHLORIDE 40 MEQ: 1500 TABLET, EXTENDED RELEASE ORAL at 10:09

## 2023-09-27 RX ADMIN — BUPRENORPHINE 8 MG: 8 TABLET SUBLINGUAL at 08:09

## 2023-09-27 RX ADMIN — ONDANSETRON 4 MG: 4 TABLET, ORALLY DISINTEGRATING ORAL at 08:09

## 2023-09-27 RX ADMIN — METOCLOPRAMIDE 10 MG: 10 TABLET ORAL at 12:09

## 2023-09-27 RX ADMIN — PANTOPRAZOLE SODIUM 40 MG: 40 TABLET, DELAYED RELEASE ORAL at 06:09

## 2023-09-27 RX ADMIN — SODIUM CHLORIDE: 9 INJECTION, SOLUTION INTRAVENOUS at 06:09

## 2023-09-27 RX ADMIN — ALPRAZOLAM 0.25 MG: 0.25 TABLET ORAL at 08:09

## 2023-09-27 RX ADMIN — LOSARTAN POTASSIUM 50 MG: 50 TABLET, FILM COATED ORAL at 08:09

## 2023-09-27 RX ADMIN — BUPRENORPHINE 8 MG: 8 TABLET SUBLINGUAL at 03:09

## 2023-09-27 RX ADMIN — NALOXEGOL OXALATE 25 MG: 25 TABLET, FILM COATED ORAL at 08:09

## 2023-09-27 NOTE — PROGRESS NOTES
"GYN NOTE    Pt was to see me in clinic today for preop appt for Thursday's planned robotic hysterectomy, bilateral salpingectomy, lysis of adhesions, removal of TOA. Pt asked for me to see her inpatient.     Pt admitted now for gastroparesis.    Pt eager for surgery. Having pelvic pain. Wonders if related to current GI issues    BP (!) 142/84 (BP Location: Right arm, Patient Position: Lying)   Pulse (!) 54   Temp 98.2 °F (36.8 °C)   Resp 17   Ht 5' 8" (1.727 m)   Wt 84 kg (185 lb 3 oz)   LMP  (LMP Unknown)   SpO2 95%   Breastfeeding No   BMI 28.16 kg/m²     NAD  Well perfused  Nonlabored breathing    A/P:  Plan [facility] for when the issues causing admission have resolved.     Dispo per primary team    Please call or epic message with any questions.     Osiel Swann MD  09/26/2023 7:04 PM    "

## 2023-09-28 ENCOUNTER — PATIENT OUTREACH (OUTPATIENT)
Dept: ADMINISTRATIVE | Facility: CLINIC | Age: 43
End: 2023-09-28
Payer: MEDICAID

## 2023-09-28 NOTE — PROGRESS NOTES
C3 nurse spoke with Fred Berman for a TCC post hospital discharge follow up call. The patient does not have a scheduled HOSFU appointment with Juan Del Valle DO within 5-7 days post hospital discharge date 9/27/23. C3 nurse was unable to schedule HOSFU appointment in [facility].  Unable to route message to PCP.  Patient stated she will call to schedule.  Patient does have an appointment with Agustin Yang MD (Gastroenterology) on 10/11/2023; @1:15.

## 2023-09-28 NOTE — PROGRESS NOTES
C3 nurse attempted to contact Fred Berman for a TCC post hospital discharge follow up call. The patient answered but stated she was at the doctor with her child and requested a call back later. The patient does not have a scheduled [facility] appointment, and the pt does not have an [facility] PCP.  The patient does have a f/u with Agustin Yang MD (Gastroenterology) on 10/11/2023; @1:15.

## 2023-10-02 NOTE — PROGRESS NOTES
"[facility] INTERNAL MEDICINE  OUTPATIENT OFFICE VISIT NOTE    SUBJECTIVE:      HPI: Fred Berman is a 42 y.o. yo female w/ PMH of DM II, HTN, gastroparesis, hypothyroidism, who presents today to establish care. She was hospitalized on 9/23/2023 for gastroparesis and GI team recommended continuing reglan 10 mg QAC/QHS at that time, no plan to repeat EGD. At this time, she has the following complaints: (1) Intermittent nausea with occasional vomiting. She states that seroquel (prescribed by psychiatry) initially helped with her symptoms but caused weight gain d/t increased appetite. She also states that her current GI provider recommended pacemaker if N/V persist. (2) Intermittent swelling of bilateral eyes with sinus congestion that are usually worsened with humidity. Other associating symptom include tearing of eyes. (3) Bilateral hip pain of 6 out of 10 that is intermittent and dull; pain is aggravated by movements and partially relieved with rest. No recent falls or injuries reported.     Social HX: 1 PPD x 10+ years, quit in April 2023; denies alcohol intake; distant polysubstance abuse in her 20's    ROS:  (+) Chronic hip pain  (-) Chest pain, palpitations, SOB, fever, night sweats, chills, diarrhea, constipation.    OBJECTIVE:     Vital signs:   /80 (BP Location: Left arm, Patient Position: Sitting, BP Method: Medium (Manual))   Pulse (!) 59   Temp 98 °F (36.7 °C) (Oral)   Resp 18   Ht 5' 8" (1.727 m)   Wt 89.4 kg (197 lb)   SpO2 98%   BMI 29.95 kg/m²      Physical Examination:  General: Obese w/o distress  HEENT: NC/AT; PERRL; nasal and oral mucosa moist and clear  Pulm: CTA bilaterally, normal work of breathing on room air  CV: S1, S2 w/o murmurs or gallops; no edema noted  GI: Soft with normal bowel sounds in all quadrants, no masses on palpation  MSK: Full ROM of all extremities; multiple tender points  Neuro: AAOx4; motor/sensory function intact  Psych: Cooperative; appropriate mood and " affect    Significant findings:  6/12/2023 - U/S thyroid: 2.6 cm lobulated hypoechoic mass inferior and lateral to the left lobe of the thyroid  6/16/2023 - NM gastric emptying test: abnormal gastric emptying suggesting gastroparesis  5/30/2023 - CT ab/pelvis w/ contrast: fatty liver, nonspecific complex cystic structure along the inferior aspect of left ovary, small amount of nonspecific free fluid in the pelvis, nonspecific heterogeneous nodule in the left adrenal gland  8/29/2023 - MRI ab/pelvis: Small left adrenal nodule (1cm) is stable and most compatible with adenoma    ASSESSMENT & PLAN:     Mouth sore and skin tag on tongue  -Patient states these issues started a few weeks ago and her dentist recommended an ENT referral to rule out oral cancer  -ENT referral placed on 10/3/2023    DM II  Gastroparesis  -A1c 5.9 (7/3/2023), A1c 6.7 (10/3/2023)  -Patient states SGLT-2 and GLP-1 made her very ill in the past; restarted lantus at 20 units QHS on 10/3/2023  -GI team recommended continuing Reglan at this time; ongoing discussion with patient regarding risks and benefits of long-term Reglan administration d/t fear of tardive dyskinesia    HTN  -Normotensive at this time  -Continue losartan 50mg daily    Chronic pain syndrome  -Has been having pain in shoulders, hips, and knees for past 5 years  -Currently F/U with suboxone clinic  -Referral to outpatient physical therapy placed 8/10/2023    Hypothyroidism  -TSH 13.531; T4 0.78 (7/3/2023); TSH 1.378 (9/23/2023), T4 0.84 (8/10/2023)  -Continue levothyroxine 150 mcg before breakfast    Adrenal lesions  -CT ab/pelvis 5/30/203: nonspecific heterogeneous nodule in the left adrenal gland  -MRI ab/pelvis 8/29/2023: Small left adrenal nodule (1cm) is stable and most compatible with adenoma  -Will obtain cortisol level    Fatty liver   -CT ab/pelvis 5/30/2023: Hepatomegaly with fat infiltration  -Lipid panel 8/10/2023: , triglycerides 55, cholesterol  198    Generalized anxiety disorder   -Currently seeing an outside psychiatrist    Vitamin D deficiency   -Vitamin D level 22.6 (6/6/2023)  -Patient refuses to start daily vitamin D supplement at this time     Obesity  -Instructed patient on the importance of weight loss    Health Maintenance:  -HIV screening and hepatitis negative (6/8/2023)  -Currently seeing outside gynecologist  -Seeing GI specialist in [place]  -Vaccines:     -Tetanus, refuses    -COVID, refuses    Return to clinic in 3 months. F/U on Hip X-ray, tolerance to lantus, ENT F/U, serum cortisol, and recheck A1c next visit.     Juan Del Valle DO   [facility] Internal Medicine, PGY-II

## 2023-10-03 ENCOUNTER — OFFICE VISIT (OUTPATIENT)
Dept: INTERNAL MEDICINE | Facility: CLINIC | Age: 43
End: 2023-10-03
Payer: MEDICAID

## 2023-10-03 VITALS
WEIGHT: 197 LBS | HEART RATE: 59 BPM | HEIGHT: 68 IN | OXYGEN SATURATION: 98 % | RESPIRATION RATE: 18 BRPM | SYSTOLIC BLOOD PRESSURE: 132 MMHG | DIASTOLIC BLOOD PRESSURE: 80 MMHG | TEMPERATURE: 98 F | BODY MASS INDEX: 29.86 KG/M2

## 2023-10-03 DIAGNOSIS — F41.1 GENERALIZED ANXIETY DISORDER: ICD-10-CM

## 2023-10-03 DIAGNOSIS — K31.84 GASTROPARESIS: ICD-10-CM

## 2023-10-03 DIAGNOSIS — I10 HYPERTENSION, UNSPECIFIED TYPE: ICD-10-CM

## 2023-10-03 DIAGNOSIS — E11.8 CONTROLLED TYPE 2 DIABETES MELLITUS WITH COMPLICATION, WITH LONG-TERM CURRENT USE OF INSULIN: Primary | ICD-10-CM

## 2023-10-03 DIAGNOSIS — E27.9 LESION OF ADRENAL GLAND: ICD-10-CM

## 2023-10-03 DIAGNOSIS — E03.9 HYPOTHYROIDISM, UNSPECIFIED TYPE: ICD-10-CM

## 2023-10-03 DIAGNOSIS — Z79.4 CONTROLLED TYPE 2 DIABETES MELLITUS WITH COMPLICATION, WITH LONG-TERM CURRENT USE OF INSULIN: Primary | ICD-10-CM

## 2023-10-03 DIAGNOSIS — G89.4 CHRONIC PAIN SYNDROME: ICD-10-CM

## 2023-10-03 DIAGNOSIS — Z00.00 HEALTHCARE MAINTENANCE: ICD-10-CM

## 2023-10-03 DIAGNOSIS — K13.70 ORAL LESION: ICD-10-CM

## 2023-10-03 PROCEDURE — 99215 OFFICE O/P EST HI 40 MIN: CPT | Mod: PBBFAC | Performed by: STUDENT IN AN ORGANIZED HEALTH CARE EDUCATION/TRAINING PROGRAM

## 2023-10-03 RX ORDER — PRUCALOPRIDE 2 MG/1
1 TABLET, FILM COATED ORAL
COMMUNITY
Start: 2023-09-26

## 2023-10-03 RX ORDER — PEN NEEDLE, DIABETIC 30 GX3/16"
1 NEEDLE, DISPOSABLE MISCELLANEOUS NIGHTLY
Qty: 100 EACH | Refills: 5 | Status: SHIPPED | OUTPATIENT
Start: 2023-10-03 | End: 2024-01-23

## 2023-10-03 RX ORDER — INSULIN GLARGINE 100 [IU]/ML
20 INJECTION, SOLUTION SUBCUTANEOUS NIGHTLY
Qty: 18 ML | Refills: 3 | Status: SHIPPED | OUTPATIENT
Start: 2023-10-03 | End: 2024-01-23

## 2023-10-16 ENCOUNTER — PATIENT MESSAGE (OUTPATIENT)
Dept: ADMINISTRATIVE | Facility: HOSPITAL | Age: 43
End: 2023-10-16
Payer: MEDICAID

## 2023-10-17 ENCOUNTER — HOSPITAL ENCOUNTER (OUTPATIENT)
Dept: RADIOLOGY | Facility: HOSPITAL | Age: 43
Discharge: HOME | End: 2023-10-17
Attending: STUDENT IN AN ORGANIZED HEALTH CARE EDUCATION/TRAINING PROGRAM
Payer: MEDICAID

## 2023-10-17 ENCOUNTER — OFFICE VISIT (OUTPATIENT)
Dept: OTOLARYNGOLOGY | Facility: CLINIC | Age: 43
End: 2023-10-17
Payer: MEDICAID

## 2023-10-17 VITALS
BODY MASS INDEX: 31.25 KG/M2 | WEIGHT: 206.19 LBS | RESPIRATION RATE: 20 BRPM | HEART RATE: 61 BPM | SYSTOLIC BLOOD PRESSURE: 123 MMHG | HEIGHT: 68 IN | OXYGEN SATURATION: 99 % | TEMPERATURE: 98 F | DIASTOLIC BLOOD PRESSURE: 89 MMHG

## 2023-10-17 DIAGNOSIS — G89.4 CHRONIC PAIN SYNDROME: ICD-10-CM

## 2023-10-17 DIAGNOSIS — J34.2 NASAL SEPTAL DEVIATION: ICD-10-CM

## 2023-10-17 DIAGNOSIS — R09.81 NASAL CONGESTION: ICD-10-CM

## 2023-10-17 DIAGNOSIS — D10.1 FIBROMA OF TONGUE: Primary | ICD-10-CM

## 2023-10-17 DIAGNOSIS — R49.9 HOARSENESS OR CHANGING VOICE: ICD-10-CM

## 2023-10-17 DIAGNOSIS — K13.70 ORAL LESION: ICD-10-CM

## 2023-10-17 DIAGNOSIS — J30.89 NON-SEASONAL ALLERGIC RHINITIS, UNSPECIFIED TRIGGER: ICD-10-CM

## 2023-10-17 PROCEDURE — 99215 OFFICE O/P EST HI 40 MIN: CPT | Mod: PBBFAC,25 | Performed by: STUDENT IN AN ORGANIZED HEALTH CARE EDUCATION/TRAINING PROGRAM

## 2023-10-17 PROCEDURE — 31575 DIAGNOSTIC LARYNGOSCOPY: CPT | Mod: PBBFAC | Performed by: STUDENT IN AN ORGANIZED HEALTH CARE EDUCATION/TRAINING PROGRAM

## 2023-10-17 PROCEDURE — 73502 X-RAY EXAM HIP UNI 2-3 VIEWS: CPT | Mod: TC,RT

## 2023-10-17 PROCEDURE — 73502 X-RAY EXAM HIP UNI 2-3 VIEWS: CPT | Mod: TC,LT

## 2023-10-17 NOTE — PROGRESS NOTES
The scope used for the exam was:  Flexible scope ENF-P4  Serial Number:  1)    [id number]    []   2)    [id number]    []   3)    [id number]    []   4)    [id number]    []   5)    [id number]    [x]   6)    [id number]    []       The scope used for the exam was:  Rigid scope   Serial Number:  1)   [id number]    []   2)   [id number]    []   3)   [id number]    []   4)    3384   []   5)    0824   []   6)    5554   []     7)   7425    []   8)   2240    []   9)   1109    []

## 2023-10-17 NOTE — PROGRESS NOTES
[facility]  Otolaryngology Clinic Note    Fred Berman  Encounter Date: 10/17/2023  YOB: 1980  Physician: Lizbet New MD    Chief Complaint: tongue lesion    HPI: Fred Berman is a 42 y.o. female referred for a tongue lesion. She reports that she first noticed it about a year ago and says that it has slowly been enlarging since then. It has never bled. She doesn't think it hurts but does bite it occasionally which makes it sore. It's never been biopsied. She says that her dentist told her that he was not going to do any more work on her teeth until she got it checked out. She also feels like she has developed allergies over the past few months.  She endorses frequent tearing and redness of her eyes in feels like her nose is running frequently.  She also endorses nasal congestion.  She is been using Allegra and does not think it is helped any.  She has not tried Flonase.  She does use Afrin she thinks maybe once a month.  She was diagnosed with a sinus infection over the summer and got a few days of antibiotics.  She denies facial pain or pressure but does note that when she wakes up in the morning she feels like the outside of her nose is swollen over the bridge of her nose.  She also endorses facial numbness bilaterally in all 3 distributions which has been going on for some time.  She reports that her PCP is work this up with an MRI scan.  She is also been dealing with intermittent dysphagia.  She feels like things are not going down, both solids and liquids.  This does not happen with every meal but comes and goes.  She follows with Gastroenterology and has had EGDs and dilations in the past.  She reports that she has another 1 scheduled in November.  As far as her voice goes, she notes intermittent dysphonia where her voice gets roughed and then returns to normal.  It usually comes back to normal on its own.  She is on Protonix daily for gastritis that  "has been seen on prior scopes she reports.  As far as other medical problems she has diabetes, hypertension, gastroparesis, anxiety and depression.  For past surgical history she is had a cholecystectomy and .  She is had her tonsils removed but no other history of head and neck surgery.  She is a former smoker and quit 1 year ago.  She does not drink and denies any other drug use.      ROS:   General: Negative except per HPI  Skin: Denies rash, ulcer, or lesion.  Eyes: Denies vision changes or diplopia.  Ears: Negative except per HPI  Nose: Negative except per HPI  Throat/mouth: Negative except per HPI  Cardiovascular: Negative except per HPI  Respiratory: Negative except per HPI  Neck: Negative except per HPI  Endocrine: Negative except per HPI  Neurologic: Negative except per HPI    Other 10-point review of systems negative except per HPI      Review of patient's allergies indicates:   Allergen Reactions    Vancomycin analogues Rash       Past Medical History:   Diagnosis Date    Adrenal mass     Anxiety     Arthritis     Bradycardia     Depression     Diabetes mellitus, type 2     Gastroparesis     General anesthetics causing adverse effect in therapeutic use     "hard time waking up"    GERD (gastroesophageal reflux disease)     Hip pain     Hypertension     Menstrual cramps     Ovarian cyst     Palpitations     Pyosalpingitis     Thyroid disease        Past Surgical History:   Procedure Laterality Date    ABLATION OF VAGINAL TISSUE USING LASER       SECTION      CHOLECYSTECTOMY      TUBAL LIGATION         Social History     Socioeconomic History    Marital status: Single   Tobacco Use    Smoking status: Former     Types: Cigarettes    Smokeless tobacco: Never   Substance and Sexual Activity    Alcohol use: Not Currently    Drug use: Never    Sexual activity: Not Currently     Social Determinants of Health     Financial Resource Strain: High Risk (2023)    Overall Financial Resource Strain " (CARDIA)     Difficulty of Paying Living Expenses: Very hard   Food Insecurity: No Food Insecurity (9/25/2023)    Hunger Vital Sign     Worried About Running Out of Food in the Last Year: Never true     Ran Out of Food in the Last Year: Never true   Recent Concern: Food Insecurity - Food Insecurity Present (7/4/2023)    Hunger Vital Sign     Worried About Running Out of Food in the Last Year: Sometimes true     Ran Out of Food in the Last Year: Never true   Transportation Needs: No Transportation Needs (9/25/2023)    PRAPARE - Transportation     Lack of Transportation (Medical): No     Lack of Transportation (Non-Medical): No   Physical Activity: Sufficiently Active (7/4/2023)    Exercise Vital Sign     Days of Exercise per Week: 5 days     Minutes of Exercise per Session: 30 min   Stress: Stress Concern Present (7/4/2023)    [employer] [place] [employer] - Occupational Stress Questionnaire     Feeling of Stress : To some extent   Social Connections: Moderately Integrated (7/5/2023)    Social Connection and Isolation Panel [NHANES]     Frequency of Communication with Friends and Family: Twice a week     Frequency of Social Gatherings with Friends and Family: Once a week     Attends Synagogue Services: More than 4 times per year     Active Member of Clubs or Organizations: Yes     Attends Club or Organization Meetings: More than 4 times per year     Marital Status:    Housing Stability: Unknown (9/25/2023)    Housing Stability Vital Sign     Unable to Pay for Housing in the Last Year: No     Unstable Housing in the Last Year: No   Recent Concern: Housing Stability - High Risk (7/4/2023)    Housing Stability Vital Sign     Unable to Pay for Housing in the Last Year: Yes     Unstable Housing in the Last Year: No       History reviewed. No pertinent family history.    Outpatient Encounter Medications as of 10/17/2023   Medication Sig Dispense Refill    buprenorphine HCL (SUBUTEX) 8 mg Subl Place 8 mg  "under the tongue 3 (three) times daily.      calcium carbonate/vitamin D3 (CALCIUM 600 + D,3, ORAL) Take 1 tablet by mouth 2 (two) times a day.      insulin (LANTUS SOLOSTAR U-100 INSULIN) glargine 100 units/mL SubQ pen Inject 20 Units into the skin every evening. 18 mL 3    levothyroxine (SYNTHROID) 150 MCG tablet Take 1 tablet (150 mcg total) by mouth before breakfast. 30 tablet 11    losartan (COZAAR) 50 MG tablet Take 1 tablet (50 mg total) by mouth once daily. 90 tablet 3    lubiprostone (AMITIZA) 24 MCG Cap Take 24 mcg by mouth daily with breakfast.      MOTEGRITY 2 mg Tab Take 1 tablet by mouth.      naloxegoL (MOVANTIK) 25 mg tablet Take 25 mg by mouth once daily. 30 tablet 2    ondansetron (ZOFRAN-ODT) 4 MG TbDL Take 1 tablet (4 mg total) by mouth every 8 (eight) hours as needed (NAUSEA). 30 tablet 1    pantoprazole (PROTONIX) 40 MG tablet Take 40 mg by mouth every morning.      pen needle, diabetic (PEN NEEDLE) 31 gauge x 5/16" Ndle Inject 1 each into the skin every evening. 100 each 5    polyethylene glycol (GLYCOLAX) 17 gram PwPk Take 17 g by mouth 2 (two) times daily. 30 each 1    REGLAN 10 mg tablet Take 1 tablet (10 mg total) by mouth 4 (four) times daily. 120 tablet 1     No facility-administered encounter medications on file as of 10/17/2023.       Physical Exam:  Vitals:    10/17/23 0909   BP: 123/89   BP Location: Left arm   Patient Position: Sitting   BP Method: Large (Automatic)   Pulse: 61   Resp: 20   Temp: 98.1 °F (36.7 °C)   TempSrc: Oral   SpO2: 99%   Weight: 93.5 kg (206 lb 3.2 oz)   Height: 5' 8" (1.727 m)       Constitutional  General Appearance: well nourished, well-developed, AAO x3, NAD  HEENT  Eyes: PEERLA, EOMI, normal conjunctivae  Ears: Hearing well at conversation level   AD: auricle normal, EAC normal, TM intact, no PEGGY   AS: auricle normal, EAC normal, TM intact, no PEGGY  Nose: septum midline deviated to the left anteriorly, septal mucosa erythematous with some prominent vessels " on the left, mild inferior turbinate hypertrophy, no polyps, moist mucosa without erythema or blue hue, difficult to visualize further posteriorly thus nasal endoscopy was performed  OC/OP: dentition moderate, left lateral tongue with 8mm raised smooth lesion, soft, nontender, tongue/FOM- soft, no leukoplakia/ulcerations/ tenderness, tonsils absent  Nasopharynx, Hypopharynx, and Larynx:    Indirect: attempted, limited view due to patient intolerance  Neck: soft, non-tender, no palpable lymph nodes   Thyroid region- no nodules or goiter  Neuro: CN II - XII intact bilaterally except for decreased sensation across bilateral V1-V3  Cardiovascular: peripheral pulses palpable  Respiratory: non-labored respirations  Psychiatric: oriented to time, place and person, no depression, anxiety or agitation          Procedures:Flexible Fiberoptic Laryngoscopy/Nasopharyngoscopy via bilateral nares    Procedure in Detail: Informed consent was obtained from the patient after explanation of procedure, indications, risks and benefits. Flexible endoscopy was performed through the nasal passages. The nasal cavity, nasopharynx, oropharynx, hypopharynx and larynx were adequately visualized. The true vocal cords and arytenoids were examined during phonation and repose.    Anesthesia: Topical Neosynephrine / Tetracaine  Adverse Events: None  Resident Surgeon: Lizbet New, PGY-5   Blood loss: none  Condition: good    Findings:  NP/OP: right nasal cavity with prominent posterior septal spur extending into the middle meatus, no mucus/polyps in the middle meatus or sphenoethmoid recess no masses/lesions of NC, eustachian tube, fossa of Rosenmuller, no adenoid hypertrophy; left nasal cavity with mild anterior septal deviation, middle meatus and sphenoethemoid recess clear of any mucus or polyps  BOT/vallecula: no lingual hypertrophy, no masses/lesions, no secretions obscuring visualization  Piriform sinuses/post-cricoid: no masses/lesions, no  pooling of secretions  Epiglottis: lingual and laryngeal surfaces within normal limits  Arytenoids/FVFs: no masses/lesions, no edema, bilateral mobility  TVCs: bilateral cord mobility, no masses or lesions, thick mucus over surface of bilateral cords  No aspiration, no pooled secretions  No sign of malignancy      Pertinent Data:  ? LABS:  ? AUDIO:  ? CT Scan:    Imaging:   I personally reviewed the following images: CT neck from June reviewed at which point her sinuses were clear. In July, her MRI noted pansinusitis.    Summary of Outside Records:      Assessment/Plan:  Fred Berman is a 42 y.o. female with multiple ENT complaints today.     First, she was referred for a tongue lesion. It appears to be a scar/fibroma from dental trauma. It is getting larger and has been present for over a year and she is interested in removing it. We will schedule her for the n/a procedure visit to get this taken care of.    For her symptoms of allergic rhinitis, I recommended continuing allegra but adding flonase and using it daily.  We discussed the Flonase takes 2-3 weeks to reach maximum effectiveness and that she should use it every single day.  We also discussed the correct way to spray it.  I also recommended starting nasal saline spray and using Aquaphor ointment in her nostrils twice daily to help with her nasal dryness.  If the Flonase is not beneficial for her congestion and rhinorrhea, can try Atrovent to see if she has a component of vasomotor rhinitis.  We also discussed the option of a septoplasty and turbinate reduction in the future if she does not get improvement from medical therapy.    Continue GI follow-up for gastritis and intermittent dysphagia.  Her flexible laryngoscopy today was notable for some thick mucus over her vocal cords.  We discussed increasing hydration.    RTC n/a procedure visit.     Lizbet New MD  [facility] Department of Otolaryngology  [place]

## 2023-10-19 NOTE — DISCHARGE SUMMARY
"[facility] Hospital Medicine Discharge Summary    Admit Date: 9/23/2023  Discharge Date and Time: 10/18/193873:09 PM  Admitting Physician:  Team  Discharging Physician: Ryan Soriano MD.  Primary Care Physician: Juan Del Valle DO  Consults: Gastroenterology and Hospital Medicine    Discharge Diagnoses:  Intractable N/V 2/2 diabetic gastroparesis vs  Subutex  DM type 2-->  A1c 6.7 on 9/23  Severe anxiety  Chronic constipation -on Amitiza  Ovarian cyst  Hypokalemia  Hypomagnesemia    Hospital Course:   42 yr old female with PMH of HTN, DM, hypothyroidism and diabetic gastroparesis. Gastric empty study performed 6/16/23 confirmed abnormal gastric emptying. Reports 3 day history of persistent nausea. Denies vomiting only because she has kept herself NPO to avoid. When given fluids in the ED she vomited them. Also for 3 days she reports frequent episodes of experiencing generalized "jerky" movements accompanied by extreme diaphoresis, chest pain and SOB. Symptoms last 2-3 minutes and resolve. Glucose during these episodes reveal no hyper or hypoglycemia. Reports chronic constipation. Recently started on Amitizia. Was refusing Reglan but was counselled regarding importance of taking it in order to improve symptoms of gastroparesis. Continues to have persistent symptoms of abdominal pain, nausea & chills on swallowing food/medications. GI consulted; following recs. XR Abdomen showed nonspecific gas pattern. Mrs Berman reported improved tolerance of liquids & medications on 09/26. Started on Miralax BID & will also be given Soap Suds enema. Started on Miralax BID & also given Soap Suds enema. Patient able to tolerate PO intake & started having bowel movements on 09/27. Movantic added by GI; GI signed off.    Pt was seen and examined on the day of discharge. Mrs Berman reported feeling better & had no new complaints. Able to tolerate PO intake better. Plan for DC " "home.    Vitals:  VITAL SIGNS: 24 HRS MIN & MAX LAST   No data recorded 98.6 °F (37 °C)   No data recorded (!) 173/95   No data recorded  (!) 55   No data recorded 18   No data recorded 97 %       Physical Exam:  General: Alert lady lying comfortably in bed, in no acute distress  HENT: oral and oropharyngeal mucosa moist, pink, with no erythema or exudates, no ear pain or discharge  Neck: normal neck movement, no lymph nodes or swellings, no JVD or Carotid bruit  Respiratory: clear breathing sounds bilaterally, no crackles, rales, ronchi or wheezes  Cardiovascular: clear S1 and S2, no murmurs, rubs or gallops  Peripheral Vascular: no lesions, ulcers or erosions, normal peripheral pulses and no pedal edema  Gastrointestinal: soft, non-tender, non-distended abdomen, no guarding, rigidity or rebound tenderness, normal bowel sounds  Integumentary: normal skin color, no rashes or lesions  Neuro: AAO x 3; motor strength 5/5 in B/L UEs & LEs; sensation intact to gross and fine touch B/L; CN II-XII grossly intact    Procedures Performed: No admission procedures for hospital encounter.     Significant Diagnostic Studies: See Full reports for all details    No results for input(s): "WBC", "RBC", "HGB", "HCT", "MCV", "MCH", "MCHC", "RDW", "PLT", "MPV", "GRAN", "LYMPH", "MONO", "BASO", "NRBC" in the last 168 hours.    No results for input(s): "NA", "K", "CL", "CO2", "ANIONGAP", "BUN", "CREATININE", "GLU", "CALCIUM", "PH", "MG", "ALBUMIN", "PROT", "ALKPHOS", "ALT", "AST", "BILITOT" in the last 168 hours.     Microbiology Results (last 7 days)       ** No results found for the last 168 hours. **             X-Ray Hip 2 or 3 views Right (with Pelvis when performed)  Narrative: EXAMINATION:  XR HIP WITH PELVIS WHEN PERFORMED, 2 OR 3  VIEWS RIGHT    CLINICAL HISTORY:  Chronic pain syndrome    COMPARISON:  None.    FINDINGS:  No acute displaced fractures or dislocations.    There is some narrowing of the inferior medial aspect of " both hip joints with some degenerative changes of the lumbosacral spine articular spaces are otherwise preserved with smooth articular surfaces    No blastic or lytic lesions.    Soft tissues within normal limits.  Impression: Degenerative changes.    Electronically signed by: Anuel Powell  Date:    10/17/2023  Time:    12:19  X-Ray Hip 2 or 3 views Left (with Pelvis when performed)  Narrative: EXAMINATION:  XR HIP WITH PELVIS WHEN PERFORMED, 2 OR 3 VIEWS LEFT    CLINICAL HISTORY:  Chronic pain syndrome    COMPARISON:  None.    FINDINGS:  No acute displaced fractures or dislocations.    There is some narrowing of the inferior medial aspect of the hip joint articular spaces are otherwise preserved with smooth articular surfaces    No blastic or lytic lesions.    Soft tissues within normal limits.  Impression: Degenerative changes.    Electronically signed by: Anuel Powell  Date:    10/17/2023  Time:    12:16         Medication List        START taking these medications      naloxegoL 25 mg tablet  Commonly known as: MOVANTIK  Take 25 mg by mouth once daily.     ondansetron 4 MG Tbdl  Commonly known as: ZOFRAN-ODT  Take 1 tablet (4 mg total) by mouth every 8 (eight) hours as needed (NAUSEA).     polyethylene glycol 17 gram Pwpk  Commonly known as: GLYCOLAX  Take 17 g by mouth 2 (two) times daily.            CONTINUE taking these medications      buprenorphine HCL 8 mg Subl  Commonly known as: SUBUTEX     CALCIUM 600 + D(3) ORAL     levothyroxine 150 MCG tablet  Commonly known as: SYNTHROID  Take 1 tablet (150 mcg total) by mouth before breakfast.     losartan 50 MG tablet  Commonly known as: COZAAR  Take 1 tablet (50 mg total) by mouth once daily.     lubiprostone 24 MCG Cap  Commonly known as: AMITIZA     pantoprazole 40 MG tablet  Commonly known as: PROTONIX     REGLAN 10 MG tablet  Generic drug: metoclopramide HCl  Take 1 tablet (10 mg total) by mouth 4 (four) times daily.            ASK your doctor about  these medications      potassium chloride SA 20 MEQ tablet  Commonly known as: K-DUR,KLOR-CON  Take 2 tablets (40 mEq total) by mouth 2 (two) times daily. for 3 days  Ask about: Should I take this medication?               Where to Get Your Medications        These medications were sent to [facility][id number] at [address]  [address] [place] [address]      Phone: [phone]   levothyroxine 150 MCG tablet  naloxegoL 25 mg tablet  ondansetron 4 MG Tbdl  polyethylene glycol 17 gram Pwpk  potassium chloride SA 20 MEQ tablet  REGLAN 10 MG tablet          Explained in detail to the patient about the discharge plan, medications, and follow-up visits. Pt understands and agrees with the treatment plan  Discharge Disposition: Home or Self Care   Discharged Condition: stable  Diet-    Medications Per DC med rec  Activities as tolerated   Follow-up Information       Juan Del Valle, DO Follow up.    Specialty: Internal Medicine  Why: 1-2 weeks  Contact information:  [address] [id number]  [phone]               Agustin Yang MD. Go on 10/11/2023.    Specialty: Gastroenterology  Why: @1:15  Contact information:  [address] [place] [id number]  [phone]                           For further questions contact hospitalist office    Discharge time 33 minutes    For worsening symptoms, chest pain, shortness of breath, increased abdominal pain, high grade fever, stroke or stroke like symptoms, immediately go to the nearest Emergency Room or call 911 as soon as possible.      Ryan Varner M.D.

## 2023-11-06 ENCOUNTER — OFFICE VISIT (OUTPATIENT)
Dept: FAMILY MEDICINE | Facility: CLINIC | Age: 43
End: 2023-11-06
Payer: MEDICAID

## 2023-11-06 VITALS
DIASTOLIC BLOOD PRESSURE: 78 MMHG | WEIGHT: 204.13 LBS | HEIGHT: 68 IN | TEMPERATURE: 98 F | OXYGEN SATURATION: 96 % | HEART RATE: 63 BPM | SYSTOLIC BLOOD PRESSURE: 120 MMHG | BODY MASS INDEX: 30.94 KG/M2

## 2023-11-06 DIAGNOSIS — E03.9 HYPOTHYROIDISM, UNSPECIFIED TYPE: ICD-10-CM

## 2023-11-06 DIAGNOSIS — R20.0 FACIAL NUMBNESS: ICD-10-CM

## 2023-11-06 DIAGNOSIS — R61 EXCESSIVE SWEATING: Primary | ICD-10-CM

## 2023-11-06 DIAGNOSIS — F41.9 ANXIETY: ICD-10-CM

## 2023-11-06 DIAGNOSIS — M19.90 ARTHRITIS: ICD-10-CM

## 2023-11-06 DIAGNOSIS — R00.2 PALPITATIONS: ICD-10-CM

## 2023-11-06 DIAGNOSIS — E11.8 CONTROLLED TYPE 2 DIABETES MELLITUS WITH COMPLICATION, WITH LONG-TERM CURRENT USE OF INSULIN: ICD-10-CM

## 2023-11-06 DIAGNOSIS — Z79.4 CONTROLLED TYPE 2 DIABETES MELLITUS WITH COMPLICATION, WITH LONG-TERM CURRENT USE OF INSULIN: ICD-10-CM

## 2023-11-06 DIAGNOSIS — K31.84 GASTROPARESIS: ICD-10-CM

## 2023-11-06 PROCEDURE — 3061F NEG MICROALBUMINURIA REV: CPT | Mod: CPTII,,, | Performed by: NURSE PRACTITIONER

## 2023-11-06 PROCEDURE — 3061F PR NEG MICROALBUMINURIA RESULT DOCUMENTED/REVIEW: ICD-10-PCS | Mod: CPTII,,, | Performed by: NURSE PRACTITIONER

## 2023-11-06 PROCEDURE — 3008F PR BODY MASS INDEX (BMI) DOCUMENTED: ICD-10-PCS | Mod: CPTII,,, | Performed by: NURSE PRACTITIONER

## 2023-11-06 PROCEDURE — 4010F PR ACE/ARB THEARPY RXD/TAKEN: ICD-10-PCS | Mod: CPTII,,, | Performed by: NURSE PRACTITIONER

## 2023-11-06 PROCEDURE — 1160F RVW MEDS BY RX/DR IN RCRD: CPT | Mod: CPTII,,, | Performed by: NURSE PRACTITIONER

## 2023-11-06 PROCEDURE — 3078F PR MOST RECENT DIASTOLIC BLOOD PRESSURE < 80 MM HG: ICD-10-PCS | Mod: CPTII,,, | Performed by: NURSE PRACTITIONER

## 2023-11-06 PROCEDURE — 3066F NEPHROPATHY DOC TX: CPT | Mod: CPTII,,, | Performed by: NURSE PRACTITIONER

## 2023-11-06 PROCEDURE — 99214 PR OFFICE/OUTPT VISIT, EST, LEVL IV, 30-39 MIN: ICD-10-PCS | Mod: ,,, | Performed by: NURSE PRACTITIONER

## 2023-11-06 PROCEDURE — 99214 OFFICE O/P EST MOD 30 MIN: CPT | Mod: ,,, | Performed by: NURSE PRACTITIONER

## 2023-11-06 PROCEDURE — 3044F PR MOST RECENT HEMOGLOBIN A1C LEVEL <7.0%: ICD-10-PCS | Mod: CPTII,,, | Performed by: NURSE PRACTITIONER

## 2023-11-06 PROCEDURE — 1160F PR REVIEW ALL MEDS BY PRESCRIBER/CLIN PHARMACIST DOCUMENTED: ICD-10-PCS | Mod: CPTII,,, | Performed by: NURSE PRACTITIONER

## 2023-11-06 PROCEDURE — 3008F BODY MASS INDEX DOCD: CPT | Mod: CPTII,,, | Performed by: NURSE PRACTITIONER

## 2023-11-06 PROCEDURE — 3078F DIAST BP <80 MM HG: CPT | Mod: CPTII,,, | Performed by: NURSE PRACTITIONER

## 2023-11-06 PROCEDURE — 3044F HG A1C LEVEL LT 7.0%: CPT | Mod: CPTII,,, | Performed by: NURSE PRACTITIONER

## 2023-11-06 PROCEDURE — 4010F ACE/ARB THERAPY RXD/TAKEN: CPT | Mod: CPTII,,, | Performed by: NURSE PRACTITIONER

## 2023-11-06 PROCEDURE — 3066F PR DOCUMENTATION OF TREATMENT FOR NEPHROPATHY: ICD-10-PCS | Mod: CPTII,,, | Performed by: NURSE PRACTITIONER

## 2023-11-06 PROCEDURE — 3074F PR MOST RECENT SYSTOLIC BLOOD PRESSURE < 130 MM HG: ICD-10-PCS | Mod: CPTII,,, | Performed by: NURSE PRACTITIONER

## 2023-11-06 PROCEDURE — 3074F SYST BP LT 130 MM HG: CPT | Mod: CPTII,,, | Performed by: NURSE PRACTITIONER

## 2023-11-06 PROCEDURE — 1159F PR MEDICATION LIST DOCUMENTED IN MEDICAL RECORD: ICD-10-PCS | Mod: CPTII,,, | Performed by: NURSE PRACTITIONER

## 2023-11-06 PROCEDURE — 1159F MED LIST DOCD IN RCRD: CPT | Mod: CPTII,,, | Performed by: NURSE PRACTITIONER

## 2023-11-06 NOTE — PROGRESS NOTES
Patient ID: Fred Berman  : 1980     Chief Complaint: Establish Care    Allergies: Patient has No Known Allergies.     History of Present Illness:    Patient presents to clinic to seek care with someone that can help figure out what is wrong with her. Patient states that she has been suffering with an unknown diagnosis since May 2023. Patient states that she is always cold and has numbness in the face. Patient states that she profusely sweats.  She reports an abrupt onset of severe nausea, dehydration, and unable to tolerate eating or drinking. She has had 4-5 hospital courses for these symptoms and has been diagnosed with gastroparesis, but her GI has told her that not all of her symptoms are related to this. She is also seeing Gynecology to check hormones, and also reports she is suppose to have a hysterectomy because of recurrent cyst.  She has also had an extensive work up with Internal Medicine, which is following an adrenal adenoma.    She c/o frequent chest pain and palpitations, and episodes of heart rate being in the 40-50's. She is requesting a Cardiology referral.     She reports feeling frustrated with not having any answers to the most bothersome symptoms of excessive sweating and facial numbness. She does report a history of anxiety and depression, but states she has been given xanax in the past which did not stop the sweating and numbness.     Social History:  reports that she has quit smoking. Her smoking use included cigarettes. She has never used smokeless tobacco. She reports that she does not currently use alcohol. She reports that she does not currently use drugs.    Past Medical History:  has a past medical history of Adrenal mass, Anxiety, Arthritis, Bradycardia, Depression, Diabetes mellitus, type 2, Gastroparesis, General anesthetics causing adverse effect in therapeutic use, GERD (gastroesophageal reflux disease), Hip pain, Hypertension, Menstrual cramps, Ovarian cyst,  "Palpitations, Pyosalpingitis, and Thyroid disease.    Care Team: Patient Care Team:  Juan Del Valle DO as PCP - General (Internal Medicine)  Rosie Yung LPN as Care Coordinator     Current Medications:  Current Outpatient Medications   Medication Instructions    buprenorphine HCL (SUBUTEX) 8 mg, Sublingual, 3 times daily    calcium carbonate/vitamin D3 (CALCIUM 600 + D,3, ORAL) 1 tablet, Oral, 2 times daily    insulin (LANTUS SOLOSTAR U-100 INSULIN) 20 Units, Subcutaneous, Nightly    levothyroxine (SYNTHROID) 150 mcg, Oral, Before breakfast    losartan (COZAAR) 50 mg, Oral, Daily    lubiprostone (AMITIZA) 24 mcg, Oral, With breakfast    MOTEGRITY 2 mg Tab 1 tablet, Oral    naloxegoL (MOVANTIK) 25 mg, Oral, Daily    ondansetron (ZOFRAN-ODT) 4 mg, Oral, Every 8 hours PRN    pantoprazole (PROTONIX) 40 mg, Oral, Every morning    pen needle, diabetic (PEN NEEDLE) 31 gauge x 5/16" Ndle 1 each, Subcutaneous, Nightly    polyethylene glycol (GLYCOLAX) 17 g, Oral, 2 times daily    REGLAN 10 mg, Oral, 4 times daily       Review of Systems   Twelve point review of system conducted, negative except as stated in the history of present illness.  See HPI for details.      Visit Vitals  /78 (BP Location: Right arm, Patient Position: Sitting)   Pulse 63   Temp 97.7 °F (36.5 °C) (Temporal)   Ht 5' 8" (1.727 m)   Wt 92.6 kg (204 lb 1.6 oz)   LMP  (LMP Unknown)   SpO2 96%   BMI 31.03 kg/m²       Physical Exam  Vitals and nursing note reviewed.   Constitutional:       Appearance: Normal appearance.   HENT:      Head: Normocephalic.   Eyes:      Conjunctiva/sclera: Conjunctivae normal.      Pupils: Pupils are equal, round, and reactive to light.   Cardiovascular:      Rate and Rhythm: Normal rate and regular rhythm.   Pulmonary:      Effort: Pulmonary effort is normal.      Breath sounds: Normal breath sounds.   Musculoskeletal:         General: Normal range of motion.      Cervical back: Normal range of motion and neck " supple.   Skin:     General: Skin is warm and dry.      Comments: Excessively sweating at times throughout visit   Neurological:      General: No focal deficit present.      Mental Status: She is alert and oriented to person, place, and time.      Gait: Gait abnormal (feet shuffle outward).   Psychiatric:         Mood and Affect: Mood normal.         Behavior: Behavior normal.                 Assessment & Plan:  1. Excessive sweating      Review previous labs/imaging          2. Facial numbness     Refer to Neurology for eval/tx    3. Palpitations     Refer to Cardio for eval/tx     4. DM II     Gastroparesis  -A1c 5.9 (7/3/2023), A1c 6.7 (10/3/2023)  -Patient states SGLT-2 and GLP-1 made her very ill in the past; restarted lantus at 20 units QHS on 10/3/2023  -GI team recommended continuing Reglan at this time; ongoing discussion with patient regarding risks and benefits of long-term Reglan administration d/t fear of tardive dyskinesia     5.  HTN  -Normotensive at this time  -Continue losartan 50mg daily     6. Chronic pain syndrome  -Has been having pain in shoulders, hips, and knees for past 5 years  -Currently F/U with suboxone clinic       7. Hypothyroidism  -TSH 13.531; T4 0.78 (7/3/2023); TSH 1.378 (9/23/2023), T4 0.84 (8/10/2023)  -Continue levothyroxine 150 mcg before breakfast     8. Adrenal lesions  -CT ab/pelvis 5/30/203: nonspecific heterogeneous nodule in the left adrenal gland  -MRI ab/pelvis 8/29/2023: Small left adrenal nodule (1cm) is stable and most compatible with adenoma                     Future Appointments   Date Time Provider Department Center   11/15/2023 11:00 AM RESIDENT 1, [facility] OTORHINOLARYNGOLOGY [facility] E[facility] [place]   11/20/2023  9:15 AM Osiel Swann MD [facility] Ob   1/23/2024  1:30 PM Juan Del Valle,  [facility] IM RES [place]       Follow up if symptoms worsen or fail to improve. Call sooner if needed.          Lab Frequency Next Occurrence   Ambulatory  referral/consult to Gastroenterology Once 06/08/2023   Ambulatory referral/consult to Gynecology Once 06/08/2023   Mammo Digital Screening Bilat Once 07/12/2023   TSH Once 09/10/2023   Ambulatory referral/consult to Physical/Occupational Therapy Once 08/25/2023   Hemoglobin A1C Once 01/03/2024   Cortisol Once 01/03/2024

## 2023-11-07 ENCOUNTER — TELEPHONE (OUTPATIENT)
Dept: FAMILY MEDICINE | Facility: CLINIC | Age: 43
End: 2023-11-07
Payer: MEDICAID

## 2023-11-07 NOTE — TELEPHONE ENCOUNTER
Spoke with pt and she called dr. Layne and waiting to hear back about appt. She will continue to call dr. Cottrell to see when they will except new pts.

## 2023-11-07 NOTE — TELEPHONE ENCOUNTER
----- Message from Jaylin Green sent at 11/6/2023 11:55 AM CST -----  Regarding: phone message  Pt called Dr Cottrell is not taking new patients,[phone]

## 2023-11-07 NOTE — TELEPHONE ENCOUNTER
Pt states that she just seen Winston Alcantara today. States that Winston was to refer her to see Dr. Layne. States that she has gastroparesis & has a GI. States that she has been hospitalized once a month since May, so about 6-7 times.       She would like to be a NP.

## 2023-11-08 ENCOUNTER — TELEPHONE (OUTPATIENT)
Dept: FAMILY MEDICINE | Facility: CLINIC | Age: 43
End: 2023-11-08
Payer: MEDICAID

## 2023-11-08 NOTE — TELEPHONE ENCOUNTER
----- Message from Estephania Barrera sent at 11/8/2023  3:29 PM CST -----  Regarding: nurse call  Pt called, states that the 2nd Doctor we referred her to in [place] is not accepting new pts, pt wants to know what to do   [phone]

## 2023-11-08 NOTE — TELEPHONE ENCOUNTER
Called pt to give her dr. Osiel duckworth at the [facility] and she states he is not taking new pt's.    Called pt to give dr. Bessie reveles in [place] no answer mailbox full. Office number is [phone]

## 2024-01-19 NOTE — PROGRESS NOTES
"[facility] INTERNAL MEDICINE  OUTPATIENT OFFICE VISIT NOTE    SUBJECTIVE:      HPI: Fred Berman is a 43 y.o. yo female w/ PMH of DM II, HTN, gastroparesis, hypothyroidism, who presents today for F/U visit. At this time, she has the following complaints: (1) She continues to feel cold and tired most of the time, causing her to stay sedentary most of the time. (2) She stopped taking metformin 500 mg BID and lantus 20 untis QHS for the past 3 months because she does not think that these medications are effective. (3) Her suboxone was recently changed to buprenorphine.     Social HX: 1 PPD x 10+ years, quit in April 2023; denies alcohol intake; distant polysubstance abuse in her 20's    ROS:  (+) Cold intolerance, tiredness, tearful  (-) Chest pain, palpitations, SOB, fever, night sweats, chills, diarrhea, constipation.    OBJECTIVE:     Vital signs:   /84 (BP Location: Left arm, Patient Position: Sitting, BP Method: Medium (Automatic))   Pulse 65   Temp 97.8 °F (36.6 °C) (Oral)   Resp 20   Ht 5' 8" (1.727 m)   Wt 95.4 kg (210 lb 6.4 oz)   LMP 05/15/2023 (Approximate)   SpO2 97%   BMI 31.99 kg/m²      Physical Examination:  General: Obese and tearful  HEENT: NC/AT; PERRL; nasal and oral mucosa moist and clear  Pulm: CTA bilaterally, normal work of breathing on room air  CV: S1, S2 w/o murmurs or gallops; no edema noted  GI: Soft with normal bowel sounds in all quadrants, no masses on palpation  MSK: Full ROM of all extremities; multiple tender points  Neuro: AAOx4; motor/sensory function intact  Psych: Tearful    Significant findings:  6/12/2023 - U/S thyroid: 2.6 cm lobulated hypoechoic mass inferior and lateral to the left lobe of the thyroid  6/16/2023 - NM gastric emptying test: abnormal gastric emptying suggesting gastroparesis  5/30/2023 - CT ab/pelvis w/ contrast: fatty liver, nonspecific complex cystic structure along the inferior aspect of left ovary, small amount of nonspecific free fluid in the " pelvis, nonspecific heterogeneous nodule in the left adrenal gland  8/29/2023 - MRI ab/pelvis: Small left adrenal nodule (1cm) is stable and most compatible with adenoma    ASSESSMENT & PLAN:     DM II  Gastroparesis  -A1c 6.7 (10/3/2023); 10.1 (1/23/2024)  -Patient states SGLT-2 and GLP-1 made her very ill in the past  -She stopped taking metformin 500mg BID and lantus 20 units QHS for the past 3 months because she did not think these medications were effective  -Instructed her on the importance of medication adherence and health risks of uncontrolled DM. She agreed to resume metformin 500mg BID again  -Seeing GI specialist in [place]; recommended continuing Reglan     Hypothyroidism  -TSH 13.531 (7/3/2023); 1.378 (9/23/2023); 10.28 (1/23/2024)  -Increased levothyroxine from 150 mcg to 200 mcg QAM on 1/23/2024  -Will repeat TSH in 1 month    HTN  -Normotensive at this time  -Continue losartan 50mg daily    Mouth sore and skin tag on tongue  -Evaluated by [facility] ENT clinic, no evidence of malignancy  -Recommended therapy for allergic rhinitis     Chronic pain syndrome  HX of polysubstance abuse  -Currently F/U with suboxone clinic and on buprenorphine 8mg TID  -Hip X-ray 10/17/2023: degenerative changes bilaterally  -Referral to outpatient physical therapy placed 8/10/2023    Adrenal lesions  -CT ab/pelvis 5/30/203: nonspecific heterogeneous nodule in the left adrenal gland  -MRI ab/pelvis 8/29/2023: Small left adrenal nodule (1cm) is stable and most compatible with adenoma  -Cortisol level pending (1/23/2024)    Fatty liver   -CT ab/pelvis 5/30/2023: Hepatomegaly with fat infiltration  -Lipid panel 8/10/2023: , triglycerides 55, cholesterol 198  -Patient prefers not to start statin therapy    Generalized anxiety disorder   -Currently seeing an outside psychiatrist    Vitamin D deficiency   -Vitamin D level 22.6 (6/6/2023)  -Patient refuses to start daily vitamin D supplement     Obesity  -Instructed patient  on the importance of weight loss    Health Maintenance:  -HIV screening and hepatitis negative (6/8/2023)  -Currently seeing outside gynecologist  -Seeing GI specialist in [place]  -Foot exam performed 1/23/2024  -Vaccines:     -Tetanus, refuses    -COVID, refuses    Return to clinic in 3 months. Patient made a request to see an attending physician, she does not want to be seen by a resident physician.    Juan Del Valle DO   [facility] Internal Medicine, PGY-II

## 2024-01-23 ENCOUNTER — OFFICE VISIT (OUTPATIENT)
Dept: INTERNAL MEDICINE | Facility: CLINIC | Age: 44
End: 2024-01-23
Payer: MEDICAID

## 2024-01-23 ENCOUNTER — CLINICAL SUPPORT (OUTPATIENT)
Dept: INTERNAL MEDICINE | Facility: CLINIC | Age: 44
End: 2024-01-23
Attending: STUDENT IN AN ORGANIZED HEALTH CARE EDUCATION/TRAINING PROGRAM
Payer: MEDICAID

## 2024-01-23 VITALS
RESPIRATION RATE: 20 BRPM | BODY MASS INDEX: 31.89 KG/M2 | SYSTOLIC BLOOD PRESSURE: 127 MMHG | WEIGHT: 210.38 LBS | HEIGHT: 68 IN | OXYGEN SATURATION: 97 % | TEMPERATURE: 98 F | DIASTOLIC BLOOD PRESSURE: 84 MMHG | HEART RATE: 65 BPM

## 2024-01-23 DIAGNOSIS — E11.65 UNCONTROLLED TYPE 2 DIABETES MELLITUS WITH HYPERGLYCEMIA: Primary | ICD-10-CM

## 2024-01-23 DIAGNOSIS — I10 HYPERTENSION, UNSPECIFIED TYPE: ICD-10-CM

## 2024-01-23 DIAGNOSIS — K76.0 FATTY LIVER: ICD-10-CM

## 2024-01-23 DIAGNOSIS — K31.84 GASTROPARESIS: ICD-10-CM

## 2024-01-23 DIAGNOSIS — E27.8 ADRENAL NODULE: ICD-10-CM

## 2024-01-23 DIAGNOSIS — E03.9 HYPOTHYROIDISM, UNSPECIFIED TYPE: ICD-10-CM

## 2024-01-23 DIAGNOSIS — G89.4 CHRONIC PAIN SYNDROME: ICD-10-CM

## 2024-01-23 DIAGNOSIS — E66.9 OBESITY, UNSPECIFIED CLASSIFICATION, UNSPECIFIED OBESITY TYPE, UNSPECIFIED WHETHER SERIOUS COMORBIDITY PRESENT: ICD-10-CM

## 2024-01-23 DIAGNOSIS — E11.65 UNCONTROLLED TYPE 2 DIABETES MELLITUS WITH HYPERGLYCEMIA: ICD-10-CM

## 2024-01-23 DIAGNOSIS — F41.9 ANXIETY: ICD-10-CM

## 2024-01-23 LAB
LEFT EYE DM RETINOPATHY: NEGATIVE
RIGHT EYE DM RETINOPATHY: NEGATIVE

## 2024-01-23 PROCEDURE — 99215 OFFICE O/P EST HI 40 MIN: CPT | Mod: PBBFAC,25 | Performed by: STUDENT IN AN ORGANIZED HEALTH CARE EDUCATION/TRAINING PROGRAM

## 2024-01-23 PROCEDURE — 92228 IMG RTA DETC/MNTR DS PHY/QHP: CPT | Mod: 59,PBBFAC

## 2024-01-23 RX ORDER — LOSARTAN POTASSIUM 50 MG/1
50 TABLET ORAL DAILY
Qty: 90 TABLET | Refills: 3 | Status: SHIPPED | OUTPATIENT
Start: 2024-01-23 | End: 2025-01-22

## 2024-01-23 RX ORDER — HYDROCORTISONE 25 MG/G
1 CREAM TOPICAL 2 TIMES DAILY
COMMUNITY
Start: 2023-11-06

## 2024-01-23 RX ORDER — SERTRALINE HYDROCHLORIDE 50 MG/1
50 TABLET, FILM COATED ORAL NIGHTLY
COMMUNITY
Start: 2024-01-02

## 2024-01-23 RX ORDER — METFORMIN HYDROCHLORIDE 500 MG/1
500 TABLET ORAL 2 TIMES DAILY WITH MEALS
Qty: 180 TABLET | Refills: 1 | Status: SHIPPED | OUTPATIENT
Start: 2024-01-23 | End: 2024-07-21

## 2024-01-23 RX ORDER — BUSPIRONE HYDROCHLORIDE 7.5 MG/1
7.5 TABLET ORAL 3 TIMES DAILY
COMMUNITY
Start: 2024-01-02

## 2024-01-23 RX ORDER — LEVOTHYROXINE SODIUM 200 UG/1
200 TABLET ORAL
Qty: 30 TABLET | Refills: 11 | Status: SHIPPED | OUTPATIENT
Start: 2024-01-23 | End: 2024-05-21 | Stop reason: SDUPTHER

## 2024-01-23 RX ORDER — METFORMIN HYDROCHLORIDE 500 MG/1
500 TABLET ORAL 2 TIMES DAILY WITH MEALS
COMMUNITY
Start: 2023-12-28 | End: 2024-01-23 | Stop reason: SDUPTHER

## 2024-01-25 NOTE — PROGRESS NOTES
Fred Berman is a 43 y.o. female here for a diabetic eye screening with non-dilated fundus photos per Jacquelin Souza MD.    Patient cooperative?: Yes  Small pupils?: No  Last eye exam: unknown    For exam results, see Encounter Report.

## 2024-02-06 ENCOUNTER — TELEPHONE (OUTPATIENT)
Dept: INTERNAL MEDICINE | Facility: CLINIC | Age: 44
End: 2024-02-06
Payer: MEDICAID

## 2024-02-06 RX ORDER — PEN NEEDLE, DIABETIC 30 GX3/16"
1 NEEDLE, DISPOSABLE MISCELLANEOUS 2 TIMES DAILY
Qty: 100 EACH | Refills: 9 | Status: SHIPPED | OUTPATIENT
Start: 2024-02-06 | End: 2025-02-05

## 2024-02-06 RX ORDER — INSULIN GLARGINE 100 [IU]/ML
15 INJECTION, SOLUTION SUBCUTANEOUS 2 TIMES DAILY
Qty: 27 ML | Refills: 3 | Status: SHIPPED | OUTPATIENT
Start: 2024-02-06 | End: 2025-02-05

## 2024-02-06 NOTE — TELEPHONE ENCOUNTER
TELEPHONE CALL:      DR. WILEY (), MS. TEJADA CALLED TO LET US KNOW THAT SHE DOES NOT THINK THAT HER METFORMIN 500 MG, BID IS NOT WORKING FOR HER.  HER GLUCOSE HAS BEEN RUNNING 400-500 ALL THE TIME.  PLEASE ADVISE.

## 2024-02-06 NOTE — TELEPHONE ENCOUNTER
Spoke to patient over the phone. She agreed to restart on insulin lantus. Will start insulin lantus 15 units BID (based on 0.3 units/kg/day).     Juan Del Valle DO  \[facility]\"" Internal Medicine PGY-III

## 2024-02-08 ENCOUNTER — LAB VISIT (OUTPATIENT)
Dept: LAB | Facility: HOSPITAL | Age: 44
End: 2024-02-08
Attending: STUDENT IN AN ORGANIZED HEALTH CARE EDUCATION/TRAINING PROGRAM
Payer: MEDICAID

## 2024-02-08 ENCOUNTER — PROCEDURE VISIT (OUTPATIENT)
Dept: OTOLARYNGOLOGY | Facility: CLINIC | Age: 44
End: 2024-02-08
Payer: MEDICAID

## 2024-02-08 VITALS
BODY MASS INDEX: 33.43 KG/M2 | SYSTOLIC BLOOD PRESSURE: 131 MMHG | HEIGHT: 67 IN | RESPIRATION RATE: 16 BRPM | HEART RATE: 66 BPM | WEIGHT: 213 LBS | DIASTOLIC BLOOD PRESSURE: 84 MMHG | TEMPERATURE: 98 F

## 2024-02-08 DIAGNOSIS — E04.1 THYROID NODULE: ICD-10-CM

## 2024-02-08 DIAGNOSIS — K14.8 LESION OF TONGUE: Primary | ICD-10-CM

## 2024-02-08 DIAGNOSIS — K13.29: ICD-10-CM

## 2024-02-08 LAB
CRP SERPL-MCNC: 1.1 MG/L
ERYTHROCYTE [SEDIMENTATION RATE] IN BLOOD: <1 MM/HR (ref 0–20)

## 2024-02-08 PROCEDURE — 84432 ASSAY OF THYROGLOBULIN: CPT

## 2024-02-08 PROCEDURE — 85652 RBC SED RATE AUTOMATED: CPT

## 2024-02-08 PROCEDURE — 36415 COLL VENOUS BLD VENIPUNCTURE: CPT

## 2024-02-08 PROCEDURE — 86376 MICROSOMAL ANTIBODY EACH: CPT

## 2024-02-08 PROCEDURE — 86038 ANTINUCLEAR ANTIBODIES: CPT

## 2024-02-08 PROCEDURE — 41112 EXCISION OF TONGUE LESION: CPT | Mod: PBBFAC | Performed by: STUDENT IN AN ORGANIZED HEALTH CARE EDUCATION/TRAINING PROGRAM

## 2024-02-08 PROCEDURE — 86140 C-REACTIVE PROTEIN: CPT

## 2024-02-08 PROCEDURE — 88305 TISSUE EXAM BY PATHOLOGIST: CPT | Mod: TC

## 2024-02-08 RX ORDER — TRIAMCINOLONE ACETONIDE 1 MG/G
PASTE DENTAL SEE ADMIN INSTRUCTIONS
Qty: 5 G | Refills: 0 | Status: SHIPPED | OUTPATIENT
Start: 2024-02-08 | End: 2024-03-09

## 2024-02-08 NOTE — PROCEDURES
[facility]  Otolaryngology Clinic Note    Fred Berman  Encounter Date: 2/8/2024  YOB: 1980  Physician: Lizbet New MD    Chief Complaint: tongue lesion    HPI: Fred Berman is a 43 y.o. female referred for a tongue lesion. She reports that she first noticed it about a year ago and says that it has slowly been enlarging since then. It has never bled. She doesn't think it hurts but does bite it occasionally which makes it sore. It's never been biopsied. She says that her dentist told her that he was not going to do any more work on her teeth until she got it checked out. She also feels like she has developed allergies over the past few months.  She endorses frequent tearing and redness of her eyes in feels like her nose is running frequently.  She also endorses nasal congestion.  She is been using Allegra and does not think it is helped any.  She has not tried Flonase.  She does use Afrin she thinks maybe once a month.  She was diagnosed with a sinus infection over the summer and got a few days of antibiotics.  She denies facial pain or pressure but does note that when she wakes up in the morning she feels like the outside of her nose is swollen over the bridge of her nose.  She also endorses facial numbness bilaterally in all 3 distributions which has been going on for some time.  She reports that her PCP is work this up with an MRI scan.  She is also been dealing with intermittent dysphagia.  She feels like things are not going down, both solids and liquids.  This does not happen with every meal but comes and goes.  She follows with Gastroenterology and has had EGDs and dilations in the past.  She reports that she has another 1 scheduled in November.  As far as her voice goes, she notes intermittent dysphonia where her voice gets roughed and then returns to normal.  It usually comes back to normal on its own.  She is on Protonix daily for gastritis that has  "been seen on prior scopes she reports.  As far as other medical problems she has diabetes, hypertension, gastroparesis, anxiety and depression.  For past surgical history she is had a cholecystectomy and .  She is had her tonsils removed but no other history of head and neck surgery.  She is a former smoker and quit 1 year ago.  She does not drink and denies any other drug use.      2024: Patient here for tongue lesion excision. Also reports,  a funny feeling in the sides of her mouth, unsure onset, just noticed since her last ENT. History of thyroid disease since the age of 17 yo.      ROS:   General: Negative except per HPI  Skin: Denies rash, ulcer, or lesion.  Eyes: Denies vision changes or diplopia.  Ears: Negative except per HPI  Nose: Negative except per HPI  Throat/mouth: Negative except per HPI  Cardiovascular: Negative except per HPI  Respiratory: Negative except per HPI  Neck: Negative except per HPI  Endocrine: Negative except per HPI  Neurologic: Negative except per HPI    Other 10-point review of systems negative except per HPI      Review of patient's allergies indicates:  No Known Allergies      Past Medical History:   Diagnosis Date    Adrenal mass     Anxiety     Arthritis     Bradycardia     Depression     Diabetes mellitus, type 2     Gastroparesis     General anesthetics causing adverse effect in therapeutic use     "hard time waking up"    GERD (gastroesophageal reflux disease)     Hip pain     Hypertension     Menstrual cramps     Ovarian cyst     Palpitations     Pyosalpingitis     Thyroid disease        Past Surgical History:   Procedure Laterality Date    ABLATION OF VAGINAL TISSUE USING LASER       SECTION      CHOLECYSTECTOMY      TUBAL LIGATION         Social History     Socioeconomic History    Marital status: Single   Tobacco Use    Smoking status: Former     Types: Cigarettes    Smokeless tobacco: Never   Substance and Sexual Activity    Alcohol use: Not Currently "     Alcohol/week: 6.0 standard drinks of alcohol     Types: 6 Shots of liquor per week    Drug use: Not Currently     Types: Other-see comments     Comment: Various Opoids    Sexual activity: Yes     Partners: Male     Social Determinants of Health     Financial Resource Strain: High Risk (7/4/2023)    Overall Financial Resource Strain (CARDIA)     Difficulty of Paying Living Expenses: Very hard   Food Insecurity: No Food Insecurity (9/25/2023)    Hunger Vital Sign     Worried About Running Out of Food in the Last Year: Never true     Ran Out of Food in the Last Year: Never true   Recent Concern: Food Insecurity - Food Insecurity Present (7/4/2023)    Hunger Vital Sign     Worried About Running Out of Food in the Last Year: Sometimes true     Ran Out of Food in the Last Year: Never true   Transportation Needs: No Transportation Needs (9/25/2023)    PRAPARE - Transportation     Lack of Transportation (Medical): No     Lack of Transportation (Non-Medical): No   Physical Activity: Sufficiently Active (7/4/2023)    Exercise Vital Sign     Days of Exercise per Week: 5 days     Minutes of Exercise per Session: 30 min   Stress: Stress Concern Present (7/4/2023)    [employer] [place] [employer] - Occupational Stress Questionnaire     Feeling of Stress : To some extent   Social Connections: Moderately Integrated (7/5/2023)    Social Connection and Isolation Panel [NHANES]     Frequency of Communication with Friends and Family: Twice a week     Frequency of Social Gatherings with Friends and Family: Once a week     Attends Denominational Services: More than 4 times per year     Active Member of Clubs or Organizations: Yes     Attends Club or Organization Meetings: More than 4 times per year     Marital Status:    Housing Stability: Unknown (9/25/2023)    Housing Stability Vital Sign     Unable to Pay for Housing in the Last Year: No     Unstable Housing in the Last Year: No   Recent Concern: Housing Stability -  "High Risk (7/4/2023)    Housing Stability Vital Sign     Unable to Pay for Housing in the Last Year: Yes     Unstable Housing in the Last Year: No       Family History   Problem Relation Age of Onset    Alcohol abuse Mother         Pat    Arthritis Mother     Cancer Mother     COPD Mother     Depression Mother     Diabetes Mother     Drug abuse Mother     Hypertension Mother     Miscarriages / Stillbirths Mother     Depression Father     Drug abuse Father     Hypertension Father     Cancer Maternal Grandfather     Miscarriages / Stillbirths Maternal Grandmother     Hearing loss Paternal Grandfather     Asthma Son     Hypertension Son     Drug abuse Sister     Heart disease Sister     Miscarriages / Stillbirths Sister     Drug abuse Brother     Early death Brother        Outpatient Encounter Medications as of 2/8/2024   Medication Sig Dispense Refill    buprenorphine HCL (SUBUTEX) 8 mg Subl Place 8 mg under the tongue 3 (three) times daily.      insulin (LANTUS SOLOSTAR U-100 INSULIN) glargine 100 units/mL SubQ pen Inject 15 Units into the skin 2 (two) times daily. 27 mL 3    levothyroxine (SYNTHROID) 200 MCG tablet Take 1 tablet (200 mcg total) by mouth before breakfast. 30 tablet 11    losartan (COZAAR) 50 MG tablet Take 1 tablet (50 mg total) by mouth once daily. 90 tablet 3    metFORMIN (GLUCOPHAGE) 500 MG tablet Take 1 tablet (500 mg total) by mouth 2 (two) times daily with meals. 180 tablet 1    ondansetron (ZOFRAN-ODT) 4 MG TbDL Take 1 tablet (4 mg total) by mouth every 8 (eight) hours as needed (NAUSEA). 30 tablet 1    pen needle, diabetic (BD ULTRA-FINE ROSALEE PEN NEEDLE) 32 gauge x 5/32" Ndle 1 each by Misc.(Non-Drug; Combo Route) route 2 (two) times a day. 100 each 9    busPIRone (BUSPAR) 7.5 MG tablet Take 7.5 mg by mouth 3 (three) times daily.      calcium carbonate/vitamin D3 (CALCIUM 600 + D,3, ORAL) Take 1 tablet by mouth 2 (two) times a day.      hydrocortisone (ANUSOL-HC) 2.5 % rectal cream Place 1 " "application  rectally 2 (two) times daily.      lubiprostone (AMITIZA) 24 MCG Cap Take 24 mcg by mouth daily with breakfast.      MOTEGRITY 2 mg Tab Take 1 tablet by mouth.      naloxegoL (MOVANTIK) 25 mg tablet Take 25 mg by mouth once daily. (Patient not taking: Reported on 1/23/2024) 30 tablet 2    pantoprazole (PROTONIX) 40 MG tablet Take 40 mg by mouth every morning.      polyethylene glycol (GLYCOLAX) 17 gram PwPk Take 17 g by mouth 2 (two) times daily. (Patient not taking: Reported on 2/8/2024) 30 each 1    REGLAN 10 mg tablet Take 1 tablet (10 mg total) by mouth 4 (four) times daily. (Patient not taking: Reported on 1/23/2024) 120 tablet 1    sertraline (ZOLOFT) 50 MG tablet Take 50 mg by mouth every evening.      triamcinolone acetonide 0.1% (KENALOG) 0.1 % paste Place onto teeth As instructed. Place on inside wall of mouth twice a day 5 g 0    [DISCONTINUED] insulin (LANTUS SOLOSTAR U-100 INSULIN) glargine 100 units/mL SubQ pen Inject 20 Units into the skin every evening. (Patient not taking: Reported on 1/23/2024) 18 mL 3    [DISCONTINUED] levothyroxine (SYNTHROID) 150 MCG tablet Take 1 tablet (150 mcg total) by mouth before breakfast. 30 tablet 11    [DISCONTINUED] losartan (COZAAR) 50 MG tablet Take 1 tablet (50 mg total) by mouth once daily. 90 tablet 3    [DISCONTINUED] pen needle, diabetic (PEN NEEDLE) 31 gauge x 5/16" Ndle Inject 1 each into the skin every evening. (Patient not taking: Reported on 1/23/2024) 100 each 5     No facility-administered encounter medications on file as of 2/8/2024.       Physical Exam:  Vitals:    02/08/24 1111   BP: 131/84   BP Location: Right forearm   Patient Position: Sitting   BP Method: Medium (Automatic)   Pulse: 66   Resp: 16   Temp: 98.2 °F (36.8 °C)   TempSrc: Oral   Weight: 96.6 kg (213 lb)   Height: 5' 7" (1.702 m)       Constitutional  General Appearance: well nourished, well-developed, AAO x3, NAD  HEENT  Eyes: PEERLA, EOMI, normal conjunctivae  OC/OP: " dentition moderate, left lateral tongue with 8mm raised smooth lesion, soft, nontender, tongue/FOM- Bilateral buccal mucosa with white lacy patches, tonsils absent  Neck: soft, non-tender, no palpable lymph nodes   Thyroid region- left nodule, nontender, no goiter  Neuro: CN II - XII intact bilaterally except for decreased sensation across bilateral V1-V3  Cardiovascular: peripheral pulses palpable  Respiratory: non-labored respirations  Psychiatric: oriented to time, place and person, no depression, anxiety or agitation        PROCEDURE: Excision of tongue lesion    Indication: Diagnosis    Anesthesia: 1% lidocaine with 1:100,000 epinephrine    Findings: Left anterior lateral tongue lesion    Specimen(s): 1    Blood Loss: 3 cc     The risks and benefits of procedure discussed with patient.  Informed consent was given to proceed.     Procedure In Detail: First, the lesion was injected with 1% lidocaine with 1:100,000 epinephrine. After adequate time had elapsed for anesthesia to take effect, a 15 blade was used to make an incision. Next, fine tissue scissors were used to dissect the soft tissue around the mass entirely. This was removed in its entirety and placed into formalin to be sent to pathology. Hemostasis was achieved using a handheld electrocautery device. The wound was closed in in layer. The The superficial layer was closed with 4-0 vicryl. The patient tolerated the procedure well. There were no complications.     Procedures:Flexible Fiberoptic 10/17/2023 Laryngoscopy/Nasopharyngoscopy via bilateral nares    Procedure in Detail: Informed consent was obtained from the patient after explanation of procedure, indications, risks and benefits. Flexible endoscopy was performed through the nasal passages. The nasal cavity, nasopharynx, oropharynx, hypopharynx and larynx were adequately visualized. The true vocal cords and arytenoids were examined during phonation and repose.    Anesthesia: Topical Neosynephrine /  Tetracaine  Adverse Events: None  Resident Surgeon: Lizbet New, PGY-5   Blood loss: none  Condition: good    Findings:  NP/OP: right nasal cavity with prominent posterior septal spur extending into the middle meatus, no mucus/polyps in the middle meatus or sphenoethmoid recess no masses/lesions of NC, eustachian tube, fossa of Rosenmuller, no adenoid hypertrophy; left nasal cavity with mild anterior septal deviation, middle meatus and sphenoethemoid recess clear of any mucus or polyps  BOT/vallecula: no lingual hypertrophy, no masses/lesions, no secretions obscuring visualization  Piriform sinuses/post-cricoid: no masses/lesions, no pooling of secretions  Epiglottis: lingual and laryngeal surfaces within normal limits  Arytenoids/FVFs: no masses/lesions, no edema, bilateral mobility  TVCs: bilateral cord mobility, no masses or lesions, thick mucus over surface of bilateral cords  No aspiration, no pooled secretions  No sign of malignancy      Pertinent Data:  ? LABS:  ? AUDIO:  ? CT Scan:    Imaging:   I personally reviewed the following images: CT neck from June reviewed at which point her sinuses were clear. In July, her MRI noted pansinusitis.    Summary of Outside Records:      Assessment/Plan:  Fred Berman is a 43 y.o. female with tongue lesion that was excised today 2/8/2024. She was also found to have a left thyroid nodule and bilateral buccal white lacy patches.    -Patient tolerated procedure without any immediate complications  -In the setting of thyroid nodule and white lacy buccal patches concerned for autoimmune process ordered Thyroid US, TPO, Thyroglobulin, ESR, CRP, Lupus panel  -Orobase with Kenalog prescribed for buccal sensitivity    RTC in 2 weeks for virtual visit to discuss pathology results then 6 weeks in F2F visit     Dr. Scott was present for the entire visit and procedure    Azalia Erickson MD

## 2024-02-09 LAB
ENDOCRINOLOGIST REVIEW: ABNORMAL
THYROGLOB AB SERPL-ACNC: 150 IU/ML
THYROGLOB SERPL-MCNC: <0.1 NG/ML

## 2024-02-10 LAB
C3 SERPL-MCNC: 169 MG/DL
C4 SERPL-MCNC: 27 MG/DL
NUCLEAR IGG SER QL IA: NORMAL
RHEUMATOID FACT SERPL-ACNC: <10 IU/ML
THYROID PEROXIDASE QUANT (OLG): 23 IU/ML

## 2024-02-12 LAB
ESTROGEN SERPL-MCNC: NORMAL PG/ML
INSULIN SERPL-ACNC: NORMAL U[IU]/ML
LAB AP CLINICAL INFORMATION: NORMAL
LAB AP GROSS DESCRIPTION: NORMAL
LAB AP REPORT FOOTNOTES: NORMAL
T3RU NFR SERPL: NORMAL %

## 2024-02-14 ENCOUNTER — HOSPITAL ENCOUNTER (OUTPATIENT)
Dept: RADIOLOGY | Facility: HOSPITAL | Age: 44
Discharge: HOME | End: 2024-02-14
Attending: STUDENT IN AN ORGANIZED HEALTH CARE EDUCATION/TRAINING PROGRAM
Payer: MEDICAID

## 2024-02-14 DIAGNOSIS — E04.1 THYROID NODULE: ICD-10-CM

## 2024-02-14 PROCEDURE — 76536 US EXAM OF HEAD AND NECK: CPT | Mod: TC

## 2024-02-26 ENCOUNTER — OFFICE VISIT (OUTPATIENT)
Dept: OTOLARYNGOLOGY | Facility: CLINIC | Age: 44
End: 2024-02-26
Payer: MEDICAID

## 2024-02-26 DIAGNOSIS — R22.1 MASS IN NECK: Primary | ICD-10-CM

## 2024-02-26 NOTE — PROGRESS NOTES
"Established Patient - Audio Only Telehealth Visit     The patient location is: home  The chief complaint leading to consultation is: neck mass  Visit type: Virtual visit with audio only (telephone)  Total time spent with patient: 20       The reason for the audio only service rather than synchronous audio and video virtual visit was related to technical difficulties or patient preference/necessity.     Each patient to whom I provide medical services by telemedicine is:  (1) informed of the relationship between the physician and patient and the respective role of any other health care provider with respect to management of the patient; and (2) notified that they may decline to receive medical services by telemedicine and may withdraw from such care at any time. Patient verbally consented to receive this service via voice-only telephone call.       HPI:  Phone conversation with patient regarding recent ultrasound.  It demonstrates a "new lobulated mass not seen on previous ultrasound."   Previous ultrasound date was 6/23 which revealed a 2.6 cm low lobulated mass inferior and lateral to the left thyroid lobe, this was followed by a CT scan afterwards which did not reveal any unusual findings.  I reviewed the last CT and did not see an abnormal findings also.    Assessment and plan:  Cervical neck mass     1. CT neck with contrast   2. Follow-up for inpatient visit after CT  3. Discussed findings in detail with the patient she is in agreement with plan                      This service was not originating from a related E/M service provided within the previous 7 days nor will  to an E/M service or procedure within the next 24 hours or my soonest available appointment.  Prevailing standard of care was able to be met in this audio-only visit.          "

## 2024-02-28 ENCOUNTER — CLINICAL SUPPORT (OUTPATIENT)
Dept: OTOLARYNGOLOGY | Facility: CLINIC | Age: 44
End: 2024-02-28
Payer: MEDICAID

## 2024-02-28 DIAGNOSIS — K12.30 MUCOSITIS ORAL: Primary | ICD-10-CM

## 2024-02-28 NOTE — PROGRESS NOTES
Established Patient - Audio Only Telehealth Visit     The patient location is:  Home  The chief complaint leading to consultation is:  Pathology results  Visit type: Virtual visit with audio only (telephone)  Total time spent with patient:  10 minutes       The reason for the audio only service rather than synchronous audio and video virtual visit was related to technical difficulties or patient preference/necessity.     Each patient to whom I provide medical services by telemedicine is:  (1) informed of the relationship between the physician and patient and the respective role of any other health care provider with respect to management of the patient; and (2) notified that they may decline to receive medical services by telemedicine and may withdraw from such care at any time. Patient verbally consented to receive this service via voice-only telephone call.       HPI:  Follow up phone call for pathology results of her tongue excisional biopsy.  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  In addition she is complaining of the area on her buccal mucosa is getting somewhat worse.  Question of autoimmune issues.  We will schedule a punch biopsy of the buccal mucosa                 This service was not originating from a related E/M service provided within the previous 7 days nor will  to an E/M service or procedure within the next 24 hours or my soonest available appointment.  Prevailing standard of care was able to be met in this audio-only visit.

## 2024-03-08 ENCOUNTER — HOSPITAL ENCOUNTER (OUTPATIENT)
Dept: RADIOLOGY | Facility: HOSPITAL | Age: 44
Discharge: HOME | End: 2024-03-08
Attending: OTOLARYNGOLOGY
Payer: MEDICAID

## 2024-03-08 DIAGNOSIS — R22.1 MASS IN NECK: ICD-10-CM

## 2024-03-08 LAB
CREAT SERPL-MCNC: 0.74 MG/DL (ref 0.55–1.02)
GFR SERPLBLD CREATININE-BSD FMLA CKD-EPI: >60 MLS/MIN/1.73/M2

## 2024-03-08 PROCEDURE — 70491 CT SOFT TISSUE NECK W/DYE: CPT | Mod: TC

## 2024-03-08 PROCEDURE — 82565 ASSAY OF CREATININE: CPT | Performed by: OTOLARYNGOLOGY

## 2024-03-08 PROCEDURE — 25500020 PHARM REV CODE 255: Performed by: OTOLARYNGOLOGY

## 2024-03-08 RX ADMIN — IOHEXOL 100 ML: 350 INJECTION, SOLUTION INTRAVENOUS at 01:03

## 2024-03-14 ENCOUNTER — TELEPHONE (OUTPATIENT)
Dept: OTOLARYNGOLOGY | Facility: CLINIC | Age: 44
End: 2024-03-14
Payer: MEDICAID

## 2024-03-14 NOTE — TELEPHONE ENCOUNTER
----- Message from Lisa Mccarty sent at 3/14/2024  8:13 AM CDT -----  Pt asked for a call back from Dr Scott's nurse about her CT and some meds that were not phoned in for her.  This was a voice mail.      Attempted to call pt back.  No answer. Voice box full

## 2024-03-21 ENCOUNTER — TELEPHONE (OUTPATIENT)
Dept: INTERVENTIONAL RADIOLOGY/VASCULAR | Facility: HOSPITAL | Age: 44
End: 2024-03-21
Payer: MEDICAID

## 2024-03-21 NOTE — TELEPHONE ENCOUNTER
Patient called and IR thyroid biopsy confirmed and scheduled for 3/28 @8am at [facility].

## 2024-03-28 ENCOUNTER — HOSPITAL ENCOUNTER (OUTPATIENT)
Dept: INTERVENTIONAL RADIOLOGY/VASCULAR | Facility: HOSPITAL | Age: 44
Discharge: HOME | End: 2024-03-28
Attending: INTERNAL MEDICINE
Payer: MEDICAID

## 2024-03-28 DIAGNOSIS — M79.89 MASS OF SOFT TISSUE OF NECK: ICD-10-CM

## 2024-03-28 PROCEDURE — 10005 FNA BX W/US GDN 1ST LES: CPT

## 2024-03-28 PROCEDURE — 0026U ONC THYR DNA&MRNA 112 GENES: CPT | Performed by: PATHOLOGY

## 2024-03-28 PROCEDURE — 88172 CYTP DX EVAL FNA 1ST EA SITE: CPT | Mod: TC | Performed by: OTOLARYNGOLOGY

## 2024-04-02 ENCOUNTER — TELEPHONE (OUTPATIENT)
Dept: INTERVENTIONAL RADIOLOGY/VASCULAR | Facility: HOSPITAL | Age: 44
End: 2024-04-02
Payer: MEDICAID

## 2024-04-02 NOTE — TELEPHONE ENCOUNTER
Ita Called patient to follow up after IR thyroid biopsy and patient stated he was doing fine with no complaints. Ita advised patient if he had any problems to follow up withs his PCP

## 2024-04-12 RX ORDER — FLUCONAZOLE 150 MG/1
150 TABLET ORAL DAILY
Qty: 21 TABLET | Refills: 0 | Status: CANCELLED | OUTPATIENT
Start: 2024-04-12 | End: 2024-05-03

## 2024-04-17 LAB
ESTRIOL SERPL-MCNC: ABNORMAL NG/ML
ESTROGEN SERPL-MCNC: ABNORMAL PG/ML
INSULIN SERPL-ACNC: ABNORMAL U[IU]/ML
LAB AP CLINICAL INFORMATION: ABNORMAL
LAB AP COMMENTS: ABNORMAL
LAB AP EBUS/EUS SPECIMEN: ABNORMAL
LAB AP GROSS DESCRIPTION: ABNORMAL
LAB AP NON-GYN INTERPRETATION SPECIMEN 1 (MULTI CAT): ABNORMAL
LAB AP NON-GYN SPECIMEN 1 ADEQUACY: ABNORMAL

## 2024-04-18 ENCOUNTER — OFFICE VISIT (OUTPATIENT)
Dept: OTOLARYNGOLOGY | Facility: CLINIC | Age: 44
End: 2024-04-18
Payer: MEDICAID

## 2024-04-18 VITALS
SYSTOLIC BLOOD PRESSURE: 127 MMHG | DIASTOLIC BLOOD PRESSURE: 86 MMHG | HEART RATE: 66 BPM | RESPIRATION RATE: 18 BRPM | WEIGHT: 241 LBS | HEIGHT: 67 IN | OXYGEN SATURATION: 100 % | BODY MASS INDEX: 37.83 KG/M2

## 2024-04-18 DIAGNOSIS — E04.1 THYROID NODULE: Primary | ICD-10-CM

## 2024-04-18 DIAGNOSIS — L43.8 ORAL LICHEN PLANUS: ICD-10-CM

## 2024-04-18 PROCEDURE — 99215 OFFICE O/P EST HI 40 MIN: CPT | Mod: PBBFAC | Performed by: STUDENT IN AN ORGANIZED HEALTH CARE EDUCATION/TRAINING PROGRAM

## 2024-04-18 RX ORDER — SODIUM CHLORIDE 9 MG/ML
INJECTION, SOLUTION INTRAVENOUS CONTINUOUS
Status: CANCELLED | OUTPATIENT
Start: 2024-04-18

## 2024-04-18 RX ORDER — FLUOCINONIDE TOPICAL SOLUTION USP, 0.05% 0.5 MG/ML
SOLUTION TOPICAL 2 TIMES DAILY
Qty: 20 ML | Refills: 1 | Status: SHIPPED | OUTPATIENT
Start: 2024-04-18

## 2024-04-18 NOTE — PROGRESS NOTES
[facility]  Otolaryngology Clinic Note    Fred Berman  Encounter Date: 4/18/2024  YOB: 1980  Physician: Lizbet New MD    Chief Complaint: tongue lesion    HPI: Fred Breman is a 43 y.o. female referred for a tongue lesion. She reports that she first noticed it about a year ago and says that it has slowly been enlarging since then. It has never bled. She doesn't think it hurts but does bite it occasionally which makes it sore. It's never been biopsied. She says that her dentist told her that he was not going to do any more work on her teeth until she got it checked out. She also feels like she has developed allergies over the past few months.  She endorses frequent tearing and redness of her eyes in feels like her nose is running frequently.  She also endorses nasal congestion.  She is been using Allegra and does not think it is helped any.  She has not tried Flonase.  She does use Afrin she thinks maybe once a month.  She was diagnosed with a sinus infection over the summer and got a few days of antibiotics.  She denies facial pain or pressure but does note that when she wakes up in the morning she feels like the outside of her nose is swollen over the bridge of her nose.  She also endorses facial numbness bilaterally in all 3 distributions which has been going on for some time.  She reports that her PCP is work this up with an MRI scan.  She is also been dealing with intermittent dysphagia.  She feels like things are not going down, both solids and liquids.  This does not happen with every meal but comes and goes.  She follows with Gastroenterology and has had EGDs and dilations in the past.  She reports that she has another 1 scheduled in November.  As far as her voice goes, she notes intermittent dysphonia where her voice gets roughed and then returns to normal.  It usually comes back to normal on its own.  She is on Protonix daily for gastritis that has  "been seen on prior scopes she reports.  As far as other medical problems she has diabetes, hypertension, gastroparesis, anxiety and depression.  For past surgical history she is had a cholecystectomy and .  She is had her tonsils removed but no other history of head and neck surgery.  She is a former smoker and quit 1 year ago.  She does not drink and denies any other drug use.      2024:  Presents today in follow up.  Eager for total thyroidectomy given results of FNA.  Has multiple concerns for surgical standpoint.  She would like to stay at least 1 night due to family history of von Willebrand's disease.  She also takes Subutex and would like to talk to the anesthesia team regarding use of opioids.  She will stop taking this she does prior to surgery and is only allowed 1 week of narcotics postop.  She states that she sees endocrinology for diabetes and thyroid dysfunction.  She is on at least 200 mcg of Synthroid already and her sugars are often uncontrolled.    ROS:   General: Negative except per HPI  Skin: Denies rash, ulcer, or lesion.  Eyes: Denies vision changes or diplopia.  Ears: Negative except per HPI  Nose: Negative except per HPI  Throat/mouth: Negative except per HPI  Cardiovascular: Negative except per HPI  Respiratory: Negative except per HPI  Neck: Negative except per HPI  Endocrine: Negative except per HPI  Neurologic: Negative except per HPI    Other 10-point review of systems negative except per HPI      Review of patient's allergies indicates:  No Known Allergies      Past Medical History:   Diagnosis Date    Adrenal mass     Anxiety     Arthritis     Bradycardia     Depression     Diabetes mellitus, type 2     Gastroparesis     General anesthetics causing adverse effect in therapeutic use     "hard time waking up"    GERD (gastroesophageal reflux disease)     Hip pain     Hypertension     Menstrual cramps     Ovarian cyst     Palpitations     Pyosalpingitis     Thyroid disease  "       Past Surgical History:   Procedure Laterality Date    ABLATION OF VAGINAL TISSUE USING LASER       SECTION      CHOLECYSTECTOMY      TUBAL LIGATION         Social History     Socioeconomic History    Marital status: Single   Tobacco Use    Smoking status: Former     Types: Cigarettes    Smokeless tobacco: Never   Substance and Sexual Activity    Alcohol use: Not Currently     Alcohol/week: 6.0 standard drinks of alcohol     Types: 6 Shots of liquor per week    Drug use: Not Currently     Types: Other-see comments     Comment: Various Opoids    Sexual activity: Yes     Partners: Male     Social Determinants of Health     Financial Resource Strain: High Risk (2023)    Overall Financial Resource Strain (CARDIA)     Difficulty of Paying Living Expenses: Very hard   Food Insecurity: No Food Insecurity (2023)    Hunger Vital Sign     Worried About Running Out of Food in the Last Year: Never true     Ran Out of Food in the Last Year: Never true   Recent Concern: Food Insecurity - Food Insecurity Present (2023)    Hunger Vital Sign     Worried About Running Out of Food in the Last Year: Sometimes true     Ran Out of Food in the Last Year: Never true   Transportation Needs: No Transportation Needs (2023)    PRAPARE - Transportation     Lack of Transportation (Medical): No     Lack of Transportation (Non-Medical): No   Physical Activity: Sufficiently Active (2023)    Exercise Vital Sign     Days of Exercise per Week: 5 days     Minutes of Exercise per Session: 30 min   Stress: Stress Concern Present (2023)    [employer] - Occupational Stress Questionnaire     Feeling of Stress : To some extent   Social Connections: Moderately Integrated (2023)    Social Connection and Isolation Panel [NHANES]     Frequency of Communication with Friends and Family: Twice a week     Frequency of Social Gatherings with Friends and Family: Once a week     Attends Buddhist  Services: More than 4 times per year     Active Member of Clubs or Organizations: Yes     Attends Club or Organization Meetings: More than 4 times per year     Marital Status:    Housing Stability: Unknown (9/25/2023)    Housing Stability Vital Sign     Unable to Pay for Housing in the Last Year: No     Unstable Housing in the Last Year: No   Recent Concern: Housing Stability - High Risk (7/4/2023)    Housing Stability Vital Sign     Unable to Pay for Housing in the Last Year: Yes     Unstable Housing in the Last Year: No       Family History   Problem Relation Name Age of Onset    Alcohol abuse Mother Pat         Pat    Arthritis Mother Pat     Cancer Mother Pat     COPD Mother Pat     Depression Mother Pat     Diabetes Mother Pat     Drug abuse Mother Pat     Hypertension Mother Pat     Miscarriages / Stillbirths Mother Pat     Depression Father Juventino     Drug abuse Father Juventino     Hypertension Father Juventino     Cancer Maternal Grandfather Janay     Miscarriages / Stillbirths Maternal Grandmother Arlean     Hearing loss Paternal Grandfather Lyod     Asthma Son Mojgan     Hypertension Son [place]     Drug abuse Sister Elana     Heart disease Sister Elana     Miscarriages / Stillbirths Sister Elana     Drug abuse Brother Juventino     Early death Brother Iaziah        Outpatient Encounter Medications as of 4/18/2024   Medication Sig Dispense Refill    buprenorphine HCL (SUBUTEX) 8 mg Subl Place 8 mg under the tongue 3 (three) times daily.      insulin (LANTUS SOLOSTAR U-100 INSULIN) glargine 100 units/mL SubQ pen Inject 15 Units into the skin 2 (two) times daily. 27 mL 3    levothyroxine (SYNTHROID) 200 MCG tablet Take 1 tablet (200 mcg total) by mouth before breakfast. 30 tablet 11    losartan (COZAAR) 50 MG tablet Take 1 tablet (50 mg total) by mouth once daily. 90 tablet 3    metFORMIN (GLUCOPHAGE) 500 MG tablet Take 1 tablet (500 mg total) by mouth 2 (two) times daily with meals. 180 tablet 1    pen needle, diabetic (BD  "ULTRA-FINE ROSALEE PEN NEEDLE) 32 gauge x 5/32" Ndle 1 each by Misc.(Non-Drug; Combo Route) route 2 (two) times a day. 100 each 9    busPIRone (BUSPAR) 7.5 MG tablet Take 7.5 mg by mouth 3 (three) times daily. (Patient not taking: Reported on 4/18/2024)      calcium carbonate/vitamin D3 (CALCIUM 600 + D,3, ORAL) Take 1 tablet by mouth 2 (two) times a day. (Patient not taking: Reported on 4/18/2024)      hydrocortisone (ANUSOL-HC) 2.5 % rectal cream Place 1 application  rectally 2 (two) times daily. (Patient not taking: Reported on 4/18/2024)      lubiprostone (AMITIZA) 24 MCG Cap Take 24 mcg by mouth daily with breakfast. (Patient not taking: Reported on 4/18/2024)      MOTEGRITY 2 mg Tab Take 1 tablet by mouth. (Patient not taking: Reported on 4/18/2024)      naloxegoL (MOVANTIK) 25 mg tablet Take 25 mg by mouth once daily. (Patient not taking: Reported on 1/23/2024) 30 tablet 2    ondansetron (ZOFRAN-ODT) 4 MG TbDL Take 1 tablet (4 mg total) by mouth every 8 (eight) hours as needed (NAUSEA). (Patient not taking: Reported on 4/18/2024) 30 tablet 1    pantoprazole (PROTONIX) 40 MG tablet Take 40 mg by mouth every morning. (Patient not taking: Reported on 4/18/2024)      polyethylene glycol (GLYCOLAX) 17 gram PwPk Take 17 g by mouth 2 (two) times daily. (Patient not taking: Reported on 2/8/2024) 30 each 1    REGLAN 10 mg tablet Take 1 tablet (10 mg total) by mouth 4 (four) times daily. (Patient not taking: Reported on 1/23/2024) 120 tablet 1    sertraline (ZOLOFT) 50 MG tablet Take 50 mg by mouth every evening. (Patient not taking: Reported on 4/18/2024)       No facility-administered encounter medications on file as of 4/18/2024.       Physical Exam:  Vitals:    04/18/24 1436   BP: 127/86   BP Location: Right arm   Patient Position: Sitting   BP Method: Large (Automatic)   Pulse: 66   Resp: 18   SpO2: 100%   Weight: 109.3 kg (241 lb)   Height: 5' 7" (1.702 m)       Constitutional  General Appearance: well nourished, " well-developed, AAO x3, NAD  HEENT  Eyes: PEERLA, EOMI, normal conjunctivae  Ears: Hearing well at conversation level   AD: auricle normal, EAC normal, TM intact, no PEGGY   AS: auricle normal, EAC normal, TM intact, no PEGGY  Nose: septum midline deviated to the left anteriorly, septal mucosa erythematous with some prominent vessels on the left, mild inferior turbinate hypertrophy, no polyps, moist mucosa without erythema or blue hue, difficult to visualize further posteriorly thus nasal endoscopy was performed  OC/OP: dentition moderate, left lateral tongue with 8mm raised smooth lesion, soft, nontender, tongue/FOM- soft, no leukoplakia/ulcerations/ tenderness, tonsils absent  Nasopharynx, Hypopharynx, and Larynx:    Indirect: attempted, limited view due to patient intolerance  Neck: soft, non-tender, no palpable lymph nodes   Thyroid region- no nodules or goiter  Neuro: CN II - XII intact bilaterally except for decreased sensation across bilateral V1-V3  Cardiovascular: peripheral pulses palpable  Respiratory: non-labored respirations  Psychiatric: oriented to time, place and person, no depression, anxiety or agitation    Previous FFL with document vocal cord motion.     Pertinent Data:  ? LABS:  ? AUDIO:  ? CT Scan:    Imaging:   I personally reviewed the following images: CT neck from June reviewed at which point her sinuses were clear. In July, her MRI noted pansinusitis.    US Thyroid: 2/14/2024  Impression:     Large, lobulated hypoechoic mass lesion(s) at the level of the left lobe of the thyroid, new from previous exam. This may be related to thyroid mass or conglomerate lymphadenopathy. .  Recommend contrast enhanced CT of the neck to evaluate further.    CT Neck: 3/8/2024:  Impression:     1. Increasing hypodense soft tissue in the left thyroid bed compared to 08/04/2023.  2. No definite cervical lymphadenopathy.     Pathology:  Supplemental Report   Thyroseq V3 GC Results Summary     Left Thyroid, 4.2 CM  Nodule, FNA Smear     Test Result Probability of Cancer or NIFTP Potential Management   POSITIVE Intermediate-high (50-60%) Surgical consultation*  *See interpretation below for details         Final Diagnosis      Fine-needle aspiration of left thyroid nodule:      - Atypical cells of undetermined significance.    See comment.      Electronically signed by Patrizia Bhatt MD on 3/28/2024 at 1424   Specimen 1 Interpretation    Abnormal   [facility] Thyroid Cytology Category III: Atypia Undetermined Significance (AUS)   Electronically signed by Patrizia Bhatt MD on 3/28/2024 at 1424   Comment     The specimen shows numerous lymphocytes, with a significant large lymphocyte population, admixed with follicular oncocytic cells and atypical follicular cell clusters. The specimen is suspicious for Hashimoto's thyroiditis. The lymphoid infiltrate has atypical features, which may represent a lymphoid germinal center or other lymphoid entity, and some of the follicular cells show atypia.  The specimen is sent for ThyroSeq testing which will be reported in an addendum.       Summary of Outside Records:      Assessment/Plan:  Fred Berman is a 43 y.o. female with 4 cm thyroid nodule, FNA with a [place] 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  She also has evidence of oral lichen planus that has failed conservative therapy    Had a very long discussion with the patient regarding treatment options for thyroid nodule.  Given that she is already on Synthroid, more reasonable to consider total thyroidectomy in the chance that the nodule is cancerous.  She is in agreement with this plan.  She has several concerns about her bleeding risk and her uncontrolled sugars.  We will test to see if she has von Willebrand's disease in the perioperative.  We are also getting touch with her endocrinologist and primary care doctor for risk stratification and diabetes optimization.  We will obtain preoperative lab  work and imaging with an EKG and chest x-ray.  We will plan this surgery on Friday May 10th with plans for overnight admission.  We will plan to obtain consents on the day of surgery. she will return to clinic 6 days postop for drain removal and wound check.    For her oral lichen planus we will give a trial of lidocaine steroid solution for her to try topically..  Will also referred to a rheumatologist to rule out autoimmune disorders.  We will continue to monitor this throughout the perioperative and postoperative given the potential risk of conversion to cancer.    Riley Stiles MD  [facility] Department of Otolaryngology  [facility] [place]

## 2024-04-18 NOTE — H&P (VIEW-ONLY)
[facility]  Otolaryngology Clinic Note    Fred Berman  Encounter Date: 4/18/2024  YOB: 1980  Physician: Lizbet New MD    Chief Complaint: tongue lesion    HPI: Fred Berman is a 43 y.o. female referred for a tongue lesion. She reports that she first noticed it about a year ago and says that it has slowly been enlarging since then. It has never bled. She doesn't think it hurts but does bite it occasionally which makes it sore. It's never been biopsied. She says that her dentist told her that he was not going to do any more work on her teeth until she got it checked out. She also feels like she has developed allergies over the past few months.  She endorses frequent tearing and redness of her eyes in feels like her nose is running frequently.  She also endorses nasal congestion.  She is been using Allegra and does not think it is helped any.  She has not tried Flonase.  She does use Afrin she thinks maybe once a month.  She was diagnosed with a sinus infection over the summer and got a few days of antibiotics.  She denies facial pain or pressure but does note that when she wakes up in the morning she feels like the outside of her nose is swollen over the bridge of her nose.  She also endorses facial numbness bilaterally in all 3 distributions which has been going on for some time.  She reports that her PCP is work this up with an MRI scan.  She is also been dealing with intermittent dysphagia.  She feels like things are not going down, both solids and liquids.  This does not happen with every meal but comes and goes.  She follows with Gastroenterology and has had EGDs and dilations in the past.  She reports that she has another 1 scheduled in November.  As far as her voice goes, she notes intermittent dysphonia where her voice gets roughed and then returns to normal.  It usually comes back to normal on its own.  She is on Protonix daily for gastritis that has  "been seen on prior scopes she reports.  As far as other medical problems she has diabetes, hypertension, gastroparesis, anxiety and depression.  For past surgical history she is had a cholecystectomy and .  She is had her tonsils removed but no other history of head and neck surgery.  She is a former smoker and quit 1 year ago.  She does not drink and denies any other drug use.      2024:  Presents today in follow up.  Eager for total thyroidectomy given results of FNA.  Has multiple concerns for surgical standpoint.  She would like to stay at least 1 night due to family history of von Willebrand's disease.  She also takes Subutex and would like to talk to the anesthesia team regarding use of opioids.  She will stop taking this she does prior to surgery and is only allowed 1 week of narcotics postop.  She states that she sees endocrinology for diabetes and thyroid dysfunction.  She is on at least 200 mcg of Synthroid already and her sugars are often uncontrolled.    ROS:   General: Negative except per HPI  Skin: Denies rash, ulcer, or lesion.  Eyes: Denies vision changes or diplopia.  Ears: Negative except per HPI  Nose: Negative except per HPI  Throat/mouth: Negative except per HPI  Cardiovascular: Negative except per HPI  Respiratory: Negative except per HPI  Neck: Negative except per HPI  Endocrine: Negative except per HPI  Neurologic: Negative except per HPI    Other 10-point review of systems negative except per HPI      Review of patient's allergies indicates:  No Known Allergies      Past Medical History:   Diagnosis Date    Adrenal mass     Anxiety     Arthritis     Bradycardia     Depression     Diabetes mellitus, type 2     Gastroparesis     General anesthetics causing adverse effect in therapeutic use     "hard time waking up"    GERD (gastroesophageal reflux disease)     Hip pain     Hypertension     Menstrual cramps     Ovarian cyst     Palpitations     Pyosalpingitis     Thyroid disease  "       Past Surgical History:   Procedure Laterality Date    ABLATION OF VAGINAL TISSUE USING LASER       SECTION      CHOLECYSTECTOMY      TUBAL LIGATION         Social History     Socioeconomic History    Marital status: Single   Tobacco Use    Smoking status: Former     Types: Cigarettes    Smokeless tobacco: Never   Substance and Sexual Activity    Alcohol use: Not Currently     Alcohol/week: 6.0 standard drinks of alcohol     Types: 6 Shots of liquor per week    Drug use: Not Currently     Types: Other-see comments     Comment: Various Opoids    Sexual activity: Yes     Partners: Male     Social Determinants of Health     Financial Resource Strain: High Risk (2023)    Overall Financial Resource Strain (CARDIA)     Difficulty of Paying Living Expenses: Very hard   Food Insecurity: No Food Insecurity (2023)    Hunger Vital Sign     Worried About Running Out of Food in the Last Year: Never true     Ran Out of Food in the Last Year: Never true   Recent Concern: Food Insecurity - Food Insecurity Present (2023)    Hunger Vital Sign     Worried About Running Out of Food in the Last Year: Sometimes true     Ran Out of Food in the Last Year: Never true   Transportation Needs: No Transportation Needs (2023)    PRAPARE - Transportation     Lack of Transportation (Medical): No     Lack of Transportation (Non-Medical): No   Physical Activity: Sufficiently Active (2023)    Exercise Vital Sign     Days of Exercise per Week: 5 days     Minutes of Exercise per Session: 30 min   Stress: Stress Concern Present (2023)    [employer] - Occupational Stress Questionnaire     Feeling of Stress : To some extent   Social Connections: Moderately Integrated (2023)    Social Connection and Isolation Panel [NHANES]     Frequency of Communication with Friends and Family: Twice a week     Frequency of Social Gatherings with Friends and Family: Once a week     Attends Jain  Services: More than 4 times per year     Active Member of Clubs or Organizations: Yes     Attends Club or Organization Meetings: More than 4 times per year     Marital Status:    Housing Stability: Unknown (9/25/2023)    Housing Stability Vital Sign     Unable to Pay for Housing in the Last Year: No     Unstable Housing in the Last Year: No   Recent Concern: Housing Stability - High Risk (7/4/2023)    Housing Stability Vital Sign     Unable to Pay for Housing in the Last Year: Yes     Unstable Housing in the Last Year: No       Family History   Problem Relation Name Age of Onset    Alcohol abuse Mother Pat         Pat    Arthritis Mother Pat     Cancer Mother Pat     COPD Mother Pat     Depression Mother Pat     Diabetes Mother Pat     Drug abuse Mother Pat     Hypertension Mother Pat     Miscarriages / Stillbirths Mother Pat     Depression Father Juventino     Drug abuse Father Juventino     Hypertension Father Juventino     Cancer Maternal Grandfather Janay     Miscarriages / Stillbirths Maternal Grandmother Arlean     Hearing loss Paternal Grandfather Lyod     Asthma Son Mojgan     Hypertension Son Fayette     Drug abuse Sister Elana     Heart disease Sister Elana     Miscarriages / Stillbirths Sister Elana     Drug abuse Brother Juventino     Early death Brother Izaiah        Outpatient Encounter Medications as of 4/18/2024   Medication Sig Dispense Refill    buprenorphine HCL (SUBUTEX) 8 mg Subl Place 8 mg under the tongue 3 (three) times daily.      insulin (LANTUS SOLOSTAR U-100 INSULIN) glargine 100 units/mL SubQ pen Inject 15 Units into the skin 2 (two) times daily. 27 mL 3    levothyroxine (SYNTHROID) 200 MCG tablet Take 1 tablet (200 mcg total) by mouth before breakfast. 30 tablet 11    losartan (COZAAR) 50 MG tablet Take 1 tablet (50 mg total) by mouth once daily. 90 tablet 3    metFORMIN (GLUCOPHAGE) 500 MG tablet Take 1 tablet (500 mg total) by mouth 2 (two) times daily with meals. 180 tablet 1    pen needle, diabetic (BD  "ULTRA-FINE ROSALEE PEN NEEDLE) 32 gauge x 5/32" Ndle 1 each by Misc.(Non-Drug; Combo Route) route 2 (two) times a day. 100 each 9    busPIRone (BUSPAR) 7.5 MG tablet Take 7.5 mg by mouth 3 (three) times daily. (Patient not taking: Reported on 4/18/2024)      calcium carbonate/vitamin D3 (CALCIUM 600 + D,3, ORAL) Take 1 tablet by mouth 2 (two) times a day. (Patient not taking: Reported on 4/18/2024)      hydrocortisone (ANUSOL-HC) 2.5 % rectal cream Place 1 application  rectally 2 (two) times daily. (Patient not taking: Reported on 4/18/2024)      lubiprostone (AMITIZA) 24 MCG Cap Take 24 mcg by mouth daily with breakfast. (Patient not taking: Reported on 4/18/2024)      MOTEGRITY 2 mg Tab Take 1 tablet by mouth. (Patient not taking: Reported on 4/18/2024)      naloxegoL (MOVANTIK) 25 mg tablet Take 25 mg by mouth once daily. (Patient not taking: Reported on 1/23/2024) 30 tablet 2    ondansetron (ZOFRAN-ODT) 4 MG TbDL Take 1 tablet (4 mg total) by mouth every 8 (eight) hours as needed (NAUSEA). (Patient not taking: Reported on 4/18/2024) 30 tablet 1    pantoprazole (PROTONIX) 40 MG tablet Take 40 mg by mouth every morning. (Patient not taking: Reported on 4/18/2024)      polyethylene glycol (GLYCOLAX) 17 gram PwPk Take 17 g by mouth 2 (two) times daily. (Patient not taking: Reported on 2/8/2024) 30 each 1    REGLAN 10 mg tablet Take 1 tablet (10 mg total) by mouth 4 (four) times daily. (Patient not taking: Reported on 1/23/2024) 120 tablet 1    sertraline (ZOLOFT) 50 MG tablet Take 50 mg by mouth every evening. (Patient not taking: Reported on 4/18/2024)       No facility-administered encounter medications on file as of 4/18/2024.       Physical Exam:  Vitals:    04/18/24 1436   BP: 127/86   BP Location: Right arm   Patient Position: Sitting   BP Method: Large (Automatic)   Pulse: 66   Resp: 18   SpO2: 100%   Weight: 109.3 kg (241 lb)   Height: 5' 7" (1.702 m)       Constitutional  General Appearance: well nourished, " well-developed, AAO x3, NAD  HEENT  Eyes: PEERLA, EOMI, normal conjunctivae  Ears: Hearing well at conversation level   AD: auricle normal, EAC normal, TM intact, no PEGGY   AS: auricle normal, EAC normal, TM intact, no PEGGY  Nose: septum midline deviated to the left anteriorly, septal mucosa erythematous with some prominent vessels on the left, mild inferior turbinate hypertrophy, no polyps, moist mucosa without erythema or blue hue, difficult to visualize further posteriorly thus nasal endoscopy was performed  OC/OP: dentition moderate, left lateral tongue with 8mm raised smooth lesion, soft, nontender, tongue/FOM- soft, no leukoplakia/ulcerations/ tenderness, tonsils absent  Nasopharynx, Hypopharynx, and Larynx:    Indirect: attempted, limited view due to patient intolerance  Neck: soft, non-tender, no palpable lymph nodes   Thyroid region- no nodules or goiter  Neuro: CN II - XII intact bilaterally except for decreased sensation across bilateral V1-V3  Cardiovascular: peripheral pulses palpable  Respiratory: non-labored respirations  Psychiatric: oriented to time, place and person, no depression, anxiety or agitation    Previous FFL with document vocal cord motion.     Pertinent Data:  ? LABS:  ? AUDIO:  ? CT Scan:    Imaging:   I personally reviewed the following images: CT neck from June reviewed at which point her sinuses were clear. In July, her MRI noted pansinusitis.    US Thyroid: 2/14/2024  Impression:     Large, lobulated hypoechoic mass lesion(s) at the level of the left lobe of the thyroid, new from previous exam. This may be related to thyroid mass or conglomerate lymphadenopathy. .  Recommend contrast enhanced CT of the neck to evaluate further.    CT Neck: 3/8/2024:  Impression:     1. Increasing hypodense soft tissue in the left thyroid bed compared to 08/04/2023.  2. No definite cervical lymphadenopathy.     Pathology:  Supplemental Report   Thyroseq V3 GC Results Summary     Left Thyroid, 4.2 CM  Nodule, FNA Smear     Test Result Probability of Cancer or NIFTP Potential Management   POSITIVE Intermediate-high (50-60%) Surgical consultation*  *See interpretation below for details         Final Diagnosis      Fine-needle aspiration of left thyroid nodule:      - Atypical cells of undetermined significance.    See comment.      Electronically signed by Patrizia Bhatt MD on 3/28/2024 at 1424   Specimen 1 Interpretation    Abnormal   [facility] Thyroid Cytology Category III: Atypia Undetermined Significance (AUS)   Electronically signed by Patrizia Bhatt MD on 3/28/2024 at 1424   Comment     The specimen shows numerous lymphocytes, with a significant large lymphocyte population, admixed with follicular oncocytic cells and atypical follicular cell clusters. The specimen is suspicious for Hashimoto's thyroiditis. The lymphoid infiltrate has atypical features, which may represent a lymphoid germinal center or other lymphoid entity, and some of the follicular cells show atypia.  The specimen is sent for ThyroSeq testing which will be reported in an addendum.       Summary of Outside Records:      Assessment/Plan:  Fred Berman is a 43 y.o. female with 4 cm thyroid nodule, FNA with a U.S. 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  She also has evidence of oral lichen planus that has failed conservative therapy    Had a very long discussion with the patient regarding treatment options for thyroid nodule.  Given that she is already on Synthroid, more reasonable to consider total thyroidectomy in the chance that the nodule is cancerous.  She is in agreement with this plan.  She has several concerns about her bleeding risk and her uncontrolled sugars.  We will test to see if she has von Willebrand's disease in the perioperative.  We are also getting touch with her endocrinologist and primary care doctor for risk stratification and diabetes optimization.  We will obtain preoperative lab  work and imaging with an EKG and chest x-ray.  We will plan this surgery on Friday May 10th with plans for overnight admission.  We will plan to obtain consents on the day of surgery. she will return to clinic 6 days postop for drain removal and wound check.    For her oral lichen planus we will give a trial of lidocaine steroid solution for her to try topically..  Will also referred to a rheumatologist to rule out autoimmune disorders.  We will continue to monitor this throughout the perioperative and postoperative given the potential risk of conversion to cancer.    Riley Stiles MD  [facility] Department of Otolaryngology  [facility]

## 2024-04-23 LAB — BEAKER SEE SCANNED REPORT: NORMAL

## 2024-04-25 ENCOUNTER — HOSPITAL ENCOUNTER (OUTPATIENT)
Dept: CARDIOLOGY | Facility: HOSPITAL | Age: 44
Discharge: HOME | End: 2024-04-25
Attending: INTERNAL MEDICINE
Payer: MEDICAID

## 2024-04-25 ENCOUNTER — HOSPITAL ENCOUNTER (OUTPATIENT)
Dept: RADIOLOGY | Facility: HOSPITAL | Age: 44
Discharge: HOME | End: 2024-04-25
Attending: STUDENT IN AN ORGANIZED HEALTH CARE EDUCATION/TRAINING PROGRAM
Payer: MEDICAID

## 2024-04-25 ENCOUNTER — OFFICE VISIT (OUTPATIENT)
Dept: INTERNAL MEDICINE | Facility: CLINIC | Age: 44
End: 2024-04-25
Payer: MEDICAID

## 2024-04-25 VITALS
OXYGEN SATURATION: 98 % | SYSTOLIC BLOOD PRESSURE: 115 MMHG | HEART RATE: 77 BPM | TEMPERATURE: 99 F | DIASTOLIC BLOOD PRESSURE: 73 MMHG | WEIGHT: 247.38 LBS | BODY MASS INDEX: 38.75 KG/M2 | RESPIRATION RATE: 16 BRPM

## 2024-04-25 DIAGNOSIS — Z12.39 ENCOUNTER FOR SCREENING FOR MALIGNANT NEOPLASM OF BREAST, UNSPECIFIED SCREENING MODALITY: ICD-10-CM

## 2024-04-25 DIAGNOSIS — E11.65 UNCONTROLLED TYPE 2 DIABETES MELLITUS WITH HYPERGLYCEMIA: Primary | ICD-10-CM

## 2024-04-25 DIAGNOSIS — E04.1 THYROID NODULE: ICD-10-CM

## 2024-04-25 DIAGNOSIS — Z01.818 PRE-OP EVALUATION: ICD-10-CM

## 2024-04-25 LAB — HBA1C MFR BLD: 10.8 %

## 2024-04-25 PROCEDURE — 93005 ELECTROCARDIOGRAM TRACING: CPT

## 2024-04-25 PROCEDURE — 71045 X-RAY EXAM CHEST 1 VIEW: CPT | Mod: TC

## 2024-04-25 PROCEDURE — 99213 OFFICE O/P EST LOW 20 MIN: CPT | Mod: PBBFAC,25 | Performed by: INTERNAL MEDICINE

## 2024-04-25 PROCEDURE — 83036 HEMOGLOBIN GLYCOSYLATED A1C: CPT | Mod: PBBFAC | Performed by: INTERNAL MEDICINE

## 2024-04-25 NOTE — PROGRESS NOTES
"[facility] INTERNAL MEDICINE  OUTPATIENT OFFICE VISIT NOTE    SUBJECTIVE:      HPI: Fred Berman is a 43 y.o. female here today for ESTABLISHING CARE  Fred Berman is a 43 y.o. yo female w/ PMH of DM II, HTN, gastroparesis, hypothyroidism, who presents today for for a preop visit.  Recently seen in ENT- She has  4 cm thyroid nodule, FNA with a U.S. 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 She also has evidence of oral lichen planus that has failed conservative therapy - Per ENT- A TOTAL THYROIDECTOMY IS PLAN GIVEN THE CHANCE THAT THE NODULE COULD BE CANCEROUS.   The patient sees an endocrinologist for control of her diabetes.  She is aware the present time diabetes is under sub- optimal control    Current Outpatient Medications   Medication Instructions    buprenorphine HCL (SUBUTEX) 8 mg, Sublingual, 3 times daily    busPIRone (BUSPAR) 7.5 mg, 3 times daily    calcium carbonate/vitamin D3 (CALCIUM 600 + D,3, ORAL) 1 tablet, 2 times daily    COMPOUND HORMONE REPLACEMENT Oral, Daily    fluocinonide (LIDEX) 0.05 % external solution Topical (Top), 2 times daily, Swish and spit solution    hydrocortisone (ANUSOL-HC) 2.5 % rectal cream 1 application , Rectal, 2 times daily    LANTUS SOLOSTAR U-100 INSULIN 15 Units, Subcutaneous, 2 times daily    levothyroxine (SYNTHROID) 200 mcg, Oral, Before breakfast    losartan (COZAAR) 50 mg, Oral, Daily    lubiprostone (AMITIZA) 24 mcg, Oral, With breakfast    metFORMIN (GLUCOPHAGE) 500 mg, Oral, 2 times daily with meals    MOTEGRITY 2 mg Tab 1 tablet, Oral    naloxegoL (MOVANTIK) 25 mg, Oral, Daily    ondansetron (ZOFRAN-ODT) 4 mg, Oral, Every 8 hours PRN    pantoprazole (PROTONIX) 40 mg, Oral, Every morning    pen needle, diabetic (BD ULTRA-FINE ROSALEE PEN NEEDLE) 32 gauge x 5/32" Ndle 1 each, Misc.(Non-Drug; Combo Route), 2 times daily    polyethylene glycol (GLYCOLAX) 17 g, Oral, 2 times daily    REGLAN 10 mg, Oral, 4 times daily    sertraline (ZOLOFT) 50 mg, " Oral, Nightly       ROS:  CONSTITUTIONAL: Denies weight loss, fever and chills.  HEENT: Denies changes in vision and hearing.  ?RESPIRATORY: Denies SOB and cough.?  CV: Denies palpitations and CP. ?  GI: Denies abdominal pain, nausea, vomiting and diarrhea.?  : Denies dysuria and urinary frequency.?  MSK: Denies myalgia and joint pain.?  SKIN: Denies rash and pruritus.  ?NEUROLOGICAL: Denies headache and syncope.?  PSYCHIATRIC: Denies recent changes in mood. Denies anxiety and depression.     OBJECTIVE:       Visit Vitals  /73   Pulse 77   Temp 98.5 °F (36.9 °C)   Resp 16   Wt 112.2 kg (247 lb 6.4 oz)   SpO2 98%   BMI 38.75 kg/m²        General: Well nourished w/o distress  HEENT: NC/AT; PERRLA; nasal and oral mucosa moist and clear; no sinus tenderness; no thyromegaly  Neck: Full ROM; no lymphadenopathy  Pulm: CTA bilaterally, normal work of breathing  CV: S1, S2 w/o murmurs or gallops; no edema noted  GI: Soft with normal bowel sounds in all quadrants, no masses on palpation  MSK: Full ROM of all extremities and spine w/o limitation or discomfort  Derm: No rashes, abnormal bruising, or skin lesions  Neuro: AAOx4; CN II-XII intact; motor/sensory function intact  Psych: Cooperative; appropriate mood and affect     ASSESSMENT & PLAN:     DM II  Gastroparesis  -A1c- POC TODAY IS 10.6.  AT THIS POINT THE PATIENT is possibly MODERATE RISK FOR MODERATE RISK PROCEDURE.  Furthermore I do not have any recent lab work to review- and her risk stratification will be dependent on blood work    She does have an endocrinologist in [place] who is trying to adjust her diabetic medications     Hypothyroidism  - continues on levothyroxine     HTN  -Normotensive at this time  -Continue losartan 50mg daily     Chronic pain syndrome  HX of polysubstance abuse  -Currently F/U with suboxone clinic and on buprenorphine 8mg TID     Generalized anxiety disorder   -Currently seeing an outside psychiatrist       1. Uncontrolled type  2 diabetes mellitus with hyperglycemia  -     POCT HEMOGLOBIN A1C    2. Encounter for screening for malignant neoplasm of breast, unspecified screening modality  -     Mammo Digital Screening Bilat w/ Sid; Future; Expected date: 04/25/2024  -     POCT HEMOGLOBIN A1C    3. Pre-op evaluation  -     IN OFFICE EKG 12-LEAD (to Muse)       This is an addendum to the above documentation.  This morning I was able to review lab work that was completed yesterday.  The patient's  labs -A1c of 10.8.  Thyroid function is within normal limits.  Awaiting EKG and chest x-ray as requested by ENT. At this point I would risk stratify the patient as moderate risk for moderate risk procedure.        No follow-ups on file.     Future Appointments   Date Time Provider Department Center   5/21/2024  1:45 PM RESIDENT 1, [facility] OTORHINOLARYNGOLOGY [facility] ENT [facility]   7/30/2024  3:15 PM Osiel Swann MD [facility] DAMASO Souza MD

## 2024-04-26 ENCOUNTER — TELEPHONE (OUTPATIENT)
Dept: OTOLARYNGOLOGY | Facility: CLINIC | Age: 44
End: 2024-04-26
Payer: MEDICAID

## 2024-04-26 LAB
OHS QRS DURATION: 94 MS
OHS QTC CALCULATION: 438 MS

## 2024-04-26 NOTE — TELEPHONE ENCOUNTER
----- Message from Annamaria Gage sent at 4/26/2024 12:56 PM CDT -----  PT called to speak to you and stated that she received her blood work( ALT AND AST) and is very concerned about her results.

## 2024-04-29 ENCOUNTER — ANESTHESIA EVENT (OUTPATIENT)
Dept: SURGERY | Facility: HOSPITAL | Age: 44
End: 2024-04-29
Payer: MEDICAID

## 2024-04-29 NOTE — ANESTHESIA PREPROCEDURE EVALUATION
"Fred Berman is a 43 y.o. female PRESENTING FOR THYROIDECTOMY,BILATERAL (Bilateral) with a history of   -4 cm thyroid nodule, FNA with a [place] 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     -HYPOTHYROIDISM; TSH=1.144 4/25/24  -UNCONTROLLED DM (A1C 10.8 4/25/24); AM CBG   -HTN  -CHRONIC PAIN  -H/O OPIOID DEPENDENCE      -ON SUBUTEX, HOLD 2 DAYS PRIOR TO SX PER PRESCRIBER  -ANXIETY/DEPRESSION   -H/O OF REPORTED "DIFFICULTY WAKING UP" FROM ANESTHESIA  -FORMER SMOKER  -GERD    BETA-BLOCKER: NONE    New Orders for Anesthesia: UPT, DM PROTOCOL    Active Ambulatory Problems     Diagnosis Date Noted    Dysmenorrhea 06/21/2023    TOA (tubo-ovarian abscess) 06/21/2023    Pelvic prolapse 06/21/2023    Unexplained weight loss 06/21/2023    Gastroparesis 07/02/2023    Orthostatic lightheadedness 07/03/2023    Volume depletion 07/03/2023    Moderate malnutrition 07/07/2023    Controlled type 2 diabetes mellitus with complication, with long-term current use of insulin 08/10/2023    HTN (hypertension) 08/10/2023    Hypothyroidism 08/10/2023    Generalized anxiety disorder 08/10/2023    Chronic pain syndrome 08/10/2023    Vitamin D deficiency 08/10/2023    Class 1 obesity in adult 08/10/2023    Panic attack 09/24/2023    Lesion of adrenal gland 10/03/2023    Arthritis     Anxiety     Palpitations     Uncontrolled type 2 diabetes mellitus with hyperglycemia 04/25/2024    Pre-op evaluation 04/25/2024     Resolved Ambulatory Problems     Diagnosis Date Noted    No Resolved Ambulatory Problems     Past Medical History:   Diagnosis Date    Adrenal mass     Bradycardia     Depression     Diabetes mellitus, type 2     General anesthetics causing adverse effect in therapeutic use     GERD (gastroesophageal reflux disease)     Hip pain     Hypertension     Menstrual cramps     Ovarian cyst     Pyosalpingitis     Thyroid disease      Pre-op Assessment    I have reviewed the NPO Status.      Review of Systems  Anesthesia " Hx:  No problems with previous Anesthesia                Social:  Former Smoker       Cardiovascular:     Hypertension, poorly controlled                                        Pulmonary:  Pulmonary Normal                       Renal/:  Renal/ Normal                 Hepatic/GI:     GERD, well controlled             Neurological:        Chronic Pain Syndrome                         Endocrine:  Diabetes, poorly controlled, type 2 Hypothyroidism        Obesity / BMI > 30  Psych:   anxiety depression              Vitals:    05/10/24 0506 05/10/24 0507 05/10/24 0559 05/10/24 0609   BP:  (!) 163/80 (!) 163/80 (!) 161/84   Pulse:  85  88   Resp:  18  20   Temp:  36.9 °C (98.5 °F)  36 °C (96.8 °F)   TempSrc:  Oral  Temporal   SpO2:  96%  98%   Weight: 107.7 kg (237 lb 6.4 oz)            Physical Exam  General: Alert, Cooperative and Well nourished    Airway:  Mallampati: II   Mouth Opening: Normal  TM Distance: Normal  Tongue: Normal  Neck ROM: Normal ROM    Dental:  Intact    Chest/Lungs:  Clear to auscultation, Normal Respiratory Rate    Heart:  Rate: Normal  Rhythm: Regular Rhythm  Sounds: Normal       Latest Reference Range & Units 05/10/24 05:31   hCG Qualitative, Urine Negative  Negative      Latest Reference Range & Units 05/10/24 06:42   POCT Glucose 70 - 110 mg/dL 223 (H)   (H): Data is abnormally high  Lab Results   Component Value Date    WBC 6.63 04/25/2024    HGB 14.9 04/25/2024    HCT 44.5 04/25/2024    MCV 85.4 04/25/2024     04/25/2024       CMP  Sodium Level   Date Value Ref Range Status   04/25/2024 139 136 - 145 mmol/L Final     Potassium Level   Date Value Ref Range Status   04/25/2024 4.0 3.5 - 5.1 mmol/L Final     Carbon Dioxide   Date Value Ref Range Status   04/25/2024 30 (H) 22 - 29 mmol/L Final     Blood Urea Nitrogen   Date Value Ref Range Status   04/25/2024 12.0 7.0 - 18.7 mg/dL Final     Creatinine   Date Value Ref Range Status   04/25/2024 0.86 0.55 - 1.02 mg/dL Final     Calcium  Level Total   Date Value Ref Range Status   04/25/2024 9.9 8.4 - 10.2 mg/dL Final     Albumin Level   Date Value Ref Range Status   04/25/2024 4.3 3.5 - 5.0 g/dL Final     Bilirubin Total   Date Value Ref Range Status   04/25/2024 0.9 <=1.5 mg/dL Final     Alkaline Phosphatase   Date Value Ref Range Status   04/25/2024 75 40 - 150 unit/L Final     Aspartate Aminotransferase   Date Value Ref Range Status   04/25/2024 50 (H) 5 - 34 unit/L Final     Alanine Aminotransferase   Date Value Ref Range Status   04/25/2024 62 (H) 0 - 55 unit/L Final     eGFR   Date Value Ref Range Status   04/25/2024 >60 mls/min/1.73/m2 Final     Lab Results   Component Value Date    HGBA1C 10.1 (H) 01/23/2024   EKG 4/25/24    ECHO 6/16/23    Summary  Show Result Comparison  Normal LV function EF 55%  Normal filling pressure  Insufficient TR jet for RVSP estimation     HOLTER MONITOR 6/12/23    Interpretation Summary  Show Result Comparison  Sinus bradycardia.  No significant arrhythmias, AV block, or pauses.    PRE-OP CLEARANCE 4/25/24    Past anesthesia records: Colonoscopy          Anesthesia Plan  Type of Anesthesia, risks & benefits discussed:    Anesthesia Type: Gen ETT  Intra-op Monitoring Plan: Standard ASA Monitors  Post Op Pain Control Plan: IV/PO Opioids PRN  Induction:  IV  Airway Plan: Direct  Informed Consent: Informed consent signed with the Patient and all parties understand the risks and agree with anesthesia plan.  All questions answered.   ASA Score: 3  Day of Surgery Review of History & Physical: H&P Update referred to the surgeon/provider.  Anesthesia Plan Notes: Remifentanil gtt    Ready For Surgery From Anesthesia Perspective.     .

## 2024-05-06 NOTE — PROGRESS NOTES
I have reviewed and agree with the resident's findings, including all diagnostic interpretations and plans as written.     Cali Trujillo M.D.

## 2024-05-09 ENCOUNTER — PATIENT MESSAGE (OUTPATIENT)
Dept: SURGERY | Facility: HOSPITAL | Age: 44
End: 2024-05-09
Payer: MEDICAID

## 2024-05-10 ENCOUNTER — HOSPITAL ENCOUNTER (OUTPATIENT)
Facility: HOSPITAL | Age: 44
LOS: 1 days | Discharge: HOME | End: 2024-05-12
Attending: OTOLARYNGOLOGY | Admitting: OTOLARYNGOLOGY
Payer: MEDICAID

## 2024-05-10 ENCOUNTER — ANESTHESIA (OUTPATIENT)
Dept: SURGERY | Facility: HOSPITAL | Age: 44
End: 2024-05-10
Payer: MEDICAID

## 2024-05-10 DIAGNOSIS — R07.9 CHEST PAIN AT REST: ICD-10-CM

## 2024-05-10 DIAGNOSIS — E04.1 THYROID NODULE: Primary | ICD-10-CM

## 2024-05-10 DIAGNOSIS — C73 PRIMARY THYROID MALIGNANCY: ICD-10-CM

## 2024-05-10 LAB
B-HCG UR QL: NEGATIVE
CTP QC/QA: YES
INR PPP: 1
POCT GLUCOSE: 208 MG/DL (ref 70–110)
POCT GLUCOSE: 219 MG/DL (ref 70–110)
POCT GLUCOSE: 223 MG/DL (ref 70–110)
POCT GLUCOSE: 231 MG/DL (ref 70–110)
POCT GLUCOSE: 241 MG/DL (ref 70–110)
PROTHROMBIN TIME: 12.5 SECONDS (ref 11.4–14)

## 2024-05-10 PROCEDURE — 88341 IMHCHEM/IMCYTCHM EA ADD ANTB: CPT

## 2024-05-10 PROCEDURE — 63600175 PHARM REV CODE 636 W HCPCS: Performed by: STUDENT IN AN ORGANIZED HEALTH CARE EDUCATION/TRAINING PROGRAM

## 2024-05-10 PROCEDURE — D9220A PRA ANESTHESIA: Mod: ANES,,, | Performed by: ANESTHESIOLOGY

## 2024-05-10 PROCEDURE — 85610 PROTHROMBIN TIME: CPT | Performed by: STUDENT IN AN ORGANIZED HEALTH CARE EDUCATION/TRAINING PROGRAM

## 2024-05-10 PROCEDURE — G0378 HOSPITAL OBSERVATION PER HR: HCPCS

## 2024-05-10 PROCEDURE — 37000009 HC ANESTHESIA EA ADD 15 MINS: Performed by: OTOLARYNGOLOGY

## 2024-05-10 PROCEDURE — 37000008 HC ANESTHESIA 1ST 15 MINUTES: Performed by: OTOLARYNGOLOGY

## 2024-05-10 PROCEDURE — 88325 CONSLTJ COMPRE RVW REC REPRT: CPT

## 2024-05-10 PROCEDURE — 82962 GLUCOSE BLOOD TEST: CPT | Performed by: OTOLARYNGOLOGY

## 2024-05-10 PROCEDURE — 36000707: Performed by: OTOLARYNGOLOGY

## 2024-05-10 PROCEDURE — 63600175 PHARM REV CODE 636 W HCPCS: Performed by: OTOLARYNGOLOGY

## 2024-05-10 PROCEDURE — 25000003 PHARM REV CODE 250: Performed by: STUDENT IN AN ORGANIZED HEALTH CARE EDUCATION/TRAINING PROGRAM

## 2024-05-10 PROCEDURE — 27201423 OPTIME MED/SURG SUP & DEVICES STERILE SUPPLY: Performed by: OTOLARYNGOLOGY

## 2024-05-10 PROCEDURE — 63600175 PHARM REV CODE 636 W HCPCS: Performed by: ANESTHESIOLOGY

## 2024-05-10 PROCEDURE — 25000003 PHARM REV CODE 250: Performed by: OTOLARYNGOLOGY

## 2024-05-10 PROCEDURE — 71000033 HC RECOVERY, INTIAL HOUR: Performed by: OTOLARYNGOLOGY

## 2024-05-10 PROCEDURE — 96372 THER/PROPH/DIAG INJ SC/IM: CPT | Performed by: STUDENT IN AN ORGANIZED HEALTH CARE EDUCATION/TRAINING PROGRAM

## 2024-05-10 PROCEDURE — 88342 IMHCHEM/IMCYTCHM 1ST ANTB: CPT | Mod: 59 | Performed by: PATHOLOGY

## 2024-05-10 PROCEDURE — 88342 IMHCHEM/IMCYTCHM 1ST ANTB: CPT | Performed by: PATHOLOGY

## 2024-05-10 PROCEDURE — 63600175 PHARM REV CODE 636 W HCPCS: Performed by: NURSE PRACTITIONER

## 2024-05-10 PROCEDURE — 25000003 PHARM REV CODE 250: Performed by: NURSE ANESTHETIST, CERTIFIED REGISTERED

## 2024-05-10 PROCEDURE — 63600175 PHARM REV CODE 636 W HCPCS: Performed by: NURSE ANESTHETIST, CERTIFIED REGISTERED

## 2024-05-10 PROCEDURE — 36000706: Performed by: OTOLARYNGOLOGY

## 2024-05-10 PROCEDURE — 25000003 PHARM REV CODE 250

## 2024-05-10 PROCEDURE — 63600175 PHARM REV CODE 636 W HCPCS: Mod: JZ,JG

## 2024-05-10 PROCEDURE — 81025 URINE PREGNANCY TEST: CPT | Performed by: NURSE PRACTITIONER

## 2024-05-10 PROCEDURE — 94761 N-INVAS EAR/PLS OXIMETRY MLT: CPT

## 2024-05-10 PROCEDURE — D9220A PRA ANESTHESIA: Mod: CRNA,,, | Performed by: NURSE ANESTHETIST, CERTIFIED REGISTERED

## 2024-05-10 RX ORDER — CEFAZOLIN SODIUM 1 G/3ML
2 INJECTION, POWDER, FOR SOLUTION INTRAMUSCULAR; INTRAVENOUS
Status: DISCONTINUED | OUTPATIENT
Start: 2024-05-10 | End: 2024-05-10

## 2024-05-10 RX ORDER — LOSARTAN POTASSIUM 25 MG/1
50 TABLET ORAL DAILY
Status: DISCONTINUED | OUTPATIENT
Start: 2024-05-10 | End: 2024-05-12 | Stop reason: HOSPADM

## 2024-05-10 RX ORDER — GLUCAGON 1 MG
1 KIT INJECTION
Status: DISCONTINUED | OUTPATIENT
Start: 2024-05-10 | End: 2024-05-12 | Stop reason: HOSPADM

## 2024-05-10 RX ORDER — FENTANYL CITRATE 50 UG/ML
INJECTION, SOLUTION INTRAMUSCULAR; INTRAVENOUS
Status: DISCONTINUED | OUTPATIENT
Start: 2024-05-10 | End: 2024-05-10

## 2024-05-10 RX ORDER — GLUCAGON 1 MG
1 KIT INJECTION
Status: DISCONTINUED | OUTPATIENT
Start: 2024-05-10 | End: 2024-05-10

## 2024-05-10 RX ORDER — MORPHINE SULFATE 2 MG/ML
2 INJECTION, SOLUTION INTRAMUSCULAR; INTRAVENOUS EVERY 4 HOURS PRN
Status: DISCONTINUED | OUTPATIENT
Start: 2024-05-10 | End: 2024-05-12

## 2024-05-10 RX ORDER — PROPOFOL 10 MG/ML
VIAL (ML) INTRAVENOUS
Status: DISCONTINUED | OUTPATIENT
Start: 2024-05-10 | End: 2024-05-10

## 2024-05-10 RX ORDER — INSULIN ASPART 100 [IU]/ML
0-5 INJECTION, SOLUTION INTRAVENOUS; SUBCUTANEOUS ONCE AS NEEDED
Status: COMPLETED | OUTPATIENT
Start: 2024-05-10 | End: 2024-05-10

## 2024-05-10 RX ORDER — SODIUM CHLORIDE 9 MG/ML
INJECTION, SOLUTION INTRAVENOUS CONTINUOUS
Status: DISCONTINUED | OUTPATIENT
Start: 2024-05-10 | End: 2024-05-10

## 2024-05-10 RX ORDER — IBUPROFEN 200 MG
24 TABLET ORAL
Status: DISCONTINUED | OUTPATIENT
Start: 2024-05-10 | End: 2024-05-10

## 2024-05-10 RX ORDER — ONDANSETRON HYDROCHLORIDE 2 MG/ML
4 INJECTION, SOLUTION INTRAVENOUS DAILY PRN
Status: DISCONTINUED | OUTPATIENT
Start: 2024-05-10 | End: 2024-05-10

## 2024-05-10 RX ORDER — TALC
6 POWDER (GRAM) TOPICAL NIGHTLY PRN
Status: DISCONTINUED | OUTPATIENT
Start: 2024-05-10 | End: 2024-05-12 | Stop reason: HOSPADM

## 2024-05-10 RX ORDER — IBUPROFEN 400 MG/1
400 TABLET ORAL EVERY 6 HOURS
Status: DISCONTINUED | OUTPATIENT
Start: 2024-05-10 | End: 2024-05-12 | Stop reason: HOSPADM

## 2024-05-10 RX ORDER — DOCUSATE SODIUM 100 MG/1
100 CAPSULE, LIQUID FILLED ORAL 2 TIMES DAILY
Status: DISCONTINUED | OUTPATIENT
Start: 2024-05-10 | End: 2024-05-12 | Stop reason: HOSPADM

## 2024-05-10 RX ORDER — MEPERIDINE HYDROCHLORIDE 25 MG/ML
12.5 INJECTION INTRAMUSCULAR; INTRAVENOUS; SUBCUTANEOUS EVERY 10 MIN PRN
Status: DISCONTINUED | OUTPATIENT
Start: 2024-05-10 | End: 2024-05-10

## 2024-05-10 RX ORDER — FERROUS SULFATE, DRIED 160(50) MG
1 TABLET, EXTENDED RELEASE ORAL 2 TIMES DAILY
Status: DISCONTINUED | OUTPATIENT
Start: 2024-05-10 | End: 2024-05-12 | Stop reason: HOSPADM

## 2024-05-10 RX ORDER — POLYETHYLENE GLYCOL 3350 17 G/17G
17 POWDER, FOR SOLUTION ORAL DAILY
Status: DISCONTINUED | OUTPATIENT
Start: 2024-05-10 | End: 2024-05-12 | Stop reason: HOSPADM

## 2024-05-10 RX ORDER — ACETAMINOPHEN 500 MG
1000 TABLET ORAL EVERY 6 HOURS
Status: DISCONTINUED | OUTPATIENT
Start: 2024-05-11 | End: 2024-05-12 | Stop reason: HOSPADM

## 2024-05-10 RX ORDER — ACETAMINOPHEN 10 MG/ML
INJECTION, SOLUTION INTRAVENOUS
Status: COMPLETED
Start: 2024-05-10 | End: 2024-05-10

## 2024-05-10 RX ORDER — LEVOTHYROXINE SODIUM 100 UG/1
200 TABLET ORAL
Status: DISCONTINUED | OUTPATIENT
Start: 2024-05-11 | End: 2024-05-12 | Stop reason: HOSPADM

## 2024-05-10 RX ORDER — ACETAMINOPHEN 10 MG/ML
1000 INJECTION, SOLUTION INTRAVENOUS EVERY 8 HOURS
Status: COMPLETED | OUTPATIENT
Start: 2024-05-10 | End: 2024-05-11

## 2024-05-10 RX ORDER — SERTRALINE HYDROCHLORIDE 50 MG/1
50 TABLET, FILM COATED ORAL NIGHTLY
Status: DISCONTINUED | OUTPATIENT
Start: 2024-05-10 | End: 2024-05-12 | Stop reason: HOSPADM

## 2024-05-10 RX ORDER — MORPHINE SULFATE 2 MG/ML
2 INJECTION, SOLUTION INTRAMUSCULAR; INTRAVENOUS EVERY 5 MIN PRN
Status: DISCONTINUED | OUTPATIENT
Start: 2024-05-10 | End: 2024-05-10

## 2024-05-10 RX ORDER — BUSPIRONE HYDROCHLORIDE 7.5 MG/1
7.5 TABLET ORAL 3 TIMES DAILY
Status: DISCONTINUED | OUTPATIENT
Start: 2024-05-10 | End: 2024-05-10

## 2024-05-10 RX ORDER — ACETAMINOPHEN 10 MG/ML
1000 INJECTION, SOLUTION INTRAVENOUS EVERY 8 HOURS
Status: DISCONTINUED | OUTPATIENT
Start: 2024-05-10 | End: 2024-05-10

## 2024-05-10 RX ORDER — OXYCODONE HYDROCHLORIDE 5 MG/1
5 TABLET ORAL
Status: DISCONTINUED | OUTPATIENT
Start: 2024-05-10 | End: 2024-05-10

## 2024-05-10 RX ORDER — TRAMADOL HYDROCHLORIDE 50 MG/1
50 TABLET ORAL 3 TIMES DAILY
Status: DISCONTINUED | OUTPATIENT
Start: 2024-05-10 | End: 2024-05-12

## 2024-05-10 RX ORDER — IBUPROFEN 200 MG
16 TABLET ORAL
Status: DISCONTINUED | OUTPATIENT
Start: 2024-05-10 | End: 2024-05-12 | Stop reason: HOSPADM

## 2024-05-10 RX ORDER — MIDAZOLAM HYDROCHLORIDE 2 MG/2ML
2 INJECTION, SOLUTION INTRAMUSCULAR; INTRAVENOUS ONCE AS NEEDED
Status: COMPLETED | OUTPATIENT
Start: 2024-05-10 | End: 2024-05-10

## 2024-05-10 RX ORDER — IBUPROFEN 200 MG
24 TABLET ORAL
Status: DISCONTINUED | OUTPATIENT
Start: 2024-05-10 | End: 2024-05-12 | Stop reason: HOSPADM

## 2024-05-10 RX ORDER — OXYCODONE HYDROCHLORIDE 5 MG/1
5 TABLET ORAL EVERY 6 HOURS PRN
Status: DISCONTINUED | OUTPATIENT
Start: 2024-05-10 | End: 2024-05-12 | Stop reason: HOSPADM

## 2024-05-10 RX ORDER — REMIFENTANIL HYDROCHLORIDE 1 MG/ML
INJECTION, POWDER, LYOPHILIZED, FOR SOLUTION INTRAVENOUS CONTINUOUS PRN
Status: DISCONTINUED | OUTPATIENT
Start: 2024-05-10 | End: 2024-05-10

## 2024-05-10 RX ORDER — ONDANSETRON 4 MG/1
4 TABLET, ORALLY DISINTEGRATING ORAL EVERY 8 HOURS PRN
Status: DISCONTINUED | OUTPATIENT
Start: 2024-05-10 | End: 2024-05-12 | Stop reason: HOSPADM

## 2024-05-10 RX ORDER — METOCLOPRAMIDE 10 MG/1
10 TABLET ORAL 4 TIMES DAILY
Status: DISCONTINUED | OUTPATIENT
Start: 2024-05-10 | End: 2024-05-12 | Stop reason: HOSPADM

## 2024-05-10 RX ORDER — CEFAZOLIN 2 G/1
INJECTION, POWDER, FOR SOLUTION INTRAMUSCULAR; INTRAVENOUS
Status: DISCONTINUED | OUTPATIENT
Start: 2024-05-10 | End: 2024-05-10

## 2024-05-10 RX ORDER — GLUCAGON 1 MG
1 KIT INJECTION
Status: ACTIVE | OUTPATIENT
Start: 2024-05-10

## 2024-05-10 RX ORDER — INSULIN ASPART 100 [IU]/ML
0-10 INJECTION, SOLUTION INTRAVENOUS; SUBCUTANEOUS
Status: DISCONTINUED | OUTPATIENT
Start: 2024-05-10 | End: 2024-05-12 | Stop reason: HOSPADM

## 2024-05-10 RX ORDER — LABETALOL HCL 20 MG/4 ML
10 SYRINGE (ML) INTRAVENOUS EVERY 4 HOURS PRN
Status: DISCONTINUED | OUTPATIENT
Start: 2024-05-10 | End: 2024-05-11

## 2024-05-10 RX ORDER — ACETAMINOPHEN 500 MG
1000 TABLET ORAL EVERY 6 HOURS
Status: DISCONTINUED | OUTPATIENT
Start: 2024-05-10 | End: 2024-05-10

## 2024-05-10 RX ORDER — SUCCINYLCHOLINE CHLORIDE 20 MG/ML
INJECTION INTRAMUSCULAR; INTRAVENOUS
Status: DISCONTINUED | OUTPATIENT
Start: 2024-05-10 | End: 2024-05-10

## 2024-05-10 RX ORDER — PHENYLEPHRINE HYDROCHLORIDE 10 MG/ML
INJECTION INTRAVENOUS
Status: DISCONTINUED | OUTPATIENT
Start: 2024-05-10 | End: 2024-05-10

## 2024-05-10 RX ORDER — SODIUM CHLORIDE, SODIUM LACTATE, POTASSIUM CHLORIDE, CALCIUM CHLORIDE 600; 310; 30; 20 MG/100ML; MG/100ML; MG/100ML; MG/100ML
INJECTION, SOLUTION INTRAVENOUS CONTINUOUS
Status: DISCONTINUED | OUTPATIENT
Start: 2024-05-10 | End: 2024-05-10

## 2024-05-10 RX ORDER — INSULIN GLARGINE 100 [IU]/ML
20 INJECTION, SOLUTION SUBCUTANEOUS NIGHTLY
Status: DISCONTINUED | OUTPATIENT
Start: 2024-05-10 | End: 2024-05-11

## 2024-05-10 RX ORDER — IBUPROFEN 200 MG
16 TABLET ORAL
Status: DISCONTINUED | OUTPATIENT
Start: 2024-05-10 | End: 2024-05-10

## 2024-05-10 RX ORDER — SODIUM CHLORIDE 0.9 % (FLUSH) 0.9 %
10 SYRINGE (ML) INJECTION
Status: DISCONTINUED | OUTPATIENT
Start: 2024-05-10 | End: 2024-05-10

## 2024-05-10 RX ORDER — LIDOCAINE HYDROCHLORIDE 20 MG/ML
INJECTION, SOLUTION EPIDURAL; INFILTRATION; INTRACAUDAL; PERINEURAL
Status: DISCONTINUED | OUTPATIENT
Start: 2024-05-10 | End: 2024-05-10

## 2024-05-10 RX ADMIN — INSULIN ASPART 2 UNITS: 100 INJECTION, SOLUTION INTRAVENOUS; SUBCUTANEOUS at 05:05

## 2024-05-10 RX ADMIN — MORPHINE SULFATE 2 MG: 2 INJECTION, SOLUTION INTRAMUSCULAR; INTRAVENOUS at 06:05

## 2024-05-10 RX ADMIN — DOCUSATE SODIUM 100 MG: 100 CAPSULE, LIQUID FILLED ORAL at 08:05

## 2024-05-10 RX ADMIN — CEFAZOLIN 2 EACH: 2 INJECTION, POWDER, FOR SOLUTION INTRAMUSCULAR; INTRAVENOUS at 07:05

## 2024-05-10 RX ADMIN — FENTANYL CITRATE 100 MCG: 50 INJECTION, SOLUTION INTRAMUSCULAR; INTRAVENOUS at 07:05

## 2024-05-10 RX ADMIN — INSULIN GLARGINE 20 UNITS: 100 INJECTION, SOLUTION SUBCUTANEOUS at 08:05

## 2024-05-10 RX ADMIN — ONDANSETRON 4 MG: 2 INJECTION INTRAMUSCULAR; INTRAVENOUS at 12:05

## 2024-05-10 RX ADMIN — SODIUM CHLORIDE, POTASSIUM CHLORIDE, SODIUM LACTATE AND CALCIUM CHLORIDE: 600; 310; 30; 20 INJECTION, SOLUTION INTRAVENOUS at 06:05

## 2024-05-10 RX ADMIN — PHENYLEPHRINE HYDROCHLORIDE 200 MCG: 10 INJECTION INTRAVENOUS at 08:05

## 2024-05-10 RX ADMIN — INSULIN ASPART 2 UNITS: 100 INJECTION, SOLUTION INTRAVENOUS; SUBCUTANEOUS at 12:05

## 2024-05-10 RX ADMIN — ACETAMINOPHEN 1000 MG: 10 INJECTION, SOLUTION INTRAVENOUS at 08:05

## 2024-05-10 RX ADMIN — MORPHINE SULFATE 2 MG: 2 INJECTION, SOLUTION INTRAMUSCULAR; INTRAVENOUS at 12:05

## 2024-05-10 RX ADMIN — MORPHINE SULFATE 2 MG: 2 INJECTION, SOLUTION INTRAMUSCULAR; INTRAVENOUS at 10:05

## 2024-05-10 RX ADMIN — LIDOCAINE HYDROCHLORIDE 50 MG: 20 INJECTION, SOLUTION EPIDURAL; INFILTRATION; INTRACAUDAL; PERINEURAL at 07:05

## 2024-05-10 RX ADMIN — ACETAMINOPHEN 1000 MG: 10 INJECTION INTRAVENOUS at 12:05

## 2024-05-10 RX ADMIN — MIDAZOLAM HYDROCHLORIDE 2 MG: 1 INJECTION, SOLUTION INTRAMUSCULAR; INTRAVENOUS at 06:05

## 2024-05-10 RX ADMIN — REMIFENTANIL HYDROCHLORIDE 0.04 MCG/KG/MIN: 1 INJECTION, POWDER, LYOPHILIZED, FOR SOLUTION INTRAVENOUS at 07:05

## 2024-05-10 RX ADMIN — PROPOFOL 200 MG: 10 INJECTION, EMULSION INTRAVENOUS at 07:05

## 2024-05-10 RX ADMIN — INSULIN ASPART 2 UNITS: 100 INJECTION, SOLUTION INTRAVENOUS; SUBCUTANEOUS at 08:05

## 2024-05-10 RX ADMIN — TRAMADOL HYDROCHLORIDE 50 MG: 50 TABLET, COATED ORAL at 08:05

## 2024-05-10 RX ADMIN — Medication 1 TABLET: at 08:05

## 2024-05-10 RX ADMIN — SUCCINYLCHOLINE CHLORIDE 120 MG: 20 INJECTION, SOLUTION INTRAMUSCULAR; INTRAVENOUS; PARENTERAL at 07:05

## 2024-05-10 RX ADMIN — OXYCODONE HYDROCHLORIDE 5 MG: 5 TABLET ORAL at 09:05

## 2024-05-10 RX ADMIN — OXYCODONE HYDROCHLORIDE 5 MG: 5 TABLET ORAL at 03:05

## 2024-05-10 NOTE — PLAN OF CARE
Problem: Adult Inpatient Plan of Care  Goal: Plan of Care Review  Outcome: Progressing  Goal: Patient-Specific Goal (Individualized)  Outcome: Progressing  Goal: Absence of Hospital-Acquired Illness or Injury  Outcome: Progressing  Goal: Optimal Comfort and Wellbeing  Outcome: Progressing  Goal: Readiness for Transition of Care  Outcome: Progressing     Problem: Diabetes Comorbidity  Goal: Blood Glucose Level Within Targeted Range  Outcome: Progressing     Problem: Wound  Goal: Optimal Coping  Outcome: Progressing  Goal: Optimal Functional Ability  Outcome: Progressing  Goal: Absence of Infection Signs and Symptoms  Outcome: Progressing  Goal: Improved Oral Intake  Outcome: Progressing  Goal: Optimal Pain Control and Function  Outcome: Progressing  Goal: Skin Health and Integrity  Outcome: Progressing  Goal: Optimal Wound Healing  Outcome: Progressing     Problem: Pain Acute  Goal: Optimal Pain Control and Function  Outcome: Progressing

## 2024-05-10 NOTE — TRANSFER OF CARE
Anesthesia Transfer of Care Note    Patient: Fred Berman    Procedure(s) Performed: Procedure(s) (LRB):  THYROIDECTOMY,BILATERAL (Bilateral)    Patient location: PACU    Anesthesia Type: general    Transport from OR: Transported from OR on room air with adequate spontaneous ventilation    Post pain: adequate analgesia    Post assessment: no apparent anesthetic complications    Post vital signs: stable    Level of consciousness: awake    Nausea/Vomiting: no nausea/vomiting    Complications: none    Transfer of care protocol was followed      Last vitals: Visit Vitals  BP (!) 161/84   Pulse 88   Temp 36 °C (96.8 °F) (Temporal)   Resp 20   Wt 107.7 kg (237 lb 6.4 oz)   LMP  (Approximate)   SpO2 98%   Breastfeeding No   BMI 37.18 kg/m²

## 2024-05-10 NOTE — OP NOTE
Otolaryngology Operative Note    Date of procedure: 05/10/2024    Attending Surgeon: Cali Trujillo MD    Resident Surgeon: Chuckie Saenz PGY V    Pre-operative diagnosis:  1. Left thyroid nodule      Post-operative diagnosis:   1. same    Procedure:  1. Left hemithyroidectomy with left central neck dissection (level 6)    Anesthesia: General via endotracheal tube    Complications: Sacrifice of left Recurrent laryngeal nerve, cessation of procedure to preserve right side until final pathology returns    Specimens:   ID Type Source Tests Collected by Time Destination   A : left hemithyroid Tissue Thyroid SPECIMEN TO PATHOLOGY Cali Trujillo MD 5/10/2024 0844    B : Paratracheal tissue Tissue Thyroid SPECIMEN TO PATHOLOGY Cali Trujillo MD 5/10/2024 1015    C : left central neck dissection Tissue Neck, Anterior SPECIMEN TO PATHOLOGY Cali Trujillo MD 5/10/2024 1110        Blood loss: 30 cc    Indication:  Fred Berman is a 43 y.o. female with left thyroid nodule with thyroseq AUS, suspicious for hashimoto vs malignancy.    Procedure in detail:  The patient was brought to the operating theater and placed supine on the operating table.  Using a neural input monitoring endotracheal tube, general anesthesia was induced.   The neck was marked and anesthetized with 1% lidocaine with epinephrine.  It was then prepped and draped in the usual sterile fashion.  Timeout was performed.  Perioperative antibiotics were administered.       The procedure began with a 7 cm incision using a 15 blade scalpel and carried to the subplatysmal layer.  Using Bovie cautery, sub platysmal flaps were developed in the superior and inferior plane.  Midline raphae was delineated and divided using Bovie cautery until the strap muscles were identified.  Dissection was carried under the strap muscles on the left side to reveal the left thyroid lobe and isthmus.  Of note, this lobe was majority nodule and very firm.  It was  densely adhered to the surrounding tissues.  Using combination of Mcmahan dissector and right angle dissector, the superior lobe was dissected free from the surgical bed.  The superior thyroid artery and vein were ligated using Harmonic scalpel.  Dissection was carried medially along the left superior lobe to relieve this from the trachea.  Attention was then turned laterally and traced down to the level of the middle thyroid vein which was subsequently ligated with Harmonic scalpel.  Left thyroid lobe was reflected medially in order to appreciate the thyroid undersurface.  This was closely adhered to the esophagus.  Mcmahan dissector was used to separate the tissues without entering the esophagus.  The inferior pole was detached from the surrounding tissue.  We were unable to identify the RLN during this dissection.  At the cricoarytenoid joing, there was tumor closely adhered.  As much of this as possible was removed.  The RLN was identified at this location and found to be entering the tumor.  It was therefore sacrificed. Thyroid lobe was dissected free from the trachea using combination of bipolar and Harmonic scalpel.  It was transected at the isthmus, and it was passed off the field for pathologic examination.  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  Decision was made to perform left central neck dissection. Lymphatic tissue from the medial edge of the carotid was dissected from the great vessels down into the chest at convergence of vessels.  This was carried medial to the other lobe of the thyroid.  This was dissected from the esophagus and trachea.  This too was passed off for permanent pathology.  The wound bed was examined and hemostasis was obtained using bipolar cautery.  Several postage-stamp sized Surgicel were placed in the wound bed for final hemostasis.  A Valsalva was performed.  A 10 flat MICHELLE drain was placed into the wound bed with drain exiting the  right neck.  The strap muscles were reapproximated using Vicryl suture, the platysmal plane was reapproximated using Vicryl suture, and skin was reapproximated using Monocryl.  The MICHELLE bulb was found to hold suction at the conclusion of the procedure.  There were no complications, all counts were correct, and the patient tolerated the procedure well.  The patient's care was turned over to the anesthesia team for extubation and transferred to PACU.    Dr. Trujillo was immediately available for the entirety of the procedure.    Chuckie Saenz MD  [facility] Otolaryngology PGY V    5/10/2024  11:32 AM

## 2024-05-10 NOTE — ANESTHESIA PROCEDURE NOTES
Intubation    Date/Time: 5/10/2024 7:04 AM    Performed by: Thomas Agustin CRNA  Authorized by: Thomas Agustin CRNA    Intubation:     Induction:  Intravenous    Intubated:  Postinduction    Mask Ventilation:  Easy mask    Attempts:  1    Attempted By:  CRNA    Method of Intubation:  Direct    Blade:  Sylvia 4    Laryngeal View Grade: Grade I - full view of cords      Difficult Airway Encountered?: No      Complications:  None    Airway Device:  EMG ETT (NIMS)    Airway Device Size:  7.0    Style/Cuff Inflation:  Cuffed    Inflation Amount (mL):  4    Tube secured:  21    Secured at:  The lips    Complicating Factors:  None    Findings Post-Intubation:  BS equal bilateral

## 2024-05-10 NOTE — H&P
[facility] Hospital Medicine  Consult Note     Resident Team: [facility] Medicine List 1  Attending Physician: Cali Trujillo MD  Resident: Hyacinth Del Valle DO   Intern: Jose Weiner DO    Date of Admit: 5/10/2024    Chief Complaint:     S/p Left Hemithyroidectomy     Subjective:      History of Present Illness:  Fred Berman is a 43 y.o. female referred for a tongue lesion. She reports that she first noticed it about a year ago and says that it has slowly been enlarging since then. It has never bled. She doesn't think it hurts but does bite it occasionally which makes it sore. It's never been biopsied. She says that her dentist told her that he was not going to do any more work on her teeth until she got it checked out. She also feels like she has developed allergies over the past few months.  She endorses frequent tearing and redness of her eyes in feels like her nose is running frequently.  She also endorses nasal congestion.  She is been using Allegra and does not think it is helped any.  She has not tried Flonase.  She does use Afrin she thinks maybe once a month.  She was diagnosed with a sinus infection over the summer and got a few days of antibiotics.  She denies facial pain or pressure but does note that when she wakes up in the morning she feels like the outside of her nose is swollen over the bridge of her nose.  She also endorses facial numbness bilaterally in all 3 distributions which has been going on for some time.  She reports that her PCP is work this up with an MRI scan.  She is also been dealing with intermittent dysphagia.  She feels like things are not going down, both solids and liquids.  This does not happen with every meal but comes and goes.  She follows with Gastroenterology and has had EGDs and dilations in the past.  She reports that she has another 1 scheduled in November.  As far as her voice goes, she notes intermittent dysphonia where her voice gets roughed and then returns to normal.  It  "usually comes back to normal on its own.  She is on Protonix daily for gastritis that has been seen on prior scopes she reports.  As far as other medical problems she has diabetes, hypertension, gastroparesis, anxiety and depression.  For past surgical history she is had a cholecystectomy and .  She is had her tonsils removed but no other history of head and neck surgery.  She is a former smoker and quit 1 year ago.  She does not drink and denies any other drug use.      Internal medicine has been consulted for the management of the patient's chronic medical conditions.       Past Medical History:  Past Medical History:   Diagnosis Date    Adrenal mass     Anxiety     Arthritis     Bradycardia     Depression     Diabetes mellitus, type 2     Gastroparesis     General anesthetics causing adverse effect in therapeutic use     "hard time waking up"    GERD (gastroesophageal reflux disease)     Hip pain     Hypertension     Menstrual cramps     Ovarian cyst     Palpitations     Pyosalpingitis     Thyroid disease         Past Surgical History:  Past Surgical History:   Procedure Laterality Date    ABLATION OF VAGINAL TISSUE USING LASER       SECTION      CHOLECYSTECTOMY      TUBAL LIGATION          Family History:  Family History   Problem Relation Name Age of Onset    Alcohol abuse Mother Pat         Pat    Arthritis Mother Pat     Cancer Mother Pat     COPD Mother Pat     Depression Mother Pat     Diabetes Mother Pat     Drug abuse Mother Pat     Hypertension Mother Pat     Miscarriages / Stillbirths Mother Pat     Depression Father Juventino     Drug abuse Father Juventino     Hypertension Father Juventino     Cancer Maternal Grandfather Janay     Miscarriages / Stillbirths Maternal Grandmother Arlean     Hearing loss Paternal Grandfather Lyod     Asthma Son Mojgan     Hypertension Son Mojgan     Drug abuse Sister Elana     Heart disease Sister Elana     Miscarriages / Stillbirths Sister Elana     Drug abuse Brother Juventino     " Early death Brother Izaiah         Social History:  Social History     Socioeconomic History    Marital status: Single   Tobacco Use    Smoking status: Former     Types: Cigarettes    Smokeless tobacco: Never   Substance and Sexual Activity    Alcohol use: Never     Alcohol/week: 6.0 standard drinks of alcohol     Types: 6 Shots of liquor per week    Drug use: Not Currently     Comment: Various Opoids    Sexual activity: Yes     Partners: Male     Social Determinants of Health     Financial Resource Strain: High Risk (7/4/2023)    Overall Financial Resource Strain (CARDIA)     Difficulty of Paying Living Expenses: Very hard   Food Insecurity: No Food Insecurity (9/25/2023)    Hunger Vital Sign     Worried About Running Out of Food in the Last Year: Never true     Ran Out of Food in the Last Year: Never true   Recent Concern: Food Insecurity - Food Insecurity Present (7/4/2023)    Hunger Vital Sign     Worried About Running Out of Food in the Last Year: Sometimes true     Ran Out of Food in the Last Year: Never true   Transportation Needs: No Transportation Needs (9/25/2023)    PRAPARE - Transportation     Lack of Transportation (Medical): No     Lack of Transportation (Non-Medical): No   Physical Activity: Sufficiently Active (7/4/2023)    Exercise Vital Sign     Days of Exercise per Week: 5 days     Minutes of Exercise per Session: 30 min   Stress: Stress Concern Present (7/4/2023)    [employer] - Occupational Stress Questionnaire     Feeling of Stress : To some extent   Housing Stability: Unknown (9/25/2023)    Housing Stability Vital Sign     Unable to Pay for Housing in the Last Year: No     Unstable Housing in the Last Year: No   Recent Concern: Housing Stability - High Risk (7/4/2023)    Housing Stability Vital Sign     Unable to Pay for Housing in the Last Year: Yes     Unstable Housing in the Last Year: No        Allergies:  has No Known Allergies.     Home Medications:  Prior  "to Admission medications    Medication Sig Start Date End Date Taking? Authorizing Provider   buprenorphine HCL (SUBUTEX) 8 mg Subl Place 8 mg under the tongue 3 (three) times daily. 8/29/23  Yes Provider, Historical   COMPOUND HORMONE REPLACEMENT Take by mouth once daily.   Yes Provider, Historical   levothyroxine (SYNTHROID) 200 MCG tablet Take 1 tablet (200 mcg total) by mouth before breakfast.  Patient taking differently: Take 250 mcg by mouth before breakfast. 1/23/24 1/22/25 Yes Juan Del Valle DO   losartan (COZAAR) 50 MG tablet Take 1 tablet (50 mg total) by mouth once daily. 1/23/24 1/22/25 Yes Juan Del Valle DO   pen needle, diabetic (BD ULTRA-FINE ROSALEE PEN NEEDLE) 32 gauge x 5/32" Ndle 1 each by Misc.(Non-Drug; Combo Route) route 2 (two) times a day. 2/6/24 2/5/25 Yes Juan Del Valle DO   busPIRone (BUSPAR) 7.5 MG tablet Take 7.5 mg by mouth 3 (three) times daily.  Patient not taking: Reported on 4/18/2024 1/2/24   Provider, Historical   calcium carbonate/vitamin D3 (CALCIUM 600 + D,3, ORAL) Take 1 tablet by mouth 2 (two) times a day.  Patient not taking: Reported on 4/18/2024    Provider, Historical   fluocinonide (LIDEX) 0.05 % external solution Apply topically 2 (two) times daily. Swish and spit solution 4/18/24   Riley Stiles MD   hydrocortisone (ANUSOL-HC) 2.5 % rectal cream Place 1 application  rectally 2 (two) times daily. 11/6/23   Provider, Historical   insulin (LANTUS SOLOSTAR U-100 INSULIN) glargine 100 units/mL SubQ pen Inject 15 Units into the skin 2 (two) times daily.  Patient taking differently: Inject 70 Units into the skin 2 (two) times daily. 2/6/24 2/5/25  Juan Del Valle DO   lubiprostone (AMITIZA) 24 MCG Cap Take 24 mcg by mouth daily with breakfast.    Provider, Historical   metFORMIN (GLUCOPHAGE) 500 MG tablet Take 1 tablet (500 mg total) by mouth 2 (two) times daily with meals. 1/23/24 7/21/24  Juan Del Valle,    MOTEGRITY 2 mg Tab Take 1 tablet by mouth. 9/26/23   Provider, " Historical   naloxegoL (MOVANTIK) 25 mg tablet Take 25 mg by mouth once daily.  Patient not taking: Reported on 1/23/2024 9/28/23   Ryan Soriano MD   ondansetron (ZOFRAN-ODT) 4 MG TbDL Take 1 tablet (4 mg total) by mouth every 8 (eight) hours as needed (NAUSEA). 9/27/23   Ryan Soriano MD   pantoprazole (PROTONIX) 40 MG tablet Take 40 mg by mouth every morning. 8/16/23   Provider, Historical   polyethylene glycol (GLYCOLAX) 17 gram PwPk Take 17 g by mouth 2 (two) times daily. 9/27/23   Ryan Soriano MD   REGLAN 10 mg tablet Take 1 tablet (10 mg total) by mouth 4 (four) times daily. 9/27/23   Ryan Soriano MD   sertraline (ZOLOFT) 50 MG tablet Take 50 mg by mouth every evening. 1/2/24   Provider, Historical       Review of Systems:  Review of Systems   Constitutional:  Negative for chills.   HENT:  Positive for sore throat.    Eyes: Negative.    Respiratory:  Negative for cough and shortness of breath.    Cardiovascular:  Negative for chest pain.   Gastrointestinal:  Negative for nausea and vomiting.   Genitourinary: Negative.    Musculoskeletal: Negative.    Neurological: Negative.    Endo/Heme/Allergies: Negative.    Psychiatric/Behavioral: Negative.     All other systems reviewed and are negative.         Objective:     Vital Signs (Most Recent):  Temp: 97.4 °F (36.3 °C) (05/10/24 1204)  Pulse: 90 (05/10/24 1219)  Resp: 16 (05/10/24 1219)  BP: 136/79 (05/10/24 1219)  SpO2: 96 % (05/10/24 1219) Vital Signs (24h Range):  Temp:  [96.8 °F (36 °C)-98.5 °F (36.9 °C)] 97.4 °F (36.3 °C)  Pulse:  [] 90  Resp:  [14-20] 16  SpO2:  [95 %-98 %] 96 %  BP: (135-163)/(72-84) 136/79       Physical Examination:  GEN: A&Ox4. In mild distress related to post-operative pain.   HEENT: normocephalic, atraumatic. PERRLA. EMOI bilaterally. Conjunctiva injected. Nares patents. Oropharyngeal mucosa dry, non-erythematous, non-edematous, uvula midline. Anterior neck post-operative   CARDIAC: S1 S2, no MRG. Radial pulses full,  "no LE edema   RESPI: Chest wall rise symmetric, atraumatic. Lungs CTAB, no wheezing or rhonchi   ABDOMEN: soft, nontender, BS present in all 4 quadrants. No herniation, masses, HSM  : deferred   MSK: ROM grossly intact.   NEURO: Motor and sensation grossly intact. CN grossly intact. No cerebellar deficits noted. No gait abnormalities noted.   PSYCH: Memory and thought process appear intact. Appropriate mood and affect. No AI/HI. No HI/SI .         CBC  No results for input(s): "WBC", "RBC", "HGB", "HCT", "MCV", "MCH", "MCHC", "RDW", "PLT", "MPV", "GRAN", "LYMPH", "MONO", "BASO", "NRBC" in the last 168 hours.    CMP   No results for input(s): "NA", "K", "CHLORIDE", "CO2", "ANIONGAP", "BUN", "CREATININE", "GLU", "CALCIUM", "PH", "MG", "ALBUMIN", "PROT", "ALKPHOS", "ALT", "AST", "BILITOT", "LABA1C", "LIPIDTOTCHOL", "LIPIDLDLCHOL", "LIPIDHDLCHOL", "LIPIDTRIG" in the last 168 hours.       Microbiology Data:  Microbiology Results (last 7 days)       ** No results found for the last 168 hours. **            Cardiac Data:  No echocardiogram results found for the past 14 days.    Results for orders placed or performed in visit on 04/25/24   IN OFFICE EKG 12-LEAD (to [place])    Collection Time: 04/25/24  3:58 PM   Result Value Ref Range    QRS Duration 94 ms    [facility] QTC Calculation 438 ms    Narrative    Test Reason : Z01.818,    Vent. Rate : 076 BPM     Atrial Rate : 076 BPM     P-R Int : 130 ms          QRS Dur : 094 ms      QT Int : 390 ms       P-R-T Axes : 057 095 036 degrees     QTc Int : 438 ms    Normal sinus rhythm  Rightward axis  Borderline Abnormal ECG  When compared with ECG of 24-SEP-2023 06:54,  No significant change was found  Confirmed by Osiel Street MD ([id number]) on 4/26/2024 3:10:10 PM    Referred By: SEVERO MARTINES           Confirmed By:Osiel Street MD        Radiology:         Lines/Drains/Airways       Drain  Duration                  Closed/Suction Drain 05/10/24 1051 Tube - 1 Anterior Neck Bulb 10 Fr. <1 " day              Peripheral Intravenous Line  Duration                  Peripheral IV - Single Lumen 05/10/24 0552 20 G Left;Posterior Hand <1 day                     Assessment & Plan:       Thyroid Nodule s/p Left Hemithyroidectomy  Hypothyroidism  - Post-operative management per primary team, ENT   - Continue synthroid regimen    Diabetes Mellitus  Gastroparesis   - Home regimen: Lantus 15 U BID, Metformin 500 BID   - Goal glucose  while inpatient   - Continue MDSSI, PEG, and reglan  - If patient is tolerating PO intake, can add Lantus 10 U BID initially and titrate appropriately     Hypertension   - continue Losartan 50  - If hypertension is persistent, can increase to Losartan 100 qD if renal indices allow    Anxiety / Depression  - continue Buspar and Zoloft   - Can add prn vistaril 50 mg q6 prn for acute anxiety       Thank you for the consult. Internal Medicine will continue to follow.       Jose Weiner, DO   Internal Medicine - PGY-1

## 2024-05-10 NOTE — ANESTHESIA POSTPROCEDURE EVALUATION
Anesthesia Post Evaluation    Patient: Fred Berman    Procedure(s) Performed: Procedure(s) (LRB):  THYROIDECTOMY,BILATERAL (Bilateral)    Final Anesthesia Type: general      Patient location during evaluation: med/surg floor  Post-procedure vital signs: reviewed and stable  Airway patency: patent      Anesthetic complications: no      Cardiovascular status: hemodynamically stable  Respiratory status: spontaneous ventilation  Follow-up not needed.              Vitals Value Taken Time   /79 05/10/24 1456   Temp 36.3 °C (97.4 °F) 05/10/24 1204   Pulse 90 05/10/24 1219   Resp 18 05/10/24 1509   SpO2 96 % 05/10/24 1219         Event Time   Out of Recovery 12:37:00         Pain/Gilberto Score: Pain Rating Prior to Med Admin: 10 (5/10/2024  3:09 PM)  Pain Rating Post Med Admin: 8 (5/10/2024 12:31 PM)  Gilberto Score: 8 (5/10/2024 11:49 AM)

## 2024-05-11 PROBLEM — E04.1 THYROID NODULE: Status: ACTIVE | Noted: 2024-05-11

## 2024-05-11 PROBLEM — C73: Status: ACTIVE | Noted: 2024-05-11

## 2024-05-11 LAB
ALBUMIN SERPL-MCNC: 3.8 G/DL (ref 3.5–5)
ALBUMIN/GLOB SERPL: 1.3 RATIO (ref 1.1–2)
ALP SERPL-CCNC: 56 UNIT/L (ref 40–150)
ALT SERPL-CCNC: 40 UNIT/L (ref 0–55)
ANION GAP SERPL CALC-SCNC: 8 MEQ/L
AST SERPL-CCNC: 31 UNIT/L (ref 5–34)
BASOPHILS # BLD AUTO: 0.01 X10(3)/MCL
BASOPHILS NFR BLD AUTO: 0.1 %
BILIRUB SERPL-MCNC: 1.1 MG/DL
BUN SERPL-MCNC: 8.6 MG/DL (ref 7–18.7)
BUN SERPL-MCNC: 9.1 MG/DL (ref 7–18.7)
CALCIUM SERPL-MCNC: 9.4 MG/DL (ref 8.4–10.2)
CALCIUM SERPL-MCNC: 9.7 MG/DL (ref 8.4–10.2)
CHLORIDE SERPL-SCNC: 102 MMOL/L (ref 98–107)
CHLORIDE SERPL-SCNC: 105 MMOL/L (ref 98–107)
CO2 SERPL-SCNC: 27 MMOL/L (ref 22–29)
CO2 SERPL-SCNC: 28 MMOL/L (ref 22–29)
CREAT SERPL-MCNC: 0.61 MG/DL (ref 0.55–1.02)
CREAT SERPL-MCNC: 0.75 MG/DL (ref 0.55–1.02)
CREAT/UREA NIT SERPL: 14
EOSINOPHIL # BLD AUTO: 0.03 X10(3)/MCL (ref 0–0.9)
EOSINOPHIL NFR BLD AUTO: 0.3 %
ERYTHROCYTE [DISTWIDTH] IN BLOOD BY AUTOMATED COUNT: 12.6 % (ref 11.5–17)
GFR SERPLBLD CREATININE-BSD FMLA CKD-EPI: >60 ML/MIN/1.73/M2
GFR SERPLBLD CREATININE-BSD FMLA CKD-EPI: >60 ML/MIN/1.73/M2
GLOBULIN SER-MCNC: 3 GM/DL (ref 2.4–3.5)
GLUCOSE SERPL-MCNC: 214 MG/DL (ref 74–100)
GLUCOSE SERPL-MCNC: 229 MG/DL (ref 74–100)
HCT VFR BLD AUTO: 40.5 % (ref 37–47)
HGB BLD-MCNC: 13.4 G/DL (ref 12–16)
HOLD SPECIMEN: NORMAL
IMM GRANULOCYTES # BLD AUTO: 0.02 X10(3)/MCL (ref 0–0.04)
IMM GRANULOCYTES NFR BLD AUTO: 0.2 %
LYMPHOCYTES # BLD AUTO: 1.83 X10(3)/MCL (ref 0.6–4.6)
LYMPHOCYTES NFR BLD AUTO: 21 %
MCH RBC QN AUTO: 28.4 PG (ref 27–31)
MCHC RBC AUTO-ENTMCNC: 33.1 G/DL (ref 33–36)
MCV RBC AUTO: 85.8 FL (ref 80–94)
MONOCYTES # BLD AUTO: 0.61 X10(3)/MCL (ref 0.1–1.3)
MONOCYTES NFR BLD AUTO: 7 %
NEUTROPHILS # BLD AUTO: 6.2 X10(3)/MCL (ref 2.1–9.2)
NEUTROPHILS NFR BLD AUTO: 71.4 %
NRBC BLD AUTO-RTO: 0 %
PLATELET # BLD AUTO: 171 X10(3)/MCL (ref 130–400)
PMV BLD AUTO: 9.3 FL (ref 7.4–10.4)
POCT GLUCOSE: 195 MG/DL (ref 70–110)
POCT GLUCOSE: 196 MG/DL (ref 70–110)
POCT GLUCOSE: 212 MG/DL (ref 70–110)
POCT GLUCOSE: 220 MG/DL (ref 70–110)
POCT GLUCOSE: 221 MG/DL (ref 70–110)
POCT GLUCOSE: 223 MG/DL (ref 70–110)
POTASSIUM SERPL-SCNC: 3.6 MMOL/L (ref 3.5–5.1)
POTASSIUM SERPL-SCNC: 4.3 MMOL/L (ref 3.5–5.1)
PROT SERPL-MCNC: 6.8 GM/DL (ref 6.4–8.3)
RBC # BLD AUTO: 4.72 X10(6)/MCL (ref 4.2–5.4)
SODIUM SERPL-SCNC: 140 MMOL/L (ref 136–145)
SODIUM SERPL-SCNC: 140 MMOL/L (ref 136–145)
TROPONIN I SERPL-MCNC: 0.01 NG/ML (ref 0–0.04)
WBC # SPEC AUTO: 8.7 X10(3)/MCL (ref 4.5–11.5)

## 2024-05-11 PROCEDURE — 36415 COLL VENOUS BLD VENIPUNCTURE: CPT | Performed by: STUDENT IN AN ORGANIZED HEALTH CARE EDUCATION/TRAINING PROGRAM

## 2024-05-11 PROCEDURE — G0378 HOSPITAL OBSERVATION PER HR: HCPCS

## 2024-05-11 PROCEDURE — 63600175 PHARM REV CODE 636 W HCPCS

## 2024-05-11 PROCEDURE — 63600175 PHARM REV CODE 636 W HCPCS: Performed by: STUDENT IN AN ORGANIZED HEALTH CARE EDUCATION/TRAINING PROGRAM

## 2024-05-11 PROCEDURE — 94760 N-INVAS EAR/PLS OXIMETRY 1: CPT

## 2024-05-11 PROCEDURE — 84484 ASSAY OF TROPONIN QUANT: CPT | Performed by: STUDENT IN AN ORGANIZED HEALTH CARE EDUCATION/TRAINING PROGRAM

## 2024-05-11 PROCEDURE — 25000003 PHARM REV CODE 250

## 2024-05-11 PROCEDURE — 27000221 HC OXYGEN, UP TO 24 HOURS

## 2024-05-11 PROCEDURE — 63600175 PHARM REV CODE 636 W HCPCS: Performed by: OTOLARYNGOLOGY

## 2024-05-11 PROCEDURE — 96372 THER/PROPH/DIAG INJ SC/IM: CPT | Performed by: STUDENT IN AN ORGANIZED HEALTH CARE EDUCATION/TRAINING PROGRAM

## 2024-05-11 PROCEDURE — 99900035 HC TECH TIME PER 15 MIN (STAT)

## 2024-05-11 PROCEDURE — 80053 COMPREHEN METABOLIC PANEL: CPT | Performed by: STUDENT IN AN ORGANIZED HEALTH CARE EDUCATION/TRAINING PROGRAM

## 2024-05-11 PROCEDURE — 25000003 PHARM REV CODE 250: Performed by: STUDENT IN AN ORGANIZED HEALTH CARE EDUCATION/TRAINING PROGRAM

## 2024-05-11 PROCEDURE — 25000003 PHARM REV CODE 250: Performed by: OTOLARYNGOLOGY

## 2024-05-11 PROCEDURE — 80048 BASIC METABOLIC PNL TOTAL CA: CPT | Performed by: STUDENT IN AN ORGANIZED HEALTH CARE EDUCATION/TRAINING PROGRAM

## 2024-05-11 PROCEDURE — 93005 ELECTROCARDIOGRAM TRACING: CPT

## 2024-05-11 PROCEDURE — 85025 COMPLETE CBC W/AUTO DIFF WBC: CPT | Performed by: STUDENT IN AN ORGANIZED HEALTH CARE EDUCATION/TRAINING PROGRAM

## 2024-05-11 PROCEDURE — 94761 N-INVAS EAR/PLS OXIMETRY MLT: CPT

## 2024-05-11 RX ORDER — HYDRALAZINE HYDROCHLORIDE 20 MG/ML
10 INJECTION INTRAMUSCULAR; INTRAVENOUS EVERY 4 HOURS PRN
Status: DISCONTINUED | OUTPATIENT
Start: 2024-05-11 | End: 2024-05-12 | Stop reason: HOSPADM

## 2024-05-11 RX ORDER — HYDRALAZINE HYDROCHLORIDE 20 MG/ML
10 INJECTION INTRAMUSCULAR; INTRAVENOUS EVERY 6 HOURS PRN
Status: DISCONTINUED | OUTPATIENT
Start: 2024-05-11 | End: 2024-05-11

## 2024-05-11 RX ORDER — OXYCODONE HYDROCHLORIDE 5 MG/1
10 TABLET ORAL EVERY 8 HOURS PRN
Status: DISCONTINUED | OUTPATIENT
Start: 2024-05-11 | End: 2024-05-12

## 2024-05-11 RX ORDER — CLONIDINE HYDROCHLORIDE 0.1 MG/1
0.1 TABLET ORAL EVERY 4 HOURS PRN
Status: DISCONTINUED | OUTPATIENT
Start: 2024-05-11 | End: 2024-05-12 | Stop reason: HOSPADM

## 2024-05-11 RX ORDER — ALPRAZOLAM 0.5 MG/1
0.5 TABLET ORAL ONCE
Status: COMPLETED | OUTPATIENT
Start: 2024-05-11 | End: 2024-05-11

## 2024-05-11 RX ORDER — ALPRAZOLAM 0.25 MG/1
0.25 TABLET ORAL 2 TIMES DAILY PRN
Status: COMPLETED | OUTPATIENT
Start: 2024-05-11 | End: 2024-05-12

## 2024-05-11 RX ORDER — LABETALOL HCL 20 MG/4 ML
10 SYRINGE (ML) INTRAVENOUS
Status: DISCONTINUED | OUTPATIENT
Start: 2024-05-11 | End: 2024-05-12 | Stop reason: HOSPADM

## 2024-05-11 RX ORDER — HYDROCHLOROTHIAZIDE 25 MG/1
25 TABLET ORAL DAILY
Status: DISCONTINUED | OUTPATIENT
Start: 2024-05-11 | End: 2024-05-12 | Stop reason: HOSPADM

## 2024-05-11 RX ORDER — INSULIN GLARGINE 100 [IU]/ML
30 INJECTION, SOLUTION SUBCUTANEOUS NIGHTLY
Status: DISCONTINUED | OUTPATIENT
Start: 2024-05-11 | End: 2024-05-12

## 2024-05-11 RX ADMIN — DOCUSATE SODIUM 100 MG: 100 CAPSULE, LIQUID FILLED ORAL at 08:05

## 2024-05-11 RX ADMIN — MORPHINE SULFATE 2 MG: 2 INJECTION, SOLUTION INTRAMUSCULAR; INTRAVENOUS at 04:05

## 2024-05-11 RX ADMIN — MORPHINE SULFATE 2 MG: 2 INJECTION, SOLUTION INTRAMUSCULAR; INTRAVENOUS at 10:05

## 2024-05-11 RX ADMIN — HYDROCHLOROTHIAZIDE 25 MG: 25 TABLET ORAL at 11:05

## 2024-05-11 RX ADMIN — IBUPROFEN 400 MG: 400 TABLET, FILM COATED ORAL at 05:05

## 2024-05-11 RX ADMIN — INSULIN ASPART 4 UNITS: 100 INJECTION, SOLUTION INTRAVENOUS; SUBCUTANEOUS at 08:05

## 2024-05-11 RX ADMIN — OXYCODONE HYDROCHLORIDE 5 MG: 5 TABLET ORAL at 03:05

## 2024-05-11 RX ADMIN — LEVOTHYROXINE SODIUM 200 MCG: 100 TABLET ORAL at 06:05

## 2024-05-11 RX ADMIN — ALPRAZOLAM 0.5 MG: 0.5 TABLET ORAL at 10:05

## 2024-05-11 RX ADMIN — OXYCODONE HYDROCHLORIDE 10 MG: 5 TABLET ORAL at 12:05

## 2024-05-11 RX ADMIN — TRAMADOL HYDROCHLORIDE 50 MG: 50 TABLET, COATED ORAL at 08:05

## 2024-05-11 RX ADMIN — IBUPROFEN 400 MG: 400 TABLET, FILM COATED ORAL at 06:05

## 2024-05-11 RX ADMIN — ACETAMINOPHEN 1000 MG: 10 INJECTION, SOLUTION INTRAVENOUS at 03:05

## 2024-05-11 RX ADMIN — MORPHINE SULFATE 2 MG: 2 INJECTION, SOLUTION INTRAMUSCULAR; INTRAVENOUS at 02:05

## 2024-05-11 RX ADMIN — Medication 1 TABLET: at 08:05

## 2024-05-11 RX ADMIN — IBUPROFEN 400 MG: 400 TABLET, FILM COATED ORAL at 11:05

## 2024-05-11 RX ADMIN — LOSARTAN POTASSIUM 50 MG: 25 TABLET, FILM COATED ORAL at 08:05

## 2024-05-11 RX ADMIN — MORPHINE SULFATE 2 MG: 2 INJECTION, SOLUTION INTRAMUSCULAR; INTRAVENOUS at 07:05

## 2024-05-11 RX ADMIN — INSULIN ASPART 4 UNITS: 100 INJECTION, SOLUTION INTRAVENOUS; SUBCUTANEOUS at 05:05

## 2024-05-11 RX ADMIN — ALPRAZOLAM 0.25 MG: 0.25 TABLET ORAL at 08:05

## 2024-05-11 RX ADMIN — TRAMADOL HYDROCHLORIDE 50 MG: 50 TABLET, COATED ORAL at 03:05

## 2024-05-11 RX ADMIN — OXYCODONE HYDROCHLORIDE 10 MG: 5 TABLET ORAL at 08:05

## 2024-05-11 RX ADMIN — IBUPROFEN 400 MG: 400 TABLET, FILM COATED ORAL at 12:05

## 2024-05-11 RX ADMIN — HYDRALAZINE HYDROCHLORIDE 10 MG: 20 INJECTION INTRAMUSCULAR; INTRAVENOUS at 10:05

## 2024-05-11 RX ADMIN — METOCLOPRAMIDE 10 MG: 10 TABLET ORAL at 08:05

## 2024-05-11 RX ADMIN — ACETAMINOPHEN 1000 MG: 500 TABLET ORAL at 05:05

## 2024-05-11 RX ADMIN — SERTRALINE HYDROCHLORIDE 50 MG: 50 TABLET ORAL at 08:05

## 2024-05-11 RX ADMIN — INSULIN ASPART 2 UNITS: 100 INJECTION, SOLUTION INTRAVENOUS; SUBCUTANEOUS at 12:05

## 2024-05-11 RX ADMIN — INSULIN GLARGINE 30 UNITS: 100 INJECTION, SOLUTION SUBCUTANEOUS at 08:05

## 2024-05-11 NOTE — PROGRESS NOTES
"[facility] INTERNAL MEDICINE  INPATIENT PROGRESS NOTE    Resident Team: [facility] Medicine List 1  Attending Physician: JOSEPH ALANIS DO    SUBJECTIVE:      HPI: Fred Berman is a 43 y.o. female with PMH of DM II, gastroparesis, hypothyroidism, HTN, chronic pain syndrome, HX of polysubstance  abuse, anxiety, who admitted to the hospital for scheduled thyroidectomy. Hospital medicine team was consulted for management of medical management.     Today: No acute events overnight. Patient states having neck pain 5 out of 10 that is intermittent and dull; continues to have swallowing difficulty. Labs this AM: overall unremarkable, serum glucose 214.     ROS:   (+) Neck pain, difficulty swallowing  (-) Palpitations, fever, night sweat, chills, N/V, diarrhea, constipation, dizziness     OBJECTIVE:     Vital signs:   BP (!) 144/84 (BP Location: Right arm, Patient Position: Sitting)   Pulse 68   Temp 96.7 °F (35.9 °C) (Axillary)   Resp 17   Ht 5' 7" (1.702 m)   Wt 107.7 kg (237 lb 7 oz)   LMP  (Approximate) Comment: 1 year ago  SpO2 100%   Breastfeeding No   BMI 37.19 kg/m²      Physical Examination:  General: Obese w/ moderate distress  HEENT: NC/AT; PERRL; nasal and oral mucosa moist and clear  Pulm: CTA bilaterally, normal work of breathing on 2 L/min  CV: S1, S2 w/o murmurs or gallops; no edema noted  GI: Soft with normal bowel sounds in all quadrants, no masses on palpation  MSK: Full ROM of all extremities   Derm: Neck incision site intact, drain intact   Neuro: AAOx3; motor/sensory function intact  Psych: Cooperative; appropriate mood and affect    Laboratory:  Lab results within past 24 hours reviewed     Imaging:  None within past 24 hours     Current meds:    acetaminophen  1,000 mg Oral Q6H    calcium-vitamin D3  1 tablet Oral BID    docusate sodium  100 mg Oral BID    ibuprofen  400 mg Oral Q6H    insulin glargine U-100  20 Units Subcutaneous QHS    levothyroxine  200 mcg Oral Before breakfast    " losartan  50 mg Oral Daily    metoclopramide HCl  10 mg Oral QID    polyethylene glycol  17 g Oral Daily    sertraline  50 mg Oral QHS    traMADoL  50 mg Oral TID         ASSESSMENT & PLAN:     Left thyroid nodule s/p left hemithyroidectomy (5/10/2024), post-op day 1  -s/p left hemithyroidectomy with sacrifice of left recurrent laryngeal nerve 5/10/24   -ENT team following    HTN  -Continue losartan 50mg daily for now along with PRN antihypertensives  -If BP remains elevated persistently, will consider adding HCTZ     DM II (A1c 10.8 4/25/2024)  Gastroparesis  -Continue sliding scale insulin; increased lantus to 30 units QHS; will continue to titrate insulin as needed    Hypothyroidism  -Continue levothyroxine 200 mcg before breakfast    Disposition (05/11/2024): Continue inpatient monitoring and medical therapy per primary team.     Juan Del Valle DO   [facility] Internal [facility], PGY-III

## 2024-05-11 NOTE — SIGNIFICANT EVENT
[facility] Otolaryngology    Called to bedside by nursing regarding patient with new onset chest pain, facial and fingertip numbness, worsening dyspnea.  Assessment of patient reveals slightly diaphoretic patient, increase from 2L to 3L NC sats at 100%, HR 80 bpm, BP in 170s/100s, hydralazine given.  Blood sugar was in 220s, 4U of insulin given by nursing.    Plan:  -CXR, EKG, CMP, troponins, monitor blood glucose    Chuckie Saenz MD  [facility] Otolaryngology PGY V

## 2024-05-11 NOTE — PLAN OF CARE
Problem: Wound  Goal: Optimal Coping  Outcome: Progressing     Problem: Diabetes Comorbidity  Goal: Blood Glucose Level Within Targeted Range  Outcome: Progressing     Problem: Pain Acute  Goal: Optimal Pain Control and Function  Outcome: Progressing

## 2024-05-11 NOTE — PLAN OF CARE
Problem: Adult Inpatient Plan of Care  Goal: Plan of Care Review  Outcome: Progressing  Flowsheets (Taken 5/11/2024 2968)  Plan of Care Reviewed With: patient  Goal: Patient-Specific Goal (Individualized)  Outcome: Progressing  Goal: Absence of Hospital-Acquired Illness or Injury  Outcome: Progressing  Goal: Optimal Comfort and Wellbeing  Outcome: Progressing  Goal: Readiness for Transition of Care  Outcome: Progressing     Problem: Diabetes Comorbidity  Goal: Blood Glucose Level Within Targeted Range  Outcome: Progressing     Problem: Wound  Goal: Optimal Coping  Outcome: Progressing  Goal: Optimal Functional Ability  Outcome: Progressing  Goal: Absence of Infection Signs and Symptoms  Outcome: Met  Goal: Improved Oral Intake  Outcome: Progressing  Goal: Optimal Pain Control and Function  Outcome: Progressing  Goal: Skin Health and Integrity  Outcome: Progressing  Goal: Optimal Wound Healing  Outcome: Progressing     Problem: Pain Acute  Goal: Optimal Pain Control and Function  Outcome: Progressing

## 2024-05-11 NOTE — NURSING
"Called to bedside by patient. Patient verbalized "Something is wrong with me." Patient sitting in bed, observed to be diaphoretic and weak. C/O squeezing chest pain 10/10, worsening dyspnea on 2L O2 nasal cannula, and facial and fingertip numbness. O2 increased to 3L, O2 sats 100%. . Initial /97, HR 63. Telemetry monitor placed. Dr. Saenz (ENT) and Dr. Cantrell (Internal Medicine) notified.   "

## 2024-05-11 NOTE — PROGRESS NOTES
[facility] OTOLARYNGOLOGY PROGRESS NOTE    Fred Berman 1980 05/11/2024    CC: No chief complaint on file.    Interval HPI: Fred Berman is a 43 y.o. female with GERD, T2DM, HTN, gastroparesis, and a left thyroid nodule suspicious for cancer s/p left hemithyroidectomy with sacrifice of left recurrent laryngeal nerve due to invasion of the nerve on 5/10    Subjective: Difficult sleeping and swallowing.  Scared to take much PO though is able to take PO pills.  Trying liquids occasionally    Diet: Diet Clear Liquid    Allergies: Review of patient's allergies indicates:  No Known Allergies  Medications:   Current Facility-Administered Medications   Medication Dose Route Frequency Provider Last Rate Last Admin    acetaminophen tablet 1,000 mg  1,000 mg Oral Q6H Cali Trujillo MD        calcium-vitamin D3 500 mg-5 mcg (200 unit) per tablet 1 tablet  1 tablet Oral BID Chuckie Saenz MD   1 tablet at 05/10/24 2025    dextrose 10% bolus 125 mL 125 mL  12.5 g Intravenous PRN Chuckie Saenz MD        dextrose 10% bolus 125 mL 125 mL  12.5 g Intravenous PRN Riley Stiles MD        dextrose 10% bolus 250 mL 250 mL  25 g Intravenous PRN Chuckie Saenz MD        dextrose 10% bolus 250 mL 250 mL  25 g Intravenous PRN Riley Stiles MD        docusate sodium capsule 100 mg  100 mg Oral BID Chuckie Saenz MD   100 mg at 05/10/24 2025    glucagon (human recombinant) injection 1 mg  1 mg Intramuscular PRChuckie Wing MD        glucagon (human recombinant) injection 1 mg  1 mg Intramuscular Riley Nazario MD        glucose chewable tablet 16 g  16 g Oral PRN Riley Stiles MD        glucose chewable tablet 24 g  24 g Oral PRN Riley Stiles MD        ibuprofen tablet 400 mg  400 mg Oral Q6H Chuckie Saenz MD   400 mg at 05/11/24 0614    insulin aspart U-100 injection 0-10 Units  0-10 Units Subcutaneous QID (AC + HS) PRN Go, Riley  MD Scott   2 Units at 05/10/24 2045    insulin glargine U-100 (Lantus) injection 20 Units  20 Units Subcutaneous QHS Juan Del Valle DO   20 Units at 05/10/24 2045    labetalol 20 mg/4 mL (5 mg/mL) IV syring  10 mg Intravenous Q4H PRN Juan Del Valle DO        levothyroxine tablet 200 mcg  200 mcg Oral Before breakfast Chuckie Saenz MD   200 mcg at 05/11/24 0614    losartan tablet 50 mg  50 mg Oral Daily Chuckie Saenz MD        melatonin tablet 6 mg  6 mg Oral Nightly PRN Chuckie Saenz MD        metoclopramide HCl tablet 10 mg  10 mg Oral QID Chuckie Saenz MD        morphine injection 2 mg  2 mg Intravenous Q4H PRN Chuckie Saenz MD   2 mg at 05/11/24 0734    ondansetron disintegrating tablet 4 mg  4 mg Oral Q8H PRN Chuckie Saenz MD        oxyCODONE immediate release tablet 5 mg  5 mg Oral Q6H PRN Chuckie Saenz MD   5 mg at 05/11/24 0343    polyethylene glycol packet 17 g  17 g Oral Daily Chuckie Saenz MD        sertraline tablet 50 mg  50 mg Oral QHS Chuckie Seanz MD        traMADoL tablet 50 mg  50 mg Oral TID Chuckie Saenz MD   50 mg at 05/10/24 2025     Scheduled Meds:   acetaminophen  1,000 mg Oral Q6H    calcium-vitamin D3  1 tablet Oral BID    docusate sodium  100 mg Oral BID    ibuprofen  400 mg Oral Q6H    insulin glargine U-100  20 Units Subcutaneous QHS    levothyroxine  200 mcg Oral Before breakfast    losartan  50 mg Oral Daily    metoclopramide HCl  10 mg Oral QID    polyethylene glycol  17 g Oral Daily    sertraline  50 mg Oral QHS    traMADoL  50 mg Oral TID     Continuous Infusions:  PRN Meds:.  Current Facility-Administered Medications:     dextrose 10%, 12.5 g, Intravenous, PRN    dextrose 10%, 12.5 g, Intravenous, PRN    dextrose 10%, 25 g, Intravenous, PRN    dextrose 10%, 25 g, Intravenous, PRN    glucagon (human recombinant), 1 mg, Intramuscular, PRN    glucagon (human recombinant), 1 mg, Intramuscular, PRN    glucose, 16 g, Oral, PRN    glucose, 24 g,  Oral, PRN    insulin aspart U-100, 0-10 Units, Subcutaneous, QID (AC + HS) PRN    labetalol, 10 mg, Intravenous, Q4H PRN    melatonin, 6 mg, Oral, Nightly PRN    morphine, 2 mg, Intravenous, Q4H PRN    ondansetron, 4 mg, Oral, Q8H PRN    oxyCODONE, 5 mg, Oral, Q6H PRN    Objective:  VITAL SIGNS: 24 HR MIN & MAX LAST   Temp  Min: 96.7 °F (35.9 °C)  Max: 98.7 °F (37.1 °C)  98 °F (36.7 °C)   BP  Min: 133/83  Max: 147/82  (!) 143/70    Pulse  Min: 65  Max: 106  67    Resp  Min: 14  Max: 20  16    SpO2  Min: 95 %  Max: 100 %  98 %         24 hr Intake/output:     Drain #1  65cc serosanguinous    Physical Exam:  GEN- NAD, AAO  HEAD/FACE-  NC, AT  EYES - EOMI PERRLA  NOSE-  Midline, no rhinorrhea, atraumatic; no external deformity  EARS - no external deformity  ORAL CAVITY/ORPHARYNX - MMM  NECK - soft, supple, no restriction in ROM, neck flat, drain with serosanguinous output  RESP - on 2L NC, no dyspnea, breathy voice    Flexible Fiberoptic Laryngoscopy/Nasopharyngoscopy via leftnare  Anesthesia: Topical 2% lidocaine  Adverse Events: None  Resident Surgeon: Chuckie Saenz MD  Blood loss: none  Condition: good  Indication: dyspnea, hoarseness    Procedure in Detail: Informed consent was obtained from the patient after explanation of procedure, indications, risks and benefits. Flexible endoscopy was performed on [facility] through the left nasal passages. The nasal cavity, nasopharynx, oropharynx, hypopharynx and larynx were adequately visualized. The true vocal cords and arytenoids were examined during phonation and repose.    Findings:  NP/OP: no masses/lesions of NC, eustachian tube, fossa of Rosenmuller, no adenoid hypertrophy  BOT/vallecula: no lingual hypertrophy, no masses/lesions, no secretions obscuring visualization  Piriform sinuses/post-cricoid: no masses/lesions, no pooling of secretions  Epiglottis: lingual and laryngeal surfaces within normal limits  Arytenoids/FVFs: no masses/lesions, no edema, right  mobility; left arytenoid paralysis  TVCs: right cord mobility, left cord paralysis, no masses or lesions  No aspiration, no pooled secretions  No sign of malignancy      Laboratory:   Lab Results   Component Value Date/Time    WBC 8.70 05/11/2024 04:42 AM    HGB 13.4 05/11/2024 04:42 AM    HCT 40.5 05/11/2024 04:42 AM    CHLORIDE 105 05/11/2024 04:42 AM    CO2 27 05/11/2024 04:42 AM    BUN 8.6 05/11/2024 04:42 AM    CREATININE 0.61 05/11/2024 04:42 AM    GLUCOSE 214 (H) 05/11/2024 04:42 AM    CALCIUM 9.4 05/11/2024 04:42 AM    ALBUMIN 4.3 04/25/2024 03:16 PM    AST 50 (H) 04/25/2024 03:16 PM    ALT 62 (H) 04/25/2024 03:16 PM    ALKPHOS 75 04/25/2024 03:16 PM       Cultures:  Microbiology Results (last 7 days)       ** No results found for the last 168 hours. **            Assessment/Plan:   Fred Berman is a 43 y.o. female with left thyroid nodule suspicious for cancer s/p left hemithyroidectomy with sacrifice of left recurrent laryngeal nerve 5/10/24, currently not taking much PO, some dyspnea    - Discussed current cancer management: will await final pathology prior to addressing right thyroid lobe which will be performed at a later date  - Breathy voice due to left vocal cord paralysis following sacrifice of L RLN; will consider vocal cord augmentation once right thyroid lobe is addressed  - Wean O2 as tolerated  - SLP consulted, appreciate recs; no signs of aspiration currently  - Multimodal pain regimen: patient with history of chronic opioid abuse; discussed weaning IV pain medication as quickly as possible; discussed using this only prior to taking PO to assist in getting improved PO intake.  - Appreciate assistance from Hospitalist team for comorbidities  - PT/OT/ST/OOB/Ambulate    Chuckie Saenz MD  [facility] Department of Otolaryngology  HO-V

## 2024-05-12 VITALS
WEIGHT: 237.44 LBS | HEART RATE: 65 BPM | DIASTOLIC BLOOD PRESSURE: 82 MMHG | HEIGHT: 67 IN | TEMPERATURE: 99 F | BODY MASS INDEX: 37.27 KG/M2 | OXYGEN SATURATION: 98 % | RESPIRATION RATE: 18 BRPM | SYSTOLIC BLOOD PRESSURE: 158 MMHG

## 2024-05-12 LAB
POCT GLUCOSE: 195 MG/DL (ref 70–110)
POCT GLUCOSE: 232 MG/DL (ref 70–110)

## 2024-05-12 PROCEDURE — 94761 N-INVAS EAR/PLS OXIMETRY MLT: CPT

## 2024-05-12 PROCEDURE — 25000003 PHARM REV CODE 250: Performed by: STUDENT IN AN ORGANIZED HEALTH CARE EDUCATION/TRAINING PROGRAM

## 2024-05-12 PROCEDURE — G0378 HOSPITAL OBSERVATION PER HR: HCPCS

## 2024-05-12 PROCEDURE — 63600175 PHARM REV CODE 636 W HCPCS: Performed by: STUDENT IN AN ORGANIZED HEALTH CARE EDUCATION/TRAINING PROGRAM

## 2024-05-12 PROCEDURE — 94760 N-INVAS EAR/PLS OXIMETRY 1: CPT

## 2024-05-12 PROCEDURE — 25000003 PHARM REV CODE 250: Performed by: OTOLARYNGOLOGY

## 2024-05-12 PROCEDURE — 96372 THER/PROPH/DIAG INJ SC/IM: CPT | Performed by: STUDENT IN AN ORGANIZED HEALTH CARE EDUCATION/TRAINING PROGRAM

## 2024-05-12 PROCEDURE — 27000221 HC OXYGEN, UP TO 24 HOURS

## 2024-05-12 RX ORDER — OXYCODONE HYDROCHLORIDE 5 MG/1
5 TABLET ORAL EVERY 6 HOURS PRN
Qty: 18 TABLET | Refills: 0 | Status: SHIPPED | OUTPATIENT
Start: 2024-05-12

## 2024-05-12 RX ORDER — MORPHINE SULFATE 2 MG/ML
2 INJECTION, SOLUTION INTRAMUSCULAR; INTRAVENOUS EVERY 8 HOURS PRN
Status: DISCONTINUED | OUTPATIENT
Start: 2024-05-12 | End: 2024-05-12 | Stop reason: HOSPADM

## 2024-05-12 RX ORDER — OXYCODONE HYDROCHLORIDE 5 MG/1
10 TABLET ORAL EVERY 6 HOURS PRN
Status: DISCONTINUED | OUTPATIENT
Start: 2024-05-12 | End: 2024-05-12 | Stop reason: HOSPADM

## 2024-05-12 RX ORDER — IBUPROFEN 400 MG/1
400 TABLET ORAL EVERY 6 HOURS
Qty: 60 TABLET | Refills: 0 | Status: SHIPPED | OUTPATIENT
Start: 2024-05-12

## 2024-05-12 RX ORDER — MORPHINE SULFATE 2 MG/ML
2 INJECTION, SOLUTION INTRAMUSCULAR; INTRAVENOUS EVERY 8 HOURS PRN
Status: DISCONTINUED | OUTPATIENT
Start: 2024-05-12 | End: 2024-05-12

## 2024-05-12 RX ORDER — INSULIN GLARGINE 100 [IU]/ML
35 INJECTION, SOLUTION SUBCUTANEOUS NIGHTLY
Status: DISCONTINUED | OUTPATIENT
Start: 2024-05-12 | End: 2024-05-12 | Stop reason: HOSPADM

## 2024-05-12 RX ORDER — TRAZODONE HYDROCHLORIDE 50 MG/1
50 TABLET ORAL DAILY PRN
Status: DISCONTINUED | OUTPATIENT
Start: 2024-05-12 | End: 2024-05-12 | Stop reason: HOSPADM

## 2024-05-12 RX ORDER — GABAPENTIN 300 MG/1
300 CAPSULE ORAL 3 TIMES DAILY
Status: DISCONTINUED | OUTPATIENT
Start: 2024-05-12 | End: 2024-05-12 | Stop reason: HOSPADM

## 2024-05-12 RX ORDER — MORPHINE SULFATE 2 MG/ML
2 INJECTION, SOLUTION INTRAMUSCULAR; INTRAVENOUS ONCE
Status: COMPLETED | OUTPATIENT
Start: 2024-05-12 | End: 2024-05-12

## 2024-05-12 RX ORDER — GABAPENTIN 300 MG/1
300 CAPSULE ORAL 3 TIMES DAILY
Qty: 90 CAPSULE | Refills: 11 | Status: SHIPPED | OUTPATIENT
Start: 2024-05-12 | End: 2025-05-12

## 2024-05-12 RX ORDER — ACETAMINOPHEN 500 MG
1000 TABLET ORAL EVERY 6 HOURS
Qty: 60 TABLET | Refills: 0 | Status: SHIPPED | OUTPATIENT
Start: 2024-05-12

## 2024-05-12 RX ORDER — BUPRENORPHINE HYDROCHLORIDE 8 MG/1
8 TABLET SUBLINGUAL 3 TIMES DAILY
Status: DISCONTINUED | OUTPATIENT
Start: 2024-05-12 | End: 2024-05-12 | Stop reason: HOSPADM

## 2024-05-12 RX ADMIN — INSULIN ASPART 2 UNITS: 100 INJECTION, SOLUTION INTRAVENOUS; SUBCUTANEOUS at 08:05

## 2024-05-12 RX ADMIN — HYDROCHLOROTHIAZIDE 25 MG: 25 TABLET ORAL at 08:05

## 2024-05-12 RX ADMIN — INSULIN ASPART 4 UNITS: 100 INJECTION, SOLUTION INTRAVENOUS; SUBCUTANEOUS at 12:05

## 2024-05-12 RX ADMIN — OXYCODONE HYDROCHLORIDE 10 MG: 5 TABLET ORAL at 04:05

## 2024-05-12 RX ADMIN — Medication 1 TABLET: at 08:05

## 2024-05-12 RX ADMIN — Medication 6 MG: at 12:05

## 2024-05-12 RX ADMIN — TRAMADOL HYDROCHLORIDE 50 MG: 50 TABLET, COATED ORAL at 09:05

## 2024-05-12 RX ADMIN — IBUPROFEN 400 MG: 400 TABLET, FILM COATED ORAL at 12:05

## 2024-05-12 RX ADMIN — LOSARTAN POTASSIUM 50 MG: 25 TABLET, FILM COATED ORAL at 08:05

## 2024-05-12 RX ADMIN — ACETAMINOPHEN 1000 MG: 500 TABLET ORAL at 01:05

## 2024-05-12 RX ADMIN — DOCUSATE SODIUM 100 MG: 100 CAPSULE, LIQUID FILLED ORAL at 09:05

## 2024-05-12 RX ADMIN — IBUPROFEN 400 MG: 400 TABLET, FILM COATED ORAL at 11:05

## 2024-05-12 RX ADMIN — ACETAMINOPHEN 1000 MG: 500 TABLET ORAL at 12:05

## 2024-05-12 RX ADMIN — IBUPROFEN 400 MG: 400 TABLET, FILM COATED ORAL at 05:05

## 2024-05-12 RX ADMIN — ACETAMINOPHEN 1000 MG: 500 TABLET ORAL at 05:05

## 2024-05-12 RX ADMIN — MORPHINE SULFATE 2 MG: 2 INJECTION, SOLUTION INTRAMUSCULAR; INTRAVENOUS at 07:05

## 2024-05-12 RX ADMIN — ALPRAZOLAM 0.25 MG: 0.25 TABLET ORAL at 05:05

## 2024-05-12 RX ADMIN — GABAPENTIN 300 MG: 300 CAPSULE ORAL at 09:05

## 2024-05-12 RX ADMIN — OXYCODONE HYDROCHLORIDE 10 MG: 5 TABLET ORAL at 10:05

## 2024-05-12 RX ADMIN — POLYETHYLENE GLYCOL 3350 17 G: 17 POWDER, FOR SOLUTION ORAL at 09:05

## 2024-05-12 RX ADMIN — MORPHINE SULFATE 2 MG: 2 INJECTION, SOLUTION INTRAMUSCULAR; INTRAVENOUS at 02:05

## 2024-05-12 RX ADMIN — LEVOTHYROXINE SODIUM 200 MCG: 100 TABLET ORAL at 05:05

## 2024-05-12 RX ADMIN — MORPHINE SULFATE 2 MG: 2 INJECTION, SOLUTION INTRAMUSCULAR; INTRAVENOUS at 03:05

## 2024-05-12 NOTE — DISCHARGE INSTRUCTIONS
[facility] Otolaryngology    Take tylenol (1000 mg ever 6 hours) and motrin (400 mg every 6 hours) as prescribed for pain control.  Narcotic medication has been sent for breakthrough pain only.  Plan to restart Subutex on discharge.  Expect a phone [facility] clinic for your follow up visit in approximately 1 week.  We will refer you to our Endocrinologist as discussed.  In the interim, discuss synthroid and diabetes management with current Endocrinologist.  For any questions or concerns, call [facility]  and ask for the on call ENT resident.

## 2024-05-12 NOTE — PLAN OF CARE
Problem: Adult Inpatient Plan of Care  Goal: Plan of Care Review  Outcome: Met  Goal: Patient-Specific Goal (Individualized)  Outcome: Met  Goal: Absence of Hospital-Acquired Illness or Injury  Outcome: Met  Goal: Optimal Comfort and Wellbeing  Outcome: Met  Goal: Readiness for Transition of Care  Outcome: Met     Problem: Diabetes Comorbidity  Goal: Blood Glucose Level Within Targeted Range  Outcome: Met     Problem: Wound  Goal: Optimal Coping  Outcome: Met  Goal: Optimal Functional Ability  Outcome: Met  Goal: Improved Oral Intake  Outcome: Met  Goal: Optimal Pain Control and Function  Outcome: Met  Goal: Skin Health and Integrity  Outcome: Met  Goal: Optimal Wound Healing  Outcome: Met     Problem: Pain Acute  Goal: Optimal Pain Control and Function  Outcome: Met

## 2024-05-12 NOTE — DISCHARGE SUMMARY
OTOLARYNGOLOGY DISCHARGE SUMMARY    Patient: Fred Berman  MRN: [id number]    Date of Admission: 5/10/2024  Date of Discharge: 5/12/2024  Admitting Physician: Cali Trujillo MD  Discharge Physician: Anuj Ferrell MD    Admitting Diagnosis: Thyroid nodule [E04.1], thyroid malignancy    Discharge Diagnosis: Thyroid nodule [E04.1], same      Procedures: left hemithyroidectomy    Consults: Hospitalist    Complications: Sacrifice of left recurrent laryngeal nerve    Hospital Course  Fred Berman is a 43 y.o. female admitted 5/10/2024 following left hemithyroidectomy with sacrifice of L RLN due to invasive tumor of the left thyroid.  For further details please refer to the operative report. During her hospital course, she had chest pain with a negative workup and which was attributable to her high anxiety.  She was able to take PO and ambulate without issue.  Over the ensuing hospital course patients clinical condition continued to improve and on 5/12/2024 all discharge criteria had been met and patient was deemed stable enough for discharge home.     Discharge To: Home  Discharge Diet: Diabetic diet      Discharge Medications:     Medication List        START taking these medications      acetaminophen 500 MG tablet  Commonly known as: TYLENOL  Take 2 tablets (1,000 mg total) by mouth every 6 (six) hours.     gabapentin 300 MG capsule  Commonly known as: NEURONTIN  Take 1 capsule (300 mg total) by mouth 3 (three) times daily.     ibuprofen 400 MG tablet  Commonly known as: ADVIL,MOTRIN  Take 1 tablet (400 mg total) by mouth every 6 (six) hours.     oxyCODONE 5 MG immediate release tablet  Commonly known as: ROXICODONE  Take 1 tablet (5 mg total) by mouth every 6 (six) hours as needed for Pain.            CONTINUE taking these medications      buprenorphine HCL 8 mg Subl  Commonly known as: SUBUTEX     CALCIUM 600 + D(3) ORAL     COMPOUND HORMONE REPLACEMENT     fluocinonide 0.05 % external  "solution  Commonly known as: LIDEX  Apply topically 2 (two) times daily. Swish and spit solution     hydrocortisone 2.5 % rectal cream  Commonly known as: ANUSOL-HC     LANTUS SOLOSTAR U-100 INSULIN 100 unit/mL (3 mL) Inpn pen  Generic drug: insulin glargine U-100 (Lantus)  Inject 15 Units into the skin 2 (two) times daily.     levothyroxine 200 MCG tablet  Commonly known as: SYNTHROID  Take 1 tablet (200 mcg total) by mouth before breakfast.     losartan 50 MG tablet  Commonly known as: COZAAR  Take 1 tablet (50 mg total) by mouth once daily.     lubiprostone 24 MCG Cap  Commonly known as: AMITIZA     metFORMIN 500 MG tablet  Commonly known as: GLUCOPHAGE  Take 1 tablet (500 mg total) by mouth 2 (two) times daily with meals.     MOTEGRITY 2 mg Tab  Generic drug: prucalopride     ondansetron 4 MG Tbdl  Commonly known as: ZOFRAN-ODT  Take 1 tablet (4 mg total) by mouth every 8 (eight) hours as needed (NAUSEA).     pantoprazole 40 MG tablet  Commonly known as: PROTONIX     pen needle, diabetic 32 gauge x 5/32" Ndle  Commonly known as: BD ULTRA-FINE ROSALEE PEN NEEDLE  1 each by Misc.(Non-Drug; Combo Route) route 2 (two) times a day.     polyethylene glycol 17 gram Pwpk  Commonly known as: GLYCOLAX  Take 17 g by mouth 2 (two) times daily.     REGLAN 10 mg tablet  Generic drug: metoclopramide HCl  Take 1 tablet (10 mg total) by mouth 4 (four) times daily.     sertraline 50 MG tablet  Commonly known as: ZOLOFT            ASK your doctor about these medications      busPIRone 7.5 MG tablet  Commonly known as: BUSPAR     naloxegoL 25 mg tablet  Commonly known as: MOVANTIK  Take 25 mg by mouth once daily.               Where to Get Your Medications        These medications were sent to [facility][id number] - [address] AT [facility] & [address] [address]      Hours: 24-hours Phone: [phone]   acetaminophen 500 MG tablet  gabapentin 300 MG " capsule  ibuprofen 400 MG tablet  oxyCODONE 5 MG immediate release tablet           Follow up  With  [facility] ENT clinic in approximately 1 weeks    5/12/2024  4:23 PM    Chuckie Saenz MD  [facility] Otolaryngology, PGY V

## 2024-05-12 NOTE — NURSING
Discharge education and instructions reviewed with patient. Educated patient on wound care, medication management, follow-up appointments, and diabetes management. Patient verbalized understanding. Teach-back method used to confirm understanding.

## 2024-05-12 NOTE — PROGRESS NOTES
[facility] OTOLARYNGOLOGY PROGRESS NOTE    Fred Berman 1980 05/12/2024    CC: No chief complaint on file.    Interval HPI: Fred Berman is a 43 y.o. female  with GERD, T2DM, HTN, gastroparesis, and a left thyroid nodule suspicious for cancer s/p left hemithyroidectomy with sacrifice of left recurrent laryngeal nerve due to invasion of the nerve on 5/10/24     Subjective: Refer to significant event note 5/10.  Workup negative. Continues with mild chest pain, difficulty sleeping and swallowing.  Able to get pills down as well as pudding.  Subjective shortness of breath but able to wean O2 nasal cannula to 1.5L with O2 @ 100%.    Diet: Diet Adult Regular    Allergies: Review of patient's allergies indicates:  No Known Allergies  Medications:   Current Facility-Administered Medications   Medication Dose Route Frequency Provider Last Rate Last Admin    acetaminophen tablet 1,000 mg  1,000 mg Oral Q6H Cali Trujillo MD   1,000 mg at 05/12/24 0554    calcium-vitamin D3 500 mg-5 mcg (200 unit) per tablet 1 tablet  1 tablet Oral BID Chuckie Saenz MD   1 tablet at 05/12/24 0816    cloNIDine tablet 0.1 mg  0.1 mg Oral Q4H PRN Juan Del Valle DO        dextrose 10% bolus 125 mL 125 mL  12.5 g Intravenous PRN Chcukie Saenz MD        dextrose 10% bolus 125 mL 125 mL  12.5 g Intravenous PRN Riley Stiles MD        dextrose 10% bolus 250 mL 250 mL  25 g Intravenous PRN Chuckie Saenz MD        dextrose 10% bolus 250 mL 250 mL  25 g Intravenous PRN Riley Stiles MD        docusate sodium capsule 100 mg  100 mg Oral BID Chuckie Saenz MD   100 mg at 05/11/24 2008    gabapentin capsule 300 mg  300 mg Oral TID Chuckie Saenz MD        glucagon (human recombinant) injection 1 mg  1 mg Intramuscular PRN Chuckie Saenz MD        glucagon (human recombinant) injection 1 mg  1 mg Intramuscular PRN Riley Stiles MD        glucose chewable tablet 16 g  16 g Oral PRN  Riley Stiles MD        glucose chewable tablet 24 g  24 g Oral PRN Riley Stiles MD        hydrALAZINE injection 10 mg  10 mg Intravenous Q4H PRN Juan Del Valle DO        hydroCHLOROthiazide tablet 25 mg  25 mg Oral Daily Juan Del Valle DO   25 mg at 05/12/24 0816    ibuprofen tablet 400 mg  400 mg Oral Q6H Chuckie Saenz MD   400 mg at 05/12/24 0554    insulin aspart U-100 injection 0-10 Units  0-10 Units Subcutaneous QID (AC + HS) PRN Riley Stiles MD   2 Units at 05/12/24 0817    insulin glargine U-100 (Lantus) injection 30 Units  30 Units Subcutaneous QHS Juan Del Valle DO   30 Units at 05/11/24 2009    labetalol 20 mg/4 mL (5 mg/mL) IV syring  10 mg Intravenous Q2H PRN Juan Del Valle DO        levothyroxine tablet 200 mcg  200 mcg Oral Before breakfast Chuckie Saenz MD   200 mcg at 05/12/24 0554    losartan tablet 50 mg  50 mg Oral Daily Chuckie Saenz MD   50 mg at 05/12/24 0816    melatonin tablet 6 mg  6 mg Oral Nightly PRN Chuckie Saenz MD   6 mg at 05/12/24 0010    metoclopramide HCl tablet 10 mg  10 mg Oral QID Chuckie Saenz MD   10 mg at 05/11/24 2009    morphine injection 2 mg  2 mg Intravenous Q8H PRN Chuckie Saenz MD        ondansetron disintegrating tablet 4 mg  4 mg Oral Q8H PRN Chuckie Saenz MD        oxyCODONE immediate release tablet 10 mg  10 mg Oral Q6H PRN Chuckie Saenz MD        oxyCODONE immediate release tablet 5 mg  5 mg Oral Q6H PRN Chuckie Saenz MD   5 mg at 05/11/24 0343    polyethylene glycol packet 17 g  17 g Oral Daily Chuckie Saenz MD        sertraline tablet 50 mg  50 mg Oral QHS Chuckie Saenz MD   50 mg at 05/11/24 2009    traMADoL tablet 50 mg  50 mg Oral TID Chuckie Saenz MD   50 mg at 05/11/24 2008     Scheduled Meds:   acetaminophen  1,000 mg Oral Q6H    calcium-vitamin D3  1 tablet Oral BID    docusate sodium  100 mg Oral BID    gabapentin  300 mg Oral TID    hydroCHLOROthiazide  25 mg Oral Daily     ibuprofen  400 mg Oral Q6H    insulin glargine U-100  30 Units Subcutaneous QHS    levothyroxine  200 mcg Oral Before breakfast    losartan  50 mg Oral Daily    metoclopramide HCl  10 mg Oral QID    polyethylene glycol  17 g Oral Daily    sertraline  50 mg Oral QHS    traMADoL  50 mg Oral TID     Continuous Infusions:  PRN Meds:.  Current Facility-Administered Medications:     cloNIDine, 0.1 mg, Oral, Q4H PRN    dextrose 10%, 12.5 g, Intravenous, PRN    dextrose 10%, 12.5 g, Intravenous, PRN    dextrose 10%, 25 g, Intravenous, PRN    dextrose 10%, 25 g, Intravenous, PRN    glucagon (human recombinant), 1 mg, Intramuscular, PRN    glucagon (human recombinant), 1 mg, Intramuscular, PRN    glucose, 16 g, Oral, PRN    glucose, 24 g, Oral, PRN    hydrALAZINE, 10 mg, Intravenous, Q4H PRN    insulin aspart U-100, 0-10 Units, Subcutaneous, QID (AC + HS) PRN    labetalol, 10 mg, Intravenous, Q2H PRN    melatonin, 6 mg, Oral, Nightly PRN    morphine, 2 mg, Intravenous, Q8H PRN    ondansetron, 4 mg, Oral, Q8H PRN    oxyCODONE, 10 mg, Oral, Q6H PRN    oxyCODONE, 5 mg, Oral, Q6H PRN    Objective:  VITAL SIGNS: 24 HR MIN & MAX LAST   Temp  Min: 97.8 °F (36.6 °C)  Max: 98.3 °F (36.8 °C)  98.2 °F (36.8 °C)   BP  Min: 118/76  Max: 170/108  (!) 166/91    Pulse  Min: 61  Max: 94  67    Resp  Min: 16  Max: 20  17    SpO2  Min: 98 %  Max: 100 %  98 %         24 hr Intake/output:     Drain #1  25cc serous    Physical Exam:  GEN- NAD, AAO  HEAD/FACE-  NC, AT  EYES - EOMI PERRLA  NOSE-  Midline, no rhinorrhea, atraumatic; no external deformity  EARS - no external deformity  ORAL CAVITY/ORPHARYNX - MMM  NECK - soft, supple, no restriction in ROM, neck flat, drain with serosanguinous output  RESP - on 2L NC, no dyspnea, breathy voice but improved    Laboratory:   Lab Results   Component Value Date/Time    WBC 8.70 05/11/2024 04:42 AM    HGB 13.4 05/11/2024 04:42 AM    HCT 40.5 05/11/2024 04:42 AM    CHLORIDE 102 05/11/2024 10:02 AM    CO2 28  05/11/2024 10:02 AM    BUN 9.1 05/11/2024 10:02 AM    CREATININE 0.75 05/11/2024 10:02 AM    GLUCOSE 229 (H) 05/11/2024 10:02 AM    CALCIUM 9.7 05/11/2024 10:02 AM    ALBUMIN 3.8 05/11/2024 10:02 AM    AST 31 05/11/2024 10:02 AM    ALT 40 05/11/2024 10:02 AM    ALKPHOS 56 05/11/2024 10:02 AM       Cultures:  Microbiology Results (last 7 days)       ** No results found for the last 168 hours. **              Assessment/Plan:   Fred Berman is a 43 y.o. female with left thyroid nodule suspicious for cancer s/p left hemithyroidectomy with sacrifice of left recurrent laryngeal nerve 5/10/24    - Chest pain workup negative yesterday, continue to monitor   - Discussed current cancer management: will await final pathology prior to addressing right thyroid lobe which will be performed at a later date  - Breathy voice due to left vocal cord paralysis following sacrifice of L RLN; will consider vocal cord augmentation once right thyroid lobe is addressed  - Wean O2 as tolerated  - SLP consulted, appreciate recs; no signs of aspiration currently, voice has improved  - Multimodal pain regimen: patient with history of chronic opioid abuse; discussed weaning IV pain medication as quickly as possible; discussed using this only prior to taking PO to assist in getting improved PO intake.  Added gabapentin  - Appreciate assistance from Hospitalist team for comorbidities  - PT/OT/ST/OOB/Ambulate    Chuckie Saenz MD  [facility] Department of Otolaryngology  HO-V

## 2024-05-12 NOTE — PROGRESS NOTES
"[facility] INTERNAL MEDICINE  INPATIENT PROGRESS NOTE    Resident Team: [facility] Medicine List 1  Attending Physician: JOSEPH ALANIS DO    SUBJECTIVE:      HPI: Fred Berman is a 43 y.o. female with PMH of DM II, gastroparesis, hypothyroidism, HTN, chronic pain syndrome, HX of polysubstance  abuse, anxiety, who admitted to the hospital for scheduled thyroidectomy. Hospital medicine team was consulted for management of medical management.     Today: No acute events overnight. Patient states having neck pain 5 out of 10 that is intermittent and dull; continues to have swallowing difficulty. No labs today. Glc remains elevated. BP remains elevated, will continue to monitor after adding HCTZ this am.     ROS:   (+) Neck pain, difficulty swallowing  (-) Palpitations, fever, night sweat, chills, N/V, diarrhea, constipation, dizziness     OBJECTIVE:     Vital signs:   BP (!) 166/91   Pulse 67   Temp 98.2 °F (36.8 °C) (Oral)   Resp 17   Ht 5' 7" (1.702 m)   Wt 107.7 kg (237 lb 7 oz)   LMP  (Approximate) Comment: 1 year ago  SpO2 98%   Breastfeeding No   BMI 37.19 kg/m²      Physical Examination:  General: Obese w/ moderate distress  HEENT: NC/AT; PERRL; nasal and oral mucosa moist and clear  Pulm: CTA bilaterally, normal work of breathing on 2 L/min  CV: S1, S2 w/o murmurs or gallops; no edema noted  GI: Soft with normal bowel sounds in all quadrants, no masses on palpation  MSK: Full ROM of all extremities   Derm: Neck incision site intact, drain intact   Neuro: AAOx3; motor/sensory function intact  Psych: Cooperative; appropriate mood and affect    Laboratory:  Lab results within past 24 hours reviewed     Imaging:  None within past 24 hours     Current meds:    acetaminophen  1,000 mg Oral Q6H    buprenorphine HCL  8 mg Sublingual TID    calcium-vitamin D3  1 tablet Oral BID    docusate sodium  100 mg Oral BID    gabapentin  300 mg Oral TID    hydroCHLOROthiazide  25 mg Oral Daily    ibuprofen  400 mg " Oral Q6H    insulin glargine U-100  30 Units Subcutaneous QHS    levothyroxine  200 mcg Oral Before breakfast    losartan  50 mg Oral Daily    metoclopramide HCl  10 mg Oral QID    polyethylene glycol  17 g Oral Daily    sertraline  50 mg Oral QHS         ASSESSMENT & PLAN:     Left thyroid nodule s/p left hemithyroidectomy (5/10/2024), post-op day 1  -s/p left hemithyroidectomy with sacrifice of left recurrent laryngeal nerve 5/10/24   -ENT team to manage pain.     Insomnia  - Can add trazadone 50 qHS or Ambien 5 PRN for sleep disruption     HTN  -Continue losartan 50mg daily for now along with PRN antihypertensives  -If BP remains elevated persistently, will consider adding HCTZ     DM II (A1c 10.8 4/25/2024)  Gastroparesis  -Continue sliding scale insulin; increased lantus to 35 units QHS; will continue to titrate insulin as needed    Hypothyroidism  -Continue levothyroxine 200 mcg before breakfast    Disposition (05/12/2024): Continue inpatient monitoring and medical therapy per primary team.     Jose Weiner DO   [facility] Internal Medicine, PGY-I

## 2024-05-12 NOTE — PLAN OF CARE
Problem: Adult Inpatient Plan of Care  Goal: Plan of Care Review  Outcome: Progressing  Goal: Absence of Hospital-Acquired Illness or Injury  Outcome: Progressing     Problem: Diabetes Comorbidity  Goal: Blood Glucose Level Within Targeted Range  Outcome: Progressing     Problem: Wound  Goal: Optimal Coping  Outcome: Progressing  Goal: Optimal Functional Ability  Outcome: Progressing  Goal: Improved Oral Intake  Outcome: Progressing     Problem: Pain Acute  Goal: Optimal Pain Control and Function  Outcome: Progressing

## 2024-05-13 LAB
OHS QRS DURATION: 90 MS
OHS QTC CALCULATION: 443 MS

## 2024-05-14 DIAGNOSIS — E04.1 THYROID NODULE: Primary | ICD-10-CM

## 2024-05-14 LAB — BEAKER SEE SCANNED REPORT: NORMAL

## 2024-05-15 ENCOUNTER — TELEPHONE (OUTPATIENT)
Dept: OTOLARYNGOLOGY | Facility: CLINIC | Age: 44
End: 2024-05-15
Payer: MEDICAID

## 2024-05-15 NOTE — TELEPHONE ENCOUNTER
----- Message from Amaris Pedroza sent at 5/13/2024 10:46 AM CDT -----  Pt called stating that she had sx on 5/10 and was discharged from the hospital on yesterday.  Drain tube was removed prior to discharge.  She stated that last night she felt/heard a pop.  She stated that her incision is draining and it's a reddish/yellow color.  Pt would like a call back in regards to this matter.    Thanks!    Dr. Saenz called about drainage.  No new orders noted.  Discussed continued incisional care with patient.  No other complaints at this time.

## 2024-05-21 ENCOUNTER — OFFICE VISIT (OUTPATIENT)
Dept: OTOLARYNGOLOGY | Facility: CLINIC | Age: 44
End: 2024-05-21
Payer: MEDICAID

## 2024-05-21 VITALS
DIASTOLIC BLOOD PRESSURE: 87 MMHG | WEIGHT: 237 LBS | OXYGEN SATURATION: 98 % | HEART RATE: 71 BPM | SYSTOLIC BLOOD PRESSURE: 120 MMHG | HEIGHT: 67 IN | BODY MASS INDEX: 37.2 KG/M2 | TEMPERATURE: 99 F

## 2024-05-21 DIAGNOSIS — L43.8 ORAL LICHEN PLANUS: ICD-10-CM

## 2024-05-21 DIAGNOSIS — C73 PRIMARY THYROID MALIGNANCY: ICD-10-CM

## 2024-05-21 DIAGNOSIS — E04.1 THYROID NODULE: Primary | ICD-10-CM

## 2024-05-21 PROCEDURE — 99215 OFFICE O/P EST HI 40 MIN: CPT | Mod: PBBFAC | Performed by: STUDENT IN AN ORGANIZED HEALTH CARE EDUCATION/TRAINING PROGRAM

## 2024-05-21 RX ORDER — LEVOTHYROXINE SODIUM 200 UG/1
200 TABLET ORAL
Qty: 30 TABLET | Refills: 11 | Status: SHIPPED | OUTPATIENT
Start: 2024-05-21 | End: 2025-05-21

## 2024-05-21 NOTE — PROGRESS NOTES
[facility]  Otolaryngology Clinic Note    Fred Berman  Encounter Date: 5/21/2024  YOB: 1980    Chief Complaint: tongue lesion    HPI: Fred Berman is a 43 y.o. female referred for a tongue lesion. She reports that she first noticed it about a year ago and says that it has slowly been enlarging since then. It has never bled. She doesn't think it hurts but does bite it occasionally which makes it sore. It's never been biopsied. She says that her dentist told her that he was not going to do any more work on her teeth until she got it checked out. She also feels like she has developed allergies over the past few months.  She endorses frequent tearing and redness of her eyes in feels like her nose is running frequently.  She also endorses nasal congestion.  She is been using Allegra and does not think it is helped any.  She has not tried Flonase.  She does use Afrin she thinks maybe once a month.  She was diagnosed with a sinus infection over the summer and got a few days of antibiotics.  She denies facial pain or pressure but does note that when she wakes up in the morning she feels like the outside of her nose is swollen over the bridge of her nose.  She also endorses facial numbness bilaterally in all 3 distributions which has been going on for some time.  She reports that her PCP is work this up with an MRI scan.  She is also been dealing with intermittent dysphagia.  She feels like things are not going down, both solids and liquids.  This does not happen with every meal but comes and goes.  She follows with Gastroenterology and has had EGDs and dilations in the past.  She reports that she has another 1 scheduled in November.  As far as her voice goes, she notes intermittent dysphonia where her voice gets roughed and then returns to normal.  It usually comes back to normal on its own.  She is on Protonix daily for gastritis that has been seen on prior scopes she  reports.  As far as other medical problems she has diabetes, hypertension, gastroparesis, anxiety and depression.  For past surgical history she is had a cholecystectomy and .  She is had her tonsils removed but no other history of head and neck surgery.  She is a former smoker and quit 1 year ago.  She does not drink and denies any other drug use.      2024:  Presents today in follow up.  Eager for total thyroidectomy given results of FNA.  Has multiple concerns for surgical standpoint.  She would like to stay at least 1 night due to family history of von Willebrand's disease.  She also takes Subutex and would like to talk to the anesthesia team regarding use of opioids.  She will stop taking this she does prior to surgery and is only allowed 1 week of narcotics postop.  She states that she sees endocrinology for diabetes and thyroid dysfunction.  She is on at least 200 mcg of Synthroid already and her sugars are often uncontrolled.    2024:  Presents today for 1st postop appointment after a left hemithyroidectomy in recurrent nerve sacrificed on May 10th.  Reports healing well with no pain. Hasn't taken any narcotics. Anxious about pathology results. Still having issues with oral mucositis.     ROS:   General: Negative except per HPI  Skin: Denies rash, ulcer, or lesion.  Eyes: Denies vision changes or diplopia.  Ears: Negative except per HPI  Nose: Negative except per HPI  Throat/mouth: Negative except per HPI  Cardiovascular: Negative except per HPI  Respiratory: Negative except per HPI  Neck: Negative except per HPI  Endocrine: Negative except per HPI  Neurologic: Negative except per HPI    Other 10-point review of systems negative except per HPI      Review of patient's allergies indicates:  No Known Allergies      Past Medical History:   Diagnosis Date    Adrenal mass     Anxiety     Arthritis     Bradycardia     Depression     Diabetes mellitus, type 2     Gastroparesis     General  "anesthetics causing adverse effect in therapeutic use     "hard time waking up"    GERD (gastroesophageal reflux disease)     Hip pain     Hypertension     Menstrual cramps     Ovarian cyst     Palpitations     Pyosalpingitis     Thyroid disease        Past Surgical History:   Procedure Laterality Date    ABLATION OF VAGINAL TISSUE USING LASER       SECTION      CHOLECYSTECTOMY      DISSECTION OF NECK Left 5/10/2024    Procedure: DISSECTION, NECK;  Surgeon: Cali Trujillo MD;  Location: [facility];  Service: ENT;  Laterality: Left;    THYROIDECTOMY Left 5/10/2024    Procedure: THYROIDECTOMY;  Surgeon: Cali Trujillo MD;  Location: [facility];  Service: ENT;  Laterality: Left;    TUBAL LIGATION         Social History     Socioeconomic History    Marital status: Single   Tobacco Use    Smoking status: Former     Types: Cigarettes    Smokeless tobacco: Never   Substance and Sexual Activity    Alcohol use: Never     Alcohol/week: 6.0 standard drinks of alcohol     Types: 6 Shots of liquor per week    Drug use: Not Currently     Comment: Various Opoids    Sexual activity: Yes     Partners: Male     Social Determinants of Health     Financial Resource Strain: High Risk (2024)    Overall Financial Resource Strain (CARDIA)     Difficulty of Paying Living Expenses: Very hard   Food Insecurity: No Food Insecurity (2024)    Hunger Vital Sign     Worried About Running Out of Food in the Last Year: Never true     Ran Out of Food in the Last Year: Never true   Transportation Needs: No Transportation Needs (2024)    TRANSPORTATION NEEDS     Transportation : No   Physical Activity: Sufficiently Active (2023)    Exercise Vital Sign     Days of Exercise per Week: 5 days     Minutes of Exercise per Session: 30 min   Stress: Stress Concern Present (2024)    [employer] - Occupational Stress Questionnaire     Feeling of Stress : To some extent   Housing Stability: Low Risk  " (5/12/2024)    Housing Stability Vital Sign     Unable to Pay for Housing in the Last Year: No     Homeless in the Last Year: No       Family History   Problem Relation Name Age of Onset    Alcohol abuse Mother Pat         Pat    Arthritis Mother Pat     Cancer Mother Pat     COPD Mother Pat     Depression Mother Pat     Diabetes Mother Pat     Drug abuse Mother Pat     Hypertension Mother Pat     Miscarriages / Stillbirths Mother Pat     Depression Father Juventino     Drug abuse Father Juventino     Hypertension Father Juventino     Cancer Maternal Grandfather Janay     Miscarriages / Stillbirths Maternal Grandmother Elsa     Hearing loss Paternal Grandfather Lyod     Asthma Son Mojgan     Hypertension Son Mojgan     Drug abuse Sister Elana     Heart disease Sister Elana     Miscarriages / Stillbirths Sister Elana     Drug abuse Brother Juventino     Early death Brother Izaiah        Outpatient Encounter Medications as of 5/21/2024   Medication Sig Dispense Refill    buprenorphine HCL (SUBUTEX) 8 mg Subl Place 8 mg under the tongue 3 (three) times daily.      calcium carbonate/vitamin D3 (CALCIUM 600 + D,3, ORAL) Take 1 tablet by mouth 2 (two) times a day.      COMPOUND HORMONE REPLACEMENT Take by mouth once daily.      ibuprofen (ADVIL,MOTRIN) 400 MG tablet Take 1 tablet (400 mg total) by mouth every 6 (six) hours. 60 tablet 0    insulin (LANTUS SOLOSTAR U-100 INSULIN) glargine 100 units/mL SubQ pen Inject 15 Units into the skin 2 (two) times daily. (Patient taking differently: Inject 70 Units into the skin 2 (two) times daily.) 27 mL 3    levothyroxine (SYNTHROID) 200 MCG tablet Take 1 tablet (200 mcg total) by mouth before breakfast. (Patient taking differently: Take 250 mcg by mouth before breakfast.) 30 tablet 11    losartan (COZAAR) 50 MG tablet Take 1 tablet (50 mg total) by mouth once daily. 90 tablet 3    ondansetron (ZOFRAN-ODT) 4 MG TbDL Take 1 tablet (4 mg total) by mouth every 8 (eight) hours as needed (NAUSEA). 30 tablet 1     "pen needle, diabetic (BD ULTRA-FINE ROSALEE PEN NEEDLE) 32 gauge x 5/32" Ndle 1 each by Misc.(Non-Drug; Combo Route) route 2 (two) times a day. 100 each 9    polyethylene glycol (GLYCOLAX) 17 gram PwPk Take 17 g by mouth 2 (two) times daily. 30 each 1    acetaminophen (TYLENOL) 500 MG tablet Take 2 tablets (1,000 mg total) by mouth every 6 (six) hours. (Patient not taking: Reported on 5/21/2024) 60 tablet 0    busPIRone (BUSPAR) 7.5 MG tablet Take 7.5 mg by mouth 3 (three) times daily. (Patient not taking: Reported on 4/18/2024)      fluocinonide (LIDEX) 0.05 % external solution Apply topically 2 (two) times daily. Swish and spit solution (Patient not taking: Reported on 5/21/2024) 20 mL 1    gabapentin (NEURONTIN) 300 MG capsule Take 1 capsule (300 mg total) by mouth 3 (three) times daily. (Patient not taking: Reported on 5/21/2024) 90 capsule 11    hydrocortisone (ANUSOL-HC) 2.5 % rectal cream Place 1 application  rectally 2 (two) times daily. (Patient not taking: Reported on 5/21/2024)      lubiprostone (AMITIZA) 24 MCG Cap Take 24 mcg by mouth daily with breakfast. (Patient not taking: Reported on 5/21/2024)      metFORMIN (GLUCOPHAGE) 500 MG tablet Take 1 tablet (500 mg total) by mouth 2 (two) times daily with meals. (Patient not taking: Reported on 5/21/2024) 180 tablet 1    MOTEGRITY 2 mg Tab Take 1 tablet by mouth. (Patient not taking: Reported on 5/21/2024)      naloxegoL (MOVANTIK) 25 mg tablet Take 25 mg by mouth once daily. (Patient not taking: Reported on 1/23/2024) 30 tablet 2    oxyCODONE (ROXICODONE) 5 MG immediate release tablet Take 1 tablet (5 mg total) by mouth every 6 (six) hours as needed for Pain. (Patient not taking: Reported on 5/21/2024) 18 tablet 0    pantoprazole (PROTONIX) 40 MG tablet Take 40 mg by mouth every morning. (Patient not taking: Reported on 5/21/2024)      REGLAN 10 mg tablet Take 1 tablet (10 mg total) by mouth 4 (four) times daily. (Patient not taking: Reported on 5/21/2024) " 120 tablet 1    sertraline (ZOLOFT) 50 MG tablet Take 50 mg by mouth every evening. (Patient not taking: Reported on 5/21/2024)       Facility-Administered Encounter Medications as of 5/21/2024   Medication Dose Route Frequency Provider Last Rate Last Admin    [COMPLETED] acetaminophen 1,000 mg/100 mL (10 mg/mL) injection 1,000 mg  1,000 mg Intravenous Q8H Cali Trujillo MD   Stopped at 05/11/24 0401    [COMPLETED] ALPRAZolam tablet 0.25 mg  0.25 mg Oral BID PRN Chuckie Saenz MD   0.25 mg at 05/12/24 0554    dextrose 10% bolus 125 mL 125 mL  12.5 g Intravenous PRN Chuckie Saenz MD        dextrose 10% bolus 250 mL 250 mL  25 g Intravenous PRN Chuckie Saenz MD        glucagon (human recombinant) injection 1 mg  1 mg Intramuscular PRN Chuckie Saenz MD        [COMPLETED] morphine injection 2 mg  2 mg Intravenous Once Chuckie Saenz MD   2 mg at 05/12/24 0349    [DISCONTINUED] acetaminophen tablet 1,000 mg  1,000 mg Oral Q6H Cali Trujillo MD   1,000 mg at 05/12/24 1351    [DISCONTINUED] calcium-vitamin D3 500 mg-5 mcg (200 unit) per tablet 1 tablet  1 tablet Oral BID Chuckie Saenz MD   1 tablet at 05/12/24 0816    [DISCONTINUED] cloNIDine tablet 0.1 mg  0.1 mg Oral Q4H PRN Juan Del Valle DO        [DISCONTINUED] dextrose 10% bolus 125 mL 125 mL  12.5 g Intravenous PRN Riley Stiles MD        [DISCONTINUED] dextrose 10% bolus 250 mL 250 mL  25 g Intravenous PRN Riley Stiles MD        [DISCONTINUED] docusate sodium capsule 100 mg  100 mg Oral BID Chuckie Saenz MD   100 mg at 05/12/24 0909    [DISCONTINUED] glucagon (human recombinant) injection 1 mg  1 mg Intramuscular PRN Riley Stiles MD        [DISCONTINUED] glucose chewable tablet 16 g  16 g Oral PRN Riley Stiles MD        [DISCONTINUED] glucose chewable tablet 24 g  24 g Oral PRN Riley Stiles MD        [DISCONTINUED] hydrALAZINE injection 10 mg  10 mg Intravenous Q4H PRN Ng,  Juan Claros DO        [DISCONTINUED] hydroCHLOROthiazide tablet 25 mg  25 mg Oral Daily Juan Del Valle DO   25 mg at 05/12/24 0816    [DISCONTINUED] ibuprofen tablet 400 mg  400 mg Oral Q6H Chuckie Saenz MD   400 mg at 05/12/24 1147    [DISCONTINUED] insulin aspart U-100 injection 0-10 Units  0-10 Units Subcutaneous QID (AC + HS) PRN Riley Stiles MD   4 Units at 05/12/24 1222    [DISCONTINUED] insulin glargine U-100 (Lantus) injection 20 Units  20 Units Subcutaneous QHS Juan Del Valle DO   20 Units at 05/10/24 2045    [DISCONTINUED] insulin glargine U-100 (Lantus) injection 30 Units  30 Units Subcutaneous QHS Juan Del Valle DO   30 Units at 05/11/24 2009    [DISCONTINUED] labetalol 20 mg/4 mL (5 mg/mL) IV syring  10 mg Intravenous Q4H PRN Juan Del Valle DO        [DISCONTINUED] labetalol 20 mg/4 mL (5 mg/mL) IV syring  10 mg Intravenous Q2H PRN Juan Del Valle DO        [DISCONTINUED] levothyroxine tablet 200 mcg  200 mcg Oral Before breakfast Chuckie Saenz MD   200 mcg at 05/12/24 0554    [DISCONTINUED] losartan tablet 50 mg  50 mg Oral Daily Chuckie Saenz MD   50 mg at 05/12/24 0816    [DISCONTINUED] melatonin tablet 6 mg  6 mg Oral Nightly PRN Chuckie Saenz MD   6 mg at 05/12/24 0010    [DISCONTINUED] metoclopramide HCl tablet 10 mg  10 mg Oral QID Chuckie Saenz MD   10 mg at 05/11/24 2009    [DISCONTINUED] morphine injection 2 mg  2 mg Intravenous Q4H PRN Chuckie Saenz MD   2 mg at 05/12/24 0728    [DISCONTINUED] ondansetron disintegrating tablet 4 mg  4 mg Oral Q8H PRN Chuckie Saenz MD        [DISCONTINUED] oxyCODONE immediate release tablet 10 mg  10 mg Oral Q8H PRN Chuckie Saenz MD   10 mg at 05/12/24 0444    [DISCONTINUED] oxyCODONE immediate release tablet 5 mg  5 mg Oral Q6H PRN Chuckie Saenz MD   5 mg at 05/11/24 0343    [DISCONTINUED] polyethylene glycol packet 17 g  17 g Oral Daily Chuckie Saenz MD   17 g at 05/12/24 0909    [DISCONTINUED] sertraline tablet 50  "mg  50 mg Oral QHS Chuckie Saenz MD   50 mg at 05/11/24 2009    [DISCONTINUED] traMADoL tablet 50 mg  50 mg Oral TID Chuckie Saenz MD   50 mg at 05/12/24 0909       Physical Exam:  Vitals:    05/21/24 1346   Weight: 107.5 kg (237 lb)   Height: 5' 7" (1.702 m)       Constitutional  General Appearance: well nourished, well-developed, AAO x3, NAD  HEENT  Eyes: PEERLA, EOMI, normal conjunctivae  Ears: Hearing well at conversation level   AD: auricle normal, EAC normal, TM intact, no PEGGY   AS: auricle normal, EAC normal, TM intact, no PEGGY  Nose: septum midline deviated to the left anteriorly, septal mucosa erythematous with some prominent vessels on the left, mild inferior turbinate hypertrophy, no polyps, moist mucosa without erythema or blue hue  OC/OP: dentition moderate, soft, nontender, tongue/FOM- soft, no leukoplakia/ulcerations/ tenderness, tonsils absent  Nasopharynx, Hypopharynx, and Larynx:    Indirect: attempted, limited view due to patient intolerance  Neck: soft, incision c/d/I. aTTP.   Neuro: CN II - XII intact   Cardiovascular: peripheral pulses palpable  Respiratory: non-labored respirations  Psychiatric: oriented to time, place and person, no depression, anxiety or agitation    Previous FFL with documented vocal cord motion.   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       Pertinent Data:  ? LABS:  ? AUDIO:  ? CT Scan:    Imaging:   I personally reviewed the following images: CT neck from June reviewed at which point her sinuses were clear. In July, her MRI noted pansinusitis.    US Thyroid: 2/14/2024  Impression:     Large, lobulated hypoechoic mass lesion(s) at the level of the left lobe of the thyroid, new from previous exam. This may be related to thyroid mass or conglomerate lymphadenopathy. .  Recommend contrast enhanced CT of the neck to evaluate further.    CT Neck: 3/8/2024:  Impression:     1. Increasing hypodense soft tissue in the left thyroid bed compared to 08/04/2023.  2. No " definite cervical lymphadenopathy.     Pathology:  Supplemental Report   Thyroseq V3 GC Results Summary     Left Thyroid, 4.2 CM Nodule, FNA Smear     Test Result Probability of Cancer or NIFTP Potential Management   POSITIVE Intermediate-high (50-60%) Surgical consultation*  *See interpretation below for details         Final Diagnosis      Fine-needle aspiration of left thyroid nodule:      - Atypical cells of undetermined significance.    See comment.      Electronically signed by Patrizia Bhatt MD on 3/28/2024 at 1424   Specimen 1 Interpretation    Abnormal   [facility] Thyroid Cytology Category III: Atypia Undetermined Significance (AUS)   Electronically signed by Patrizia Bhatt MD on 3/28/2024 at 1424   Comment     The specimen shows numerous lymphocytes, with a significant large lymphocyte population, admixed with follicular oncocytic cells and atypical follicular cell clusters. The specimen is suspicious for Hashimoto's thyroiditis. The lymphoid infiltrate has atypical features, which may represent a lymphoid germinal center or other lymphoid entity, and some of the follicular cells show atypia.  The specimen is sent for ThyroSeq testing which will be reported in an addendum.       Pathology:  Prelim:  Intraoperative Consult P   1. Thyroid: left hemithyroid  Poorly differentiated malignancy.  Defer to permanent for final diagnosis.     Results are reported by phone to Dr. Saenz at 1045 by Dr. Cruz.     2. Thyroid: Paratracheal tissue  Reactive lymphoid tissue.       Summary of Outside Records:      Assessment/Plan:  Fred Berman is a 43 y.o. female with 4 cm thyroid nodule, FNA with a [place] 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     Awaiting final path. Possible lymphoma vs poorly differentiated thyroid cancer.   Pending path, will schedule for completion thyroidectomy vs referral to  heme/onc  Refill synthroid  Medical release of information to endocrinologist in [place].     She also has evidence of oral lichen planus that has failed conservative therapy. Trial of lidocaine steroid solution. No rheumatology appt yet. New referral sent.     Tele appt in 1 week.     Riley Stiles MD  [facility] Department of Otolaryngology  [facility]

## 2024-05-28 ENCOUNTER — TELEPHONE (OUTPATIENT)
Dept: ENDOCRINOLOGY | Facility: CLINIC | Age: 44
End: 2024-05-28
Payer: MEDICAID

## 2024-05-28 NOTE — TELEPHONE ENCOUNTER
Notified by Dr. Saenz that pt recently underwent oscar and found cancer.  Looks like path is still trying to sort what type it is.  Also pt has DM.  ENT team is trying to get her in to be seen by local Endo rather than her current one a few hours away.    Please offer her the open slot on my schedule Thursday 5/30/24.

## 2024-05-29 ENCOUNTER — TELEPHONE (OUTPATIENT)
Dept: ENDOCRINOLOGY | Facility: CLINIC | Age: 44
End: 2024-05-29
Payer: MEDICAID

## 2024-05-29 NOTE — TELEPHONE ENCOUNTER
----- Message from Akanksha Díaz sent at 5/29/2024 12:07 PM CDT -----  Pt of [place]      Pt called to reschedule appointment due to her having flu like symptoms.    When I advised pt next available appointment wouldn't be until Dec.pt stated she will keep her appointment for tomorrow and just wear a mask.      Pt number  [phone]    Please advise

## 2024-05-29 NOTE — TELEPHONE ENCOUNTER
Ok to cancel pt and encourage her instead to get seen by her PCP/urgent care to sort these symptoms.    We will keep her on our cancellation list and work her in in the coming weeks once her final path is known as it has more implications for her mgmt and which kind of doctors her care team will need.

## 2024-05-29 NOTE — TELEPHONE ENCOUNTER
Spoke with pt, she will seek urgent care evaluation for her symptoms, informed we will keep her on our cancellation list. Pt voiced understanding.    Please cancel her appt tomorrow and add her to the wait list. Thanks.

## 2024-06-03 ENCOUNTER — TELEPHONE (OUTPATIENT)
Dept: OTOLARYNGOLOGY | Facility: CLINIC | Age: 44
End: 2024-06-03
Payer: MEDICAID

## 2024-06-03 NOTE — TELEPHONE ENCOUNTER
Returning call.   Patient wanting to discuss final path results.    Added pt on for a tele visit Wednesday which is next avl clinic day.

## 2024-06-04 ENCOUNTER — TELEPHONE (OUTPATIENT)
Dept: RHEUMATOLOGY | Facility: CLINIC | Age: 44
End: 2024-06-04
Payer: MEDICAID

## 2024-06-05 ENCOUNTER — CLINICAL SUPPORT (OUTPATIENT)
Dept: OTOLARYNGOLOGY | Facility: CLINIC | Age: 44
End: 2024-06-05
Payer: MEDICAID

## 2024-06-05 DIAGNOSIS — E04.1 THYROID NODULE: Primary | ICD-10-CM

## 2024-06-05 DIAGNOSIS — C73 PRIMARY THYROID MALIGNANCY: ICD-10-CM

## 2024-06-05 NOTE — PROGRESS NOTES
Telehealth appointment to follow up path.    Still pending [place] review.  Reached out to pathology here and awaiting response.  She states that she felt great 2 weeks after surgery in regards to her night sweats and overall well-being.  Does feel like that it is recently started to get worse again.  Incision is healing well.     Return in 2 weeks for tele.  We will call sooner to discuss plan pending path.  -PET scan ordered    Riley Stiles MD

## 2024-06-06 DIAGNOSIS — L43.8 ORAL LICHEN PLANUS: ICD-10-CM

## 2024-06-06 DIAGNOSIS — C73 PRIMARY THYROID MALIGNANCY: Primary | ICD-10-CM

## 2024-06-06 DIAGNOSIS — N64.4 BREAST PAIN: Primary | ICD-10-CM

## 2024-06-06 LAB
BEAKER SEE SCANNED REPORT: NORMAL
BEAKER SEE SCANNED REPORT: NORMAL
DHEA SERPL-MCNC: NORMAL
ESTROGEN SERPL-MCNC: NORMAL PG/ML
INSULIN SERPL-ACNC: NORMAL U[IU]/ML
LAB AP CLINICAL INFORMATION: NORMAL
LAB AP GROSS DESCRIPTION: NORMAL
LAB AP INTRA OP: NORMAL
LAB AP REPORT FOOTNOTES: NORMAL
T3RU NFR SERPL: NORMAL %

## 2024-06-11 ENCOUNTER — HOSPITAL ENCOUNTER (OUTPATIENT)
Dept: RADIOLOGY | Facility: HOSPITAL | Age: 44
Discharge: HOME | End: 2024-06-11
Attending: INTERNAL MEDICINE
Payer: MEDICAID

## 2024-06-11 DIAGNOSIS — N64.4 BREAST PAIN: ICD-10-CM

## 2024-06-14 ENCOUNTER — TELEPHONE (OUTPATIENT)
Dept: HEMATOLOGY/ONCOLOGY | Facility: CLINIC | Age: 44
End: 2024-06-14
Payer: MEDICAID

## 2024-06-14 PROBLEM — C85.99: Status: ACTIVE | Noted: 2024-06-14

## 2024-06-14 PROBLEM — C83.398 DIFFUSE LARGE B-CELL LYMPHOMA OF SOLID ORGAN EXCLUDING SPLEEN: Status: ACTIVE | Noted: 2024-06-14

## 2024-06-14 PROBLEM — F11.11 HISTORY OF OPIOID ABUSE: Status: ACTIVE | Noted: 2024-06-14

## 2024-06-14 PROBLEM — C85.99: Status: RESOLVED | Noted: 2024-06-14 | Resolved: 2024-06-14

## 2024-06-14 PROBLEM — D10.1 FIBROMA OF TONGUE: Status: ACTIVE | Noted: 2024-06-14

## 2024-06-14 PROBLEM — C83.39 DIFFUSE LARGE B-CELL LYMPHOMA OF SOLID ORGAN EXCLUDING SPLEEN: Status: ACTIVE | Noted: 2024-06-14

## 2024-06-14 PROBLEM — Z83.2 FAMILY HISTORY OF VON WILLEBRAND DISEASE: Status: ACTIVE | Noted: 2024-06-14

## 2024-06-15 NOTE — PROGRESS NOTES
"History:  Past Medical History:   Diagnosis Date    Adrenal mass     Anxiety     Arthritis     Bradycardia     Depression     Diabetes mellitus, type 2     Gastroparesis     General anesthetics causing adverse effect in therapeutic use     "hard time waking up"    GERD (gastroesophageal reflux disease)     Hip pain     Hypertension     Menstrual cramps     Ovarian cyst     Palpitations     Pyosalpingitis     Thyroid disease    Past medical history:  Adrenal mass; anxiety; arthritis; bradycardia; depression; NIDDM; gastroparesis; GERD; hip pain; hypertension; menstrual cramps; ovarian cyst; palpitations; bile salpingitis; thyroid disease  Procedure/surgical history:  Ablation of vaginal tissue using laser; ; cholecystectomy; dissection of neck, left 05/10/2024; thyroidectomy 05/10/2024; tubal ligation    Past Surgical History:   Procedure Laterality Date    ABLATION OF VAGINAL TISSUE USING LASER       SECTION      CHOLECYSTECTOMY      DISSECTION OF NECK Left 5/10/2024    Procedure: DISSECTION, NECK;  Surgeon: Cali Trujillo MD;  Location: [facility];  Service: ENT;  Laterality: Left;    THYROIDECTOMY Left 5/10/2024    Procedure: THYROIDECTOMY;  Surgeon: Cali Trujillo MD;  Location: [facility];  Service: ENT;  Laterality: Left;    TUBAL LIGATION        Social History     Socioeconomic History    Marital status: Single   Tobacco Use    Smoking status: Former     Types: Cigarettes    Smokeless tobacco: Never   Substance and Sexual Activity    Alcohol use: Never     Alcohol/week: 6.0 standard drinks of alcohol     Types: 6 Shots of liquor per week    Drug use: Not Currently     Comment: Various Opoids    Sexual activity: Yes     Partners: Male     Social Determinants of Health     Financial Resource Strain: High Risk (2024)    Overall Financial Resource Strain (CARDIA)     Difficulty of Paying Living Expenses: Very hard   Food Insecurity: No Food Insecurity (2024)    Hunger Vital Sign     " Worried About Running Out of Food in the Last Year: Never true     Ran Out of Food in the Last Year: Never true   Transportation Needs: No Transportation Needs (5/12/2024)    TRANSPORTATION NEEDS     Transportation : No   Physical Activity: Sufficiently Active (7/4/2023)    Exercise Vital Sign     Days of Exercise per Week: 5 days     Minutes of Exercise per Session: 30 min   Stress: Stress Concern Present (5/12/2024)    [employer] - Occupational Stress Questionnaire     Feeling of Stress : To some extent   Housing Stability: Low Risk  (5/12/2024)    Housing Stability Vital Sign     Unable to Pay for Housing in the Last Year: No     Homeless in the Last Year: No      Family History   Problem Relation Name Age of Onset    Alcohol abuse Mother Pat         Pat    Arthritis Mother Pat     Cancer Mother Pat     COPD Mother Pat     Depression Mother Pat     Diabetes Mother Pat     Drug abuse Mother Pat     Hypertension Mother Pat     Miscarriages / Stillbirths Mother Pat     Depression Father Juventino     Drug abuse Father Juventino     Hypertension Father Juventino     Cancer Maternal Grandfather Janay     Miscarriages / Stillbirths Maternal Grandmother Arlean     Hearing loss Paternal Grandfather Lyod     Asthma Son Mojgan     Hypertension Son Mojgan     Drug abuse Sister Elana     Heart disease Sister Elana     Miscarriages / Stillbirths Sister Elana     Drug abuse Brother Juventino     Early death Brother Izaiah         Reason for Follow-up:  Reason for consultation:  -diffuse large B-cell lymphoma of thyroid gland  -family history of von Willebrand disease   -oral fibroma of tongue  -anxiety, depression, NIDDM, hypothyroidism, history of opioid abuse, etc.    History of Present Illness:   Diffuse large B-cell lymphoma        Oncologic/Hematologic History:  Oncology History    No history exists.   43-year-old lady, referred from [facility] ENT, with diffuse large B-cell lymphoma of thyroid.      Past medical history:   Adrenal mass; anxiety; arthritis; bradycardia; depression; NIDDM; gastroparesis; GERD; hip pain; hypertension; menstrual cramps; ovarian cyst; palpitations; pyosalpingitis; hypothyroidism, history of opioid abuse (on 2024, she tells me that she used opioids for 10-15 years; apparently, prescription opioids; on buprenorphine with a psychiatrist for last 2 years)  history of opiate abuse (maintained on Suboxone), history of C difficile colitis (unable to tolerate C difficile treatment); diagnosed with unspecified bipolar disorder during hospitalization at our [facility], [place], [facility] (enteritis, left pelvic adnexal fluid collection, PID, etc.); history of dysphagia, S/P EGD and dilatation; history of smoking (quit )  -2023: EGD:  Mild gastric erythema with scattered erosions: Pathology:  Small bowel biopsy:  Normal small bowel mucosa; stomach biopsy:  Mild superficial chronic gastritis, negative for H pylori)  -2023: Colonoscopy:  Normal:  Pathology:  Colon, random sites, biopsy:  Normal colonic mucosa  -08/10/2020: MRI abdomen with and without contrast (comparison:  CT 2023) (adrenal gland protocol):  Small left adrenal nodule stable, most compatible with adenoma (1 cm)  Procedure/surgical history:  Endometrial ablation; ; cholecystectomy; dissection of neck, left 05/10/2024; thyroidectomy 05/10/2024; tubal ligation; tonsillectomy  Social history:  In her relationship.  Lives in [place], [place].  Has 3 children.  Does not work.  Has not work in 1 year.  Before that, used to clean houses.  Smoked a pack of cigarettes daily for 15 years; quit smoking 1 year ago.  Social alcohol. History of opioid abuse (on 2024, she tells me that she used opioids for 10-15 years; apparently, prescription opioids; on buprenorphine with a psychiatrist for last 2 years)  Family history:  Positive for von Willebrand disease in her son.  Health maintenance:  -2022:   Bilateral screening mammogram:  BI-RADS 2-benign  -according to her, colonoscopy performed as outpatient by Nawaf Le Iberia, showed precancerous colon polyp.      Records reviewed:  05/21/2024:  [facility] ENT office note:  Fred Berman is a 43 y.o. female referred for a tongue lesion.   She reports that she first noticed it about a year ago and says that it has slowly been enlarging since then. It has never bled. She doesn't think it hurts but does bite it occasionally which makes it sore. It's never been biopsied. She says that her dentist told her that he was not going to do any more work on her teeth until she got it checked out.   She also feels like she has developed allergies over the past few months.  She endorses frequent tearing and redness of her eyes in feels like her nose is running frequently.  She also endorses nasal congestion.  She is been using Allegra and does not think it is helped any.  She has not tried Flonase.  She does use Afrin she thinks maybe once a month.  She was diagnosed with a sinus infection over the summer and got a few days of antibiotics.  She denies facial pain or pressure but does note that when she wakes up in the morning she feels like the outside of her nose is swollen over the bridge of her nose.    She also endorses facial numbness bilaterally in all 3 distributions which has been going on for some time.  She reports that her PCP is work this up with an MRI scan.    She is also been dealing with intermittent dysphagia.  She feels like things are not going down, both solids and liquids.  This does not happen with every meal but comes and goes.    She follows with Gastroenterology and has had EGDs and dilations in the past.  She reports that she has another 1 scheduled in November.    As far as her voice goes, she notes intermittent dysphonia where her voice gets roughed and then returns to normal.  It usually comes back to normal on its own.  She is on Protonix  daily for gastritis that has been seen on prior scopes she reports.    As far as other medical problems she has diabetes, hypertension, gastroparesis, anxiety and depression.  For past surgical history she is had a cholecystectomy and .  She is had her tonsils removed but no other history of head and neck surgery.  She is a former smoker and quit 1 year ago.  She does not drink and denies any other drug use.     2024:  Presents today in follow up.  Eager for total thyroidectomy given results of FNA.  Has multiple concerns for surgical standpoint.  She would like to stay at least 1 night due to family history of von Willebrand's disease.  She also takes Suboxone and would like to talk to the anesthesia team regarding use of opioids.  She will stop taking this she does prior to surgery and is only allowed 1 week of narcotics postop.  She states that she sees endocrinology for diabetes and thyroid dysfunction.  She is on at least 200 mcg of Synthroid already and her sugars are often uncontrolled.   2024:  Presents today for 1st postop appointment after a left hemithyroidectomy in recurrent nerve sacrificed on May 10th.  Reports healing well with no pain. Hasn't taken any narcotics. Anxious about pathology results. Still having issues with oral mucositis.   Postop FFL:  Left TVC paralysis (preop FFL documented vocal cord motion)      Clinical events/investigations reviewed:  -2023:  CT neck and chest with contrast (evaluate thyroid mass) right lobe thyroid gland markedly atrophic; left lobe of the thyroid demonstrates a heterogeneous appearance with heterogeneous contrast enhancement with no obvious, dominant, or worrisome mass appreciated  -2024:  Tongue, biopsy: Oral fibroma  -2024: Ultrasound thyroid (nontoxic single thyroid nodule) (comparison: Thyroid ultrasound 2023, CT neck 2023):  Large lobulated hypoechoic mass lesion at the level of the left lobe of the thyroid,  new from prior exam (maybe related to thyroid mass or conglomerate lymphadenopathy)  -03/08/2024: CT soft tissues of the neck with contrast (localized swelling, mass, lumps) (comparison:  Ultrasound 02/14/2024; CT neck and chest): Increasing hypodense soft tissue in the left thyroid bed compared to 08/04/2023 (2.1 x 2.7 x 4.2 cm); minimal mass effect on the trachea; no definite cervical lymphadenopathy  -03/28/2024:  FNA left thyroid nodule:  Atypical cells of undetermined significance (numerous lymphocytes with a significant large lymphocytes population, admixed with follicular oncocytic cells and atypical follicle cell clusters, etc., suspicious for Hashimoto's thyroiditis, etc.) (Thyroseq V3 GC positive; probability of cancer or NIFTP intermediate-high, 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 Thyroid, left hemithyroid, excision:  Diffuse large B-cell lymphoma, germinal center B-cell phenotype; BCL 2 positive/MYC negative by IHC (not a double expressor); negative for MYC gene rearrangement by interface FISH (not double hit lymphoma) (morphologically, the thyroid is extensive involved by a diffuse proliferation of large atypical lymphoid cells with irregular nuclear contours, vesicular chromatin, and ample cytoplasm)  2. Paratracheal tissue, biopsy:  Reactive lymphoid tissue   3. Left central neck dissection:  13 benign lymph nodes; no malignancy      Labs reviewed:  -05/11/2024: CBC normal  -05/11/2024:  CMP normal except glucose 229 mg/dL    03/28/2024:  Thyroglobulin antibody:  150 IU /ml, elevated (reference Range:  < 1.8)  03/28/2024:  Thyroglobulin, tumor Marker:  < 0.1 (reference Range:  Athyroid < 0.1; intact thyroid </= 33)    -06/16/2023: TTE:  LVEF 55%    06/18/2024:  Pleasant lady who presents for initial medical oncology  consultation.  In no acute discomfort.  Feels poorly.  Main complaint is in in the back and hips for last 1 year.  History of opioid abuse.  On buprenorphine with a psychiatrist for last years.  Drenching sweats all the time.  Has to change clothes.  Lost 50 lb unintentionally in 2023, apparently due to gastroparesis side effects of Ozempic; regained her weight back after Ozempic was discontinued.    ECOG 1.  No recurrent fevers.  Fatigue.  Generalized weakness.  Night sweats and hot flashes.  Sweats all the time for 1 year.  Bones hurt all the time.  Occasional chest pains and leg swelling as well.  Exertional dyspnea.  Some constipation.  Some nausea.  Great appetite.      Interval History:  [No matching plan found]   [No matching plan found]       Medications:  Current Outpatient Medications on File Prior to Visit   Medication Sig Dispense Refill    acetaminophen (TYLENOL) 500 MG tablet Take 2 tablets (1,000 mg total) by mouth every 6 (six) hours. 60 tablet 0    buprenorphine HCL (SUBUTEX) 8 mg Subl Place 8 mg under the tongue 3 (three) times daily.      calcium carbonate/vitamin D3 (CALCIUM 600 + D,3, ORAL) Take 1 tablet by mouth 2 (two) times a day.      COMPOUND HORMONE REPLACEMENT Take by mouth once daily.      hydrocortisone (ANUSOL-HC) 2.5 % rectal cream Place 1 application  rectally 2 (two) times daily.      insulin (LANTUS SOLOSTAR U-100 INSULIN) glargine 100 units/mL SubQ pen Inject 15 Units into the skin 2 (two) times daily. (Patient taking differently: Inject 70 Units into the skin 2 (two) times daily.) 27 mL 3    levothyroxine (SYNTHROID) 200 MCG tablet Take 1 tablet (200 mcg total) by mouth before breakfast. 30 tablet 11    LORazepam (ATIVAN) 0.5 MG tablet Take 0.5 mg by mouth every 6 (six) hours as needed for Anxiety.      losartan (COZAAR) 50 MG tablet Take 1 tablet (50 mg total) by mouth once daily. 90 tablet 3    ondansetron (ZOFRAN-ODT) 4 MG TbDL Take 1 tablet (4 mg total) by mouth every 8  "(eight) hours as needed (NAUSEA). 30 tablet 1    pen needle, diabetic (BD ULTRA-FINE ROSALEE PEN NEEDLE) 32 gauge x 5/32" Ndle 1 each by Misc.(Non-Drug; Combo Route) route 2 (two) times a day. 100 each 9    polyethylene glycol (GLYCOLAX) 17 gram PwPk Take 17 g by mouth 2 (two) times daily. 30 each 1    busPIRone (BUSPAR) 7.5 MG tablet Take 7.5 mg by mouth 3 (three) times daily. (Patient not taking: Reported on 4/18/2024)      fluocinonide (LIDEX) 0.05 % external solution Apply topically 2 (two) times daily. Swish and spit solution (Patient not taking: Reported on 5/21/2024) 20 mL 1    gabapentin (NEURONTIN) 300 MG capsule Take 1 capsule (300 mg total) by mouth 3 (three) times daily. (Patient not taking: Reported on 5/21/2024) 90 capsule 11    ibuprofen (ADVIL,MOTRIN) 400 MG tablet Take 1 tablet (400 mg total) by mouth every 6 (six) hours. (Patient not taking: Reported on 6/18/2024) 60 tablet 0    lubiprostone (AMITIZA) 24 MCG Cap Take 24 mcg by mouth daily with breakfast. (Patient not taking: Reported on 5/21/2024)      metFORMIN (GLUCOPHAGE) 500 MG tablet Take 1 tablet (500 mg total) by mouth 2 (two) times daily with meals. (Patient not taking: Reported on 5/21/2024) 180 tablet 1    MOTEGRITY 2 mg Tab Take 1 tablet by mouth. (Patient not taking: Reported on 5/21/2024)      naloxegoL (MOVANTIK) 25 mg tablet Take 25 mg by mouth once daily. (Patient not taking: Reported on 1/23/2024) 30 tablet 2    oxyCODONE (ROXICODONE) 5 MG immediate release tablet Take 1 tablet (5 mg total) by mouth every 6 (six) hours as needed for Pain. (Patient not taking: Reported on 5/21/2024) 18 tablet 0    pantoprazole (PROTONIX) 40 MG tablet Take 40 mg by mouth every morning. (Patient not taking: Reported on 5/21/2024)      REGLAN 10 mg tablet Take 1 tablet (10 mg total) by mouth 4 (four) times daily. (Patient not taking: Reported on 5/21/2024) 120 tablet 1    sertraline (ZOLOFT) 50 MG tablet Take 50 mg by mouth every evening. (Patient not " taking: Reported on 5/21/2024)       Current Facility-Administered Medications on File Prior to Visit   Medication Dose Route Frequency Provider Last Rate Last Admin    dextrose 10% bolus 125 mL 125 mL  12.5 g Intravenous PRN Chuckie Saenz MD        dextrose 10% bolus 250 mL 250 mL  25 g Intravenous PRN Chuckie Saenz MD        glucagon (human recombinant) injection 1 mg  1 mg Intramuscular PRN Chuckie Saenz MD           Review of Systems:   All systems reviewed and found to be negative except for the symptoms detailed above    Physical Examination:   VITAL SIGNS:   Vitals:    06/18/24 1054   BP: 116/76   Pulse: 72   Resp: 19   Temp: 98 °F (36.7 °C)     GENERAL:  In no apparent distress.    HEAD:  No signs of head trauma.  EYES:  Pupils are equal.  Extraocular motions intact.    EARS:  Hearing grossly intact.  MOUTH:  Oropharynx is normal.   NECK:  No adenopathy, no JVD.     CHEST:  Chest with clear breath sounds bilaterally.  No wheezes, rales, rhonchi.    CARDIAC:  Regular rate and rhythm.  S1 and S2, without murmurs, gallops, rubs.  VASCULAR:  No Edema.  Peripheral pulses normal and equal in all extremities.  ABDOMEN:  Soft, without detectable tenderness.  No sign of distention.  No   rebound or guarding, and no masses palpated.   Bowel Sounds normal.  MUSCULOSKELETAL:  Good range of motion of all major joints. Extremities without clubbing, cyanosis or edema.    NEUROLOGIC EXAM:  Alert and oriented x 3.  No focal sensory or strength deficits.   Speech normal.  Follows commands.  PSYCHIATRIC:  Mood normal.    Results for orders placed or performed during the hospital encounter of 05/10/24   CBC Auto Differential    Narrative    The following orders were created for panel order CBC Auto Differential.  Procedure                               Abnormality         Status                     ---------                               -----------         ------                     CBC with  Differential[[id number]]                           Final result                 Please view results for these tests on the individual orders.   CBC with Differential   Result Value Ref Range    WBC 8.70 4.50 - 11.50 x10(3)/mcL    RBC 4.72 4.20 - 5.40 x10(6)/mcL    Hgb 13.4 12.0 - 16.0 g/dL    Hct 40.5 37.0 - 47.0 %    MCV 85.8 80.0 - 94.0 fL    MCH 28.4 27.0 - 31.0 pg    MCHC 33.1 33.0 - 36.0 g/dL    RDW 12.6 11.5 - 17.0 %    Platelet 171 130 - 400 x10(3)/mcL    MPV 9.3 7.4 - 10.4 fL    Neut % 71.4 %    Lymph % 21.0 %    Mono % 7.0 %    Eos % 0.3 %    Basophil % 0.1 %    Lymph # 1.83 0.6 - 4.6 x10(3)/mcL    Neut # 6.20 2.1 - 9.2 x10(3)/mcL    Mono # 0.61 0.1 - 1.3 x10(3)/mcL    Eos # 0.03 0 - 0.9 x10(3)/mcL    Baso # 0.01 <=0.2 x10(3)/mcL    IG# 0.02 0 - 0.04 x10(3)/mcL    IG% 0.2 %    NRBC% 0.0 %     Results for orders placed or performed during the hospital encounter of 05/10/24   Comprehensive Metabolic Panel   Result Value Ref Range    Sodium 140 136 - 145 mmol/L    Potassium 4.3 3.5 - 5.1 mmol/L    Chloride 102 98 - 107 mmol/L    CO2 28 22 - 29 mmol/L    Glucose 229 (H) 74 - 100 mg/dL    Blood Urea Nitrogen 9.1 7.0 - 18.7 mg/dL    Creatinine 0.75 0.55 - 1.02 mg/dL    Calcium 9.7 8.4 - 10.2 mg/dL    Protein Total 6.8 6.4 - 8.3 gm/dL    Albumin 3.8 3.5 - 5.0 g/dL    Globulin 3.0 2.4 - 3.5 gm/dL    Albumin/Globulin Ratio 1.3 1.1 - 2.0 ratio    Bilirubin Total 1.1 <=1.5 mg/dL    ALP 56 40 - 150 unit/L    ALT 40 0 - 55 unit/L    AST 31 5 - 34 unit/L    eGFR >60 mL/min/1.73/m2       Assessment:  Problem List Items Addressed This Visit          Psychiatric    History of opioid abuse       Hematology    Family history of von Willebrand disease       Oncology    Primary thyroid malignancy    Diffuse large B-cell lymphoma of solid organ excluding spleen - Primary    Overview     Diffuse large B-cell lymphoma of thyroid gland.         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   -03/28/2024:  Thyroglobulin antibody 150 IU/mL, elevated (reference Range:  < 1.8)  -03/28/2024: Thyroglobulin, tumor marker: < 0.1 (reference Range:  Athyroid < 0.1; intact thyroid </= 33)  -05/10/2024: Left hemithyroidectomy with left central neck dissection (level 6) with sacrifice of left recurrent laryngeal nerve due to invasive tumor of the left thyroid gland:  Diffuse large B-cell lymphoma left oscar thyroid, germinal center B-cell type, not a double expressor, not double hit lymphoma; morphologically, thyroid extensively involved; paratracheal tissue biopsy, reactive lymphoid tissue; left central neck dissection, 13 benign lymph nodes  -postop left TVC paralysis, noted on FFL exam by ENT  -06/18/2024: ECOG 1; chronic fatigue; sweats all the time; no consistent weight loss; appetite is great      Family history of von Willebrand disease:  -04/25/2024:  Von Willebrand factor activity 75%, normal      Oral fibroma of tongue:   -tongue lesion: 1st noted around 02/2023; slowly enlarging since; no bleeding; no pain  -tongue biopsy 02/08/2024: Oral fibroma      Comorbid medical conditions:  Anxiety, depression, NIDDM, gastroparesis, hypertension, history of PID  Hypothyroidism  History of opioid abuse, maintained on Suboxone  History of dysphagia, S/P endoscopic dilatation         Plan:   CBC, CMP, LDH, uric acid level   FDG PET-CT (Preferred); all, contrast-enhanced CT scans of C/A/P/neck  Hep B surface antigen, hep B core  antibody total (because of risk of reactivation with immunotherapy +chemotherapy)  Echocardiogram to assess LVEF, in anticipation of anthracycline based chemotherapy  HIV testing  Hep C antibody testing  Beta 2 microglobulin  Bone marrow aspiration and core biopsy (not necessary if PET-CT scan demonstrates bone disease)  Refer to surgery for MediPort placement for chemotherapy  Follow-up in 3 weeks, with results of requested testing.  --------------------------------------------------------------      -diffuse large B-cell lymphoma of left thyroid lobe, S/P left thyroidectomy 05/10/2024    Need the following for workup of lymphoma:   CBC, CMP, LDH, uric acid level   FDG PET-CT (Preferred); or, contrast-enhanced CT scans of C/A/P/neck  Calculation of IPI (data pending)   Hep B surface antigen, hep B core antibody total (because of risk of reactivation with immunotherapy +chemotherapy)  Echocardiogram to assess LVEF, in anticipation of anthracycline based chemotherapy  HIV testing  Hep C antibody testing  Beta 2 microglobulin  Lumbar puncture if at risk for CNS involvement (data pending)  Bone marrow aspiration and core biopsy (not necessary if PET-CT scan demonstrates bone disease)  Refer to surgery for MediPort placement for chemotherapy  Discussion of fertility preservation (ovarian tissue or oocyte cryopreservation)    Refer to surgery for MediPort placement in anticipation of chemotherapy    Family history of von Willebrand disease   -04/25/2024: Von Willebrand factor activity 75%, normal    Follow-up in 3 weeks, with results of requested testing.    Above discussed at length with the patient.  All questions answered.    Discussed labs, scans, and pathology report, and gave her copies of relevant records.    Plan of management discussed.  Discussed that she will need chemotherapy.  Specific to be discussed after requested data results.    She understands and agrees with this plan.    Follow-up:  No follow-ups on  file.

## 2024-06-17 ENCOUNTER — TELEPHONE (OUTPATIENT)
Dept: HEMATOLOGY/ONCOLOGY | Facility: CLINIC | Age: 44
End: 2024-06-17
Payer: MEDICAID

## 2024-06-18 ENCOUNTER — DOCUMENTATION ONLY (OUTPATIENT)
Dept: HEMATOLOGY/ONCOLOGY | Facility: CLINIC | Age: 44
End: 2024-06-18

## 2024-06-18 ENCOUNTER — OFFICE VISIT (OUTPATIENT)
Dept: HEMATOLOGY/ONCOLOGY | Facility: CLINIC | Age: 44
End: 2024-06-18
Attending: INTERNAL MEDICINE
Payer: MEDICAID

## 2024-06-18 ENCOUNTER — HOSPITAL ENCOUNTER (OUTPATIENT)
Dept: RADIOLOGY | Facility: HOSPITAL | Age: 44
Discharge: HOME | End: 2024-06-18
Attending: STUDENT IN AN ORGANIZED HEALTH CARE EDUCATION/TRAINING PROGRAM
Payer: MEDICAID

## 2024-06-18 VITALS
OXYGEN SATURATION: 97 % | TEMPERATURE: 98 F | RESPIRATION RATE: 19 BRPM | DIASTOLIC BLOOD PRESSURE: 76 MMHG | SYSTOLIC BLOOD PRESSURE: 116 MMHG | HEART RATE: 72 BPM | WEIGHT: 253.19 LBS | BODY MASS INDEX: 39.74 KG/M2 | HEIGHT: 67 IN

## 2024-06-18 DIAGNOSIS — E04.1 THYROID NODULE: ICD-10-CM

## 2024-06-18 DIAGNOSIS — D10.1 FIBROMA OF TONGUE: ICD-10-CM

## 2024-06-18 DIAGNOSIS — C85.99 MALIGNANT LYMPHOMA OF THYROID GLAND: ICD-10-CM

## 2024-06-18 DIAGNOSIS — C83.39 DIFFUSE LARGE B-CELL LYMPHOMA OF SOLID ORGAN EXCLUDING SPLEEN: Primary | ICD-10-CM

## 2024-06-18 DIAGNOSIS — E03.9 HYPOTHYROIDISM, UNSPECIFIED TYPE: ICD-10-CM

## 2024-06-18 DIAGNOSIS — C73 PRIMARY THYROID MALIGNANCY: ICD-10-CM

## 2024-06-18 DIAGNOSIS — F11.11 HISTORY OF OPIOID ABUSE: ICD-10-CM

## 2024-06-18 DIAGNOSIS — Z83.2 FAMILY HISTORY OF VON WILLEBRAND DISEASE: ICD-10-CM

## 2024-06-18 LAB
ALBUMIN SERPL-MCNC: 4.1 G/DL (ref 3.5–5)
ALBUMIN/GLOB SERPL: 1.3 RATIO (ref 1.1–2)
ALP SERPL-CCNC: 83 UNIT/L (ref 40–150)
ALT SERPL-CCNC: 60 UNIT/L (ref 0–55)
ANION GAP SERPL CALC-SCNC: 10 MEQ/L
AST SERPL-CCNC: 47 UNIT/L (ref 5–34)
BASOPHILS # BLD AUTO: 0.02 X10(3)/MCL
BASOPHILS NFR BLD AUTO: 0.4 %
BILIRUB SERPL-MCNC: 0.5 MG/DL
BUN SERPL-MCNC: 10.8 MG/DL (ref 7–18.7)
CALCIUM SERPL-MCNC: 9.8 MG/DL (ref 8.4–10.2)
CHLORIDE SERPL-SCNC: 101 MMOL/L (ref 98–107)
CO2 SERPL-SCNC: 30 MMOL/L (ref 22–29)
CREAT SERPL-MCNC: 0.7 MG/DL (ref 0.55–1.02)
CREAT/UREA NIT SERPL: 15
EOSINOPHIL # BLD AUTO: 0.11 X10(3)/MCL (ref 0–0.9)
EOSINOPHIL NFR BLD AUTO: 2 %
ERYTHROCYTE [DISTWIDTH] IN BLOOD BY AUTOMATED COUNT: 12.7 % (ref 11.5–17)
GFR SERPLBLD CREATININE-BSD FMLA CKD-EPI: >60 ML/MIN/1.73/M2
GLOBULIN SER-MCNC: 3.2 GM/DL (ref 2.4–3.5)
GLUCOSE SERPL-MCNC: 246 MG/DL (ref 74–100)
HBV CORE AB SERPL QL IA: NONREACTIVE
HBV SURFACE AG SERPL QL IA: NONREACTIVE
HCT VFR BLD AUTO: 40.9 % (ref 37–47)
HCV AB SERPL QL IA: NONREACTIVE
HGB BLD-MCNC: 13.6 G/DL (ref 12–16)
HIV 1+2 AB+HIV1 P24 AG SERPL QL IA: NONREACTIVE
IMM GRANULOCYTES # BLD AUTO: 0.03 X10(3)/MCL (ref 0–0.04)
IMM GRANULOCYTES NFR BLD AUTO: 0.6 %
LDH SERPL-CCNC: 186 U/L (ref 125–220)
LYMPHOCYTES # BLD AUTO: 1.68 X10(3)/MCL (ref 0.6–4.6)
LYMPHOCYTES NFR BLD AUTO: 30.8 %
MCH RBC QN AUTO: 29.1 PG (ref 27–31)
MCHC RBC AUTO-ENTMCNC: 33.3 G/DL (ref 33–36)
MCV RBC AUTO: 87.4 FL (ref 80–94)
MONOCYTES # BLD AUTO: 0.37 X10(3)/MCL (ref 0.1–1.3)
MONOCYTES NFR BLD AUTO: 6.8 %
NEUTROPHILS # BLD AUTO: 3.24 X10(3)/MCL (ref 2.1–9.2)
NEUTROPHILS NFR BLD AUTO: 59.4 %
NRBC BLD AUTO-RTO: 0 %
PLATELET # BLD AUTO: 186 X10(3)/MCL (ref 130–400)
PMV BLD AUTO: 9.4 FL (ref 7.4–10.4)
POTASSIUM SERPL-SCNC: 3.9 MMOL/L (ref 3.5–5.1)
PROT SERPL-MCNC: 7.3 GM/DL (ref 6.4–8.3)
RBC # BLD AUTO: 4.68 X10(6)/MCL (ref 4.2–5.4)
SODIUM SERPL-SCNC: 141 MMOL/L (ref 136–145)
URATE SERPL-MCNC: 4.1 MG/DL (ref 2.6–6)
WBC # BLD AUTO: 5.45 X10(3)/MCL (ref 4.5–11.5)

## 2024-06-18 PROCEDURE — 86704 HEP B CORE ANTIBODY TOTAL: CPT | Performed by: INTERNAL MEDICINE

## 2024-06-18 PROCEDURE — 1159F MED LIST DOCD IN RCRD: CPT | Mod: CPTII,,, | Performed by: INTERNAL MEDICINE

## 2024-06-18 PROCEDURE — 3074F SYST BP LT 130 MM HG: CPT | Mod: CPTII,,, | Performed by: INTERNAL MEDICINE

## 2024-06-18 PROCEDURE — 82232 ASSAY OF BETA-2 PROTEIN: CPT | Performed by: INTERNAL MEDICINE

## 2024-06-18 PROCEDURE — 83615 LACTATE (LD) (LDH) ENZYME: CPT | Performed by: INTERNAL MEDICINE

## 2024-06-18 PROCEDURE — 80053 COMPREHEN METABOLIC PANEL: CPT | Performed by: INTERNAL MEDICINE

## 2024-06-18 PROCEDURE — 1160F RVW MEDS BY RX/DR IN RCRD: CPT | Mod: CPTII,,, | Performed by: INTERNAL MEDICINE

## 2024-06-18 PROCEDURE — 36415 COLL VENOUS BLD VENIPUNCTURE: CPT | Performed by: INTERNAL MEDICINE

## 2024-06-18 PROCEDURE — 3008F BODY MASS INDEX DOCD: CPT | Mod: CPTII,,, | Performed by: INTERNAL MEDICINE

## 2024-06-18 PROCEDURE — 86803 HEPATITIS C AB TEST: CPT | Performed by: INTERNAL MEDICINE

## 2024-06-18 PROCEDURE — 85025 COMPLETE CBC W/AUTO DIFF WBC: CPT | Performed by: INTERNAL MEDICINE

## 2024-06-18 PROCEDURE — 84550 ASSAY OF BLOOD/URIC ACID: CPT | Performed by: INTERNAL MEDICINE

## 2024-06-18 PROCEDURE — 3078F DIAST BP <80 MM HG: CPT | Mod: CPTII,,, | Performed by: INTERNAL MEDICINE

## 2024-06-18 PROCEDURE — 78815 PET IMAGE W/CT SKULL-THIGH: CPT | Mod: TC

## 2024-06-18 PROCEDURE — 87340 HEPATITIS B SURFACE AG IA: CPT | Performed by: INTERNAL MEDICINE

## 2024-06-18 PROCEDURE — A9552 F18 FDG: HCPCS | Performed by: STUDENT IN AN ORGANIZED HEALTH CARE EDUCATION/TRAINING PROGRAM

## 2024-06-18 PROCEDURE — 99215 OFFICE O/P EST HI 40 MIN: CPT | Mod: PBBFAC,25 | Performed by: INTERNAL MEDICINE

## 2024-06-18 PROCEDURE — 87389 HIV-1 AG W/HIV-1&-2 AB AG IA: CPT | Performed by: INTERNAL MEDICINE

## 2024-06-18 PROCEDURE — 3046F HEMOGLOBIN A1C LEVEL >9.0%: CPT | Mod: CPTII,,, | Performed by: INTERNAL MEDICINE

## 2024-06-18 PROCEDURE — 99205 OFFICE O/P NEW HI 60 MIN: CPT | Mod: S$PBB,,, | Performed by: INTERNAL MEDICINE

## 2024-06-18 PROCEDURE — 4010F ACE/ARB THERAPY RXD/TAKEN: CPT | Mod: CPTII,,, | Performed by: INTERNAL MEDICINE

## 2024-06-18 RX ORDER — FLUDEOXYGLUCOSE F18 500 MCI/ML
10 INJECTION INTRAVENOUS
Status: COMPLETED | OUTPATIENT
Start: 2024-06-18 | End: 2024-06-18

## 2024-06-18 RX ORDER — LORAZEPAM 0.5 MG/1
0.5 TABLET ORAL EVERY 6 HOURS PRN
COMMUNITY

## 2024-06-18 RX ADMIN — FLUDEOXYGLUCOSE F-18 10.9 MILLICURIE: 500 INJECTION INTRAVENOUS at 01:06

## 2024-06-18 NOTE — Clinical Note
CBC, CMP, LDH, uric acid level  FDG PET-CT (Preferred); all, contrast-enhanced CT scans of C/A/P/neck Hep B surface antigen, hep B core antibody total (because of risk of reactivation with immunotherapy +chemotherapy) Echocardiogram to assess LVEF, in anticipation of anthracycline based chemotherapy HIV testing Hep C antibody testing Beta 2 microglobulin Bone marrow aspiration and core biopsy (not necessary if PET-CT scan demonstrates bone disease) Refer to surgery for MediPort placement for chemotherapy Follow-up in 3 weeks, with results of requested testing.

## 2024-06-19 ENCOUNTER — DOCUMENTATION ONLY (OUTPATIENT)
Dept: HEMATOLOGY/ONCOLOGY | Facility: CLINIC | Age: 44
End: 2024-06-19
Payer: MEDICAID

## 2024-06-19 ENCOUNTER — CLINICAL SUPPORT (OUTPATIENT)
Dept: OTOLARYNGOLOGY | Facility: CLINIC | Age: 44
End: 2024-06-19
Payer: MEDICAID

## 2024-06-19 DIAGNOSIS — R49.9 HOARSENESS OR CHANGING VOICE: Primary | ICD-10-CM

## 2024-06-19 LAB
B2 MICROGLOB SERPL-MCNC: 1.8 MCG/ML (ref 1.21–2.7)
POCT GLUCOSE: 178 MG/DL (ref 70–110)

## 2024-06-19 NOTE — PROGRESS NOTES
Telehealth to f/u appts with heme/onc.         Patient saw Dr. Collins yesterday.  Also got PET scan.  Plan for bone marrow aspiration and biopsy  Plan for MediPort for chemotherapy.    Slp referral  We will return to clinic in 3-4 months to ensure patient undergoing treatment for follow up.

## 2024-06-19 NOTE — NURSING
"Received phone call from patient regarding her PET/CT from 6/18/24. Patient reports she saw the results on the patient portal and wants to clarify if her "thyroid cancer is back." SARTHAK Batres explained that Dr. Collins would explain these results at her follow up appt when he has all the information. Patient states that the surgeon told her after her thyroidectomy that the "cancer cells would come back." SARTHAK Batres explained to patient the meaning of clear (negative) margins and close (positive) margins when cancer cells/tumor is surgically removed;  that this might be what the surgeon was trying to communicate. Patient voiced understanding.   "

## 2024-06-19 NOTE — NURSING
Met with patient today after her consult appointment with Dr. Collins. Patient attended appointment alone. Provided patient with RN Medardo contact and explained role in patient's care. NCCN Distress Screen completed with a reported distress score of 10. Patient tearful during visit. Patient indicates she has been very emotional since all her symptoms started. Says she does see a psychiatrist for medication management of Suboxone and does receive psychotherapy. Confirms she has seen her psychiatrist since her diagnosis and surgery. Patient demonstrates needing confirmation that she is not misdiagnosed. Provided emotional support including advocating for Dr. Collins.   Reports she has started the process to apply for SSDI.  Social support reported as very good. Provided information on support services. Answered all patient questions regarding treatment and expectations. Provided cancer center packet with clinic team, contact numbers, and information on cancer treatment. Patient verbalized understanding and agrees to contact SARTHAK Batres if any needs or concerns arise.     Oncology Navigation   Intake  Cancer Type: Lymphoma  Appointment Date: 24     Treatment  Current Status: Staging work-up; Active    Surgical Oncologist: Dr. Trujillo  Type of Surgery: left hemithyroidectomy  Surgery Schedule Date: 05/10/24    Medical Oncologist: Virgil Collins MD  Consult Date: 24  Chemotherapy: Planned  Chemotherapy Regimen: pending test results       Procedures: Biopsy; PET scan; Port / PICC; Echo  Echo Schedule Date: 24  PET Scan Schedule Date: 24  Port / PICC Schedule Date: 24                 Acuity  Treatment Tolerability: Has not started treatment yet/treatment fully completed and side effects resolved  ECO  Comorbidities in Medical History: 2  Hospitalization Within the Past Month: 0   Needed: 0  Support: 0  Verbalizes Financial Concerns: 0  Transportation: 0  Mental Health: PHQ Score:  1  History of noncompliance/frequent no shows and cancellations: 0  Verbalizes the need for more education: 0  Navigation Acuity: 1     Follow Up  No follow-ups on file.

## 2024-06-20 ENCOUNTER — ANESTHESIA EVENT (OUTPATIENT)
Dept: SURGERY | Facility: HOSPITAL | Age: 44
End: 2024-06-20
Payer: MEDICAID

## 2024-06-20 ENCOUNTER — OFFICE VISIT (OUTPATIENT)
Dept: SURGERY | Facility: CLINIC | Age: 44
End: 2024-06-20
Attending: INTERNAL MEDICINE
Payer: MEDICAID

## 2024-06-20 VITALS
TEMPERATURE: 98 F | BODY MASS INDEX: 39.21 KG/M2 | DIASTOLIC BLOOD PRESSURE: 76 MMHG | SYSTOLIC BLOOD PRESSURE: 120 MMHG | WEIGHT: 249.81 LBS | HEIGHT: 67 IN | OXYGEN SATURATION: 97 % | HEART RATE: 69 BPM

## 2024-06-20 DIAGNOSIS — E03.9 HYPOTHYROIDISM, UNSPECIFIED TYPE: ICD-10-CM

## 2024-06-20 DIAGNOSIS — C83.39 DIFFUSE LARGE B-CELL LYMPHOMA OF SOLID ORGAN EXCLUDING SPLEEN: ICD-10-CM

## 2024-06-20 DIAGNOSIS — C85.99 MALIGNANT LYMPHOMA OF THYROID GLAND: ICD-10-CM

## 2024-06-20 PROCEDURE — 99215 OFFICE O/P EST HI 40 MIN: CPT | Mod: PBBFAC

## 2024-06-20 RX ORDER — HEPARIN SODIUM 5000 [USP'U]/ML
5000 INJECTION, SOLUTION INTRAVENOUS; SUBCUTANEOUS ONCE
OUTPATIENT
Start: 2024-06-20

## 2024-06-20 RX ORDER — INSULIN LISPRO 100 [IU]/ML
35 INJECTION, SOLUTION INTRAVENOUS; SUBCUTANEOUS 3 TIMES DAILY
COMMUNITY
Start: 2024-06-20

## 2024-06-20 RX ORDER — CEFAZOLIN SODIUM 2 G/50ML
2 SOLUTION INTRAVENOUS ONCE
OUTPATIENT
Start: 2024-06-20

## 2024-06-20 NOTE — ANESTHESIA PREPROCEDURE EVALUATION
"Fred Berman is a 43 y.o. female PRESENTING FOR [id number] (Right: Chest) with a history of   -diffuse large B cell lymphoma     Vitals:    24 0505 24 0507 24 0530   BP: (!) 143/89  (!) 143/89   Pulse: 79     Temp: 36.9 °C (98.5 °F)     TempSrc: Oral     SpO2: 95%     Weight:  115.8 kg (255 lb 6.4 oz)        -left thyroid nodule suspicious for cancer s/p left hemithyroidectomy with sacrifice of left recurrent laryngeal nerve due to invasion of the nerve on 5/10   HYPOTHYROIDISM; TSH=1.144 24  -UNCONTROLLED DM (A1C 10.8 24); AM CBG  197  -HTN  -CHRONIC PAIN  -H/O OPIOID DEPENDENCE      -ON SUBUTEX, PREVIOUS: HOLD 2 DAYS PRIOR TO SX PER PRESCRIBER  -ANXIETY/DEPRESSION   -H/O OF REPORTED "DIFFICULTY WAKING UP" FROM ANESTHESIA  -FORMER SMOKER  -GERD  -ADRENAL MASS  -OBESITY, BMI 39  -MILDLY ELEVATED LFTS    BETA-BLOCKER: NONE    New Orders for Anesthesia: DM PROTOCOL, UPT NEG DOS       Patient Active Problem List   Diagnosis    Dysmenorrhea    TOA (tubo-ovarian abscess)    Pelvic prolapse    Unexplained weight loss    Gastroparesis    Orthostatic lightheadedness    Volume depletion    Moderate malnutrition    Controlled type 2 diabetes mellitus with complication, with long-term current use of insulin    HTN (hypertension)    Hypothyroidism    Generalized anxiety disorder    Chronic pain syndrome    Vitamin D deficiency    Class 1 obesity in adult    Panic attack    Lesion of adrenal gland    Arthritis    Anxiety    Palpitations    Uncontrolled type 2 diabetes mellitus with hyperglycemia    Pre-op evaluation    Thyroid nodule    Primary thyroid malignancy    Diffuse large B-cell lymphoma of solid organ excluding spleen    Malignant lymphoma of thyroid gland    Family history of von Willebrand disease    Fibroma of tongue    History of opioid abuse       Past Surgical History:   Procedure Laterality Date    ABLATION OF VAGINAL TISSUE USING LASER       SECTION      " CHOLECYSTECTOMY      DISSECTION OF NECK Left 5/10/2024    Procedure: DISSECTION, NECK;  Surgeon: Cali Trujillo MD;  Location: [facility] OR;  Service: ENT;  Laterality: Left;    THYROIDECTOMY Left 5/10/2024    Procedure: THYROIDECTOMY;  Surgeon: Cali Trujillo MD;  Location: [facility] OR;  Service: ENT;  Laterality: Left;    TUBAL LIGATION         Lab Results   Component Value Date    WBC 5.45 06/18/2024    HGB 13.6 06/18/2024    HCT 40.9 06/18/2024     06/18/2024       CMP  Sodium   Date Value Ref Range Status   06/18/2024 141 136 - 145 mmol/L Final     Potassium   Date Value Ref Range Status   06/18/2024 3.9 3.5 - 5.1 mmol/L Final     Chloride   Date Value Ref Range Status   06/18/2024 101 98 - 107 mmol/L Final     CO2   Date Value Ref Range Status   06/18/2024 30 (H) 22 - 29 mmol/L Final     Blood Urea Nitrogen   Date Value Ref Range Status   06/18/2024 10.8 7.0 - 18.7 mg/dL Final     Creatinine   Date Value Ref Range Status   06/18/2024 0.70 0.55 - 1.02 mg/dL Final     Calcium   Date Value Ref Range Status   06/18/2024 9.8 8.4 - 10.2 mg/dL Final     Albumin   Date Value Ref Range Status   06/18/2024 4.1 3.5 - 5.0 g/dL Final     Bilirubin Total   Date Value Ref Range Status   06/18/2024 0.5 <=1.5 mg/dL Final     ALP   Date Value Ref Range Status   06/18/2024 83 40 - 150 unit/L Final     AST   Date Value Ref Range Status   06/18/2024 47 (H) 5 - 34 unit/L Final     ALT   Date Value Ref Range Status   06/18/2024 60 (H) 0 - 55 unit/L Final     eGFR   Date Value Ref Range Status   06/18/2024 >60 mL/min/1.73/m2 Final       Lab Results   Component Value Date    INR 1.0 05/10/2024       EKG 5/13/24    ECHO 6/26/24    HOLTER MONITOR 6/12/23  Sinus bradycardia.  No significant arrhythmias, AV block, or pauses.     PRE-OP CLEARANCE 4/25/24      Current Outpatient Medications   Medication Instructions    acetaminophen (TYLENOL) 1,000 mg, Oral, Every 6 hours    buprenorphine HCL (SUBUTEX) 8 mg, Sublingual, 3 times daily  "   busPIRone (BUSPAR) 7.5 mg, 3 times daily    calcium carbonate/vitamin D3 (CALCIUM 600 + D,3, ORAL) 1 tablet, Oral, 2 times daily    COMPOUND HORMONE REPLACEMENT Oral, Daily    fluocinonide (LIDEX) 0.05 % external solution Topical (Top), 2 times daily, Swish and spit solution    gabapentin (NEURONTIN) 300 mg, Oral, 3 times daily    hydrocortisone (ANUSOL-HC) 2.5 % rectal cream 1 application , Rectal, 2 times daily    ibuprofen (ADVIL,MOTRIN) 400 mg, Oral, Every 6 hours    insulin lispro 20 Units, Subcutaneous, 3 times daily, With meals    LANTUS SOLOSTAR U-100 INSULIN 15 Units, Subcutaneous, 2 times daily    levothyroxine (SYNTHROID) 200 mcg, Oral, Before breakfast    LORazepam (ATIVAN) 0.5 mg, Oral, Every 6 hours PRN    losartan (COZAAR) 50 mg, Oral, Daily    lubiprostone (AMITIZA) 24 mcg, With breakfast    metFORMIN (GLUCOPHAGE) 500 mg, Oral, 2 times daily with meals    MOTEGRITY 2 mg Tab 1 tablet    naloxegoL (MOVANTIK) 25 mg, Oral, Daily    ondansetron (ZOFRAN-ODT) 4 mg, Oral, Every 8 hours PRN    oxyCODONE (ROXICODONE) 5 mg, Oral, Every 6 hours PRN    pantoprazole (PROTONIX) 40 mg, Every morning    pen needle, diabetic (BD ULTRA-FINE ROSALEE PEN NEEDLE) 32 gauge x 5/32" Ndle 1 each, Misc.(Non-Drug; Combo Route), 2 times daily    polyethylene glycol (GLYCOLAX) 17 g, Oral, 2 times daily    REGLAN 10 mg, Oral, 4 times daily    sertraline (ZOLOFT) 50 mg, Nightly       Past anesthesia records:       Pre-op Assessment    I have reviewed the Patient Summary Reports.     I have reviewed the Nursing Notes. I have reviewed the NPO Status.   I have reviewed the Medications.     Review of Systems  Anesthesia Hx:  No problems with previous Anesthesia   History of prior surgery of interest to airway management or planning:          Denies Family Hx of Anesthesia complications.    Denies Personal Hx of Anesthesia complications.                    Hematology/Oncology:  Hematology Normal   Oncology Normal                         "           EENT/Dental:  EENT/Dental Normal           Cardiovascular:  Cardiovascular Normal                                            Pulmonary:  Pulmonary Normal                       Renal/:  Renal/ Normal                 Hepatic/GI:  Hepatic/GI Normal                 Musculoskeletal:  Musculoskeletal Normal                Neurological:  Neurology Normal                                      Endocrine:  Endocrine Normal            Dermatological:  Skin Normal    Psych:  Psychiatric Normal                    Physical Exam  General: Well nourished, Cooperative, Alert and Oriented    Airway:  Mallampati: I / I  Mouth Opening: Normal  TM Distance: Normal  Tongue: Normal  Neck ROM: Normal ROM    Dental:  Intact        Anesthesia Plan  Type of Anesthesia, risks & benefits discussed:    Anesthesia Type: Gen Supraglottic Airway  Intra-op Monitoring Plan: Standard ASA Monitors  Post Op Pain Control Plan: IV/PO Opioids PRN  Induction:  IV  Airway Plan: Direct  Informed Consent: Informed consent signed with the Patient representative and all parties understand the risks and agree with anesthesia plan.  All questions answered. Patient consented to blood products? No  ASA Score: 3  Day of Surgery Review of History & Physical: H&P Update referred to the surgeon/provider.    Ready For Surgery From Anesthesia Perspective.     .

## 2024-06-20 NOTE — H&P (VIEW-ONLY)
"[facility]   Clinic Note       CC: Mediport placement       HPI: Fred Berman is a 43 y.o. female with PMHx of GERD, T2DM, HTN, gastroparesis, and a left thyroid nodule suspicious for cancer s/p left hemithyroidectomy with sacrifice of left recurrent laryngeal nerve due to invasion of the nerve on 5/10 who presents today to discuss mediport placement. Preoperative FNA with a [place].S. 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 However surgical pathology consistent with diffuse large B cell lymphoma. She was seen by heme/onc on  who is recommended adjuvant chemotherapy. Denies any history of central lines or ports in the past. No current tobacco, alcohol, and/or illicit drug use.     ROS negative other than those mentioned in the HPI above.     Past Medical History:   Diagnosis Date    Adrenal mass     Anxiety     Arthritis     Bradycardia     Depression     Diabetes mellitus, type 2     Gastroparesis     General anesthetics causing adverse effect in therapeutic use     "hard time waking up"    GERD (gastroesophageal reflux disease)     Hip pain     Hypertension     Menstrual cramps     Ovarian cyst     Palpitations     Pyosalpingitis     Thyroid disease      Past Surgical History:   Procedure Laterality Date    ABLATION OF VAGINAL TISSUE USING LASER       SECTION      CHOLECYSTECTOMY      DISSECTION OF NECK Left 5/10/2024    Procedure: DISSECTION, NECK;  Surgeon: Cali Trujillo MD;  Location: [facility] OR;  Service: ENT;  Laterality: Left;    THYROIDECTOMY Left 5/10/2024    Procedure: THYROIDECTOMY;  Surgeon: Cali Trujillo MD;  Location: [facility] OR;  Service: ENT;  Laterality: Left;    TUBAL LIGATION       Review of patient's allergies indicates:  No Known Allergies    Current Outpatient Medications   Medication Instructions    acetaminophen (TYLENOL) 1,000 mg, Oral, Every 6 hours    buprenorphine HCL (SUBUTEX) 8 mg, Sublingual, 3 times daily    busPIRone (BUSPAR) 7.5 mg, 3 times " "daily    calcium carbonate/vitamin D3 (CALCIUM 600 + D,3, ORAL) 1 tablet, Oral, 2 times daily    COMPOUND HORMONE REPLACEMENT Oral, Daily    fluocinonide (LIDEX) 0.05 % external solution Topical (Top), 2 times daily, Swish and spit solution    gabapentin (NEURONTIN) 300 mg, Oral, 3 times daily    hydrocortisone (ANUSOL-HC) 2.5 % rectal cream 1 application , Rectal, 2 times daily    ibuprofen (ADVIL,MOTRIN) 400 mg, Oral, Every 6 hours    LANTUS SOLOSTAR U-100 INSULIN 15 Units, Subcutaneous, 2 times daily    levothyroxine (SYNTHROID) 200 mcg, Oral, Before breakfast    LORazepam (ATIVAN) 0.5 mg, Oral, Every 6 hours PRN    losartan (COZAAR) 50 mg, Oral, Daily    lubiprostone (AMITIZA) 24 mcg, With breakfast    metFORMIN (GLUCOPHAGE) 500 mg, Oral, 2 times daily with meals    MOTEGRITY 2 mg Tab 1 tablet    naloxegoL (MOVANTIK) 25 mg, Oral, Daily    ondansetron (ZOFRAN-ODT) 4 mg, Oral, Every 8 hours PRN    oxyCODONE (ROXICODONE) 5 mg, Oral, Every 6 hours PRN    pantoprazole (PROTONIX) 40 mg, Every morning    pen needle, diabetic (BD ULTRA-FINE ROSALEE PEN NEEDLE) 32 gauge x 5/32" Ndle 1 each, Misc.(Non-Drug; Combo Route), 2 times daily    polyethylene glycol (GLYCOLAX) 17 g, Oral, 2 times daily    REGLAN 10 mg, Oral, 4 times daily    sertraline (ZOLOFT) 50 mg, Nightly     Social Determinants of Health     Tobacco Use: Medium Risk (6/20/2024)    Patient History     Smoking Tobacco Use: Former     Smokeless Tobacco Use: Never     Passive Exposure: Not on file   Alcohol Use: Not At Risk (7/5/2023)    AUDIT-C     Frequency of Alcohol Consumption: Never     Average Number of Drinks: Patient does not drink     Frequency of Binge Drinking: Never   Financial Resource Strain: High Risk (5/12/2024)    Overall Financial Resource Strain (CARDIA)     Difficulty of Paying Living Expenses: Very hard   Food Insecurity: No Food Insecurity (5/12/2024)    Hunger Vital Sign     Worried About Running Out of Food in the Last Year: Never true     " Ran Out of Food in the Last Year: Never true   Transportation Needs: No Transportation Needs (5/12/2024)    TRANSPORTATION NEEDS     Transportation : No   Physical Activity: Sufficiently Active (7/4/2023)    Exercise Vital Sign     Days of Exercise per Week: 5 days     Minutes of Exercise per Session: 30 min   Stress: Stress Concern Present (5/12/2024)    [employer] [place] [employer] - Occupational Stress Questionnaire     Feeling of Stress : To some extent   Housing Stability: Low Risk  (5/12/2024)    Housing Stability Vital Sign     Unable to Pay for Housing in the Last Year: No     Homeless in the Last Year: No   Depression: High Risk (6/19/2024)    Depression     Last PHQ-4: Flowsheet Data: 16   Utilities: Not on file   Health Literacy: Not on file   Social Isolation: Not on file       Physical Exam:   Vitals:    06/20/24 1105   BP: 120/76   Pulse: 69   Temp: 98.4 °F (36.9 °C)      Gen: NAD, AAOx3, answering questions appropriately.    HEENT: well healed surgical incisions from previous left hemithyroidectomy and central neck dissection; EOMI, no scleral icterus  CV: RR on RP  Pulm: NWOB on RA  Abd: soft, non-tender, non-distended  Ext:  Move all 4 extremities.       Interval Labs:    Non-contributory       Interval Imaging:    None         A/P: Fred Berman is a 43 y.o. female with PMHx of GERD, T2DM, HTN, gastroparesis, and a left thyroid nodule suspicious for cancer s/p left hemithyroidectomy with sacrifice of left recurrent laryngeal nerve due to invasion of the nerve on 5/10 who presents today to discuss mediport placement. Final surgical pathology consistent with diffuse large B cell lymphoma, for which heme/onc is recommended initiation of chemotherapy.       - After discussing risks, benefits, and alteratives to surgery, she elects to proceed with mediport placement on 7/8. All questions and concerns addressed. Consents obtained.  - Able to achieve at least 4 METs    Katherine Jensen  DO  [employer] [facility] PGY-1

## 2024-06-20 NOTE — PROGRESS NOTES
Patient seen by Dr. TOÑO Jensen. Scheduled for [facility] 7/8/24. One Day surgery instructions explained and given. Will return prn. Written and verbal discharge instructions given.

## 2024-06-20 NOTE — PROGRESS NOTES
"[facility]   Clinic Note       CC: Mediport placement       HPI: Fred Berman is a 43 y.o. female with PMHx of GERD, T2DM, HTN, gastroparesis, and a left thyroid nodule suspicious for cancer s/p left hemithyroidectomy with sacrifice of left recurrent laryngeal nerve due to invasion of the nerve on 5/10 who presents today to discuss mediport placement. Preoperative FNA with a U.S. 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 However surgical pathology consistent with diffuse large B cell lymphoma. She was seen by heme/onc on  who is recommended adjuvant chemotherapy. Denies any history of central lines or ports in the past. No current tobacco, alcohol, and/or illicit drug use.     ROS negative other than those mentioned in the HPI above.     Past Medical History:   Diagnosis Date    Adrenal mass     Anxiety     Arthritis     Bradycardia     Depression     Diabetes mellitus, type 2     Gastroparesis     General anesthetics causing adverse effect in therapeutic use     "hard time waking up"    GERD (gastroesophageal reflux disease)     Hip pain     Hypertension     Menstrual cramps     Ovarian cyst     Palpitations     Pyosalpingitis     Thyroid disease      Past Surgical History:   Procedure Laterality Date    ABLATION OF VAGINAL TISSUE USING LASER       SECTION      CHOLECYSTECTOMY      DISSECTION OF NECK Left 5/10/2024    Procedure: DISSECTION, NECK;  Surgeon: Cali Trujillo MD;  Location: [facility] OR;  Service: ENT;  Laterality: Left;    THYROIDECTOMY Left 5/10/2024    Procedure: THYROIDECTOMY;  Surgeon: Cali Trujillo MD;  Location: [facility] OR;  Service: ENT;  Laterality: Left;    TUBAL LIGATION       Review of patient's allergies indicates:  No Known Allergies    Current Outpatient Medications   Medication Instructions    acetaminophen (TYLENOL) 1,000 mg, Oral, Every 6 hours    buprenorphine HCL (SUBUTEX) 8 mg, Sublingual, 3 times daily    busPIRone (BUSPAR) 7.5 mg, 3 times " "daily    calcium carbonate/vitamin D3 (CALCIUM 600 + D,3, ORAL) 1 tablet, Oral, 2 times daily    COMPOUND HORMONE REPLACEMENT Oral, Daily    fluocinonide (LIDEX) 0.05 % external solution Topical (Top), 2 times daily, Swish and spit solution    gabapentin (NEURONTIN) 300 mg, Oral, 3 times daily    hydrocortisone (ANUSOL-HC) 2.5 % rectal cream 1 application , Rectal, 2 times daily    ibuprofen (ADVIL,MOTRIN) 400 mg, Oral, Every 6 hours    LANTUS SOLOSTAR U-100 INSULIN 15 Units, Subcutaneous, 2 times daily    levothyroxine (SYNTHROID) 200 mcg, Oral, Before breakfast    LORazepam (ATIVAN) 0.5 mg, Oral, Every 6 hours PRN    losartan (COZAAR) 50 mg, Oral, Daily    lubiprostone (AMITIZA) 24 mcg, With breakfast    metFORMIN (GLUCOPHAGE) 500 mg, Oral, 2 times daily with meals    MOTEGRITY 2 mg Tab 1 tablet    naloxegoL (MOVANTIK) 25 mg, Oral, Daily    ondansetron (ZOFRAN-ODT) 4 mg, Oral, Every 8 hours PRN    oxyCODONE (ROXICODONE) 5 mg, Oral, Every 6 hours PRN    pantoprazole (PROTONIX) 40 mg, Every morning    pen needle, diabetic (BD ULTRA-FINE ROSALEE PEN NEEDLE) 32 gauge x 5/32" Ndle 1 each, Misc.(Non-Drug; Combo Route), 2 times daily    polyethylene glycol (GLYCOLAX) 17 g, Oral, 2 times daily    REGLAN 10 mg, Oral, 4 times daily    sertraline (ZOLOFT) 50 mg, Nightly     Social Determinants of Health     Tobacco Use: Medium Risk (6/20/2024)    Patient History     Smoking Tobacco Use: Former     Smokeless Tobacco Use: Never     Passive Exposure: Not on file   Alcohol Use: Not At Risk (7/5/2023)    AUDIT-C     Frequency of Alcohol Consumption: Never     Average Number of Drinks: Patient does not drink     Frequency of Binge Drinking: Never   Financial Resource Strain: High Risk (5/12/2024)    Overall Financial Resource Strain (CARDIA)     Difficulty of Paying Living Expenses: Very hard   Food Insecurity: No Food Insecurity (5/12/2024)    Hunger Vital Sign     Worried About Running Out of Food in the Last Year: Never true     " Ran Out of Food in the Last Year: Never true   Transportation Needs: No Transportation Needs (5/12/2024)    TRANSPORTATION NEEDS     Transportation : No   Physical Activity: Sufficiently Active (7/4/2023)    Exercise Vital Sign     Days of Exercise per Week: 5 days     Minutes of Exercise per Session: 30 min   Stress: Stress Concern Present (5/12/2024)    [employer] [place] [employer] - Occupational Stress Questionnaire     Feeling of Stress : To some extent   Housing Stability: Low Risk  (5/12/2024)    Housing Stability Vital Sign     Unable to Pay for Housing in the Last Year: No     Homeless in the Last Year: No   Depression: High Risk (6/19/2024)    Depression     Last PHQ-4: Flowsheet Data: 16   Utilities: Not on file   Health Literacy: Not on file   Social Isolation: Not on file       Physical Exam:   Vitals:    06/20/24 1105   BP: 120/76   Pulse: 69   Temp: 98.4 °F (36.9 °C)      Gen: NAD, AAOx3, answering questions appropriately.    HEENT: well healed surgical incisions from previous left hemithyroidectomy and central neck dissection; EOMI, no scleral icterus  CV: RR on RP  Pulm: NWOB on RA  Abd: soft, non-tender, non-distended  Ext:  Move all 4 extremities.       Interval Labs:    Non-contributory       Interval Imaging:    None         A/P: Fred Berman is a 43 y.o. female with PMHx of GERD, T2DM, HTN, gastroparesis, and a left thyroid nodule suspicious for cancer s/p left hemithyroidectomy with sacrifice of left recurrent laryngeal nerve due to invasion of the nerve on 5/10 who presents today to discuss mediport placement. Final surgical pathology consistent with diffuse large B cell lymphoma, for which heme/onc is recommended initiation of chemotherapy.       - After discussing risks, benefits, and alteratives to surgery, she elects to proceed with mediport placement on 7/8. All questions and concerns addressed. Consents obtained.  - Able to achieve at least 4 METs    Katherine Jensen  DO  [employer] General Surgery PGY-1

## 2024-06-26 ENCOUNTER — HOSPITAL ENCOUNTER (OUTPATIENT)
Dept: CARDIOLOGY | Facility: HOSPITAL | Age: 44
Discharge: HOME | End: 2024-06-26
Attending: INTERNAL MEDICINE
Payer: MEDICAID

## 2024-06-26 ENCOUNTER — HOSPITAL ENCOUNTER (OUTPATIENT)
Dept: RADIOLOGY | Facility: HOSPITAL | Age: 44
Discharge: HOME | End: 2024-06-26
Attending: INTERNAL MEDICINE
Payer: MEDICAID

## 2024-06-26 DIAGNOSIS — E03.9 HYPOTHYROIDISM, UNSPECIFIED TYPE: ICD-10-CM

## 2024-06-26 DIAGNOSIS — C85.99 MALIGNANT LYMPHOMA OF THYROID GLAND: ICD-10-CM

## 2024-06-26 DIAGNOSIS — C83.39 DIFFUSE LARGE B-CELL LYMPHOMA OF SOLID ORGAN EXCLUDING SPLEEN: ICD-10-CM

## 2024-06-26 LAB
APICAL FOUR CHAMBER EJECTION FRACTION: 54 %
APICAL TWO CHAMBER EJECTION FRACTION: 57 %
AV INDEX (PROSTH): 0.77
AV MEAN GRADIENT: 3 MMHG
AV PEAK GRADIENT: 5 MMHG
AV VALVE AREA BY VELOCITY RATIO: 2.55 CM²
AV VALVE AREA: 2.41 CM²
AV VELOCITY RATIO: 0.81
CV ECHO LV RWT: 0.44 CM
DOP CALC AO PEAK VEL: 1.17 M/S
DOP CALC AO VTI: 22.8 CM
DOP CALC LVOT AREA: 3.1 CM2
DOP CALC LVOT DIAMETER: 2 CM
DOP CALC LVOT PEAK VEL: 0.95 M/S
DOP CALC LVOT STROKE VOLUME: 54.95 CM3
DOP CALC MV VTI: 31.8 CM
DOP CALCLVOT PEAK VEL VTI: 17.5 CM
E WAVE DECELERATION TIME: 177 MSEC
E/A RATIO: 1.39
E/E' RATIO: 7.73 M/S
ECHO LV POSTERIOR WALL: 1.2 CM (ref 0.6–1.1)
FRACTIONAL SHORTENING: 26 % (ref 28–44)
GLOBAL LONGITUIDAL STRAIN: -18.9 %
INTERVENTRICULAR SEPTUM: 1 CM (ref 0.6–1.1)
LEFT ATRIUM AREA SYSTOLIC (APICAL 2 CHAMBER): 19 CM2
LEFT ATRIUM AREA SYSTOLIC (APICAL 4 CHAMBER): 25 CM2
LEFT ATRIUM SIZE: 4.4 CM
LEFT ATRIUM VOLUME MOD: 71.8 CM3
LEFT INTERNAL DIMENSION IN SYSTOLE: 4 CM (ref 2.1–4)
LEFT VENTRICLE DIASTOLIC VOLUME: 141.31 ML
LEFT VENTRICLE END DIASTOLIC VOLUME APICAL 2 CHAMBER: 71.8 ML
LEFT VENTRICLE END DIASTOLIC VOLUME APICAL 4 CHAMBER: 90.5 ML
LEFT VENTRICLE END SYSTOLIC VOLUME APICAL 2 CHAMBER: 54.1 ML
LEFT VENTRICLE END SYSTOLIC VOLUME APICAL 4 CHAMBER: 85.6 ML
LEFT VENTRICLE SYSTOLIC VOLUME: 70 ML
LEFT VENTRICULAR INTERNAL DIMENSION IN DIASTOLE: 5.4 CM (ref 3.5–6)
LEFT VENTRICULAR MASS: 234.82 G
LV LATERAL E/E' RATIO: 5.67 M/S
LV SEPTAL E/E' RATIO: 12.14 M/S
LVED V (TEICH): 141.31 ML
LVES V (TEICH): 70 ML
LVOT MG: 2 MMHG
LVOT MV: 0.6 CM/S
MV MEAN GRADIENT: 2 MMHG
MV PEAK A VEL: 0.61 M/S
MV PEAK E VEL: 0.85 M/S
MV PEAK GRADIENT: 5 MMHG
MV STENOSIS PRESSURE HALF TIME: 61 MS
MV VALVE AREA BY CONTINUITY EQUATION: 1.73 CM2
MV VALVE AREA P 1/2 METHOD: 3.61 CM2
OHS CV RV/LV RATIO: 0.69 CM
OHS LV EJECTION FRACTION SIMPSONS BIPLANE MOD: 55 %
PISA TR MAX VEL: 1.3 M/S
RA PRESSURE ESTIMATED: 3 MMHG
RIGHT VENTRICULAR END-DIASTOLIC DIMENSION: 3.7 CM
RV TB RVSP: 4 MMHG
TDI LATERAL: 0.15 M/S
TDI SEPTAL: 0.07 M/S
TDI: 0.11 M/S
TR MAX PG: 7 MMHG
TRICUSPID ANNULAR PLANE SYSTOLIC EXCURSION: 2.01 CM
TV REST PULMONARY ARTERY PRESSURE: 10 MMHG

## 2024-06-26 PROCEDURE — 77066 DX MAMMO INCL CAD BI: CPT | Mod: TC

## 2024-06-26 PROCEDURE — 76642 ULTRASOUND BREAST LIMITED: CPT | Mod: 26,50,, | Performed by: RADIOLOGY

## 2024-06-26 PROCEDURE — 93306 TTE W/DOPPLER COMPLETE: CPT

## 2024-06-26 PROCEDURE — 77066 DX MAMMO INCL CAD BI: CPT | Mod: 26,,, | Performed by: RADIOLOGY

## 2024-06-26 PROCEDURE — 77062 BREAST TOMOSYNTHESIS BI: CPT | Mod: 26,,, | Performed by: RADIOLOGY

## 2024-06-26 PROCEDURE — 93306 TTE W/DOPPLER COMPLETE: CPT | Mod: 26,,, | Performed by: INTERNAL MEDICINE

## 2024-06-26 PROCEDURE — 76642 ULTRASOUND BREAST LIMITED: CPT | Mod: TC,50

## 2024-06-26 PROCEDURE — 93356 MYOCRD STRAIN IMG SPCKL TRCK: CPT | Mod: ,,, | Performed by: INTERNAL MEDICINE

## 2024-06-26 NOTE — PROGRESS NOTES
[facility] INTERNAL MEDICINE  OUTPATIENT OFFICE VISIT NOTE    SUBJECTIVE:      HPI: Fred Berman is a 43 y.o. female here today for f/u  43 y.o. yo female w/ PMH of DM II, HTN, gastroparesis, hypothyroidism, who presents today for for a preop visit.  Recently seen in ENT- patient recently underwent a left hemithyroidectomy on May 10th-she is healing well.  Pathology revealed a diffuse large B-cell lymphoma.  She has already established care with Dr. Flannery in Hematology-Oncology-an extensive workup has been initiated and she will start immunotherapy plus chemotherapy-MediPort to be placed    The patient sees an endocrinologist for control of her diabetes.  She is aware the present time diabetes is under sub- optimal control- she had seen her endocrinologist outside recently and her insulin dose was increased    She is also complaining of feeling swollen-generalized all over her body-she did undergo an echocardiogram      Current Outpatient Medications   Medication Instructions    acetaminophen (TYLENOL) 1,000 mg, Oral, Every 6 hours    buprenorphine HCL (SUBUTEX) 8 mg, Sublingual, 3 times daily    busPIRone (BUSPAR) 7.5 mg, 3 times daily    calcium carbonate/vitamin D3 (CALCIUM 600 + D,3, ORAL) 1 tablet, 2 times daily    COMPOUND HORMONE REPLACEMENT Oral, Daily    fluocinonide (LIDEX) 0.05 % external solution Topical (Top), 2 times daily, Swish and spit solution    gabapentin (NEURONTIN) 300 mg, Oral, 3 times daily    hydrocortisone (ANUSOL-HC) 2.5 % rectal cream 1 application , Rectal, 2 times daily    ibuprofen (ADVIL,MOTRIN) 400 mg, Oral, Every 6 hours    insulin lispro 35 Units, Subcutaneous, 3 times daily, With meals    LANTUS SOLOSTAR U-100 INSULIN 15 Units, Subcutaneous, 2 times daily    levothyroxine (SYNTHROID) 200 mcg, Oral, Before breakfast    LORazepam (ATIVAN) 0.5 mg, Oral, Every 6 hours PRN    losartan (COZAAR) 50 mg, Oral, Daily    lubiprostone (AMITIZA) 24 mcg, With breakfast    metFORMIN  "(GLUCOPHAGE) 500 mg, Oral, 2 times daily with meals    MOTEGRITY 2 mg Tab 1 tablet    naloxegoL (MOVANTIK) 25 mg, Oral, Daily    ondansetron (ZOFRAN-ODT) 4 mg, Oral, Every 8 hours PRN    oxyCODONE (ROXICODONE) 5 mg, Oral, Every 6 hours PRN    pantoprazole (PROTONIX) 40 mg, Every morning    pen needle, diabetic (BD ULTRA-FINE ROSALEE PEN NEEDLE) 32 gauge x 5/32" Ndle 1 each, Misc.(Non-Drug; Combo Route), 2 times daily    polyethylene glycol (GLYCOLAX) 17 g, Oral, 2 times daily    REGLAN 10 mg, Oral, 4 times daily    sertraline (ZOLOFT) 50 mg, Nightly       ROS:  CONSTITUTIONAL: Denies weight loss, fever and chills.  HEENT: Denies changes in vision and hearing.  ?RESPIRATORY: Denies SOB and cough.?  CV: Denies palpitations and CP. ?  GI: Denies abdominal pain, nausea, vomiting and diarrhea.?  : Denies dysuria and urinary frequency.?  MSK: Denies myalgia and joint pain.?  SKIN: Denies rash and pruritus.  ?NEUROLOGICAL: Denies headache and syncope.?  PSYCHIATRIC: Denies recent changes in mood. Denies anxiety and depression.     OBJECTIVE:       General: Well nourished w/o distress  Pulm: normal work of breathing  Psych: Cooperative; appropriate mood and affect       ASSESSMENT & PLAN:     DM II  Gastroparesis  - patient was seeing an endocrinologist out in [place]-  -  she is currently on insulin lispro 35 units t.i.d. , Lantus 90 units BID     Hypothyroidism  - continues on levothyroxine 200 mcg     HTN  -Continue losartan 50mg daily     Chronic pain syndrome  HX of polysubstance abuse  -Currently F/U with suboxone clinic and on buprenorphine 8mg TID     Generalized anxiety disorder   -Currently seeing an outside psychiatrist    REVIEWED RESULTS OF MAMMOGRAM, ULTRASOUND, PET SCAN AND ECHOCARDIOGRAM THE PATIENT  Explained to the patient that in order to accurately analyze her complaint of feeling swollen she will need to be seen in the clinic  1. Hypothyroidism, unspecified type             Follow up in about 3 months " (around 9/27/2024).     Future Appointments   Date Time Provider Department Center   7/9/2024  2:20 PM Virgil Collins MD [facility] HEMOMC [facility] [facility]   7/30/2024  3:15 PM Osiel Swann MD [facility] Ob   9/19/2024  2:30 PM RESIDENT 1, [facility] OTORHINOLARYNGOLOGY [facility] ENT [facility]   12/13/2024  8:00 AM RESIDENTS, [facility] ENDOCRINOLOGY [facility] ENDOCR [facility]        Jacquelin Souza MD

## 2024-06-27 ENCOUNTER — OFFICE VISIT (OUTPATIENT)
Dept: INTERNAL MEDICINE | Facility: CLINIC | Age: 44
End: 2024-06-27
Payer: MEDICAID

## 2024-06-27 DIAGNOSIS — E03.9 HYPOTHYROIDISM, UNSPECIFIED TYPE: Primary | ICD-10-CM

## 2024-07-02 NOTE — PROGRESS NOTES
Inpatient Nutrition Assessment    Admit Date: 7/2/2023   Total duration of encounter: 5 days     Nutrition Recommendation/Prescription     Advance diet as tolerated to diabetic diet  RD to order Boost Breeze TID to provide an additional 250 kcal and 9 g protein per container  RD to monitor po intake and weight changes    Communication of Recommendations: reviewed with patient    Nutrition Assessment     Malnutrition Assessment/Nutrition-Focused Physical Exam    Malnutrition Context: chronic illness  Malnutrition Level: moderate  Energy Intake (Malnutrition): less than 75% for greater than or equal to 1 month  Weight Loss (Malnutrition): 10% in 6 months  Subcutaneous Fat (Malnutrition): mild depletion  Orbital Region (Subcutaneous Fat Loss): mild depletion  Upper Arm Region (Subcutaneous Fat Loss): mild depletion     Muscle Mass (Malnutrition): mild depletion  [place] Region (Muscle Loss): mild depletion                                A minimum of two characteristics is recommended for diagnosis of either severe or non-severe malnutrition.    Chart Review    Reason Seen: length of stay    Malnutrition Screening Tool Results   Have you recently lost weight without trying?: No  Have you been eating poorly because of a decreased appetite?: Yes   MST Score: 1     Diagnosis:  Diabetic gastroparesis exacerbation  Insulin-dependent type 2 diabetes mellitus  Hypothyroidism  Generalized anxiety disorder  Panic attacks  History of opioid dependence on Suboxone  Hypokalemia, resolved    Relevant Medical History: HTN, thyroid disease, DM 2, gastroparesis    Nutrition-Related Medications: NaCl, protonix, carafate, insulin prn, zofran prn, miralax prn, senna prn  Calorie Containing IV Medications: no significant kcals from medications at this time    Nutrition-Related Labs: 7/7: Cl-109, BUN-5.4, glu-101, -8.3      Diet/PN Order: Diet full liquid Diabetic  Oral Supplement Order: Boost Breeze  Tube Feeding Order:  "none  Appetite/Oral Intake: poor/0-25% of meals  Factors Affecting Nutritional Intake: altered gastrointestinal function, constipation, and nausea  Food/Yarsanism/Cultural Preferences: none reported  Food Allergies: none reported       Wound(s):       Comments    : pt reports poor appetite since admission, with decreased appetite prior x 2 months. Pt with nausea and constipation, vomiting improved. Diet changed to FL today, per notes pt only tolerating liquid diet. Pt agreed to ONS, stating tried ensure at home but did not tolerate. Will try Boost Breeze.  lb with weight loss to 170 lb. Bed weight of 79.3 kg (174 lb) on 7/3. Per EMR weights, 9.4% weight loss in 4 months noted.    Anthropometrics    Height: 5' 4.02" (162.6 cm)    Last Weight: 79.3 kg (174 lb 13.2 oz) (23 0527) Weight Method: Bed Scale  BMI (Calculated): 26.6  BMI Classification: overweight (BMI 25-29.9)     Ideal Body Weight (IBW), Female: 120.1 lb     % Ideal Body Weight, Female (lb): 124.88 %                    Usual Body Weight (UBW), k.9 kg  % Usual Body Weight: 87.42     Usual Weight Provided By: family/caregiver and EMR weight history    Wt Readings from Last 5 Encounters:   23 79.3 kg (174 lb 13.2 oz)   23 79 kg (174 lb 2.6 oz)   23 78 kg (171 lb 14.4 oz)   23 81.6 kg (180 lb)     Weight Change(s) Since Admission:  Admit Weight: 79.4 kg (175 lb) (23 1341)      Estimated Needs    Weight Used For Calorie Calculations: 79.3 kg (174 lb 13.2 oz)  Energy Calorie Requirements (kcal): 2910-1877 kcal (1.1-1.2 stress factor)  Energy Need Method: [place]- Jeor  Weight Used For Protein Calculations: 79.3 kg (174 lb 13.2 oz)  Protein Requirements: 79-87 g (1.0-1.1 g/kg)  Fluid Requirements (mL): 6898-5985 mL (1 mL/kcal)  Temp (24hrs), Av.2 °F (36.8 °C), Min:98.1 °F (36.7 °C), Max:98.5 °F (36.9 °C)       Enteral Nutrition    Patient not receiving enteral nutrition at this time.    Parenteral " Nutrition    Patient not receiving parenteral nutrition support at this time.    Evaluation of Received Nutrient Intake    Calories: not meeting estimated needs  Protein: not meeting estimated needs    Patient Education    Not applicable.    Nutrition Diagnosis     PES: Malnutrition related to chronic illness as evidenced by mild muscle/fat wasting, 10% weight loss in 6 months, and <75% intake for >1 month (new)    Interventions/Goals     Intervention(s): commercial beverage and collaboration with other providers  Goal: Consume % of oral supplements by follow-up. (new)    Monitoring & Evaluation     Dietitian will monitor energy intake and weight.  Nutrition Risk/Follow-Up: moderate (follow-up in 3-5 days)   Please consult if re-assessment needed sooner.       Detail Level: Detailed X Size Of Lesion In Cm (Optional): 0 Introduction Text (Please End With A Colon): pt deferred ILK Reason To Defer Override: patient declines biopsy today

## 2024-07-05 ENCOUNTER — PATIENT MESSAGE (OUTPATIENT)
Dept: ADMINISTRATIVE | Facility: OTHER | Age: 44
End: 2024-07-05
Payer: MEDICAID

## 2024-07-06 ENCOUNTER — PATIENT MESSAGE (OUTPATIENT)
Dept: ADMINISTRATIVE | Facility: OTHER | Age: 44
End: 2024-07-06
Payer: MEDICAID

## 2024-07-07 ENCOUNTER — PATIENT MESSAGE (OUTPATIENT)
Dept: ADMINISTRATIVE | Facility: OTHER | Age: 44
End: 2024-07-07
Payer: MEDICAID

## 2024-07-08 ENCOUNTER — ANESTHESIA (OUTPATIENT)
Dept: SURGERY | Facility: HOSPITAL | Age: 44
End: 2024-07-08
Payer: MEDICAID

## 2024-07-08 ENCOUNTER — TELEPHONE (OUTPATIENT)
Dept: HEMATOLOGY/ONCOLOGY | Facility: CLINIC | Age: 44
End: 2024-07-08
Payer: MEDICAID

## 2024-07-08 ENCOUNTER — HOSPITAL ENCOUNTER (OUTPATIENT)
Facility: HOSPITAL | Age: 44
Discharge: HOME | End: 2024-07-08
Attending: SURGERY | Admitting: SURGERY
Payer: MEDICAID

## 2024-07-08 DIAGNOSIS — C83.39 DIFFUSE LARGE B-CELL LYMPHOMA OF SOLID ORGAN EXCLUDING SPLEEN: ICD-10-CM

## 2024-07-08 LAB
B-HCG UR QL: NEGATIVE
CTP QC/QA: YES
POCT GLUCOSE: 156 MG/DL (ref 70–110)
POCT GLUCOSE: 197 MG/DL (ref 70–110)
POCT GLUCOSE: 207 MG/DL (ref 70–110)

## 2024-07-08 PROCEDURE — 82962 GLUCOSE BLOOD TEST: CPT | Performed by: SURGERY

## 2024-07-08 PROCEDURE — 71000015 HC POSTOP RECOV 1ST HR: Performed by: SURGERY

## 2024-07-08 PROCEDURE — 37000009 HC ANESTHESIA EA ADD 15 MINS: Performed by: SURGERY

## 2024-07-08 PROCEDURE — 63600175 PHARM REV CODE 636 W HCPCS

## 2024-07-08 PROCEDURE — 63600175 PHARM REV CODE 636 W HCPCS: Performed by: SURGERY

## 2024-07-08 PROCEDURE — 25000003 PHARM REV CODE 250: Performed by: SURGERY

## 2024-07-08 PROCEDURE — 71000033 HC RECOVERY, INTIAL HOUR: Performed by: SURGERY

## 2024-07-08 PROCEDURE — 36000706: Performed by: SURGERY

## 2024-07-08 PROCEDURE — C1788 PORT, INDWELLING, IMP: HCPCS | Performed by: SURGERY

## 2024-07-08 PROCEDURE — 81025 URINE PREGNANCY TEST: CPT | Performed by: NURSE PRACTITIONER

## 2024-07-08 PROCEDURE — 63600175 PHARM REV CODE 636 W HCPCS: Performed by: NURSE ANESTHETIST, CERTIFIED REGISTERED

## 2024-07-08 PROCEDURE — 71000016 HC POSTOP RECOV ADDL HR: Performed by: SURGERY

## 2024-07-08 PROCEDURE — 36000707: Performed by: SURGERY

## 2024-07-08 PROCEDURE — 25000003 PHARM REV CODE 250: Performed by: NURSE ANESTHETIST, CERTIFIED REGISTERED

## 2024-07-08 PROCEDURE — 37000008 HC ANESTHESIA 1ST 15 MINUTES: Performed by: SURGERY

## 2024-07-08 PROCEDURE — 63600175 PHARM REV CODE 636 W HCPCS: Performed by: NURSE PRACTITIONER

## 2024-07-08 PROCEDURE — 63600175 PHARM REV CODE 636 W HCPCS: Performed by: SPECIALIST

## 2024-07-08 DEVICE — PORT POWER CLEAR VIEW: Type: IMPLANTABLE DEVICE | Site: JUGULAR | Status: FUNCTIONAL

## 2024-07-08 RX ORDER — PROCHLORPERAZINE EDISYLATE 5 MG/ML
5 INJECTION INTRAMUSCULAR; INTRAVENOUS ONCE AS NEEDED
Status: DISCONTINUED | OUTPATIENT
Start: 2024-07-08 | End: 2024-07-08 | Stop reason: HOSPADM

## 2024-07-08 RX ORDER — LIDOCAINE HYDROCHLORIDE 20 MG/ML
INJECTION INTRAVENOUS
Status: DISCONTINUED | OUTPATIENT
Start: 2024-07-08 | End: 2024-07-08

## 2024-07-08 RX ORDER — HEPARIN 100 UNIT/ML
SYRINGE INTRAVENOUS
Status: DISCONTINUED | OUTPATIENT
Start: 2024-07-08 | End: 2024-07-08 | Stop reason: HOSPADM

## 2024-07-08 RX ORDER — PROPOFOL 10 MG/ML
INJECTION, EMULSION INTRAVENOUS
Status: DISCONTINUED | OUTPATIENT
Start: 2024-07-08 | End: 2024-07-08

## 2024-07-08 RX ORDER — GLUCAGON 1 MG
1 KIT INJECTION
Status: DISCONTINUED | OUTPATIENT
Start: 2024-07-08 | End: 2024-07-08 | Stop reason: HOSPADM

## 2024-07-08 RX ORDER — HYDROMORPHONE HYDROCHLORIDE 1 MG/ML
0.5 INJECTION, SOLUTION INTRAMUSCULAR; INTRAVENOUS; SUBCUTANEOUS EVERY 5 MIN PRN
Status: DISCONTINUED | OUTPATIENT
Start: 2024-07-08 | End: 2024-07-08 | Stop reason: HOSPADM

## 2024-07-08 RX ORDER — OXYCODONE AND ACETAMINOPHEN 5; 325 MG/1; MG/1
2 TABLET ORAL ONCE
Status: DISCONTINUED | OUTPATIENT
Start: 2024-07-08 | End: 2024-07-08 | Stop reason: HOSPADM

## 2024-07-08 RX ORDER — DIPHENHYDRAMINE HYDROCHLORIDE 50 MG/ML
25 INJECTION INTRAMUSCULAR; INTRAVENOUS ONCE AS NEEDED
Status: DISCONTINUED | OUTPATIENT
Start: 2024-07-08 | End: 2024-07-08 | Stop reason: HOSPADM

## 2024-07-08 RX ORDER — HEPARIN SODIUM 5000 [USP'U]/ML
5000 INJECTION, SOLUTION INTRAVENOUS; SUBCUTANEOUS ONCE
Status: DISCONTINUED | OUTPATIENT
Start: 2024-07-08 | End: 2024-07-08

## 2024-07-08 RX ORDER — MEPERIDINE HYDROCHLORIDE 25 MG/ML
12.5 INJECTION INTRAMUSCULAR; INTRAVENOUS; SUBCUTANEOUS ONCE
Status: DISCONTINUED | OUTPATIENT
Start: 2024-07-08 | End: 2024-07-08 | Stop reason: HOSPADM

## 2024-07-08 RX ORDER — HEPARIN SODIUM 5000 [USP'U]/ML
5000 INJECTION, SOLUTION INTRAVENOUS; SUBCUTANEOUS ONCE
Status: COMPLETED | OUTPATIENT
Start: 2024-07-08 | End: 2024-07-08

## 2024-07-08 RX ORDER — HEPARIN SOD,PORCINE/0.9 % NACL 1000/500ML
INTRAVENOUS SOLUTION INTRAVENOUS
Status: DISCONTINUED | OUTPATIENT
Start: 2024-07-08 | End: 2024-07-08 | Stop reason: HOSPADM

## 2024-07-08 RX ORDER — ONDANSETRON HYDROCHLORIDE 2 MG/ML
4 INJECTION, SOLUTION INTRAVENOUS ONCE
Status: DISCONTINUED | OUTPATIENT
Start: 2024-07-08 | End: 2024-07-08 | Stop reason: HOSPADM

## 2024-07-08 RX ORDER — INSULIN ASPART 100 [IU]/ML
0-5 INJECTION, SOLUTION INTRAVENOUS; SUBCUTANEOUS ONCE AS NEEDED
Status: COMPLETED | OUTPATIENT
Start: 2024-07-08 | End: 2024-07-08

## 2024-07-08 RX ORDER — MIDAZOLAM HYDROCHLORIDE 2 MG/2ML
2 INJECTION, SOLUTION INTRAMUSCULAR; INTRAVENOUS ONCE
Status: COMPLETED | OUTPATIENT
Start: 2024-07-08 | End: 2024-07-08

## 2024-07-08 RX ORDER — ONDANSETRON HYDROCHLORIDE 2 MG/ML
INJECTION, SOLUTION INTRAVENOUS
Status: DISCONTINUED | OUTPATIENT
Start: 2024-07-08 | End: 2024-07-08

## 2024-07-08 RX ORDER — SODIUM CHLORIDE, SODIUM LACTATE, POTASSIUM CHLORIDE, CALCIUM CHLORIDE 600; 310; 30; 20 MG/100ML; MG/100ML; MG/100ML; MG/100ML
INJECTION, SOLUTION INTRAVENOUS CONTINUOUS
Status: DISCONTINUED | OUTPATIENT
Start: 2024-07-08 | End: 2024-07-08 | Stop reason: HOSPADM

## 2024-07-08 RX ORDER — HYDROMORPHONE HYDROCHLORIDE 1 MG/ML
0.2 INJECTION, SOLUTION INTRAMUSCULAR; INTRAVENOUS; SUBCUTANEOUS EVERY 5 MIN PRN
Status: DISCONTINUED | OUTPATIENT
Start: 2024-07-08 | End: 2024-07-08 | Stop reason: HOSPADM

## 2024-07-08 RX ORDER — CEFAZOLIN SODIUM 1 G/3ML
2 INJECTION, POWDER, FOR SOLUTION INTRAMUSCULAR; INTRAVENOUS ONCE
Status: COMPLETED | OUTPATIENT
Start: 2024-07-08 | End: 2024-07-08

## 2024-07-08 RX ORDER — LIDOCAINE HYDROCHLORIDE 10 MG/ML
INJECTION INFILTRATION; PERINEURAL
Status: DISCONTINUED | OUTPATIENT
Start: 2024-07-08 | End: 2024-07-08 | Stop reason: HOSPADM

## 2024-07-08 RX ORDER — FENTANYL CITRATE 50 UG/ML
INJECTION, SOLUTION INTRAMUSCULAR; INTRAVENOUS
Status: DISCONTINUED | OUTPATIENT
Start: 2024-07-08 | End: 2024-07-08

## 2024-07-08 RX ORDER — IPRATROPIUM BROMIDE AND ALBUTEROL SULFATE 2.5; .5 MG/3ML; MG/3ML
3 SOLUTION RESPIRATORY (INHALATION) ONCE AS NEEDED
Status: DISCONTINUED | OUTPATIENT
Start: 2024-07-08 | End: 2024-07-08 | Stop reason: HOSPADM

## 2024-07-08 RX ADMIN — ONDANSETRON 4 MG: 2 INJECTION INTRAMUSCULAR; INTRAVENOUS at 07:07

## 2024-07-08 RX ADMIN — MIDAZOLAM HYDROCHLORIDE 2 MG: 1 INJECTION, SOLUTION INTRAMUSCULAR; INTRAVENOUS at 06:07

## 2024-07-08 RX ADMIN — SODIUM CHLORIDE, POTASSIUM CHLORIDE, SODIUM LACTATE AND CALCIUM CHLORIDE: 600; 310; 30; 20 INJECTION, SOLUTION INTRAVENOUS at 06:07

## 2024-07-08 RX ADMIN — CEFAZOLIN 2 G: 330 INJECTION, POWDER, FOR SOLUTION INTRAMUSCULAR; INTRAVENOUS at 07:07

## 2024-07-08 RX ADMIN — FENTANYL CITRATE 50 MCG: 50 INJECTION INTRAMUSCULAR; INTRAVENOUS at 07:07

## 2024-07-08 RX ADMIN — INSULIN ASPART 2 UNITS: 100 INJECTION, SOLUTION INTRAVENOUS; SUBCUTANEOUS at 05:07

## 2024-07-08 RX ADMIN — HEPARIN SODIUM 5000 UNITS: 5000 INJECTION, SOLUTION INTRAVENOUS; SUBCUTANEOUS at 05:07

## 2024-07-08 RX ADMIN — PROPOFOL 200 MG: 10 INJECTION, EMULSION INTRAVENOUS at 07:07

## 2024-07-08 RX ADMIN — LIDOCAINE HYDROCHLORIDE 100 MG: 20 INJECTION INTRAVENOUS at 07:07

## 2024-07-08 NOTE — PLAN OF CARE
Pt tolerated procedure well, ready for discharge  Problem: Adult Inpatient Plan of Care  Goal: Plan of Care Review  7/8/2024 1010 by Fred Vincent RN  Outcome: Met  7/8/2024 0913 by Fred Vincent RN  Outcome: Progressing  Goal: Patient-Specific Goal (Individualized)  7/8/2024 1010 by Fred Vincent RN  Outcome: Met  7/8/2024 0913 by Fred Vincent RN  Outcome: Progressing  Goal: Absence of Hospital-Acquired Illness or Injury  7/8/2024 1010 by Fred Vincent RN  Outcome: Met  7/8/2024 0913 by Fred Vincent RN  Outcome: Progressing  Goal: Optimal Comfort and Wellbeing  7/8/2024 1010 by Fred Vincent RN  Outcome: Met  7/8/2024 0913 by Fred Vincent RN  Outcome: Progressing  Goal: Readiness for Transition of Care  7/8/2024 1010 by Fred Vincent RN  Outcome: Met  7/8/2024 0913 by Fred Vincent RN  Outcome: Progressing     Problem: Diabetes Comorbidity  Goal: Blood Glucose Level Within Targeted Range  7/8/2024 1010 by Fred Vincent RN  Outcome: Met  7/8/2024 0913 by Fred Vincent RN  Outcome: Progressing     Problem: Wound  Goal: Optimal Coping  7/8/2024 1010 by Fred Vincent RN  Outcome: Met  7/8/2024 0913 by Fred Vincent RN  Outcome: Progressing  Goal: Optimal Functional Ability  7/8/2024 1010 by Fred Vincent, SARTHAK  Outcome: Met  7/8/2024 0913 by Fred Vincent RN  Outcome: Progressing  Goal: Absence of Infection Signs and Symptoms  7/8/2024 1010 by Fred Vincent, SARTHAK  Outcome: Met  7/8/2024 0913 by Fred Vincent RN  Outcome: Progressing  Goal: Improved Oral Intake  7/8/2024 1010 by Fred Vincent RN  Outcome: Met  7/8/2024 0913 by Fred Vincent RN  Outcome: Progressing  Goal: Optimal Pain Control and Function  7/8/2024 1010 by Fred Vincent, SARTHAK  Outcome: Met  7/8/20242024 0913 by Fred Vincent, RN  Outcome: Progressing  Goal: Skin Health and Integrity  7/8/2024 1010 by Fred Vincent, RN  Outcome: Met  7/8/2024 0913 by Fred Vincent, RN  Outcome: Progressing  Goal: Optimal Wound Healing  7/8/2024 1010 by Melisa,  Fred, RN  Outcome: Met  7/8/2024 0913 by Fred Vincent, RN  Outcome: Progressing     Problem: Bariatric Environmental Safety  Goal: Safety Maintained with Care  7/8/2024 1010 by Fred Vincent, RN  Outcome: Met  7/8/2024 0913 by Fred Vincent, RN  Outcome: Progressing

## 2024-07-08 NOTE — ANESTHESIA POSTPROCEDURE EVALUATION
Anesthesia Post Evaluation    Patient: Fred Berman    Procedure(s) Performed: Procedure(s) (LRB):  [id number] (Right)    Final Anesthesia Type: general      Patient location during evaluation: PACU  Patient participation: Yes- Able to Participate  Level of consciousness: awake and responds to stimulation  Post-procedure vital signs: reviewed and stable  Pain management: adequate  Airway patency: patent    PONV status at discharge: No PONV  Anesthetic complications: no      Cardiovascular status: blood pressure returned to baseline  Respiratory status: unassisted  Hydration status: euvolemic  Follow-up not needed.              Vitals Value Taken Time   /90 07/08/24 0840   Temp 36.4 °C (97.6 °F) 07/08/24 0825   Pulse 80 07/08/24 0844   Resp 20 07/08/24 0844   SpO2 95 % 07/08/24 0844         Event Time   Out of Recovery 08:50:00         Pain/Gilberto Score: Gilberto Score: 9 (7/8/2024  8:40 AM)

## 2024-07-08 NOTE — DISCHARGE INSTRUCTIONS
· Keep follow up appointment [facility].     ` Resume home medications unless otherwise instructed by your doctor.    · No heavy lifting or straining over 10 pounds for 2-3 weeks.    · You may take a shower starting tomorrow evening. Wash GENTLY with soap and water (do not scrub) over incision, rinse with water, and pat dry.    · Do not soak your wound in water for two weeks. Do not take baths, swim, or use a hot tub until your doctor says it is okay.    · You may alternate ibuprofen and tylenol every 4-6 hours as needed for pain/discomfort    · You may use an ice pack as needed for 20 minutes at a time over your incision site to minimize swelling and help relieve pain.    · Do not drink alcohol or drive today, or as long as you are on pain medication.    · Notify MD of any moderate to severe pain unrelieved by pain medicine, if your incision opens and/or bleeds, or for any signs of infection including fever above 100.4, excessive redness or swelling, yellow/green foul- smelling drainage, nausea or vomiting. Clinics number is [phone]. If it is after business hours or emergency call [phone] and state Im having post op complications and need to speak to the surgeon on call.    · Thanks for choosing [facility]! Have a smooth recovery!

## 2024-07-08 NOTE — TRANSFER OF CARE
Anesthesia Transfer of Care Note    Patient: Fred Berman    Procedure(s) Performed: Procedure(s) (LRB):  [id number] (Right)    Patient location: PACU    Anesthesia Type: general    Transport from OR: Transported from OR on room air with adequate spontaneous ventilation    Post pain: adequate analgesia    Post assessment: no apparent anesthetic complications and tolerated procedure well    Post vital signs: stable    Level of consciousness: sedated    Nausea/Vomiting: no nausea/vomiting    Complications: none    Transfer of care protocol was followed

## 2024-07-08 NOTE — DISCHARGE SUMMARY
[facility]  General Surgery  Discharge Summary      Patient Name: Fred Berman  MRN: [id number]  Admission Date: 7/8/2024  Hospital Length of Stay: 0 days  Discharge Date and Time:  07/08/2024 8:30 AM  Attending Physician: Lio Storey Jr.,*   Discharging Provider: Ragini Estrada MD  Primary Care Provider: Jacquelin Souza MD     HPI: 44 yo female with PMH of large B cell lymphoma of the neck s/p neck dissection and partial thyroidectomy presenting for mediport placement.    Procedure(s) (LRB):  [id number] (Right)       Pending Diagnostic Studies:       Procedure Component Value Units Date/Time    SURG FL Surgery Fluoro Usage [[id number]] Resulted: 07/08/24 0707    Order Status: Sent Lab Status: In process Updated: 07/08/24 0707    X-Ray Chest 1 View [[id number]]     Order Status: Sent Lab Status: No result           There are no hospital problems to display for this patient.     Discharged Condition: good    Disposition: Home or Self Care    Follow Up:    Patient Instructions:      Diet Adult Regular     Notify your health care provider if you experience any of the following:  temperature >100.4     Notify your health care provider if you experience any of the following:  persistent nausea and vomiting or diarrhea     Notify your health care provider if you experience any of the following:  severe uncontrolled pain     Notify your health care provider if you experience any of the following:  redness, tenderness, or signs of infection (pain, swelling, redness, odor or green/yellow discharge around incision site)     No dressing needed   Order Comments: Can shower when you get home. Do not submerge your incisions in water (not sitting in a bath tub or swimming) for two weeks. Surgical glue will come off on its own over time. If it has not come off in 10 days you can peel it off.      Alternate Tylenol and Advil every four hours for pain control.     Activity as tolerated  "    Medications:  Reconciled Home Medications:      Medication List        CONTINUE taking these medications      acetaminophen 500 MG tablet  Commonly known as: TYLENOL  Take 2 tablets (1,000 mg total) by mouth every 6 (six) hours.     buprenorphine HCL 8 mg Subl  Commonly known as: SUBUTEX  Place 8 mg under the tongue 3 (three) times daily.     CALCIUM 600 + D(3) ORAL  Take 1 tablet by mouth 2 (two) times a day.     COMPOUND HORMONE REPLACEMENT  Take by mouth once daily.     hydrocortisone 2.5 % rectal cream  Commonly known as: ANUSOL-HC  Place 1 application  rectally 2 (two) times daily.     insulin lispro 100 unit/mL pen  Inject 35 Units into the skin 3 (three) times daily. With meals     LANTUS SOLOSTAR U-100 INSULIN 100 unit/mL (3 mL) Inpn pen  Generic drug: insulin glargine U-100 (Lantus)  Inject 15 Units into the skin 2 (two) times daily.     levothyroxine 200 MCG tablet  Commonly known as: SYNTHROID  Take 1 tablet (200 mcg total) by mouth before breakfast.     LORazepam 0.5 MG tablet  Commonly known as: ATIVAN  Take 0.5 mg by mouth every 6 (six) hours as needed for Anxiety.     losartan 50 MG tablet  Commonly known as: COZAAR  Take 1 tablet (50 mg total) by mouth once daily.     ondansetron 4 MG Tbdl  Commonly known as: ZOFRAN-ODT  Take 1 tablet (4 mg total) by mouth every 8 (eight) hours as needed (NAUSEA).     pen needle, diabetic 32 gauge x 5/32" Ndle  Commonly known as: BD ULTRA-FINE ROSALEE PEN NEEDLE  1 each by Misc.(Non-Drug; Combo Route) route 2 (two) times a day.     polyethylene glycol 17 gram Pwpk  Commonly known as: GLYCOLAX  Take 17 g by mouth 2 (two) times daily.            STOP taking these medications      busPIRone 7.5 MG tablet  Commonly known as: BUSPAR     fluocinonide 0.05 % external solution  Commonly known as: LIDEX     gabapentin 300 MG capsule  Commonly known as: NEURONTIN     ibuprofen 400 MG tablet  Commonly known as: ADVIL,MOTRIN     oxyCODONE 5 MG immediate release tablet  Commonly " known as: ROXICODONE     pantoprazole 40 MG tablet  Commonly known as: PROTONIX     REGLAN 10 mg tablet  Generic drug: metoclopramide HCl     sertraline 50 MG tablet  Commonly known as: ZOLOFT            ASK your doctor about these medications      lubiprostone 24 MCG Cap  Commonly known as: AMITIZA  Take 24 mcg by mouth daily with breakfast.     metFORMIN 500 MG tablet  Commonly known as: GLUCOPHAGE  Take 1 tablet (500 mg total) by mouth 2 (two) times daily with meals.     MOTEGRITY 2 mg Tab  Generic drug: prucalopride  Take 1 tablet by mouth.     naloxegoL 25 mg tablet  Commonly known as: MOVANTIK  Take 25 mg by mouth once daily.              Ragini Estrada MD  General Surgery  [facility]

## 2024-07-08 NOTE — OP NOTE
[facility]  General Surgery  Operative Note    SUMMARY     Date of Procedure: 7/8/2024     Procedure: Procedure(s) (LRB):  [id number] (Right)       Surgeons and Role:     * Lio Storey Jr., MD - Primary  Kristine Santos MD PGY3, Ragini Estrada MD PGY1    Assisting Surgeon: None    Pre-Operative Diagnosis:    * Diffuse large B-cell lymphoma of solid organ excluding spleen [C83.39]    Post-Operative Diagnosis: Post-Op Diagnosis Codes:     * Diffuse large B-cell lymphoma of solid organ excluding spleen [C83.39]    Anesthesia: Monitor Anesthesia Care    Operative Findings (including complications, if any): Widely patent R IJ    Description of Technical Procedures:     After appropriate consents were obtained, the patient was taken to the operating room and placed in supine position and placed under anesthesia with an LMA. Patient's bilateral chest and neck was prepped and draped in sterile fashion. A proper timeout was performed confirming correct patient, site, procedure, and administration of preoperative antibiotic and anticoagulation. A large bore needle was used to penetrate the skin and venous access was obtained under ultrasound guidance.  A guidewire was inserted into the needle and confirmed to be in the superior vena cava by flouroscopy.  Next a 3 cm incision was made on the upper chest and deepened with electrocautery.  A 2 x 3 cm port pocket was created in the subcutaneous space overlying the pectoralis fascia inferior to the incision. The catheter was then tunneled through the subcutaneous fat to exit the skin at the wire.  A dilator and sheath were inserted over the guidewire into the central venous system under fluoroscopic guidance. The guidewire and dilator were removed from the sheath. The catheter was inserted into the sheath and then the sheath was torn away.  The catheter was attached to the port with a locking washer and the the port was fixed to the pectoralis  fascia with two 2-0 Prolene stitches. A Johnston needle was used to aspirate then flush the port with heparinized saline.  The incision was then reapproximated with 3-0 Vicryl, 4-0 Monocryl, and Dermabond.  The patient tolerated the procedure without complications.  The needle and sponge count were accurate. The patient was transferred to recovery room in stable condition.       Findings: Properly placed catheter tip at the cavo-atrial junction visualized with fluoroscopy      Estimated Blood Loss (EBL): 5cc    Implants:   Implant Name Type Inv. Item Serial No.  Lot No. LRB No. Used Action   PORT POWER CLEAR VIEW - [id number]  PORT POWER CLEAR VIEW  [employer]  Right 1 Implanted       Specimens:   Specimen (24h ago, onward)      None                    Condition: Good    Disposition: PACU - hemodynamically stable.      Kristine Santos MD   [employer] General Surgery PGY3

## 2024-07-08 NOTE — INTERVAL H&P NOTE
The patient has been examined and the H&P has been reviewed:    I concur with the findings and no changes have occurred since H&P was written.    Surgery risks, benefits and alternative options discussed and understood by patient/family.      Patient has been NPO since midnight .     Patient has held home blood thinners for appropriate amount of time: N/A    Pre-operative Heparin Ordered: Yes    Pre-operative Antibiotics Ordered: Yes: ancef    Laterality Marked: N/A    All questions and concerns addressed. Patient confirms the procedure to be performed: [id number].     Ragini Estrada MD  [facility] General Surgery, PGY-1  07/08/2024 5:49 AM      There are no hospital problems to display for this patient.

## 2024-07-08 NOTE — ANESTHESIA PROCEDURE NOTES
Intubation    Date/Time: 7/8/2024 7:08 AM    Performed by: Isabel Sanders CRNA  Authorized by: Tanesha Mckeon MD    Intubation:     Induction:  Intravenous    Intubated:  Postinduction    Mask Ventilation:  Not attempted    Attempts:  1    Attempted By:  CRNA    Difficult Airway Encountered?: No      Complications:  None    Airway Device:  Supraglottic airway/LMA    Airway Device Size:  4.0    Style/Cuff Inflation:  Uncuffed    Placement Verified By:  Capnometry    Complicating Factors:  None    Findings Post-Intubation:  BS equal bilateral and atraumatic/condition of teeth unchanged

## 2024-07-08 NOTE — PLAN OF CARE
Problem: Adult Inpatient Plan of Care  Goal: Plan of Care Review  Outcome: Progressing  Goal: Patient-Specific Goal (Individualized)  Outcome: Progressing  Goal: Absence of Hospital-Acquired Illness or Injury  Outcome: Progressing  Goal: Optimal Comfort and Wellbeing  Outcome: Progressing  Goal: Readiness for Transition of Care  Outcome: Progressing     Problem: Diabetes Comorbidity  Goal: Blood Glucose Level Within Targeted Range  Outcome: Progressing     Problem: Wound  Goal: Optimal Coping  Outcome: Progressing  Goal: Optimal Functional Ability  Outcome: Progressing  Goal: Absence of Infection Signs and Symptoms  Outcome: Progressing  Goal: Improved Oral Intake  Outcome: Progressing  Goal: Optimal Pain Control and Function  Outcome: Progressing  Goal: Skin Health and Integrity  Outcome: Progressing  Goal: Optimal Wound Healing  Outcome: Progressing     Problem: Bariatric Environmental Safety  Goal: Safety Maintained with Care  Outcome: Progressing

## 2024-07-09 ENCOUNTER — OFFICE VISIT (OUTPATIENT)
Dept: SURGERY | Facility: CLINIC | Age: 44
End: 2024-07-09
Attending: INTERNAL MEDICINE
Payer: MEDICAID

## 2024-07-09 ENCOUNTER — PATIENT MESSAGE (OUTPATIENT)
Dept: ADMINISTRATIVE | Facility: OTHER | Age: 44
End: 2024-07-09
Payer: MEDICAID

## 2024-07-09 ENCOUNTER — OFFICE VISIT (OUTPATIENT)
Dept: HEMATOLOGY/ONCOLOGY | Facility: CLINIC | Age: 44
End: 2024-07-09
Attending: INTERNAL MEDICINE
Payer: MEDICAID

## 2024-07-09 VITALS
BODY MASS INDEX: 38.4 KG/M2 | HEIGHT: 68 IN | RESPIRATION RATE: 20 BRPM | SYSTOLIC BLOOD PRESSURE: 128 MMHG | TEMPERATURE: 98 F | DIASTOLIC BLOOD PRESSURE: 83 MMHG | OXYGEN SATURATION: 97 % | HEART RATE: 66 BPM | WEIGHT: 253.38 LBS

## 2024-07-09 VITALS
TEMPERATURE: 98 F | HEART RATE: 70 BPM | SYSTOLIC BLOOD PRESSURE: 162 MMHG | OXYGEN SATURATION: 95 % | DIASTOLIC BLOOD PRESSURE: 95 MMHG | WEIGHT: 255.38 LBS | BODY MASS INDEX: 40 KG/M2 | RESPIRATION RATE: 19 BRPM

## 2024-07-09 VITALS
RESPIRATION RATE: 19 BRPM | OXYGEN SATURATION: 95 % | WEIGHT: 257.25 LBS | HEIGHT: 67 IN | DIASTOLIC BLOOD PRESSURE: 83 MMHG | TEMPERATURE: 98 F | HEART RATE: 73 BPM | BODY MASS INDEX: 40.38 KG/M2 | SYSTOLIC BLOOD PRESSURE: 136 MMHG

## 2024-07-09 DIAGNOSIS — C85.99 MALIGNANT LYMPHOMA OF THYROID GLAND: ICD-10-CM

## 2024-07-09 DIAGNOSIS — C83.39 DIFFUSE LARGE B-CELL LYMPHOMA OF SOLID ORGAN EXCLUDING SPLEEN: ICD-10-CM

## 2024-07-09 DIAGNOSIS — D10.1 FIBROMA OF TONGUE: ICD-10-CM

## 2024-07-09 DIAGNOSIS — F11.11 HISTORY OF OPIOID ABUSE: ICD-10-CM

## 2024-07-09 DIAGNOSIS — T80.219A INFECTION OF VENOUS ACCESS PORT, INITIAL ENCOUNTER: ICD-10-CM

## 2024-07-09 DIAGNOSIS — Z83.2 FAMILY HISTORY OF VON WILLEBRAND DISEASE: ICD-10-CM

## 2024-07-09 DIAGNOSIS — C83.39 DIFFUSE LARGE B-CELL LYMPHOMA OF SOLID ORGAN EXCLUDING SPLEEN: Primary | ICD-10-CM

## 2024-07-09 DIAGNOSIS — R63.4 UNEXPLAINED WEIGHT LOSS: ICD-10-CM

## 2024-07-09 DIAGNOSIS — G89.4 CHRONIC PAIN SYNDROME: Primary | ICD-10-CM

## 2024-07-09 DIAGNOSIS — F41.1 GENERALIZED ANXIETY DISORDER: ICD-10-CM

## 2024-07-09 DIAGNOSIS — E03.9 HYPOTHYROIDISM, UNSPECIFIED TYPE: ICD-10-CM

## 2024-07-09 PROCEDURE — 1160F RVW MEDS BY RX/DR IN RCRD: CPT | Mod: CPTII,,, | Performed by: INTERNAL MEDICINE

## 2024-07-09 PROCEDURE — 4010F ACE/ARB THERAPY RXD/TAKEN: CPT | Mod: CPTII,,, | Performed by: INTERNAL MEDICINE

## 2024-07-09 PROCEDURE — 99215 OFFICE O/P EST HI 40 MIN: CPT | Mod: PBBFAC,27

## 2024-07-09 PROCEDURE — 99214 OFFICE O/P EST MOD 30 MIN: CPT | Mod: S$PBB,,, | Performed by: INTERNAL MEDICINE

## 2024-07-09 PROCEDURE — 3079F DIAST BP 80-89 MM HG: CPT | Mod: CPTII,,, | Performed by: INTERNAL MEDICINE

## 2024-07-09 PROCEDURE — 3075F SYST BP GE 130 - 139MM HG: CPT | Mod: CPTII,,, | Performed by: INTERNAL MEDICINE

## 2024-07-09 PROCEDURE — 3008F BODY MASS INDEX DOCD: CPT | Mod: CPTII,,, | Performed by: INTERNAL MEDICINE

## 2024-07-09 PROCEDURE — 99215 OFFICE O/P EST HI 40 MIN: CPT | Mod: PBBFAC | Performed by: INTERNAL MEDICINE

## 2024-07-09 PROCEDURE — 1159F MED LIST DOCD IN RCRD: CPT | Mod: CPTII,,, | Performed by: INTERNAL MEDICINE

## 2024-07-09 PROCEDURE — 3046F HEMOGLOBIN A1C LEVEL >9.0%: CPT | Mod: CPTII,,, | Performed by: INTERNAL MEDICINE

## 2024-07-09 NOTE — PROGRESS NOTES
"History:  Past Medical History:   Diagnosis Date    Adrenal mass     Anxiety     Arthritis     Bradycardia     Depression     Diabetes mellitus, type 2     Gastroparesis     General anesthetics causing adverse effect in therapeutic use     "hard time waking up"    GERD (gastroesophageal reflux disease)     Hip pain     Hypertension     Menstrual cramps     Ovarian cyst     Palpitations     Pyosalpingitis     Thyroid disease    Past medical history:  Adrenal mass; anxiety; arthritis; bradycardia; depression; NIDDM; gastroparesis; GERD; hip pain; hypertension; menstrual cramps; ovarian cyst; palpitations; bile salpingitis; thyroid disease    Procedure/surgical history:  Ablation of vaginal tissue using laser; ; cholecystectomy; dissection of neck, left 05/10/2024; thyroidectomy 05/10/2024; tubal ligation    Past Surgical History:   Procedure Laterality Date    ABLATION OF VAGINAL TISSUE USING LASER       SECTION      CHOLECYSTECTOMY      DISSECTION OF NECK Left 5/10/2024    Procedure: DISSECTION, NECK;  Surgeon: Cali Trujillo MD;  Location: [facility];  Service: ENT;  Laterality: Left;    INSERTION OF TUNNELED CENTRAL VENOUS CATHETER (CVC) WITH SUBCUTANEOUS PORT Right 2024    Procedure: [id number];  Surgeon: Lio Storey Jr., MD;  Location: [facility];  Service: General;  Laterality: Right;    THYROIDECTOMY Left 5/10/2024    Procedure: THYROIDECTOMY;  Surgeon: Cali Trujillo MD;  Location: [facility];  Service: ENT;  Laterality: Left;    TUBAL LIGATION        Social History     Socioeconomic History    Marital status: Single   Tobacco Use    Smoking status: Former     Types: Cigarettes    Smokeless tobacco: Never   Substance and Sexual Activity    Alcohol use: Never     Alcohol/week: 6.0 standard drinks of alcohol     Types: 6 Shots of liquor per week    Drug use: Not Currently     Types: Methamphetamines     Comment: Various Opoids    Sexual activity: Yes     Partners: Male "     Social Determinants of Health     Financial Resource Strain: Medium Risk (6/27/2024)    Overall Financial Resource Strain (CARDIA)     Difficulty of Paying Living Expenses: Somewhat hard   Food Insecurity: No Food Insecurity (6/27/2024)    Hunger Vital Sign     Worried About Running Out of Food in the Last Year: Never true     Ran Out of Food in the Last Year: Never true   Transportation Needs: No Transportation Needs (5/12/2024)    TRANSPORTATION NEEDS     Transportation : No   Physical Activity: Insufficiently Active (6/27/2024)    Exercise Vital Sign     Days of Exercise per Week: 2 days     Minutes of Exercise per Session: 30 min   Stress: No Stress Concern Present (6/27/2024)    [employer] - Occupational Stress Questionnaire     Feeling of Stress : Only a little   Recent Concern: Stress - Stress Concern Present (5/12/2024)    [employer] - Occupational Stress Questionnaire     Feeling of Stress : To some extent   Housing Stability: Low Risk  (6/27/2024)    Housing Stability Vital Sign     Unable to Pay for Housing in the Last Year: No     Homeless in the Last Year: No      Family History   Problem Relation Name Age of Onset    Alcohol abuse Mother Pat         Pat    Arthritis Mother Pat     Breast cancer Mother Pat     COPD Mother Pat     Depression Mother Pat     Diabetes Mother Pat     Drug abuse Mother Pat     Hypertension Mother Pat     Miscarriages / Stillbirths Mother Pat     Depression Father Juventino     Drug abuse Father Juventino     Hypertension Father Juventino     Prostate cancer Father Juventino     Thyroid cancer Father Juventino     Drug abuse Sister Elana     Heart disease Sister Elana     Miscarriages / Stillbirths Sister Elana     Drug abuse Brother Juventino     Early death Brother Izaiah     Asthma Son Mojgan     Hypertension Son Mojgan     Miscarriages / Stillbirths Maternal Grandmother Arlean     Cancer Maternal Grandfather Janay     Hearing loss Paternal  Grandfather Karthik         Reason for Follow-up:  -diffuse large B-cell lymphoma of thyroid gland  -family history of von Willebrand disease   -oral fibroma of tongue  -anxiety, depression, NIDDM, hypothyroidism, history of opioid abuse, etc.    History of Present Illness:   Diffuse large B-cell lymphoma of solid organ excluding sple        Oncologic/Hematologic History:  Oncology History    No history exists.   43-year-old lady, referred from [facility] ENT, with diffuse large B-cell lymphoma of thyroid.      Past medical history:  Adrenal mass; anxiety; arthritis; bradycardia; depression; NIDDM; gastroparesis; GERD; hip pain; hypertension; menstrual cramps; ovarian cyst; palpitations; pyosalpingitis; hypothyroidism, history of opioid abuse (on 2024, she tells me that she used opioids for 10-15 years; apparently, prescription opioids; on buprenorphine with a psychiatrist for last 2 years)  history of opiate abuse (maintained on Suboxone), history of C difficile colitis (unable to tolerate C difficile treatment); diagnosed with unspecified bipolar disorder during hospitalization at our [facility], [place] [id number] (enteritis, left pelvic adnexal fluid collection, PID, etc.); history of dysphagia, S/P EGD and dilatation; history of smoking (quit )  -2023: EGD:  Mild gastric erythema with scattered erosions: Pathology:  Small bowel biopsy:  Normal small bowel mucosa; stomach biopsy:  Mild superficial chronic gastritis, negative for H pylori)  -2023: Colonoscopy:  Normal:  Pathology:  Colon, random sites, biopsy:  Normal colonic mucosa  -08/10/2020: MRI abdomen with and without contrast (comparison:  CT 2023) (adrenal gland protocol):  Small left adrenal nodule stable, most compatible with adenoma (1 cm)  Procedure/surgical history:  Endometrial ablation; ; cholecystectomy; dissection of neck, left 05/10/2024; thyroidectomy 05/10/2024; tubal ligation;  tonsillectomy  Social history:  In her relationship.  Lives in [place], [place].  Has 3 children.  Does not work.  Has not work in 1 year.  Before that, used to clean houses.  Smoked a pack of cigarettes daily for 15 years; quit smoking 1 year ago.  Social alcohol. History of opioid abuse (on 06/18/2024, she tells me that she used opioids for 10-15 years; apparently, prescription opioids; on buprenorphine with a psychiatrist for last 2 years)  Family history:  Positive for von Willebrand disease in her son.  Health maintenance:  -11/16/2022:  Bilateral screening mammogram:  BI-RADS 2-benign  -according to her, colonoscopy performed as outpatient by Nawaf Le, showed precancerous colon polyp.      Records reviewed:  05/21/2024:  [facility] ENT office note:  Fred Berman is a 43 y.o. female referred for a tongue lesion.   She reports that she first noticed it about a year ago and says that it has slowly been enlarging since then. It has never bled. She doesn't think it hurts but does bite it occasionally which makes it sore. It's never been biopsied. She says that her dentist told her that he was not going to do any more work on her teeth until she got it checked out.   She also feels like she has developed allergies over the past few months.  She endorses frequent tearing and redness of her eyes in feels like her nose is running frequently.  She also endorses nasal congestion.  She is been using Allegra and does not think it is helped any.  She has not tried Flonase.  She does use Afrin she thinks maybe once a month.  She was diagnosed with a sinus infection over the summer and got a few days of antibiotics.  She denies facial pain or pressure but does note that when she wakes up in the morning she feels like the outside of her nose is swollen over the bridge of her nose.    She also endorses facial numbness bilaterally in all 3 distributions which has been going on for some time.  She reports that her  PCP is work this up with an MRI scan.    She is also been dealing with intermittent dysphagia.  She feels like things are not going down, both solids and liquids.  This does not happen with every meal but comes and goes.    She follows with Gastroenterology and has had EGDs and dilations in the past.  She reports that she has another 1 scheduled in November.    As far as her voice goes, she notes intermittent dysphonia where her voice gets roughed and then returns to normal.  It usually comes back to normal on its own.  She is on Protonix daily for gastritis that has been seen on prior scopes she reports.    As far as other medical problems she has diabetes, hypertension, gastroparesis, anxiety and depression.  For past surgical history she is had a cholecystectomy and .  She is had her tonsils removed but no other history of head and neck surgery.  She is a former smoker and quit 1 year ago.  She does not drink and denies any other drug use.     2024:  Presents today in follow up.  Eager for total thyroidectomy given results of FNA.  Has multiple concerns for surgical standpoint.  She would like to stay at least 1 night due to family history of von Willebrand's disease.  She also takes Suboxone and would like to talk to the anesthesia team regarding use of opioids.  She will stop taking this she does prior to surgery and is only allowed 1 week of narcotics postop.  She states that she sees endocrinology for diabetes and thyroid dysfunction.  She is on at least 200 mcg of Synthroid already and her sugars are often uncontrolled.   2024:  Presents today for 1st postop appointment after a left hemithyroidectomy in recurrent nerve sacrificed on May 10th.  Reports healing well with no pain. Hasn't taken any narcotics. Anxious about pathology results. Still having issues with oral mucositis.   Postop FFL:  Left TVC paralysis (preop FFL documented vocal cord motion)      Clinical events/investigations  reviewed:  -08/04/2023:  CT neck and chest with contrast (evaluate thyroid mass) right lobe thyroid gland markedly atrophic; left lobe of the thyroid demonstrates a heterogeneous appearance with heterogeneous contrast enhancement with no obvious, dominant, or worrisome mass appreciated  -02/08/2024:  Tongue, biopsy: Oral fibroma  -02/14/2024: Ultrasound thyroid (nontoxic single thyroid nodule) (comparison: Thyroid ultrasound 06/12/2023, CT neck 08/04/2023):  Large lobulated hypoechoic mass lesion at the level of the left lobe of the thyroid, new from prior exam (maybe related to thyroid mass or conglomerate lymphadenopathy)  -03/08/2024: CT soft tissues of the neck with contrast (localized swelling, mass, lumps) (comparison:  Ultrasound 02/14/2024; CT neck and chest): Increasing hypodense soft tissue in the left thyroid bed compared to 08/04/2023 (2.1 x 2.7 x 4.2 cm); minimal mass effect on the trachea; no definite cervical lymphadenopathy  -03/28/2024:  FNA left thyroid nodule:  Atypical cells of undetermined significance (numerous lymphocytes with a significant large lymphocytes population, admixed with follicular oncocytic cells and atypical follicle cell clusters, etc., suspicious for Hashimoto's thyroiditis, etc.) (Thyroseq V3 GC positive; probability of cancer or NIFTP intermediate-high, 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 Thyroid, left hemithyroid, excision:  Diffuse large B-cell lymphoma, germinal center B-cell phenotype; BCL 2 positive/MYC negative by IHC (not a double expressor); negative for MYC gene rearrangement by interface FISH (not double hit lymphoma) (morphologically, the thyroid is extensive involved by a diffuse proliferation of large atypical lymphoid cells with irregular nuclear contours, vesicular chromatin,  and ample cytoplasm)  2. Paratracheal tissue, biopsy:  Reactive lymphoid tissue   3. Left central neck dissection:  13 benign lymph nodes; no malignancy      Labs reviewed:  -05/11/2024: CBC normal  -05/11/2024:  CMP normal except glucose 229 mg/dL    03/28/2024:  Thyroglobulin antibody:  150 IU /ml, elevated (reference Range:  < 1.8)  03/28/2024:  Thyroglobulin, tumor Marker:  < 0.1 (reference Range:  Athyroid < 0.1; intact thyroid </= 33)    -06/16/2023: TTE:  LVEF 55%    06/18/2024:  Pleasant lady who presents for initial medical oncology consultation.  In no acute discomfort.  Feels poorly.  Main complaint is in in the back and hips for last 1 year.  History of opioid abuse.  On buprenorphine with a psychiatrist for last years.  Drenching sweats all the time.  Has to change clothes.  Lost 50 lb unintentionally in 2023, apparently due to gastroparesis side effects of Ozempic; regained her weight back after Ozempic was discontinued.    ECOG 1.  No recurrent fevers.  Fatigue.  Generalized weakness.  Night sweats and hot flashes.  Sweats all the time for 1 year.  Bones hurt all the time.  Occasional chest pains and leg swelling as well.  Exertional dyspnea.  Some constipation.  Some nausea.  Great appetite.    Interval History:  [No matching plan found]   [No matching plan found]     07/09/2024:   -06/18/2024: CBC normal; hemoglobin 13.6  -06/18/2024: CMP: CO2 30, elevated; glucose 246; AST 47; ALT 60  -06/18/2024: , normal  -06/18/2024:  Beta 2 microglobulin 1.80 (normal)  -06/18/2024: Hep B core antibody total, hep B surface antigen, hep C antibody, HIV: Nonreactive  -06/18/2024: FDG PET-CT:  Comparison 03/08/2024, 07/02/2023:  1. Interval left hemithyroidectomy with some mild FDG activity along the surgical bed likely reactive.  2. No convincing PET evidence of metastatic disease.  -06/26/2024: Bilateral diagnostic mammogram, bilateral limited ultrasound (comparison: 11/16/2022) (indication: Bilateral  lateral breast pain, right nipple pain, right nipple white/yellow discharge):  Right breast BI-RADS 2; left breast BI-RADS 1  -06/26/2024:  TTE: LVEF 55-60%  -07/08/2024:  Right IJ MediPort placed  Presents for a follow-up visit.  Bone marrow biopsy has not been done as yet.  Apparently, T scheduled for 23rd of this month.  We will try to get the biopsy done sooner at [facility], [place].  Overall, stable.  Some weakness, fatigue, headaches, leg swelling.  Appetite is okay.  No new lumps or lymphadenopathy.  No throat pain.  Since last night, has noticed expanding erythema around MediPort insertion site, with some tenderness.  No fevers but occasional chilly sensations.  We will send her to surgery clinic down stairs for evaluation.  On examination, no fluctuance.  She is normal and ambulatory, without any signs of sepsis.    Medications:  Current Outpatient Medications on File Prior to Visit   Medication Sig Dispense Refill    buprenorphine HCL (SUBUTEX) 8 mg Subl Place 8 mg under the tongue 3 (three) times daily.      calcium carbonate/vitamin D3 (CALCIUM 600 + D,3, ORAL) Take 1 tablet by mouth 2 (two) times a day.      COMPOUND HORMONE REPLACEMENT Take by mouth once daily.      hydrocortisone (ANUSOL-HC) 2.5 % rectal cream Place 1 application  rectally 2 (two) times daily.      insulin (LANTUS SOLOSTAR U-100 INSULIN) glargine 100 units/mL SubQ pen Inject 15 Units into the skin 2 (two) times daily. (Patient taking differently: Inject 90 Units into the skin 2 (two) times a day.) 27 mL 3    insulin lispro 100 unit/mL pen Inject 35 Units into the skin 3 (three) times daily. With meals      levothyroxine (SYNTHROID) 200 MCG tablet Take 1 tablet (200 mcg total) by mouth before breakfast. 30 tablet 11    LORazepam (ATIVAN) 0.5 MG tablet Take 1 mg by mouth every 6 (six) hours as needed for Anxiety.      losartan (COZAAR) 50 MG tablet Take 1 tablet (50 mg total) by mouth once daily. 90 tablet 3    ondansetron  "(ZOFRAN-ODT) 4 MG TbDL Take 1 tablet (4 mg total) by mouth every 8 (eight) hours as needed (NAUSEA). 30 tablet 1    pen needle, diabetic (BD ULTRA-FINE ROSALEE PEN NEEDLE) 32 gauge x 5/32" Ndle 1 each by Misc.(Non-Drug; Combo Route) route 2 (two) times a day. 100 each 9    polyethylene glycol (GLYCOLAX) 17 gram PwPk Take 17 g by mouth 2 (two) times daily. 30 each 1    acetaminophen (TYLENOL) 500 MG tablet Take 2 tablets (1,000 mg total) by mouth every 6 (six) hours. (Patient not taking: Reported on 7/9/2024) 60 tablet 0    lubiprostone (AMITIZA) 24 MCG Cap Take 24 mcg by mouth daily with breakfast. (Patient not taking: Reported on 5/21/2024)      metFORMIN (GLUCOPHAGE) 500 MG tablet Take 1 tablet (500 mg total) by mouth 2 (two) times daily with meals. (Patient not taking: Reported on 5/21/2024) 180 tablet 1    MOTEGRITY 2 mg Tab Take 1 tablet by mouth. (Patient not taking: Reported on 5/21/2024)      naloxegoL (MOVANTIK) 25 mg tablet Take 25 mg by mouth once daily. (Patient not taking: Reported on 1/23/2024) 30 tablet 2     Current Facility-Administered Medications on File Prior to Visit   Medication Dose Route Frequency Provider Last Rate Last Admin    dextrose 10% bolus 125 mL 125 mL  12.5 g Intravenous PRN Chuckie Saenz MD        dextrose 10% bolus 250 mL 250 mL  25 g Intravenous PRN Chuckie Saenz MD        glucagon (human recombinant) injection 1 mg  1 mg Intramuscular PRN Chuckie Saenz MD           Review of Systems:   All systems reviewed and found to be negative except for the symptoms detailed above    Physical Examination:   VITAL SIGNS:   Vitals:    07/09/24 1436   BP: 136/83   Pulse: 73   Resp: 19   Temp: 98.1 °F (36.7 °C)       GENERAL:  In no apparent distress.    HEAD:  No signs of head trauma.  EYES:  Pupils are equal.  Extraocular motions intact.    EARS:  Hearing grossly intact.  MOUTH:  Oropharynx is normal.   NECK:  No adenopathy, no JVD.     CHEST:  Chest with clear breath sounds " bilaterally.  No wheezes, rales, rhonchi.    CARDIAC:  Regular rate and rhythm.  S1 and S2, without murmurs, gallops, rubs.  VASCULAR:  No Edema.  Peripheral pulses normal and equal in all extremities.  ABDOMEN:  Soft, without detectable tenderness.  No sign of distention.  No   rebound or guarding, and no masses palpated.   Bowel Sounds normal.  MUSCULOSKELETAL:  Good range of motion of all major joints. Extremities without clubbing, cyanosis or edema.    NEUROLOGIC EXAM:  Alert and oriented x 3.  No focal sensory or strength deficits.   Speech normal.  Follows commands.  PSYCHIATRIC:  Mood normal.  -07/09/2024:  Erythema around MediPort site; mild tenderness; no fluctuance; no tunnelitis    Results for orders placed or performed during the hospital encounter of 05/10/24   CBC Auto Differential    Narrative    The following orders were created for panel order CBC Auto Differential.  Procedure                               Abnormality         Status                     ---------                               -----------         ------                     CBC with Differential[[id number]]                           Final result                 Please view results for these tests on the individual orders.   CBC with Differential   Result Value Ref Range    WBC 8.70 4.50 - 11.50 x10(3)/mcL    RBC 4.72 4.20 - 5.40 x10(6)/mcL    Hgb 13.4 12.0 - 16.0 g/dL    Hct 40.5 37.0 - 47.0 %    MCV 85.8 80.0 - 94.0 fL    MCH 28.4 27.0 - 31.0 pg    MCHC 33.1 33.0 - 36.0 g/dL    RDW 12.6 11.5 - 17.0 %    Platelet 171 130 - 400 x10(3)/mcL    MPV 9.3 7.4 - 10.4 fL    Neut % 71.4 %    Lymph % 21.0 %    Mono % 7.0 %    Eos % 0.3 %    Basophil % 0.1 %    Lymph # 1.83 0.6 - 4.6 x10(3)/mcL    Neut # 6.20 2.1 - 9.2 x10(3)/mcL    Mono # 0.61 0.1 - 1.3 x10(3)/mcL    Eos # 0.03 0 - 0.9 x10(3)/mcL    Baso # 0.01 <=0.2 x10(3)/mcL    IG# 0.02 0 - 0.04 x10(3)/mcL    IG% 0.2 %    NRBC% 0.0 %     Results for orders placed or performed during the  hospital encounter of 05/10/24   Comprehensive Metabolic Panel   Result Value Ref Range    Sodium 140 136 - 145 mmol/L    Potassium 4.3 3.5 - 5.1 mmol/L    Chloride 102 98 - 107 mmol/L    CO2 28 22 - 29 mmol/L    Glucose 229 (H) 74 - 100 mg/dL    Blood Urea Nitrogen 9.1 7.0 - 18.7 mg/dL    Creatinine 0.75 0.55 - 1.02 mg/dL    Calcium 9.7 8.4 - 10.2 mg/dL    Protein Total 6.8 6.4 - 8.3 gm/dL    Albumin 3.8 3.5 - 5.0 g/dL    Globulin 3.0 2.4 - 3.5 gm/dL    Albumin/Globulin Ratio 1.3 1.1 - 2.0 ratio    Bilirubin Total 1.1 <=1.5 mg/dL    ALP 56 40 - 150 unit/L    ALT 40 0 - 55 unit/L    AST 31 5 - 34 unit/L    eGFR >60 mL/min/1.73/m2       Assessment:  Problem List Items Addressed This Visit          Neuro    Chronic pain syndrome - Primary       Psychiatric    Generalized anxiety disorder    History of opioid abuse       ID    Infected venous access port       Hematology    Family history of von Willebrand disease       Oncology    Diffuse large B-cell lymphoma of solid organ excluding spleen    Overview     Diffuse large B-cell lymphoma of thyroid gland.         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   -03/28/2024:  Thyroglobulin antibody 150 IU/mL, elevated (reference Range:  < 1.8)  -03/28/2024: Thyroglobulin, tumor marker: < 0.1 (reference Range:  Athyroid < 0.1; intact thyroid </= 33)  -left hemithyroidectomy with left central neck dissection level 6  (05/10/2024):   Diffuse large B-cell lymphoma left oscar thyroid, germinal center B-cell type, not a double expressor, not double hit lymphoma; morphologically, thyroid extensively involved; paratracheal tissue biopsy negative; left central neck dissection 13 benign lymph nodes,  -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 during left hemithyroidectomy 05/10/2024, noted on FFL exam by ENT  -06/18/2024: ECOG 1; chronic fatigue; sweats all the time; no consistent weight loss; appetite is great  -06/18/2024: CBC, CMP essentially unremarkable   -06/18/2024: LDH normal, beta 2 microglobulin normal  -06/18/2024: Hep B core antibody total, hep B surface antigen, hep C antibody, HIV: Nonreactive  -FDG PET-CT 06/18/2024:  No other sites of disease  -TTE 06/26/2024: LVEF 55-60%  -right IJ MediPort placed 07/08/2024      Family history of von Willebrand disease:  -04/25/2024:  Von Willebrand factor activity 75%, normal      Oral fibroma of tongue:   -tongue lesion: 1st noted around 02/2023; slowly enlarging since; no bleeding; no pain  -tongue biopsy 02/08/2024: Oral fibroma      Comorbid medical conditions:  Anxiety, depression, NIDDM, gastroparesis, hypertension, history of PID  Hypothyroidism  History of opioid abuse, maintained on Suboxone  History of dysphagia, S/P endoscopic dilatation         Plan:     -diffuse large B-cell lymphoma of left thyroid lobe, S/P left thyroidectomy 05/10/2024  -06/18/2024: CBC, CMP essentially unremarkable   -06/18/2024: LDH normal, beta 2 microglobulin normal  -06/18/2024: Hep B core antibody total, hep B surface antigen, hep C antibody, HIV: Nonreactive  -FDG PET-CT 06/18/2024:  No other sites of disease  -06/26/2024: Bilateral diagnostic mammogram, bilateral limited ultrasound (comparison: 11/16/2022) (indication: Bilateral lateral breast pain, right nipple pain, right nipple white/yellow discharge):  Right breast BI-RADS 2; left breast BI-RADS  1  -TTE 06/26/2024: LVEF 55-60%  -right IJ MediPort placed 07/08/2024    -pending:  Bone marrow aspiration and core biopsy; will refer to [facility], [place], for biopsy ASA so that we can start her on chemotherapy   -07/09/2024:  Infected MediPort; erythema around MediPort insertion site; mild tenderness, no fluctuance; no apparent signs of infection of the tunneled portion of catheter; will refer to surgery clinic now for evaluation    -proceed with chemo teaching with nursing staff ASAP; I am going to order chemotherapy orders in the computer now ([facility])    If bone marrow is negative for lymphoma, then, stage I disease  (For management, see below)    Family history of von Willebrand disease   -04/25/2024: Von Willebrand factor activity 75%, normal    Follow-up in 2 weeks, with results of requested testing.    Above discussed at length with the patient.  All questions answered.    Discussed labs, scans, and pathology report, and gave her copies of relevant records.    Plan of management discussed.  She understands and agrees with this plan.  =================================    Discussion:     If bone marrow examination is negative, then, for stage I disease, treatment plan will be as follows:  -06/18/2024: Initial consultation:  ECOG 1; sweats all the time  -IPI:  Low (0)  -age adjusted IP!:  Low (0)  -stage modified IPI (smIPI) low (0)  -NCCN IPI:  Low (1, by virtue of age 43)  -prognostic model to assess the risk of CNS disease (CNS IPI):  Low risk (0)    -discuss fertility preservation (IVF; ovarian tissue or oocyte cryopreservation)    Assuming stage I disease:  -nonbulky  -smIPI 0  -recommendation:  R-CHOP x3 cycles;   -followed by interim staging with PET-CT:   1. If complete response (PET negative, i.e., 5-PS 1-3), then:  R-CHOP x1 cycle (total of 4 cycles); or ISRT, followed by surveillance  2. If partial response (PET positive, i.e., 5-PS for), then:  Repeat biopsy, etc.  3. If progressive disease  (PET positive, i.e.,5-PS 5), then:  Repeat biopsy, etc.    TLS prophylaxis:  Allopurinol beginning 2-3 days prior to chemo immunotherapy and continued for 10-14 days  (Allopurinol: 100 mg per m2 every 8 hours, maximum 800 mg per day)    Follow-up:  No follow-ups on file.        Answers submitted by the patient for this visit:  Review of Systems Questionnaire (Submitted on 7/2/2024)  appetite change : No  unexpected weight change: No  mouth sores: No  visual disturbance: No  cough: No  shortness of breath: Yes  chest pain: No  abdominal pain: Yes  diarrhea: No  frequency: No  back pain: Yes  rash: No  headaches: No  adenopathy: No  nervous/ anxious: No

## 2024-07-09 NOTE — Clinical Note
-I am going to place orders for R-CHOP -arrange for chemo teaching nursing staff ASAP  -bone marrow [facility] before we can start her on chemotherapy -erythema and some tenderness around MediPort site since last night; refer to surgery clinic now for them to take a look at it

## 2024-07-09 NOTE — PROGRESS NOTES
Pt seen by Dr. Santos; Pt instructed to return to clinic as needed; Discharge paperwork given w/pt verbalizing understanding

## 2024-07-09 NOTE — PROGRESS NOTES
"[facility] Clinic Note    CC:   Fred Berman is a 43 y.o. female presenting with possible infection of mediport placed yesterday. Sent from heme/once clinic    HPI:   Patient underwent R IJ mediport placement yesterday. Sent from heme/onc for possible surgical site infection. Patient denies fever, chills, nausea or vomiting at home. Reports minimal pain at incision sites. Reports she did feel weak since waking from anesthesia but not other complaints. Tolerating a regular diet.    PMH:   Past Medical History:   Diagnosis Date    Adrenal mass     Anxiety     Arthritis     Bradycardia     Depression     Diabetes mellitus, type 2     Gastroparesis     General anesthetics causing adverse effect in therapeutic use     "hard time waking up"    GERD (gastroesophageal reflux disease)     Hip pain     Hypertension     Menstrual cramps     Ovarian cyst     Palpitations     Pyosalpingitis     Thyroid disease        PSH:   Past Surgical History:   Procedure Laterality Date    ABLATION OF VAGINAL TISSUE USING LASER       SECTION      CHOLECYSTECTOMY      DISSECTION OF NECK Left 5/10/2024    Procedure: DISSECTION, NECK;  Surgeon: Cali Trujillo MD;  Location: [facility] OR;  Service: ENT;  Laterality: Left;    INSERTION OF TUNNELED CENTRAL VENOUS CATHETER (CVC) WITH SUBCUTANEOUS PORT Right 2024    Procedure: [id number];  Surgeon: Lio Storey Jr., MD;  Location: [facility] OR;  Service: General;  Laterality: Right;    THYROIDECTOMY Left 5/10/2024    Procedure: THYROIDECTOMY;  Surgeon: Cali Trujillo MD;  Location: [facility] OR;  Service: ENT;  Laterality: Left;    TUBAL LIGATION         FamHx:   Family History   Problem Relation Name Age of Onset    Alcohol abuse Mother Pat         Pat    Arthritis Mother Pat     Breast cancer Mother Pat     COPD Mother Pat     Depression Mother Pat     Diabetes Mother Pat     Drug abuse Mother Pat     Hypertension Mother Pat     " Miscarriages / Stillbirths Mother Pat     Depression Father Juventino     Drug abuse Father Juventino     Hypertension Father Juventino     Prostate cancer Father Juventino     Thyroid cancer Father Juventino     Drug abuse Sister Elana     Heart disease Sister Elana     Miscarriages / Stillbirths Sister Elana     Drug abuse Brother Juventino     Early death Brother Izaiah     Asthma Son Mojgan     Hypertension Son Mojgan     Miscarriages / Stillbirths Maternal Grandmother Arlean     Cancer Maternal Grandfather Janay     Hearing loss Paternal Grandfather Lyod        SocHx:  Social History     Socioeconomic History    Marital status: Single   Tobacco Use    Smoking status: Former     Types: Cigarettes    Smokeless tobacco: Never   Substance and Sexual Activity    Alcohol use: Never     Alcohol/week: 6.0 standard drinks of alcohol     Types: 6 Shots of liquor per week    Drug use: Not Currently     Types: Methamphetamines     Comment: Various Opoids    Sexual activity: Yes     Partners: Male     Social Determinants of Health     Financial Resource Strain: Medium Risk (6/27/2024)    Overall Financial Resource Strain (CARDIA)     Difficulty of Paying Living Expenses: Somewhat hard   Food Insecurity: No Food Insecurity (6/27/2024)    Hunger Vital Sign     Worried About Running Out of Food in the Last Year: Never true     Ran Out of Food in the Last Year: Never true   Transportation Needs: No Transportation Needs (5/12/2024)    TRANSPORTATION NEEDS     Transportation : No   Physical Activity: Insufficiently Active (6/27/2024)    Exercise Vital Sign     Days of Exercise per Week: 2 days     Minutes of Exercise per Session: 30 min   Stress: No Stress Concern Present (6/27/2024)    [employer] [place] [employer] - Occupational Stress Questionnaire     Feeling of Stress : Only a little   Recent Concern: Stress - Stress Concern Present (5/12/2024)    [place] [employer] - Occupational Stress Questionnaire  "    Feeling of Stress : To some extent   Housing Stability: Low Risk  (6/27/2024)    Housing Stability Vital Sign     Unable to Pay for Housing in the Last Year: No     Homeless in the Last Year: No       Allergies:   Review of patient's allergies indicates:  No Known Allergies    Medications:  Current Outpatient Medications on File Prior to Visit   Medication Sig Dispense Refill    buprenorphine HCL (SUBUTEX) 8 mg Subl Place 8 mg under the tongue 3 (three) times daily.      calcium carbonate/vitamin D3 (CALCIUM 600 + D,3, ORAL) Take 1 tablet by mouth 2 (two) times a day.      COMPOUND HORMONE REPLACEMENT Take by mouth once daily.      hydrocortisone (ANUSOL-HC) 2.5 % rectal cream Place 1 application  rectally 2 (two) times daily.      insulin (LANTUS SOLOSTAR U-100 INSULIN) glargine 100 units/mL SubQ pen Inject 15 Units into the skin 2 (two) times daily. (Patient taking differently: Inject 90 Units into the skin 2 (two) times a day.) 27 mL 3    insulin lispro 100 unit/mL pen Inject 35 Units into the skin 3 (three) times daily. With meals      levothyroxine (SYNTHROID) 200 MCG tablet Take 1 tablet (200 mcg total) by mouth before breakfast. 30 tablet 11    LORazepam (ATIVAN) 0.5 MG tablet Take 1 mg by mouth every 6 (six) hours as needed for Anxiety.      losartan (COZAAR) 50 MG tablet Take 1 tablet (50 mg total) by mouth once daily. 90 tablet 3    ondansetron (ZOFRAN-ODT) 4 MG TbDL Take 1 tablet (4 mg total) by mouth every 8 (eight) hours as needed (NAUSEA). 30 tablet 1    pen needle, diabetic (BD ULTRA-FINE ROSALEE PEN NEEDLE) 32 gauge x 5/32" Ndle 1 each by Misc.(Non-Drug; Combo Route) route 2 (two) times a day. 100 each 9    polyethylene glycol (GLYCOLAX) 17 gram PwPk Take 17 g by mouth 2 (two) times daily. 30 each 1    acetaminophen (TYLENOL) 500 MG tablet Take 2 tablets (1,000 mg total) by mouth every 6 (six) hours. (Patient not taking: Reported on 7/9/2024) 60 tablet 0    lubiprostone (AMITIZA) 24 " MCG Cap Take 24 mcg by mouth daily with breakfast. (Patient not taking: Reported on 5/21/2024)      metFORMIN (GLUCOPHAGE) 500 MG tablet Take 1 tablet (500 mg total) by mouth 2 (two) times daily with meals. (Patient not taking: Reported on 5/21/2024) 180 tablet 1    MOTEGRITY 2 mg Tab Take 1 tablet by mouth. (Patient not taking: Reported on 5/21/2024)      naloxegoL (MOVANTIK) 25 mg tablet Take 25 mg by mouth once daily. (Patient not taking: Reported on 1/23/2024) 30 tablet 2     Current Facility-Administered Medications on File Prior to Visit   Medication Dose Route Frequency Provider Last Rate Last Admin    dextrose 10% bolus 125 mL 125 mL  12.5 g Intravenous PRN Chuckie Saenz MD        dextrose 10% bolus 250 mL 250 mL  25 g Intravenous PRN Chuckie Saenz MD        glucagon (human recombinant) injection 1 mg  1 mg Intramuscular PRN Chuckie Saenz MD             Objective:  Physical Exam:  Physical Exam  Constitutional:       General: She is not in acute distress.     Appearance: Normal appearance.   HENT:      Head: Normocephalic and atraumatic.      Mouth/Throat:      Mouth: Mucous membranes are moist.   Neck:      Comments: Incisions c/d/I, no discharge. Mild erythema surrounding chest incision. No swelling, induration, or fluctuance   Cardiovascular:      Rate and Rhythm: Normal rate.   Pulmonary:      Effort: Pulmonary effort is normal. No respiratory distress.   Abdominal:      General: Abdomen is flat.      Palpations: Abdomen is soft.   Musculoskeletal:         General: Normal range of motion.   Skin:     General: Skin is warm and dry.   Neurological:      General: No focal deficit present.      Mental Status: She is alert and oriented to person, place, and time. Mental status is at baseline.   Psychiatric:         Mood and Affect: Mood normal.         Behavior: Behavior normal.         Anesthesia/Surgery risks, benefits and alternative options discussed and understood by patient/family.  exam        Imaging:  CXR: 7/9/23  FINDINGS:  Right chest wall port catheter has its tip over the superior vena cava.  The heart is normal in size.  There is no focal airspace consolidation.  There is no pleural effusion or visible pneumothorax.     Impression:     Right chest wall port catheter with tip over the superior vena cava.    A/P:   43 y.o. 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 Mild erythema, non TTP, no fevers, no purulence. Incisions c/d/i    -Mild erythema normal after surgery.   -Patient given return precautions of fevers, chills, purulent discharge from incisions, worsening erythema or swelling    Kristine Santos MD  [facility], PGY-3

## 2024-07-10 ENCOUNTER — TELEPHONE (OUTPATIENT)
Dept: HEMATOLOGY/ONCOLOGY | Facility: CLINIC | Age: 44
End: 2024-07-10
Payer: MEDICAID

## 2024-07-10 ENCOUNTER — DOCUMENTATION ONLY (OUTPATIENT)
Dept: HEMATOLOGY/ONCOLOGY | Facility: CLINIC | Age: 44
End: 2024-07-10
Payer: MEDICAID

## 2024-07-10 NOTE — PROGRESS NOTES
Referred patient to have bone marrow biopsy in [place], [place]. Spoke With Javy. They are not scheduling bone marrow biopsies until August. Will keep patient scheduled here. Dr. Collins notified.

## 2024-07-10 NOTE — PROGRESS NOTES
Per team message on 7/9 Gardenia Cantrell: Spoke with Dr. Collins nurse and she stated pt will have BMB  at [facility].

## 2024-07-10 NOTE — TELEPHONE ENCOUNTER
Spoke with Javy, RN Navigator with [facility]. She stated patient is scheduled to see Dr. Brannon and a BMB would be scheduled thereafter. Javy stated that there's not any BMB available appts until August. Informed MAAHD Wade of available BMB appts and she stated that patient has a sooner appt scheduled with [facility] and she can keep that appt instead of going to [facility]. Dr. Collins notified per MAHAD Wade.

## 2024-07-17 ENCOUNTER — PATIENT MESSAGE (OUTPATIENT)
Dept: ADMINISTRATIVE | Facility: OTHER | Age: 44
End: 2024-07-17
Payer: MEDICAID

## 2024-07-22 ENCOUNTER — TELEPHONE (OUTPATIENT)
Dept: HEMATOLOGY/ONCOLOGY | Facility: CLINIC | Age: 44
End: 2024-07-22
Payer: MEDICAID

## 2024-07-23 ENCOUNTER — HOSPITAL ENCOUNTER (OUTPATIENT)
Facility: HOSPITAL | Age: 44
Discharge: HOME | End: 2024-07-23
Attending: PATHOLOGY | Admitting: PATHOLOGY
Payer: MEDICAID

## 2024-07-23 ENCOUNTER — OFFICE VISIT (OUTPATIENT)
Dept: HEMATOLOGY/ONCOLOGY | Facility: CLINIC | Age: 44
End: 2024-07-23
Attending: PATHOLOGY
Payer: MEDICAID

## 2024-07-23 VITALS
HEART RATE: 74 BPM | SYSTOLIC BLOOD PRESSURE: 128 MMHG | OXYGEN SATURATION: 94 % | DIASTOLIC BLOOD PRESSURE: 82 MMHG | RESPIRATION RATE: 18 BRPM | TEMPERATURE: 98 F | BODY MASS INDEX: 41.62 KG/M2 | HEIGHT: 67 IN | WEIGHT: 265.19 LBS

## 2024-07-23 DIAGNOSIS — C83.39 DIFFUSE LARGE B-CELL LYMPHOMA OF SOLID ORGAN EXCLUDING SPLEEN: Primary | ICD-10-CM

## 2024-07-23 DIAGNOSIS — C83.39 DIFFUSE LARGE B-CELL LYMPHOMA OF SOLID ORGAN EXCLUDING SPLEEN: ICD-10-CM

## 2024-07-23 DIAGNOSIS — C85.99 MALIGNANT LYMPHOMA OF THYROID GLAND: ICD-10-CM

## 2024-07-23 LAB
B-HCG UR QL: NEGATIVE
BASOPHILS # BLD AUTO: 0.02 X10(3)/MCL
BASOPHILS NFR BLD AUTO: 0.3 %
CTP QC/QA: YES
EOSINOPHIL # BLD AUTO: 0.16 X10(3)/MCL (ref 0–0.9)
EOSINOPHIL NFR BLD AUTO: 2.3 %
ERYTHROCYTE [DISTWIDTH] IN BLOOD BY AUTOMATED COUNT: 13 % (ref 11.5–17)
HCT VFR BLD AUTO: 41.7 % (ref 37–47)
HGB BLD-MCNC: 13.9 G/DL (ref 12–16)
IMM GRANULOCYTES # BLD AUTO: 0.04 X10(3)/MCL (ref 0–0.04)
IMM GRANULOCYTES NFR BLD AUTO: 0.6 %
INR PPP: 0.9
LYMPHOCYTES # BLD AUTO: 2.31 X10(3)/MCL (ref 0.6–4.6)
LYMPHOCYTES NFR BLD AUTO: 32.8 %
MCH RBC QN AUTO: 29.1 PG (ref 27–31)
MCHC RBC AUTO-ENTMCNC: 33.3 G/DL (ref 33–36)
MCV RBC AUTO: 87.4 FL (ref 80–94)
MONOCYTES # BLD AUTO: 0.5 X10(3)/MCL (ref 0.1–1.3)
MONOCYTES NFR BLD AUTO: 7.1 %
NEUTROPHILS # BLD AUTO: 4.02 X10(3)/MCL (ref 2.1–9.2)
NEUTROPHILS NFR BLD AUTO: 56.9 %
NRBC BLD AUTO-RTO: 0 %
PLATELET # BLD AUTO: 187 X10(3)/MCL (ref 130–400)
PMV BLD AUTO: 9.9 FL (ref 7.4–10.4)
PROTHROMBIN TIME: 12.1 SECONDS (ref 11.4–14)
RBC # BLD AUTO: 4.77 X10(6)/MCL (ref 4.2–5.4)
WBC # BLD AUTO: 7.05 X10(3)/MCL (ref 4.5–11.5)

## 2024-07-23 PROCEDURE — 38222 DX BONE MARROW BX & ASPIR: CPT | Performed by: PATHOLOGY

## 2024-07-23 PROCEDURE — 4010F ACE/ARB THERAPY RXD/TAKEN: CPT | Mod: CPTII,,,

## 2024-07-23 PROCEDURE — 63600175 PHARM REV CODE 636 W HCPCS: Performed by: PATHOLOGY

## 2024-07-23 PROCEDURE — 99215 OFFICE O/P EST HI 40 MIN: CPT | Mod: S$PBB,,,

## 2024-07-23 PROCEDURE — 3046F HEMOGLOBIN A1C LEVEL >9.0%: CPT | Mod: CPTII,,,

## 2024-07-23 PROCEDURE — 81025 URINE PREGNANCY TEST: CPT | Performed by: PATHOLOGY

## 2024-07-23 PROCEDURE — 85610 PROTHROMBIN TIME: CPT | Performed by: PATHOLOGY

## 2024-07-23 PROCEDURE — 25000003 PHARM REV CODE 250: Performed by: PATHOLOGY

## 2024-07-23 PROCEDURE — 99211 OFF/OP EST MAY X REQ PHY/QHP: CPT | Mod: PBBFAC

## 2024-07-23 PROCEDURE — 85025 COMPLETE CBC W/AUTO DIFF WBC: CPT | Performed by: PATHOLOGY

## 2024-07-23 RX ORDER — OLANZAPINE 5 MG/1
TABLET ORAL
Qty: 3 TABLET | Refills: 5 | Status: SHIPPED | OUTPATIENT
Start: 2024-07-23

## 2024-07-23 RX ORDER — MIDAZOLAM HYDROCHLORIDE 1 MG/ML
INJECTION INTRAMUSCULAR; INTRAVENOUS
Status: COMPLETED | OUTPATIENT
Start: 2024-07-23 | End: 2024-07-23

## 2024-07-23 RX ORDER — PREDNISONE 50 MG/1
100 TABLET ORAL DAILY
Qty: 8 TABLET | Refills: 5 | Status: SHIPPED | OUTPATIENT
Start: 2024-07-24

## 2024-07-23 RX ORDER — SODIUM CHLORIDE, SODIUM LACTATE, POTASSIUM CHLORIDE, CALCIUM CHLORIDE 600; 310; 30; 20 MG/100ML; MG/100ML; MG/100ML; MG/100ML
INJECTION, SOLUTION INTRAVENOUS
Status: COMPLETED | OUTPATIENT
Start: 2024-07-23 | End: 2024-07-23

## 2024-07-23 RX ORDER — PROCHLORPERAZINE MALEATE 5 MG
10 TABLET ORAL EVERY 6 HOURS PRN
Qty: 20 TABLET | Refills: 5 | Status: SHIPPED | OUTPATIENT
Start: 2024-07-23

## 2024-07-23 RX ORDER — LIDOCAINE HYDROCHLORIDE 20 MG/ML
INJECTION, SOLUTION EPIDURAL; INFILTRATION; INTRACAUDAL; PERINEURAL
Status: COMPLETED | OUTPATIENT
Start: 2024-07-23 | End: 2024-07-23

## 2024-07-23 NOTE — DISCHARGE SUMMARY
[facility]  Discharge Note  Short Stay    Procedure(s) (LRB):  Biopsy-bone marrow (N/A)  ASPIRATION, BONE MARROW (N/A)      OUTCOME: Patient tolerated treatment/procedure well without complication and is now ready for discharge.    DISPOSITION: Home or Self Care    FINAL DIAGNOSIS:  DLBL of thyroid    FOLLOWUP: In clinic    DISCHARGE INSTRUCTIONS:  Post bone marrow biopsy education given      Clinical Reference Documents Added to Patient Instructions         Document    BONE MARROW ASPIRATION OR BIOPSY (ENGLISH)            TIME SPENT ON DISCHARGE: 5 minutes

## 2024-07-23 NOTE — PROCEDURES
"Fred Berman is a 43 y.o. female patient.    Pulse: 71 (07/23/24 0850)  Resp: (!) 22 (07/23/24 0850)  BP: (!) 155/95 (07/23/24 0850)  SpO2: 100 % (07/23/24 0850)  Weight: 120.5 kg (265 lb 9.6 oz) (07/23/24 0815)  Height: 5' 8" (172.7 cm) (07/23/24 0815)       Bone marrow    Date/Time: 7/23/2024 9:17 AM    Performed by: Patrizia Bhatt MD  Authorized by: Patrizia Bhatt MD    Consent Done?: Yes (Written)   Immediately prior to procedure a time out was called to verify the correct patient, procedure, equipment, support staff and site/side marked as required.   Patient was prepped and draped in the usual sterile fashion.      Assistants?: Yes    I was present for the entire procedure.    Position: right lateral  Anesthesia: local infiltration  Local anesthetic: lidocaine 2% without epinephrine  Aspiration?: Yes   Biopsy?: Yes    Specimen source: posterior iliac crest  Number of Specimens: 3  Estimated blood loss (cc):1  Patient tolerated the procedure well with no immediate complications.  Post-operative instructions were provided for the patient.  Patient was discharged and will follow up if any complications occur.     7/23/2024    "

## 2024-07-23 NOTE — PLAN OF CARE
Patient resting in bed awake. Tolerating sips of diet coke. Call bell in reach and family at bedside.

## 2024-07-23 NOTE — PROGRESS NOTES
Pre-operative note  Consulted for bone marrow aspiration and biopsy by Dr. Collins.  Indication:  Diffuse Large Bcell Lymphoma of Thyroid  Patient chart reviewed.  Patient seen this am and no complaints.    No changes to ordering provider's previous assessment.  PE: Alert and oriented.  Vitals stable.  Pre-procedure labs reviewed.    Informed consent obtained from patient.    Alternative treatment options, risks, and complications discussed with patient.    Patient voices understanding and wishes to proceed with procedure.  Discussed discharge instructions and transportation with patient.     Plan:  Bone marrow biopsy procedure to be performed on 5W.

## 2024-07-23 NOTE — PROGRESS NOTES
Please call patient @ [phone] can leave a message regarding referral for back specialist. The back specialist referred to Dr Colby Arthur. He see's this doctor on 11/23/21. The back specialist states this appointment is to far out and maybe pcp can push the appointment out sooner.    THERAPY EDUCATION: RCHOP     Chief Complaint: Therapy Education     Diagnosis: Diffuse large B-cell lymphoma of thyroid    Current Treatment: RCHOP to start on 7/29/24    Interval History     7/23/24: Ms. Berman presents to the clinic accompanied by her significant other for therapy education. Patient reports no major complaints at today's visit. Denies fever, chills, SOB, N/V/D, constipation, recent infection, chest pain or unexplained bleeding or bruising.      Answers submitted by the patient for this visit:  Review of Systems Questionnaire (Submitted on 7/22/2024)  appetite change : No  unexpected weight change: Yes  mouth sores: Yes  visual disturbance: No  cough: No  shortness of breath: Yes  chest pain: No  abdominal pain: No  diarrhea: No  frequency: Yes  back pain: Yes  rash: No  headaches: No  adenopathy: No  nervous/ anxious: Yes    PLAN     -Therapy education completed on 7/23/24.  -Plans to start RCHOP Q3W on 7/29/24. Patient understood that she will receive premedications given 30 minutes prior to each treatment to help prevent nausea and allergic reaction. Patient understood that she will be responsible for taking Prednisone 2 tablets by mouth on Days 2-5 of each chemo treatment. Patient also aware that Rituxan can lower BP and advised to hold BP meds the mornings of Rituxan infusion.  -Antiemetic regimen schedule given and calendar made for guidance    Olanzapine- Take 1 tablet by mouth daily in the evening on Days 1-4 of each treatment    -Mediport intact.   -Compazine PRN prescribed for at home with instructions to be taken by mouth every 6 hours as needed for nausea.   -OTC Imodium AD recommended for diarrhea (4-5 BMs a day). Take 2 tablets after the first loose bowel movement, and 1 tablet after each loose bowel movement after the first dose has been taken. No more than 4 tablets should be taken in any 24-hour period. If not working, Lomotil can be prescribed as 2nd choice.    -Mouth sore  prevention with 1-quart warm water with 1 tsp of baking soda and salt and alcohol-free mouthwash.   -Emphasized adequate hand-hygiene and limited contact with people who are sick.   -Monitor and notify any bleeding in urine, stool, or sputum.  As well as unusual bleeding or bruising and stomach pain.   - Emphasized hydration with 4 16 oz bottles of water a day.    -Call clinic if fever >100.4, shakes or chills, shortness of breath, chest pain, uncontrolled vomiting or diarrhea, pain and tingling in the chest or arm, or just not feeling well.    -Plans to RTC on 8/5/24 for same day labs and toxicity check with NP.    DISCUSSION:    1.  A total of 60 minutes were spent in counseling today, in which 100% were face-to-face.  At today's therapy teaching session, we discussed the patient's cancer diagnosis as well as planned therapy regimen, protocol, side effects and toxicities.  A handout of each therapeutic agent in the regimen was provided and reviewed in detail.    2.  The following side effects were discussed but not limited to:    Rituximab Side Effects:  Allergic Reactions  Bruising  Chills  Cough  Diarrhea  Dizziness  Edema  Feeling Faint  Fever  Headache  Infection  Injury  Itching  Joint Pain  Low Blood Pressure  Low Red Blood Cell Count  Mouth Sores  Nausea  Pain  Rash  Swelling  Vomiting  Weight Gain    Cyclophosphamide Side Effects:  Allergic Reactions  Anemia  Breathing Problems  Bruising  Chills  Confusion  Cough  Diarrhea  Feeling Faint  Fever  Flushing  Hair Loss  Headache  Heart Failure  Infection  Injury  Itching  Nausea  Pain  Rash  Swelling  Vomiting  Weight Gain    Doxorubicin Side Effects:  Allergic Reactions  Breathing Problems  Bruising  Chest Pain  Chills  Cough  Diarrhea  Fever  Hair Loss  Infection  Itching  Low Blood Counts  Mouth Sores  Nausea  Pain  Rash  Swelling  Vomiting  Weakness    Vincristine Side Effects:  Allergic Reactions  Breathing  Problems  Confusion  Constipation  Cough  Diarrhea  Hair Loss  Itching  Mouth Sores  Nausea  Numbness  Pain  Rash  Seizures  Swelling  Tingling  Vomiting  Weakness                a.  Discussed the risk of infection while on therapy related to pancytopenia, specifically a decrease in their white blood cell count.  Instructed to contact our office for temperature >100.4 F, chills, sudden onset cough or shortness of breath, symptoms of a urinary tract infection.                b.  Discussed the risk of anemia. Instructed to contact our office for dizziness, heart palpitations, or extreme or sudden changes in weakness.                c.  Discussed the risk of thrombocytopenia, which increases the risk of bruising or bleeding.  Instructed the patient to contact our office for spontaneous signs of bleeding, including nose bleeds, bleeding from the gums or mouth, blood in sputum, urine or stool and unusual or excessive bruising or rash.                d.  Discussed GI side effects including weight changes, changes in appetite, altered sense of taste, stomatitis, nausea, vomiting, diarrhea, constipation, and heartburn.                e.  Discussed  side effects including painful urination, changes in the amount of urination, possible urine color changes.  Discussed fertility issues and to prevent  pregnancy if of child bearing age.                f.  Discussed neurological side effects including the risk of peripheral neuropathy, either temporary or permanent.                g.  Discussed the potential for skin, hair, and nail changes.       3.  Instructed to contact our office for discussion of medication changes, the addition of vitamin and/or herbal supplementation as they may interact with some chemotherapy agents.    4.  Discussed dietary modifications and the need to maintain adequate caloric intake and proper oral hydration.  Recommended 64 ounces of fluid per day.    5.  Discussed anti-emetic protocol and bowel  regimen protocol.    6.  Office contact information given including after hours number.  Discussed there is an oncologist on call 24/7, 365 days including weekends.  Provided primary nurse's information .    7.  In summary, the patient is in agreement with the plan of care.  Questions appeared to be answered to their satisfaction. Consented the patient to the treatment plan and the patient was educated on the planned duration of the treatment and schedule of the treatment administration. Copy to be scanned into the chart.    All questions answered to the satisfaction of the patient and family.     Follow up appointments given to patient.     MADELINE CARRASCO  PATIENT EDUCATOR  [facility]

## 2024-07-24 VITALS
RESPIRATION RATE: 18 BRPM | BODY MASS INDEX: 40.26 KG/M2 | WEIGHT: 265.63 LBS | HEART RATE: 73 BPM | OXYGEN SATURATION: 100 % | HEIGHT: 68 IN | DIASTOLIC BLOOD PRESSURE: 76 MMHG | SYSTOLIC BLOOD PRESSURE: 150 MMHG

## 2024-07-26 DIAGNOSIS — G89.4 CHRONIC PAIN SYNDROME: ICD-10-CM

## 2024-07-26 DIAGNOSIS — C83.39 DIFFUSE LARGE B-CELL LYMPHOMA OF SOLID ORGAN EXCLUDING SPLEEN: Primary | ICD-10-CM

## 2024-07-29 ENCOUNTER — INFUSION (OUTPATIENT)
Dept: INFUSION THERAPY | Facility: HOSPITAL | Age: 44
End: 2024-07-29
Attending: INTERNAL MEDICINE
Payer: MEDICAID

## 2024-07-29 ENCOUNTER — DOCUMENTATION ONLY (OUTPATIENT)
Dept: HEMATOLOGY/ONCOLOGY | Facility: CLINIC | Age: 44
End: 2024-07-29

## 2024-07-29 ENCOUNTER — LAB VISIT (OUTPATIENT)
Dept: HEMATOLOGY/ONCOLOGY | Facility: CLINIC | Age: 44
End: 2024-07-29
Payer: MEDICAID

## 2024-07-29 ENCOUNTER — CLINICAL SUPPORT (OUTPATIENT)
Dept: HEMATOLOGY/ONCOLOGY | Facility: CLINIC | Age: 44
End: 2024-07-29
Payer: MEDICAID

## 2024-07-29 VITALS
RESPIRATION RATE: 20 BRPM | BODY MASS INDEX: 41.91 KG/M2 | OXYGEN SATURATION: 95 % | DIASTOLIC BLOOD PRESSURE: 82 MMHG | HEIGHT: 67 IN | HEART RATE: 83 BPM | WEIGHT: 267 LBS | SYSTOLIC BLOOD PRESSURE: 131 MMHG | TEMPERATURE: 98 F

## 2024-07-29 DIAGNOSIS — C83.39 DIFFUSE LARGE B-CELL LYMPHOMA OF SOLID ORGAN EXCLUDING SPLEEN: Primary | ICD-10-CM

## 2024-07-29 DIAGNOSIS — G89.4 CHRONIC PAIN SYNDROME: ICD-10-CM

## 2024-07-29 DIAGNOSIS — C83.39 DIFFUSE LARGE B-CELL LYMPHOMA OF SOLID ORGAN EXCLUDING SPLEEN: ICD-10-CM

## 2024-07-29 DIAGNOSIS — C73 PRIMARY THYROID MALIGNANCY: ICD-10-CM

## 2024-07-29 DIAGNOSIS — C85.99 MALIGNANT LYMPHOMA OF THYROID GLAND: ICD-10-CM

## 2024-07-29 PROBLEM — J38.00 VOCAL CORD PARALYSIS: Status: ACTIVE | Noted: 2024-07-29

## 2024-07-29 LAB
ALBUMIN SERPL-MCNC: 3.8 G/DL (ref 3.5–5)
ALBUMIN/GLOB SERPL: 1.2 RATIO (ref 1.1–2)
ALP SERPL-CCNC: 99 UNIT/L (ref 40–150)
ALT SERPL-CCNC: 59 UNIT/L (ref 0–55)
ANION GAP SERPL CALC-SCNC: 6 MEQ/L
AST SERPL-CCNC: 47 UNIT/L (ref 5–34)
BASOPHILS # BLD AUTO: 0.02 X10(3)/MCL
BASOPHILS NFR BLD AUTO: 0.3 %
BILIRUB SERPL-MCNC: 0.4 MG/DL
BUN SERPL-MCNC: 10.2 MG/DL (ref 7–18.7)
CALCIUM SERPL-MCNC: 9.6 MG/DL (ref 8.4–10.2)
CHLORIDE SERPL-SCNC: 101 MMOL/L (ref 98–107)
CO2 SERPL-SCNC: 31 MMOL/L (ref 22–29)
CREAT SERPL-MCNC: 0.78 MG/DL (ref 0.55–1.02)
CREAT/UREA NIT SERPL: 13
EOSINOPHIL # BLD AUTO: 0.12 X10(3)/MCL (ref 0–0.9)
EOSINOPHIL NFR BLD AUTO: 1.7 %
ERYTHROCYTE [DISTWIDTH] IN BLOOD BY AUTOMATED COUNT: 13.1 % (ref 11.5–17)
GFR SERPLBLD CREATININE-BSD FMLA CKD-EPI: >60 ML/MIN/1.73/M2
GLOBULIN SER-MCNC: 3.2 GM/DL (ref 2.4–3.5)
GLUCOSE SERPL-MCNC: 345 MG/DL (ref 74–100)
HCT VFR BLD AUTO: 41.9 % (ref 37–47)
HGB BLD-MCNC: 13.6 G/DL (ref 12–16)
IMM GRANULOCYTES # BLD AUTO: 0.04 X10(3)/MCL (ref 0–0.04)
IMM GRANULOCYTES NFR BLD AUTO: 0.6 %
LYMPHOCYTES # BLD AUTO: 2.37 X10(3)/MCL (ref 0.6–4.6)
LYMPHOCYTES NFR BLD AUTO: 34.5 %
MCH RBC QN AUTO: 28.6 PG (ref 27–31)
MCHC RBC AUTO-ENTMCNC: 32.5 G/DL (ref 33–36)
MCV RBC AUTO: 88 FL (ref 80–94)
MONOCYTES # BLD AUTO: 0.45 X10(3)/MCL (ref 0.1–1.3)
MONOCYTES NFR BLD AUTO: 6.6 %
NEUTROPHILS # BLD AUTO: 3.87 X10(3)/MCL (ref 2.1–9.2)
NEUTROPHILS NFR BLD AUTO: 56.3 %
NRBC BLD AUTO-RTO: 0 %
PLATELET # BLD AUTO: 179 X10(3)/MCL (ref 130–400)
PMV BLD AUTO: 9.8 FL (ref 7.4–10.4)
POTASSIUM SERPL-SCNC: 3.9 MMOL/L (ref 3.5–5.1)
PROT SERPL-MCNC: 7 GM/DL (ref 6.4–8.3)
RBC # BLD AUTO: 4.76 X10(6)/MCL (ref 4.2–5.4)
SODIUM SERPL-SCNC: 138 MMOL/L (ref 136–145)
WBC # BLD AUTO: 6.87 X10(3)/MCL (ref 4.5–11.5)

## 2024-07-29 PROCEDURE — 96376 TX/PRO/DX INJ SAME DRUG ADON: CPT

## 2024-07-29 PROCEDURE — 96415 CHEMO IV INFUSION ADDL HR: CPT

## 2024-07-29 PROCEDURE — 63600175 PHARM REV CODE 636 W HCPCS: Performed by: INTERNAL MEDICINE

## 2024-07-29 PROCEDURE — 99211 OFF/OP EST MAY X REQ PHY/QHP: CPT | Mod: PBBFAC,25

## 2024-07-29 PROCEDURE — 96411 CHEMO IV PUSH ADDL DRUG: CPT

## 2024-07-29 PROCEDURE — 25000003 PHARM REV CODE 250: Performed by: INTERNAL MEDICINE

## 2024-07-29 PROCEDURE — A4216 STERILE WATER/SALINE, 10 ML: HCPCS | Performed by: INTERNAL MEDICINE

## 2024-07-29 PROCEDURE — 80053 COMPREHEN METABOLIC PANEL: CPT

## 2024-07-29 PROCEDURE — 96413 CHEMO IV INFUSION 1 HR: CPT

## 2024-07-29 PROCEDURE — 96375 TX/PRO/DX INJ NEW DRUG ADDON: CPT

## 2024-07-29 PROCEDURE — 85025 COMPLETE CBC W/AUTO DIFF WBC: CPT

## 2024-07-29 PROCEDURE — 36415 COLL VENOUS BLD VENIPUNCTURE: CPT

## 2024-07-29 PROCEDURE — 96417 CHEMO IV INFUS EACH ADDL SEQ: CPT

## 2024-07-29 RX ORDER — FAMOTIDINE 10 MG/ML
20 INJECTION INTRAVENOUS
Status: CANCELLED | OUTPATIENT
Start: 2024-07-29

## 2024-07-29 RX ORDER — PALONOSETRON 0.05 MG/ML
0.25 INJECTION, SOLUTION INTRAVENOUS ONCE
Status: COMPLETED | OUTPATIENT
Start: 2024-07-29 | End: 2024-07-29

## 2024-07-29 RX ORDER — PROCHLORPERAZINE EDISYLATE 5 MG/ML
10 INJECTION INTRAMUSCULAR; INTRAVENOUS ONCE AS NEEDED
Status: CANCELLED | OUTPATIENT
Start: 2024-07-29

## 2024-07-29 RX ORDER — SODIUM CHLORIDE 0.9 % (FLUSH) 0.9 %
10 SYRINGE (ML) INJECTION
Status: CANCELLED | OUTPATIENT
Start: 2024-07-29

## 2024-07-29 RX ORDER — EPINEPHRINE 0.3 MG/.3ML
0.3 INJECTION SUBCUTANEOUS ONCE AS NEEDED
Status: CANCELLED | OUTPATIENT
Start: 2024-07-29

## 2024-07-29 RX ORDER — ALLOPURINOL 100 MG/1
100 TABLET ORAL 2 TIMES DAILY
Qty: 28 TABLET | Refills: 0 | Status: SHIPPED | OUTPATIENT
Start: 2024-07-29 | End: 2024-08-12

## 2024-07-29 RX ORDER — MEPERIDINE HYDROCHLORIDE 50 MG/ML
25 INJECTION INTRAMUSCULAR; INTRAVENOUS; SUBCUTANEOUS
Status: CANCELLED | OUTPATIENT
Start: 2024-07-29 | End: 2024-07-30

## 2024-07-29 RX ORDER — HEPARIN 100 UNIT/ML
500 SYRINGE INTRAVENOUS
Status: DISCONTINUED | OUTPATIENT
Start: 2024-07-29 | End: 2024-07-29 | Stop reason: HOSPADM

## 2024-07-29 RX ORDER — DIPHENHYDRAMINE HYDROCHLORIDE 50 MG/ML
50 INJECTION INTRAMUSCULAR; INTRAVENOUS ONCE AS NEEDED
Status: CANCELLED | OUTPATIENT
Start: 2024-07-29

## 2024-07-29 RX ORDER — DOXORUBICIN HYDROCHLORIDE 2 MG/ML
50 INJECTION, SOLUTION INTRAVENOUS
Status: COMPLETED | OUTPATIENT
Start: 2024-07-29 | End: 2024-07-29

## 2024-07-29 RX ORDER — HEPARIN 100 UNIT/ML
500 SYRINGE INTRAVENOUS
Status: CANCELLED | OUTPATIENT
Start: 2024-07-29

## 2024-07-29 RX ORDER — SODIUM CHLORIDE 0.9 % (FLUSH) 0.9 %
10 SYRINGE (ML) INJECTION
Status: DISCONTINUED | OUTPATIENT
Start: 2024-07-29 | End: 2024-07-29 | Stop reason: HOSPADM

## 2024-07-29 RX ORDER — EPINEPHRINE 0.3 MG/.3ML
0.3 INJECTION SUBCUTANEOUS ONCE AS NEEDED
Status: DISCONTINUED | OUTPATIENT
Start: 2024-07-29 | End: 2024-07-29 | Stop reason: HOSPADM

## 2024-07-29 RX ORDER — ACETAMINOPHEN 325 MG/1
650 TABLET ORAL
Status: COMPLETED | OUTPATIENT
Start: 2024-07-29 | End: 2024-07-29

## 2024-07-29 RX ORDER — MEPERIDINE HYDROCHLORIDE 25 MG/ML
25 INJECTION INTRAMUSCULAR; INTRAVENOUS; SUBCUTANEOUS
Status: DISCONTINUED | OUTPATIENT
Start: 2024-07-29 | End: 2024-07-29 | Stop reason: HOSPADM

## 2024-07-29 RX ORDER — PROCHLORPERAZINE EDISYLATE 5 MG/ML
10 INJECTION INTRAMUSCULAR; INTRAVENOUS ONCE AS NEEDED
Status: DISCONTINUED | OUTPATIENT
Start: 2024-07-29 | End: 2024-07-29 | Stop reason: HOSPADM

## 2024-07-29 RX ORDER — DIPHENHYDRAMINE HYDROCHLORIDE 50 MG/ML
25 INJECTION INTRAMUSCULAR; INTRAVENOUS
Status: COMPLETED | OUTPATIENT
Start: 2024-07-29 | End: 2024-07-29

## 2024-07-29 RX ORDER — DIPHENHYDRAMINE HYDROCHLORIDE 50 MG/ML
25 INJECTION INTRAMUSCULAR; INTRAVENOUS
Status: CANCELLED
Start: 2024-07-29

## 2024-07-29 RX ORDER — DIPHENHYDRAMINE HYDROCHLORIDE 50 MG/ML
50 INJECTION INTRAMUSCULAR; INTRAVENOUS ONCE AS NEEDED
Status: COMPLETED | OUTPATIENT
Start: 2024-07-29 | End: 2024-07-29

## 2024-07-29 RX ORDER — ACETAMINOPHEN 325 MG/1
650 TABLET ORAL
Status: CANCELLED | OUTPATIENT
Start: 2024-07-29

## 2024-07-29 RX ORDER — DOXORUBICIN HYDROCHLORIDE 2 MG/ML
50 INJECTION, SOLUTION INTRAVENOUS
Status: CANCELLED | OUTPATIENT
Start: 2024-07-29

## 2024-07-29 RX ORDER — FAMOTIDINE 10 MG/ML
20 INJECTION INTRAVENOUS
Status: COMPLETED | OUTPATIENT
Start: 2024-07-29 | End: 2024-07-29

## 2024-07-29 RX ADMIN — CYCLOPHOSPHAMIDE 1760 MG: 200 INJECTION, SOLUTION INTRAVENOUS at 05:07

## 2024-07-29 RX ADMIN — HYDROCORTISONE SODIUM SUCCINATE 100 MG: 100 INJECTION, POWDER, FOR SOLUTION INTRAMUSCULAR; INTRAVENOUS at 12:07

## 2024-07-29 RX ADMIN — VINCRISTINE SULFATE 2 MG: 1 INJECTION, SOLUTION INTRAVENOUS at 05:07

## 2024-07-29 RX ADMIN — HEPARIN 500 UNITS: 100 SYRINGE at 06:07

## 2024-07-29 RX ADMIN — DOXORUBICIN HYDROCHLORIDE 118 MG: 2 INJECTION, SOLUTION INTRAVENOUS at 05:07

## 2024-07-29 RX ADMIN — DIPHENHYDRAMINE HYDROCHLORIDE 50 MG: 50 INJECTION INTRAMUSCULAR; INTRAVENOUS at 01:07

## 2024-07-29 RX ADMIN — PALONOSETRON HYDROCHLORIDE 0.25 MG: 0.25 INJECTION, SOLUTION INTRAVENOUS at 05:07

## 2024-07-29 RX ADMIN — FAMOTIDINE 20 MG: 10 INJECTION, SOLUTION INTRAVENOUS at 09:07

## 2024-07-29 RX ADMIN — Medication 10 ML: at 06:07

## 2024-07-29 RX ADMIN — DIPHENHYDRAMINE HYDROCHLORIDE 25 MG: 50 INJECTION INTRAMUSCULAR; INTRAVENOUS at 09:07

## 2024-07-29 RX ADMIN — ACETAMINOPHEN 650 MG: 325 TABLET, FILM COATED ORAL at 09:07

## 2024-07-29 RX ADMIN — SODIUM CHLORIDE 900 MG: 9 INJECTION, SOLUTION INTRAVENOUS at 10:07

## 2024-07-29 RX ADMIN — SODIUM CHLORIDE: 9 INJECTION, SOLUTION INTRAVENOUS at 09:07

## 2024-07-29 NOTE — NURSING
0843  Pt did labs and is here for  1 [facility].  Pt denies any pain.  Pt very anxious and states she vomitted this morning.  Pt reports constipation due to all her meds but does take Miralax as needed.  Pt is accomp by female friend.  Pt reports having a tubal ligation.  Pt reports having filled her steroids and olanzapine.  Pt well educated on chemo meds and at home meds.  Pt even contacted her PCP to notify them she would be taking steroids with her diabetes.  Message from Dr. Collins pt is to start allopurinol 100 mg po bid.  He also wants pt scheduled to see him tomorrow.  1039  Nutritionist seeing pt in room.  1226  Pt's neck and chest very pink.  Pt states she is itching in her face, neck, and head. Denies any swelling to tongue or problems breathing. Pt declined any more benadryl because she says she is too drowsy.   Rituxan turned off and IV fluids NS open.  1235  Spoke with Dr. Collins about her reaction.  He stated to give the solucortef 100 mg IV but do not give the decadron as ordered with the aloxi.  Pt is to continue po steroids on days 2-5.  1247  Spoke to pharmacy to notify of reaction, adjust steroid meds on MAR, and make sure pt's future chmeo plans do not switch to sub Q rituxan.  1333  Pt states she was too nauseated to eat.  Also started saying her back was hurting but she does have back issues.  A Reddy NP came in room to assess pt to decide if we would rechanllenge rituxan.  Pt is not itching and neck is less pink but cheeks are still flushed.  Decision made to rechanllenge so rituxan restarted at half the rate which was 100.  1338  Pt stated she was feeling hot again and itching to hand.  Rituxan stopped.  A Reddy NP feels she did not get enough med to completely cover the reaction.  She wants to give benadryl 25 mg IV now that pt is awake and talking.  1343  Pt got very nauseated but did not vomit.  She checked her sugar and it was 202.  Benadryl 25 mg IV given.  Will wait 30 min and  rechallenge.  1415  Rituxan restarted at 100 ml/hr.  Vitals stable and pt's flushing to checks has lightened.  Pt ready to retry med.  Will cont to monitor.  1719  Rituxan completed without any further incident.  Will proceed with remainder of RCHOP trx.

## 2024-07-29 NOTE — PROGRESS NOTES
"                                                                                                                  Initial Nutrition Assessment    Fred Berman is a 43 y.o. female.    DATE: 2024     Referral from: Oncology    Diagnosis: Diffuse Large B-cell Lymphoma of thyroid    PAST MEDICAL HISTORY  Past Medical History:   Diagnosis Date    Adrenal mass     Anxiety     Arthritis     Bradycardia     Depression     Diabetes mellitus, type 2     Gastroparesis     General anesthetics causing adverse effect in therapeutic use     "hard time waking up"    GERD (gastroesophageal reflux disease)     Hip pain     Hypertension     Menstrual cramps     Ovarian cyst     Palpitations     Pyosalpingitis     Thyroid disease      Past Surgical History:   Procedure Laterality Date    ABLATION OF VAGINAL TISSUE USING LASER      BONE MARROW ASPIRATION N/A 2024    Procedure: ASPIRATION, BONE MARROW;  Surgeon: Patrizia Bhatt MD;  Location: [facility] ENDOSCOPY;  Service: General;  Laterality: N/A;    BONE MARROW BIOPSY N/A 2024    Procedure: Biopsy-bone marrow;  Surgeon: Patrizia Bhatt MD;  Location: [facility] ENDOSCOPY;  Service: General;  Laterality: N/A;     SECTION      CHOLECYSTECTOMY      DISSECTION OF NECK Left 5/10/2024    Procedure: DISSECTION, NECK;  Surgeon: Cali Trujillo MD;  Location: [facility] OR;  Service: ENT;  Laterality: Left;    INSERTION OF TUNNELED CENTRAL VENOUS CATHETER (CVC) WITH SUBCUTANEOUS PORT Right 2024    Procedure: [id number];  Surgeon: Lio Storey Jr., MD;  Location: [facility] OR;  Service: General;  Laterality: Right;    THYROIDECTOMY Left 5/10/2024    Procedure: THYROIDECTOMY;  Surgeon: Cali Trujillo MD;  Location: [facility] OR;  Service: ENT;  Laterality: Left;    TUBAL LIGATION       Family History   Problem Relation Name Age of Onset    Alcohol abuse Mother Pat         Pat    Arthritis Mother Pat     Breast cancer Mother Pat     COPD Mother Pat     " "Depression Mother Pat     Diabetes Mother Pat     Drug abuse Mother Pat     Hypertension Mother Pat     Miscarriages / Stillbirths Mother Pat     Depression Father Juventino     Drug abuse Father Juventino     Hypertension Father Juventino     Prostate cancer Father Juventino     Thyroid cancer Father Juventino     Drug abuse Sister Elana     Heart disease Sister Elana     Miscarriages / Stillbirths Sister Elana     Drug abuse Brother Juventino     Early death Brother Izaiah     Asthma Son Mojgan     Hypertension Son Mojgan     Miscarriages / Stillbirths Maternal Grandmother Arlean     Cancer Maternal Grandfather Janay     Hearing loss Paternal Grandfather Lyod      Social History     Tobacco Use    Smoking status: Former     Types: Cigarettes    Smokeless tobacco: Never   Substance and Sexual Activity    Alcohol use: Never     Alcohol/week: 6.0 standard drinks of alcohol     Types: 6 Shots of liquor per week    Drug use: Not Currently     Types: Methamphetamines     Comment: Various Opoids    Sexual activity: Yes     Partners: Male       TREATMENT PLAN:  Chemo: [x] Yes, OP LYM RCHOP (rituximab, cycloPHOSphamide, DOXOrubicin, vinCRIStine, predniSONE) Q3W       [] No  Radiation: [] Yes      [x] No    Review of patient's allergies indicates:  No Known Allergies    ANTHROPOMETRICS:  Height: 67"  Weight:   Wt Readings from Last 10 Encounters:   07/29/24 121.1 kg (266 lb 15.6 oz)   07/23/24 120.3 kg (265 lb 3.4 oz)   07/23/24 120.5 kg (265 lb 9.6 oz)   07/09/24 116.7 kg (257 lb 4.4 oz)   07/09/24 114.9 kg (253 lb 6.4 oz)   07/08/24 115.8 kg (255 lb 6.4 oz)   06/20/24 113.3 kg (249 lb 12.8 oz)   06/18/24 114.9 kg (253 lb 3.2 oz)   05/21/24 107.5 kg (237 lb)   05/10/24 107.7 kg (237 lb 7 oz)     Weight Changes: has increased 52 pounds over last 6 months  BMI: 41.8    INTAKE:  Current Diet: regular diet  Diet Texture: Soft  Dietary Patterns: Mainly Sweets  Drinks Mostly water    Current ONS Intake: []  Yes  X/day  [x] No    Enteral Nutrition     Patient not " receiving enteral nutrition at this time.    Food Security  Is patient able to sufficiently able to prepare meals at home? [x] Yes  [] No []N/A  If no, does patient have help cooking, preparing, and serving meals at home? [] Yes  [] No [x] N/A    SYMPTOMS/COMPLAINTS:  constipation, difficulty/impaired swallowing, and nausea    Current Medications:   Current Outpatient Medications:     acetaminophen (TYLENOL) 500 MG tablet, Take 2 tablets (1,000 mg total) by mouth every 6 (six) hours. (Patient not taking: Reported on 7/9/2024), Disp: 60 tablet, Rfl: 0    allopurinoL (ZYLOPRIM) 100 MG tablet, Take 1 tablet (100 mg total) by mouth 2 (two) times daily. for 14 days, Disp: 28 tablet, Rfl: 0    buprenorphine HCL (SUBUTEX) 8 mg Subl, Place 8 mg under the tongue 3 (three) times daily., Disp: , Rfl:     calcium carbonate/vitamin D3 (CALCIUM 600 + D,3, ORAL), Take 1 tablet by mouth 2 (two) times a day., Disp: , Rfl:     COMPOUND HORMONE REPLACEMENT, Take by mouth once daily., Disp: , Rfl:     hydrocortisone (ANUSOL-HC) 2.5 % rectal cream, Place 1 application  rectally 2 (two) times daily., Disp: , Rfl:     insulin (LANTUS SOLOSTAR U-100 INSULIN) glargine 100 units/mL SubQ pen, Inject 15 Units into the skin 2 (two) times daily. (Patient taking differently: Inject 90 Units into the skin 2 (two) times a day.), Disp: 27 mL, Rfl: 3    insulin lispro 100 unit/mL pen, Inject 35 Units into the skin 3 (three) times daily. With meals, Disp: , Rfl:     levothyroxine (SYNTHROID) 200 MCG tablet, Take 1 tablet (200 mcg total) by mouth before breakfast., Disp: 30 tablet, Rfl: 11    LORazepam (ATIVAN) 0.5 MG tablet, Take 1 mg by mouth every 6 (six) hours as needed for Anxiety., Disp: , Rfl:     losartan (COZAAR) 50 MG tablet, Take 1 tablet (50 mg total) by mouth once daily., Disp: 90 tablet, Rfl: 3    lubiprostone (AMITIZA) 24 MCG Cap, Take 24 mcg by mouth daily with breakfast. (Patient not taking: Reported on 5/21/2024), Disp: , Rfl:      "metFORMIN (GLUCOPHAGE) 500 MG tablet, Take 1 tablet (500 mg total) by mouth 2 (two) times daily with meals. (Patient not taking: Reported on 5/21/2024), Disp: 180 tablet, Rfl: 1    MOTEGRITY 2 mg Tab, Take 1 tablet by mouth. (Patient not taking: Reported on 5/21/2024), Disp: , Rfl:     naloxegoL (MOVANTIK) 25 mg tablet, Take 25 mg by mouth once daily. (Patient not taking: Reported on 1/23/2024), Disp: 30 tablet, Rfl: 2    OLANZapine (ZYPREXA) 5 MG tablet, Take 1 tablet by mouth nightly on days 1-3 of each chemotherapy cycle., Disp: 3 tablet, Rfl: 5    ondansetron (ZOFRAN-ODT) 4 MG TbDL, Take 1 tablet (4 mg total) by mouth every 8 (eight) hours as needed (NAUSEA)., Disp: 30 tablet, Rfl: 1    pen needle, diabetic (BD ULTRA-FINE ROSALEE PEN NEEDLE) 32 gauge x 5/32" Ndle, 1 each by Misc.(Non-Drug; Combo Route) route 2 (two) times a day., Disp: 100 each, Rfl: 9    polyethylene glycol (GLYCOLAX) 17 gram PwPk, Take 17 g by mouth 2 (two) times daily., Disp: 30 each, Rfl: 1    predniSONE (DELTASONE) 50 MG Tab, Take 2 tablets (100 mg total) by mouth once daily. On days 2-5 of your chemotherapy cycles. Take with food., Disp: 8 tablet, Rfl: 5    prochlorperazine (COMPAZINE) 5 MG tablet, Take 2 tablets (10 mg total) by mouth every 6 (six) hours as needed for Nausea., Disp: 20 tablet, Rfl: 5  No current facility-administered medications for this visit.    Facility-Administered Medications Ordered in Other Visits:     0.9% NaCl 250 mL flush bag, , Intravenous, PRN, Virgil Collins MD, Last Rate: 25 mL/hr at 07/29/24 0907, New Bag at 07/29/24 0907    alteplase injection 2 mg, 2 mg, Intra-Catheter, PRN, Virgil Collins MD    cycloPHOSphamide 750 mg/m2 = 1,760 mg in 0.9% NaCl 293.8 mL chemo infusion, 750 mg/m2 (Treatment Plan Recorded), Intravenous, 1 time in Clinic/HOD, Virgil Collins MD    dextrose 10% bolus 125 mL 125 mL, 12.5 g, Intravenous, PRN, Chuckie Saenz MD    dextrose 10% bolus 250 mL 250 mL, 25 g, Intravenous, " "PRN, Chuckie Saenz MD    diphenhydrAMINE injection 50 mg, 50 mg, Intravenous, Once PRN, Virgil Collins MD    DOXOrubicin chemo injection 118 mg, 50 mg/m2 (Treatment Plan Recorded), Intravenous, 1 time in Clinic/[facility], Virgil Collins MD    EPINEPHrine (EPIPEN) 0.3 mg/0.3 mL pen injection 0.3 mg, 0.3 mg, Intramuscular, Once PRN, Virgil Collins MD    glucagon (human recombinant) injection 1 mg, 1 mg, Intramuscular, PRN, Chuckie Saenz MD    heparin, porcine (PF) 100 unit/mL injection flush 500 Units, 500 Units, Intravenous, PRN, Virgil Collins MD    hydrocortisone sodium succinate injection 100 mg, 100 mg, Intravenous, Once PRN, Virgil Collins MD    meperidine (PF) injection 25 mg, 25 mg, Intravenous, PRN, Virgil Collins MD    palonosetron 0.25mg/dexAMETHasone 12mg in NS IVPB 0.25 mg 50 mL, 0.25 mg, Intravenous, 1 time in Clinic/[facility], Virgil Collins MD    prochlorperazine injection Soln 10 mg, 10 mg, Intravenous, Once PRN, Virgil Collins MD    sodium chloride 0.9% flush 10 mL, 10 mL, Intravenous, PRN, Virgil Collins MD    vinCRIStine (ONCOVIN) 2 mg in 0.9% NaCl 65 mL chemo infusion, 2 mg, Intravenous, 1 time in Clinic/[facility], Virgil Collins MD       Current labs reviewed:  7/29/24 -- H/H 13.6/41.9, Glu 345 H, K 3.9, BUN 10, Cr 0.78    RD Note:    7/29/24 -- pt beginning chemo treatment accompanied by family; most recently with decreased activity d/t fatigue related to cancer diagnosis - reports good family support; report daily nausea managed without vomiting; no abdominal pain + constipation on Miralax; reports tolerating soft foods related to painful swallow & food getting "stuck", reports scheduled appointment with SLP next week; reviewed soft protein foods with patient & suggest Glucerna or equivalent ONS to supplement nutrition; wt gain reported related to increase of intake of sweets; Glu (H) - h/o DM, Pt reports familiar with diabetic diet & no need for diet education; previously " with wt loss while on Ozempic, reports off x ~1 year ago; No fat/muscle wasting visible    Education Provided:  Maximizing Nutrition during cancer treatment, food safety  Teaching Method: explanation and printed materials  Comprehension: verbalizes understanding  Barriers to Learning: none evident  Expected Compliance: good  Comments: All questions were answered and dietitian's contact information was provided.     Estimated Nutrition Needs:  Calories : 4648-7764 kcal (18 - 20 kcal/kg)  Protein : 96 gm (0.8 gm/kg)  Fluid: 5213-0244 ml (1ml/kcal)    Nutrition Problem (PES):   PES: Swallowing difficulty related to chronic illness as evidenced by diffuse large B-cell lymphoma of thyroid. (new)    RD Plan/Goals:   [] Weight stable [] Weight gain  [] Weight Loss [] Increase Kcal/protein [] Biochemical data improved  [] Adjust Tube-feeding Rx  [] Tolerate Tube Feedings   [] Increase tube feedings to goal   [x] Tolerate Supplements   [x] Symptoms Improved  [] Understand nutrition Education  [] offer supportive visits [] other, please specify        Interventions:  Diabetic diet Soft foods or consistency per SLP recs  Glucerna BID  Monitor Weight Weekly   Consider daily MVI d/t lack of food variety consumed    Follow up:   []  Will continue to closely monitor throughout chemotherapy treatment regimen.  []  Will continue to monitor with MD follow up appointments or as needed per patient  [x]  Consult RD zamzam Fernandes RDN, LDN

## 2024-07-30 ENCOUNTER — OFFICE VISIT (OUTPATIENT)
Dept: HEMATOLOGY/ONCOLOGY | Facility: CLINIC | Age: 44
End: 2024-07-30
Attending: INTERNAL MEDICINE
Payer: MEDICAID

## 2024-07-30 VITALS
WEIGHT: 268.63 LBS | TEMPERATURE: 99 F | BODY MASS INDEX: 42.16 KG/M2 | DIASTOLIC BLOOD PRESSURE: 76 MMHG | RESPIRATION RATE: 20 BRPM | SYSTOLIC BLOOD PRESSURE: 111 MMHG | HEART RATE: 74 BPM | HEIGHT: 67 IN | OXYGEN SATURATION: 95 %

## 2024-07-30 DIAGNOSIS — J38.00 VOCAL CORD PARALYSIS: ICD-10-CM

## 2024-07-30 DIAGNOSIS — F41.1 GENERALIZED ANXIETY DISORDER: ICD-10-CM

## 2024-07-30 DIAGNOSIS — E03.9 HYPOTHYROIDISM, UNSPECIFIED TYPE: ICD-10-CM

## 2024-07-30 DIAGNOSIS — G89.4 CHRONIC PAIN SYNDROME: Primary | ICD-10-CM

## 2024-07-30 DIAGNOSIS — C85.99 MALIGNANT LYMPHOMA OF THYROID GLAND: ICD-10-CM

## 2024-07-30 DIAGNOSIS — G89.4 CHRONIC PAIN SYNDROME: ICD-10-CM

## 2024-07-30 DIAGNOSIS — C83.39 DIFFUSE LARGE B-CELL LYMPHOMA OF SOLID ORGAN EXCLUDING SPLEEN: Primary | ICD-10-CM

## 2024-07-30 DIAGNOSIS — F11.11 HISTORY OF OPIOID ABUSE: ICD-10-CM

## 2024-07-30 DIAGNOSIS — E27.9 LESION OF ADRENAL GLAND: ICD-10-CM

## 2024-07-30 DIAGNOSIS — Z83.2 FAMILY HISTORY OF VON WILLEBRAND DISEASE: ICD-10-CM

## 2024-07-30 DIAGNOSIS — F41.0 PANIC ATTACK: ICD-10-CM

## 2024-07-30 DIAGNOSIS — C83.39 DIFFUSE LARGE B-CELL LYMPHOMA OF SOLID ORGAN EXCLUDING SPLEEN: ICD-10-CM

## 2024-07-30 DIAGNOSIS — F41.9 ANXIETY: ICD-10-CM

## 2024-07-30 DIAGNOSIS — D10.1 FIBROMA OF TONGUE: ICD-10-CM

## 2024-07-30 PROCEDURE — 1159F MED LIST DOCD IN RCRD: CPT | Mod: CPTII,,, | Performed by: INTERNAL MEDICINE

## 2024-07-30 PROCEDURE — 4010F ACE/ARB THERAPY RXD/TAKEN: CPT | Mod: CPTII,,, | Performed by: INTERNAL MEDICINE

## 2024-07-30 PROCEDURE — 3074F SYST BP LT 130 MM HG: CPT | Mod: CPTII,,, | Performed by: INTERNAL MEDICINE

## 2024-07-30 PROCEDURE — 3078F DIAST BP <80 MM HG: CPT | Mod: CPTII,,, | Performed by: INTERNAL MEDICINE

## 2024-07-30 PROCEDURE — 99215 OFFICE O/P EST HI 40 MIN: CPT | Mod: PBBFAC | Performed by: INTERNAL MEDICINE

## 2024-07-30 PROCEDURE — 3046F HEMOGLOBIN A1C LEVEL >9.0%: CPT | Mod: CPTII,,, | Performed by: INTERNAL MEDICINE

## 2024-07-30 PROCEDURE — 3008F BODY MASS INDEX DOCD: CPT | Mod: CPTII,,, | Performed by: INTERNAL MEDICINE

## 2024-07-30 PROCEDURE — 99214 OFFICE O/P EST MOD 30 MIN: CPT | Mod: S$PBB,,, | Performed by: INTERNAL MEDICINE

## 2024-07-30 PROCEDURE — 1160F RVW MEDS BY RX/DR IN RCRD: CPT | Mod: CPTII,,, | Performed by: INTERNAL MEDICINE

## 2024-07-30 NOTE — PROGRESS NOTES
"History:  Past Medical History:   Diagnosis Date    Adrenal mass     Anxiety     Arthritis     Bradycardia     Depression     Diabetes mellitus, type 2     Gastroparesis     General anesthetics causing adverse effect in therapeutic use     "hard time waking up"    GERD (gastroesophageal reflux disease)     Hip pain     Hypertension     Menstrual cramps     Ovarian cyst     Palpitations     Pyosalpingitis     Thyroid disease    Past medical history:  Adrenal mass; anxiety; arthritis; bradycardia; depression; NIDDM; gastroparesis; GERD; hip pain; hypertension; menstrual cramps; ovarian cyst; palpitations; bile salpingitis; thyroid disease    Procedure/surgical history:  Ablation of vaginal tissue using laser; ; cholecystectomy; dissection of neck, left 05/10/2024; thyroidectomy 05/10/2024; tubal ligation    Past Surgical History:   Procedure Laterality Date    ABLATION OF VAGINAL TISSUE USING LASER      BONE MARROW ASPIRATION N/A 2024    Procedure: ASPIRATION, BONE MARROW;  Surgeon: Patrizia Bhatt MD;  Location: [facility] ENDOSCOPY;  Service: General;  Laterality: N/A;    BONE MARROW BIOPSY N/A 2024    Procedure: Biopsy-bone marrow;  Surgeon: Patrizia Bhatt MD;  Location: [facility] ENDOSCOPY;  Service: General;  Laterality: N/A;     SECTION      CHOLECYSTECTOMY      DISSECTION OF NECK Left 5/10/2024    Procedure: DISSECTION, NECK;  Surgeon: Cali Trujillo MD;  Location: [facility] OR;  Service: ENT;  Laterality: Left;    INSERTION OF TUNNELED CENTRAL VENOUS CATHETER (CVC) WITH SUBCUTANEOUS PORT Right 2024    Procedure: [id number];  Surgeon: Lio Storey Jr., MD;  Location: [facility] OR;  Service: General;  Laterality: Right;    THYROIDECTOMY Left 5/10/2024    Procedure: THYROIDECTOMY;  Surgeon: Cali Trujillo MD;  Location: [facility] OR;  Service: ENT;  Laterality: Left;    TUBAL LIGATION        Social History     Socioeconomic History    Marital status: Single   Tobacco Use    " Smoking status: Former     Types: Cigarettes    Smokeless tobacco: Never   Substance and Sexual Activity    Alcohol use: Never     Alcohol/week: 6.0 standard drinks of alcohol     Types: 6 Shots of liquor per week    Drug use: Not Currently     Types: Methamphetamines     Comment: Various Opoids    Sexual activity: Yes     Partners: Male     Social Determinants of Health     Financial Resource Strain: Medium Risk (6/27/2024)    Overall Financial Resource Strain (CARDIA)     Difficulty of Paying Living Expenses: Somewhat hard   Food Insecurity: No Food Insecurity (6/27/2024)    Hunger Vital Sign     Worried About Running Out of Food in the Last Year: Never true     Ran Out of Food in the Last Year: Never true   Transportation Needs: No Transportation Needs (5/12/2024)    TRANSPORTATION NEEDS     Transportation : No   Physical Activity: Insufficiently Active (6/27/2024)    Exercise Vital Sign     Days of Exercise per Week: 2 days     Minutes of Exercise per Session: 30 min   Stress: No Stress Concern Present (6/27/2024)    [employer] - Occupational Stress Questionnaire     Feeling of Stress : Only a little   Recent Concern: Stress - Stress Concern Present (5/12/2024)    [employer] - Occupational Stress Questionnaire     Feeling of Stress : To some extent   Housing Stability: Low Risk  (6/27/2024)    Housing Stability Vital Sign     Unable to Pay for Housing in the Last Year: No     Homeless in the Last Year: No      Family History   Problem Relation Name Age of Onset    Alcohol abuse Mother Pat         Pat    Arthritis Mother Pat     Breast cancer Mother Pat     COPD Mother Pat     Depression Mother Pat     Diabetes Mother Pat     Drug abuse Mother Pat     Hypertension Mother Pat     Miscarriages / Stillbirths Mother Pat     Depression Father Juventino     Drug abuse Father Juventino     Hypertension Father Juventino     Prostate cancer Father Juventino     Thyroid cancer Father  Juventino     Drug abuse Sister Elana     Heart disease Sister Elana     Miscarriages / Stillbirths Sister Elana     Drug abuse Brother Juventino     Early death Brother Izaiah     Asthma Son Omjgan     Hypertension Son Mojgan     Miscarriages / Stillbirths Maternal Grandmother Arlean     Cancer Maternal Grandfather Janay     Hearing loss Paternal Grandfather Lyod         Reason for Follow-up:  -diffuse large B-cell lymphoma of thyroid gland  -family history of von Willebrand disease   -oral fibroma of tongue  -anxiety, depression, NIDDM, hypothyroidism, history of opioid abuse, etc.    History of Present Illness:   Diffuse large B-cell lymphoma        Oncologic/Hematologic History:  Oncology History   Diffuse large B-cell lymphoma of solid organ excluding spleen   6/14/2024 Initial Diagnosis    Diffuse large B-cell lymphoma of solid organ excluding spleen     7/29/2024 -  Chemotherapy    Treatment Summary   Plan Name: OP LYM RCHOP (rituximab, cycloPHOSphamide, DOXOrubicin, vinCRIStine, predniSONE) Q3W  Treatment Goal: Curative  Status: Active  Start Date: 7/29/2024  End Date: 11/5/2024 (Planned)  Provider: Virgil Collins MD  Chemotherapy: DOXOrubicin chemo injection 118 mg, 50 mg/m2 = 118 mg, Intravenous, Clinic/HOD 1 time, 1 of 6 cycles  Administration: 118 mg (7/29/2024)  vinCRIStine (ONCOVIN) 2 mg in 0.9% NaCl 65 mL chemo infusion, 2 mg (100 % of original dose 2 mg), Intravenous, Clinic/HOD 1 time, 1 of 6 cycles  Dose modification: 2 mg (original dose 2 mg, Cycle 1)  Administration: 2 mg (7/29/2024)  cycloPHOSphamide 750 mg/m2 = 1,760 mg in 0.9% NaCl 293.8 mL chemo infusion, 750 mg/m2 = 1,760 mg, Intravenous, Clinic/HOD 1 time, 1 of 6 cycles  Administration: 1,760 mg (7/29/2024)  riTUXimab-pvvr (RUXIENCE) 900 mg in 0.9% NaCl 900 mL infusion (conc: 1 mg/mL), 881 mg, Intravenous, Clinic/HOD 1 time, 1 of 6 cycles  Administration: 900 mg (7/29/2024)     43-year-old lady, referred from [facility], with diffuse large B-cell lymphoma of  thyroid.      Past medical history:  Adrenal mass; anxiety; arthritis; bradycardia; depression; NIDDM; gastroparesis; GERD; hip pain; hypertension; menstrual cramps; ovarian cyst; palpitations; pyosalpingitis; hypothyroidism, history of opioid abuse (on 2024, she tells me that she used opioids for 10-15 years; apparently, prescription opioids; on buprenorphine with a psychiatrist for last 2 years)  history of opiate abuse (maintained on Suboxone), history of C difficile colitis (unable to tolerate C difficile treatment); diagnosed with unspecified bipolar disorder during hospitalization at our [facility] [place], [facility] (enteritis, left pelvic adnexal fluid collection, PID, etc.); history of dysphagia, S/P EGD and dilatation; history of smoking (quit )  -2023: EGD:  Mild gastric erythema with scattered erosions: Pathology:  Small bowel biopsy:  Normal small bowel mucosa; stomach biopsy:  Mild superficial chronic gastritis, negative for H pylori)  -2023: Colonoscopy:  Normal:  Pathology:  Colon, random sites, biopsy:  Normal colonic mucosa  -08/10/2020: MRI abdomen with and without contrast (comparison:  CT 2023) (adrenal gland protocol):  Small left adrenal nodule stable, most compatible with adenoma (1 cm)  Procedure/surgical history:  Endometrial ablation; ; cholecystectomy; dissection of neck, left 05/10/2024; thyroidectomy 05/10/2024; tubal ligation; tonsillectomy  Social history:  In her relationship.  Lives in [place], [place].  Has 3 children.  Does not work.  Has not worked in 1 year.  Before that, used to clean houses.  Smoked a pack of cigarettes daily for 15 years; quit smoking 1 year ago.  Social alcohol. History of opioid abuse (on 2024, she tells me that she used opioids for 10-15 years; apparently, prescription opioids; on buprenorphine with a psychiatrist for last 2 years)  Family history:  Positive for von Willebrand disease in her  son.  Health maintenance:  -11/16/2022:  Bilateral screening mammogram:  BI-RADS 2-benign  -according to her, colonoscopy performed as outpatient by Nawaf Le, showed precancerous colon polyp.      Records reviewed:  05/21/2024:  [facility]NT office note:  Fred Berman is a 43 y.o. female referred for a tongue lesion.   She reports that she first noticed it about a year ago and says that it has slowly been enlarging since then. It has never bled. She doesn't think it hurts but does bite it occasionally which makes it sore. It's never been biopsied. She says that her dentist told her that he was not going to do any more work on her teeth until she got it checked out.   She also feels like she has developed allergies over the past few months.  She endorses frequent tearing and redness of her eyes in feels like her nose is running frequently.  She also endorses nasal congestion.  She is been using Allegra and does not think it is helped any.  She has not tried Flonase.  She does use Afrin she thinks maybe once a month.  She was diagnosed with a sinus infection over the summer and got a few days of antibiotics.  She denies facial pain or pressure but does note that when she wakes up in the morning she feels like the outside of her nose is swollen over the bridge of her nose.    She also endorses facial numbness bilaterally in all 3 distributions which has been going on for some time.  She reports that her PCP is work this up with an MRI scan.    She is also been dealing with intermittent dysphagia.  She feels like things are not going down, both solids and liquids.  This does not happen with every meal but comes and goes.    She follows with Gastroenterology and has had EGDs and dilations in the past.  She reports that she has another 1 scheduled in November.    As far as her voice goes, she notes intermittent dysphonia where her voice gets roughed and then returns to normal.  It usually comes back to  normal on its own.  She is on Protonix daily for gastritis that has been seen on prior scopes she reports.    As far as other medical problems she has diabetes, hypertension, gastroparesis, anxiety and depression.  For past surgical history she is had a cholecystectomy and .  She is had her tonsils removed but no other history of head and neck surgery.  She is a former smoker and quit 1 year ago.  She does not drink and denies any other drug use.     2024:  Presents today in follow up.  Eager for total thyroidectomy given results of FNA.  Has multiple concerns for surgical standpoint.  She would like to stay at least 1 night due to family history of von Willebrand's disease.  She also takes Suboxone and would like to talk to the anesthesia team regarding use of opioids.  She will stop taking this she does prior to surgery and is only allowed 1 week of narcotics postop.  She states that she sees endocrinology for diabetes and thyroid dysfunction.  She is on at least 200 mcg of Synthroid already and her sugars are often uncontrolled.   2024:  Presents today for 1st postop appointment after a left hemithyroidectomy in recurrent nerve sacrificed on May 10th.  Reports healing well with no pain. Hasn't taken any narcotics. Anxious about pathology results. Still having issues with oral mucositis.   Postop FFL:  Left TVC paralysis (preop FFL documented vocal cord motion)      Clinical events/investigations reviewed:  -2023:  CT neck and chest with contrast (evaluate thyroid mass) right lobe thyroid gland markedly atrophic; left lobe of the thyroid demonstrates a heterogeneous appearance with heterogeneous contrast enhancement with no obvious, dominant, or worrisome mass appreciated  -2024:  Tongue, biopsy: Oral fibroma  -2024: Ultrasound thyroid (nontoxic single thyroid nodule) (comparison: Thyroid ultrasound 2023, CT neck 2023):  Large lobulated hypoechoic mass lesion at the  level of the left lobe of the thyroid, new from prior exam (maybe related to thyroid mass or conglomerate lymphadenopathy)  -03/08/2024: CT soft tissues of the neck with contrast (localized swelling, mass, lumps) (comparison:  Ultrasound 02/14/2024; CT neck and chest): Increasing hypodense soft tissue in the left thyroid bed compared to 08/04/2023 (2.1 x 2.7 x 4.2 cm); minimal mass effect on the trachea; no definite cervical lymphadenopathy  -03/28/2024:  FNA left thyroid nodule:  Atypical cells of undetermined significance (numerous lymphocytes with a significant large lymphocytes population, admixed with follicular oncocytic cells and atypical follicle cell clusters, etc., suspicious for Hashimoto's thyroiditis, etc.) (Thyroseq V3 GC positive; probability of cancer or NIFTP intermediate-high, 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 Thyroid, left hemithyroid, excision:  Diffuse large B-cell lymphoma, germinal center B-cell phenotype; BCL 2 positive/MYC negative by IHC (not a double expressor); negative for MYC gene rearrangement by interface FISH (not double hit lymphoma) (morphologically, the thyroid is extensive involved by a diffuse proliferation of large atypical lymphoid cells with irregular nuclear contours, vesicular chromatin, and ample cytoplasm)  2. Paratracheal tissue, biopsy:  Reactive lymphoid tissue   3. Left central neck dissection:  13 benign lymph nodes; no malignancy      Labs reviewed:  -05/11/2024: CBC normal  -05/11/2024:  CMP normal except glucose 229 mg/dL    03/28/2024:  Thyroglobulin antibody:  150 IU /ml, elevated (reference Range:  < 1.8)  03/28/2024:  Thyroglobulin, tumor Marker:  < 0.1 (reference Range:  Athyroid < 0.1; intact thyroid </= 33)    -06/16/2023: TTE:  LVEF 55%    06/18/2024:  Pleasant lady who  presents for initial medical oncology consultation.  In no acute discomfort.  Feels poorly.  Main complaint is in in the back and hips for last 1 year.  History of opioid abuse.  On buprenorphine with a psychiatrist for last years.  Drenching sweats all the time.  Has to change clothes.  Lost 50 lb unintentionally in 2023, apparently due to gastroparesis side effects of Ozempic; regained her weight back after Ozempic was discontinued.    ECOG 1.  No recurrent fevers.  Fatigue.  Generalized weakness.  Night sweats and hot flashes.  Sweats all the time for 1 year.  Bones hurt all the time.  Occasional chest pains and leg swelling as well.  Exertional dyspnea.  Some constipation.  Some nausea.  Great appetite.    Interval History:  [No matching plan found]   OP LYM RCHOP (rituximab, cycloPHOSphamide, DOXOrubicin, vinCRIStine, predniSONE) Q3W   07/30/2024:   -S/P tubal ligation in the past  -07/23/2024:  Bone marrow aspiration and core biopsy: Report pending  -R-CHOP started 07/29/2024  -experienced allergic reaction to Rituxan; managed with corticosteroids and antihistamines.  -07/29/2024: CBC unremarkable; CO2 31, elevated; glucose 345; AST 47, ALT 59; rest of CMP unremarkable  Presents for a follow-up visit, accompanied by a male .  In no acute discomfort.  Says that allergic reaction to Rituxan that she experienced yesterday, has resolved.  She took over-the-counter Benadryl last night.  Some weakness, fatigue, night sweats, hot flashes, chest pain, dyspnea, nausea, and constipation.  In no acute distress at this time.    Medications:  Current Outpatient Medications on File Prior to Visit   Medication Sig Dispense Refill    allopurinoL (ZYLOPRIM) 100 MG tablet Take 1 tablet (100 mg total) by mouth 2 (two) times daily. for 14 days 28 tablet 0    buprenorphine HCL (SUBUTEX) 8 mg Subl Place 8 mg under the tongue 3 (three) times daily.      calcium carbonate/vitamin D3 (CALCIUM 600 + D,3, ORAL) Take 1 tablet by  "mouth 2 (two) times a day.      COMPOUND HORMONE REPLACEMENT Take by mouth once daily.      hydrocortisone (ANUSOL-HC) 2.5 % rectal cream Place 1 application  rectally 2 (two) times daily.      insulin (LANTUS SOLOSTAR U-100 INSULIN) glargine 100 units/mL SubQ pen Inject 15 Units into the skin 2 (two) times daily. (Patient taking differently: Inject 90 Units into the skin 2 (two) times a day.) 27 mL 3    insulin lispro 100 unit/mL pen Inject 35 Units into the skin 3 (three) times daily. With meals      levothyroxine (SYNTHROID) 200 MCG tablet Take 1 tablet (200 mcg total) by mouth before breakfast. 30 tablet 11    LORazepam (ATIVAN) 0.5 MG tablet Take 1 mg by mouth every 6 (six) hours as needed for Anxiety.      losartan (COZAAR) 50 MG tablet Take 1 tablet (50 mg total) by mouth once daily. 90 tablet 3    OLANZapine (ZYPREXA) 5 MG tablet Take 1 tablet by mouth nightly on days 1-3 of each chemotherapy cycle. 3 tablet 5    ondansetron (ZOFRAN-ODT) 4 MG TbDL Take 1 tablet (4 mg total) by mouth every 8 (eight) hours as needed (NAUSEA). 30 tablet 1    pen needle, diabetic (BD ULTRA-FINE ROSALEE PEN NEEDLE) 32 gauge x 5/32" Ndle 1 each by Misc.(Non-Drug; Combo Route) route 2 (two) times a day. 100 each 9    polyethylene glycol (GLYCOLAX) 17 gram PwPk Take 17 g by mouth 2 (two) times daily. 30 each 1    predniSONE (DELTASONE) 50 MG Tab Take 2 tablets (100 mg total) by mouth once daily. On days 2-5 of your chemotherapy cycles. Take with food. 8 tablet 5    prochlorperazine (COMPAZINE) 5 MG tablet Take 2 tablets (10 mg total) by mouth every 6 (six) hours as needed for Nausea. 20 tablet 5    acetaminophen (TYLENOL) 500 MG tablet Take 2 tablets (1,000 mg total) by mouth every 6 (six) hours. (Patient not taking: Reported on 7/9/2024) 60 tablet 0    lubiprostone (AMITIZA) 24 MCG Cap Take 24 mcg by mouth daily with breakfast. (Patient not taking: Reported on 5/21/2024)      metFORMIN (GLUCOPHAGE) 500 MG tablet Take 1 tablet (500 mg " total) by mouth 2 (two) times daily with meals. (Patient not taking: Reported on 5/21/2024) 180 tablet 1    MOTEGRITY 2 mg Tab Take 1 tablet by mouth. (Patient not taking: Reported on 5/21/2024)      naloxegoL (MOVANTIK) 25 mg tablet Take 25 mg by mouth once daily. (Patient not taking: Reported on 1/23/2024) 30 tablet 2     Current Facility-Administered Medications on File Prior to Visit   Medication Dose Route Frequency Provider Last Rate Last Admin    [COMPLETED] acetaminophen tablet 650 mg  650 mg Oral 1 time in Clinic/HOD Virgil Collins MD   650 mg at 07/29/24 0936    [COMPLETED] cycloPHOSphamide 750 mg/m2 = 1,760 mg in 0.9% NaCl 293.8 mL chemo infusion  750 mg/m2 (Treatment Plan Recorded) Intravenous 1 time in [facility] [place] Virgil Collins MD   Stopped at 07/29/24 1823    dextrose 10% bolus 125 mL 125 mL  12.5 g Intravenous PRN Chuckie Saenz MD        dextrose 10% bolus 250 mL 250 mL  25 g Intravenous PRN Chuckie Saenz MD        [COMPLETED] diphenhydrAMINE injection 25 mg  25 mg Intravenous 1 time in [facility] [place] Virgil Collins MD   25 mg at 07/29/24 0936    [COMPLETED] diphenhydrAMINE injection 50 mg  50 mg Intravenous Once PRN Virgil Collins MD   50 mg at 07/29/24 1343    [COMPLETED] DOXOrubicin chemo injection 118 mg  50 mg/m2 (Treatment Plan Recorded) Intravenous 1 time in [facility] [place] Virgil Collins MD   Stopped at 07/29/24 1742    [COMPLETED] famotidine (PF) injection 20 mg  20 mg Intravenous 1 time in [facility] [place] Virgil Collins MD   20 mg at 07/29/24 0939    glucagon (human recombinant) injection 1 mg  1 mg Intramuscular PRN Chuckie Saenz MD        [COMPLETED] hydrocortisone sodium succinate injection 100 mg  100 mg Intravenous Once PRN Virgil Collins MD   100 mg at 07/29/24 1238    [COMPLETED] palonosetron injection 0.25 mg  0.25 mg Intravenous Once Virgil Collins MD   0.25 mg at 07/29/24 1720    [COMPLETED] riTUXimab-pvvr (RUXIENCE) 900 mg in 0.9% NaCl 900 mL infusion (conc:  1 mg/mL)  900 mg Intravenous 1 time in Clinic/HOD Virgil Collins MD   Stopped at 07/29/24 1719    [COMPLETED] vinCRIStine (ONCOVIN) 2 mg in 0.9% NaCl 65 mL chemo infusion  2 mg Intravenous 1 time in Clinic/HOD Virgil Collins MD   Stopped at 07/29/24 1729    [DISCONTINUED] 0.9% NaCl 250 mL flush bag   Intravenous PRN Virgil Collins MD   Stopped at 07/29/24 1825    [DISCONTINUED] alteplase injection 2 mg  2 mg Intra-Catheter PRN Virgil Collins MD        [DISCONTINUED] EPINEPHrine (EPIPEN) 0.3 mg/0.3 mL pen injection 0.3 mg  0.3 mg Intramuscular Once PRN Virgil Collins MD        [DISCONTINUED] heparin, porcine (PF) 100 unit/mL injection flush 500 Units  500 Units Intravenous PRN Virgil Collins MD   500 Units at 07/29/24 1825    [DISCONTINUED] meperidine (PF) injection 25 mg  25 mg Intravenous PRN Virgil Collins MD        [DISCONTINUED] palonosetron 0.25mg/dexAMETHasone 12mg in NS IVPB 0.25 mg 50 mL  0.25 mg Intravenous 1 time in Clinic/HOD Virgil Collins MD        [DISCONTINUED] prochlorperazine injection Soln 10 mg  10 mg Intravenous Once PRN Virgil Collins MD        [DISCONTINUED] sodium chloride 0.9% flush 10 mL  10 mL Intravenous PRN Virgil Collins MD   10 mL at 07/29/24 1825       Review of Systems:   All systems reviewed and found to be negative except for the symptoms detailed above    Physical Examination:   VITAL SIGNS:   Vitals:    07/30/24 0835   BP: 111/76   Pulse: 74   Resp: 20   Temp: 98.5 °F (36.9 °C)         GENERAL:  In no apparent distress.    HEAD:  No signs of head trauma.  EYES:  Pupils are equal.  Extraocular motions intact.    EARS:  Hearing grossly intact.  MOUTH:  Oropharynx is normal.   NECK:  No adenopathy, no JVD.     CHEST:  Chest with clear breath sounds bilaterally.  No wheezes, rales, rhonchi.    CARDIAC:  Regular rate and rhythm.  S1 and S2, without murmurs, gallops, rubs.  VASCULAR:  No Edema.  Peripheral pulses normal and equal in all  extremities.  ABDOMEN:  Soft, without detectable tenderness.  No sign of distention.  No   rebound or guarding, and no masses palpated.   Bowel Sounds normal.  MUSCULOSKELETAL:  Good range of motion of all major joints. Extremities without clubbing, cyanosis or edema.    NEUROLOGIC EXAM:  Alert and oriented x 3.  No focal sensory or strength deficits.   Speech normal.  Follows commands.  PSYCHIATRIC:  Mood normal.  -07/09/2024:  Erythema around MediPort site; mild tenderness; no fluctuance; no tunnelitis    Results for orders placed or performed during the hospital encounter of 05/10/24   CBC Auto Differential    Narrative    The following orders were created for panel order CBC Auto Differential.  Procedure                               Abnormality         Status                     ---------                               -----------         ------                     CBC with Differential[[id number]]                           Final result                 Please view results for these tests on the individual orders.   CBC with Differential   Result Value Ref Range    WBC 8.70 4.50 - 11.50 x10(3)/mcL    RBC 4.72 4.20 - 5.40 x10(6)/mcL    Hgb 13.4 12.0 - 16.0 g/dL    Hct 40.5 37.0 - 47.0 %    MCV 85.8 80.0 - 94.0 fL    MCH 28.4 27.0 - 31.0 pg    MCHC 33.1 33.0 - 36.0 g/dL    RDW 12.6 11.5 - 17.0 %    Platelet 171 130 - 400 x10(3)/mcL    MPV 9.3 7.4 - 10.4 fL    Neut % 71.4 %    Lymph % 21.0 %    Mono % 7.0 %    Eos % 0.3 %    Basophil % 0.1 %    Lymph # 1.83 0.6 - 4.6 x10(3)/mcL    Neut # 6.20 2.1 - 9.2 x10(3)/mcL    Mono # 0.61 0.1 - 1.3 x10(3)/mcL    Eos # 0.03 0 - 0.9 x10(3)/mcL    Baso # 0.01 <=0.2 x10(3)/mcL    IG# 0.02 0 - 0.04 x10(3)/mcL    IG% 0.2 %    NRBC% 0.0 %     Results for orders placed or performed during the hospital encounter of 05/10/24   Comprehensive Metabolic Panel   Result Value Ref Range    Sodium 140 136 - 145 mmol/L    Potassium 4.3 3.5 - 5.1 mmol/L    Chloride 102 98 - 107 mmol/L    CO2 28  22 - 29 mmol/L    Glucose 229 (H) 74 - 100 mg/dL    Blood Urea Nitrogen 9.1 7.0 - 18.7 mg/dL    Creatinine 0.75 0.55 - 1.02 mg/dL    Calcium 9.7 8.4 - 10.2 mg/dL    Protein Total 6.8 6.4 - 8.3 gm/dL    Albumin 3.8 3.5 - 5.0 g/dL    Globulin 3.0 2.4 - 3.5 gm/dL    Albumin/Globulin Ratio 1.3 1.1 - 2.0 ratio    Bilirubin Total 1.1 <=1.5 mg/dL    ALP 56 40 - 150 unit/L    ALT 40 0 - 55 unit/L    AST 31 5 - 34 unit/L    eGFR >60 mL/min/1.73/m2       Assessment:  Problem List Items Addressed This Visit          Neuro    Chronic pain syndrome - Primary       Psychiatric    Generalized anxiety disorder    Panic attack    Anxiety    History of opioid abuse       ENT    Vocal cord paralysis       Hematology    Family history of von Willebrand disease       Oncology    Diffuse large B-cell lymphoma of solid organ excluding spleen    Overview     Diffuse large B-cell lymphoma of thyroid gland.         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   -03/28/2024:  Thyroglobulin antibody 150 IU/mL, elevated (reference Range:  < 1.8)  -03/28/2024: Thyroglobulin, tumor marker: < 0.1 (reference Range:  Athyroid < 0.1; intact thyroid </= 33)  -left hemithyroidectomy with left central neck dissection level 6 (05/10/2024):   Diffuse large B-cell lymphoma left oscar thyroid, germinal center B-cell type, not a double expressor, not double hit lymphoma; morphologically, thyroid extensively  involved; paratracheal tissue biopsy negative; left central neck dissection 13 benign lymph nodes,  -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 during left hemithyroidectomy 05/10/2024, noted on FFL exam by ENT  -06/18/2024: ECOG 1; chronic fatigue; sweats all the time; no consistent weight loss; appetite is great  -06/18/2024: CBC, CMP essentially unremarkable   -06/18/2024: LDH normal, beta 2 microglobulin normal  -06/18/2024: Hep B core antibody total, hep B surface antigen, hep C antibody, HIV: Nonreactive  -FDG PET-CT 06/18/2024:  No other sites of disease  -TTE 06/26/2024: LVEF 55-60%  -right IJ MediPort placed 07/08/2024      Family history of von Willebrand disease:  -04/25/2024:  Von Willebrand factor activity 75%, normal      Oral fibroma of tongue:   -tongue lesion: 1st noted around 02/2023; slowly enlarging since; no bleeding; no pain  -tongue biopsy 02/08/2024: Oral fibroma      Comorbid medical conditions:  Anxiety, depression, NIDDM, gastroparesis, hypertension, history of PID  Hypothyroidism  History of opioid abuse, maintained on Suboxone  History of dysphagia, S/P endoscopic dilatation         Plan:   Continue R-CHOP every 3 weeks   Awaiting bone marrow biopsy report   Check CBC and CMP weekly   Check LDH and uric acid level weekly   Allopurinol 100 mg p.o. b.i.d.   Re-stage with FDG PET-CT after 3 cycles of chemotherapy  Follow-up with NP in 2 weeks  ------------------------------------------      -diffuse large B-cell lymphoma of left thyroid lobe, S/P left thyroidectomy 05/10/2024  -06/18/2024: CBC, CMP essentially unremarkable   -06/18/2024: LDH normal, beta 2 microglobulin normal  -06/18/2024: Hep B core antibody total, hep B surface antigen, hep C antibody, HIV: Nonreactive  -FDG PET-CT 06/18/2024:  No other sites of disease  -06/26/2024: Bilateral diagnostic mammogram, bilateral limited ultrasound (comparison: 11/16/2022) (indication: Bilateral  lateral breast pain, right nipple pain, right nipple white/yellow discharge):  Right breast BI-RADS 2; left breast BI-RADS 1  -TTE 06/26/2024: LVEF 55-60%  -right IJ MediPort placed 07/08/2024  -S/P tubal ligation in the past  -bone marrow evaluation 07/23/2024: Report pending   -R-CHOP started 07/29/2024; experienced allergic reaction to rituximab; managed with corticosteroids and antihistamines  >>>  -continue R-CHOP every 3 weeks   -awaiting bone marrow aspiration and core biopsy report  -S/P tubal ligation in the past   -check CBC and CMP weekly  -check LDH and uric acid level now  -allopurinol 200 mg p.o. b.i.d. for TLS prophylaxis  Re-stage with FDG PET-CT after 3 cycles of R-CHOP  -aggressive oral hydration prevent hemorrhagic cystitis with cyclophosphamide  -monitor for neurotoxicity with vincristine  -treatment should be delayed until ANC is> 1500 mm3 and platelet count> 100 K mm3    R-CHOP: Monitoring parameters:  CBC with differential and platelet count weekly during treatment.  Assess basic metabolic panel (creatinine and electrolytes) and liver function prior to each subsequent treatment cycle.  LVEF should be evaluated periodically based on LVEF at initiation of therapy and cumulative dose of doxorubicin.  Carriers of hepatitis B or C should be monitored for clinical and laboratory signs of active infection during and following completion of therapy. Rituximab should be discontinued if reactivation occurs.    If bone marrow examination is negative, then, for stage I disease, treatment plan will be as follows:  -06/18/2024: Initial consultation:  ECOG 1; sweats all the time  -IPI:  Low (0)  -age adjusted IP!:  Low (0)  -stage modified IPI (smIPI) low (0)  -NCCN IPI:  Low (1, by virtue of age 43)  -prognostic model to assess the risk of CNS disease (CNS IPI):  Low risk (0)    Assuming stage I disease:  -nonbulky  -smIPI 0  -recommendation:  R-CHOP x3 cycles;   -followed by interim staging with PET-CT:    1. If complete response (PET negative, i.e., 5-PS 1-3), then:  R-CHOP x1 cycle (total of 4 cycles); or ISRT, followed by surveillance  2. If partial response (PET positive, i.e., 5-PS for), then:  Repeat biopsy, etc.  3. If progressive disease (PET positive, i.e.,5-PS 5), then:  Repeat biopsy, etc.    TLS prophylaxis:  Allopurinol beginning 2-3 days prior to chemo immunotherapy and continued for 10-14 days  (Allopurinol: 100 mg per m2 every 8 hours, maximum 800 mg per day)    R-CHOP:  -Patients receiving cyclophosphamide should maintain adequate oral hydration (2 to 3 L/day) and void frequently to reduce risk of hemorrhagic cystitis.  -The risk of febrile neutropenia with this regimen is 10 to 20%; primary prophylaxis with hematopoietic growth factors should be considered on an individual basis, particularly for high-risk patients such as those with preexisting neutropenia, advanced disease, poor performance status, or patients age 65 years or older.  -Adjustment of initial cyclophosphamide, doxorubicin, and vincristine doses may be needed for preexisting liver dysfunction.  In addition, dose adjustment of cyclophosphamide may be required for kidney dysfunction.  -LVEF should be evaluated prior to initiation of therapy. Dose alterations should be considered for LVEF <50%, and doxorubicin therapy is contraindicated in patients with LVEF <30% at initiation. Infusion times and schedule may be adjusted to decrease the risk of cardiotoxicity in individuals at high risk for its development.  -Neurotoxicity: Vincristine may cause constipation, and in severe cases, paralytic ileus. A routine prophylactic regimen against constipation is recommended in all patients receiving vincristine.    R-CHOP:  Dose modifications for myelotoxicity and neuropathy:  # Treatment should be delayed until ANC is >1500/microL and platelet count is >100,000/microL.  # if a patient develops grade 4 (ANC <500/microL) neutropenia or febrile  neutropenia with any cycle, G-CSF support is added to the regimen for subsequent cycles.  # If grade 4 neutropenia or febrile neutropenia occurs despite G-CSF support, or if the patient develops grade 3 (25,000 to 50,000/microL) or 4 (<25,000/microL) thrombocytopenia with any cycle, the doses of cyclophosphamide and doxorubicin should be decreased by 50% for subsequent cycles.  # Neuropathy: Dose adjustment of vincristine may be necessary if the severity of neuropathy persists or worsens. No specific guidelines are available for dose adjustments     If bone marrow is negative for lymphoma, then, stage I disease    Family history of von Willebrand disease   -04/25/2024: Von Willebrand factor activity 75%, normal    Follow-up in 2 weeks, with results of requested testing.    Above discussed at length with the patient.  All questions answered.    Plan of management discussed.  She understands and agrees with this plan.      Follow-up:  No follow-ups on file.

## 2024-07-30 NOTE — NURSING
SARTHAK Batres met with patient for resource session in Infusion Clinic.Reviewed RN Medardo contact information and role in patient's care. Patient reported her level of distress has improved but was anxious this morning before starting treatment. Reports she has resources of Medicaid benefits and has not received any update on her SSDI . Social support reported as good. States she has been having virtual visits with her  provider. Resource folder with written information of community and cancer resources, disability benefits, nutritional hints during cancer treatment, and chemo side effect guide given to patient. Spent additional time on signs of infection, infection prevention through good hand hygiene and wearing mask, adequate nutrition/hydration, skin care along with sunscreen, and oral care. Discussed communication process for some common scenarios in which patient should call provider for guidance vs. immediately report to the emergency room. Informed of [facility] and [employer] referral that she may need to utilize during the course of her treatment. Patient verbalized understanding and agreed to be referred. Referral and order for nutritional supplement sent to [facility]. Patient agrees to contact SARTHAK Batres if any needs or concerns arise.     Oncology Navigation   Intake  Cancer Type: Lymphoma  Appointment Date: 06/18/24  Start of Treatment: 07/29/24     Treatment  Current Status: Staging work-up; Active    Surgical Oncologist: Dr. Trujillo  Type of Surgery: left hemithyroidectomy  Surgery Schedule Date: 05/10/24    Medical Oncologist: Virgil Collins MD  Consult Date: 06/18/24  Chemotherapy: Planned  Chemotherapy Regimen: pending test results       Procedures: Biopsy; PET scan; Port / PICC; Echo  Echo Schedule Date: 06/25/24  PET Scan Schedule Date: 06/18/24  Port / PICC Schedule Date: 06/20/24                 Acuity  Treatment Tolerability: Has not started treatment yet/treatment  fully completed and side effects resolved  ECO  Comorbidities in Medical History: 2  Hospitalization Within the Past Month: 0   Needed: 0  Support: 0  Verbalizes Financial Concerns: 0  Transportation: 0  Mental Health: PHQ Score: 1  History of noncompliance/frequent no shows and cancellations: 0  Verbalizes the need for more education: 0  Navigation Acuity: 1     Follow Up  No follow-ups on file.

## 2024-07-30 NOTE — Clinical Note
Continue R-CHOP every 3 weeks  Awaiting bone marrow biopsy report  Check CBC and CMP weekly  Check LDH and uric acid level weekly  Allopurinol 100 mg p.o. b.i.d.  Re-stage with FDG PET-CT after 3 cycles of chemotherapy Follow-up with NP in 2 weeks

## 2024-08-01 ENCOUNTER — TELEPHONE (OUTPATIENT)
Dept: HEMATOLOGY/ONCOLOGY | Facility: CLINIC | Age: 44
End: 2024-08-01
Payer: MEDICAID

## 2024-08-01 NOTE — TELEPHONE ENCOUNTER
patient called and spoke with ced cabrales, supervisor stating that last night her face broke out in a red rash. stated that it felt like her face was on fire. patient also c/o sharp pain in her chest. Dr. Collins notified. explained to patient to stop allopurinol and if symptoms persist to go to emergency room. patient voices understanding.

## 2024-08-02 ENCOUNTER — TELEPHONE (OUTPATIENT)
Dept: HEMATOLOGY/ONCOLOGY | Facility: CLINIC | Age: 44
End: 2024-08-02
Payer: MEDICAID

## 2024-08-02 ENCOUNTER — PATIENT MESSAGE (OUTPATIENT)
Dept: HEMATOLOGY/ONCOLOGY | Facility: CLINIC | Age: 44
End: 2024-08-02
Payer: MEDICAID

## 2024-08-02 DIAGNOSIS — C83.39 DIFFUSE LARGE B-CELL LYMPHOMA OF SOLID ORGAN EXCLUDING SPLEEN: Primary | ICD-10-CM

## 2024-08-02 NOTE — TELEPHONE ENCOUNTER
Called patient mother Adelina Santos to confirm patient appointment for 8/5/24. Asked to speak with patient but patient was sleeping. Spoke with patient mother. Moved up patient appointment time to 12:00 pm labs and 1:00 pm NP visit. Informed patient mother clinic will move up appointment time. Patient mother Adelina Santos confirmed appointment and verbalized understanding.

## 2024-08-02 NOTE — TELEPHONE ENCOUNTER
Called patient to confirm appointment for 8/5/24. Patient did not answer the phone and was unable to leave a voice message.

## 2024-08-05 ENCOUNTER — TELEPHONE (OUTPATIENT)
Dept: HEMATOLOGY/ONCOLOGY | Facility: CLINIC | Age: 44
End: 2024-08-05
Payer: MEDICAID

## 2024-08-06 ENCOUNTER — OFFICE VISIT (OUTPATIENT)
Dept: HEMATOLOGY/ONCOLOGY | Facility: CLINIC | Age: 44
End: 2024-08-06
Payer: MEDICAID

## 2024-08-06 ENCOUNTER — APPOINTMENT (OUTPATIENT)
Dept: HEMATOLOGY/ONCOLOGY | Facility: CLINIC | Age: 44
End: 2024-08-06
Payer: MEDICAID

## 2024-08-06 VITALS
HEIGHT: 67 IN | HEART RATE: 82 BPM | TEMPERATURE: 99 F | WEIGHT: 267.63 LBS | SYSTOLIC BLOOD PRESSURE: 137 MMHG | BODY MASS INDEX: 42 KG/M2 | DIASTOLIC BLOOD PRESSURE: 75 MMHG | OXYGEN SATURATION: 94 %

## 2024-08-06 DIAGNOSIS — G89.4 CHRONIC PAIN SYNDROME: ICD-10-CM

## 2024-08-06 DIAGNOSIS — K06.8 BLEEDING GUMS: ICD-10-CM

## 2024-08-06 DIAGNOSIS — C83.39 DIFFUSE LARGE B-CELL LYMPHOMA OF SOLID ORGAN EXCLUDING SPLEEN: Primary | ICD-10-CM

## 2024-08-06 DIAGNOSIS — Z09 CHEMOTHERAPY FOLLOW-UP EXAMINATION: ICD-10-CM

## 2024-08-06 DIAGNOSIS — C85.99 MALIGNANT LYMPHOMA OF THYROID GLAND: ICD-10-CM

## 2024-08-06 DIAGNOSIS — C83.39 DIFFUSE LARGE B-CELL LYMPHOMA OF SOLID ORGAN EXCLUDING SPLEEN: ICD-10-CM

## 2024-08-06 DIAGNOSIS — K13.79 MOUTH SORES: ICD-10-CM

## 2024-08-06 LAB
ALBUMIN SERPL-MCNC: 3.7 G/DL (ref 3.5–5)
ALBUMIN/GLOB SERPL: 1.2 RATIO (ref 1.1–2)
ALP SERPL-CCNC: 87 UNIT/L (ref 40–150)
ALT SERPL-CCNC: 52 UNIT/L (ref 0–55)
ANION GAP SERPL CALC-SCNC: 7 MEQ/L
AST SERPL-CCNC: 24 UNIT/L (ref 5–34)
BASOPHILS # BLD AUTO: 0.01 X10(3)/MCL
BASOPHILS NFR BLD AUTO: 0.3 %
BILIRUB SERPL-MCNC: 0.4 MG/DL
BUN SERPL-MCNC: 8.4 MG/DL (ref 7–18.7)
CALCIUM SERPL-MCNC: 9.6 MG/DL (ref 8.4–10.2)
CHLORIDE SERPL-SCNC: 98 MMOL/L (ref 98–107)
CO2 SERPL-SCNC: 29 MMOL/L (ref 22–29)
CREAT SERPL-MCNC: 0.79 MG/DL (ref 0.55–1.02)
CREAT/UREA NIT SERPL: 11
EOSINOPHIL # BLD AUTO: 0.08 X10(3)/MCL (ref 0–0.9)
EOSINOPHIL NFR BLD AUTO: 2.3 %
ERYTHROCYTE [DISTWIDTH] IN BLOOD BY AUTOMATED COUNT: 12.8 % (ref 11.5–17)
GFR SERPLBLD CREATININE-BSD FMLA CKD-EPI: >60 ML/MIN/1.73/M2
GLOBULIN SER-MCNC: 3.2 GM/DL (ref 2.4–3.5)
GLUCOSE SERPL-MCNC: 314 MG/DL (ref 74–100)
HCT VFR BLD AUTO: 42.1 % (ref 37–47)
HGB BLD-MCNC: 13.8 G/DL (ref 12–16)
IMM GRANULOCYTES # BLD AUTO: 0.02 X10(3)/MCL (ref 0–0.04)
IMM GRANULOCYTES NFR BLD AUTO: 0.6 %
LDH SERPL-CCNC: 152 U/L (ref 125–220)
LYMPHOCYTES # BLD AUTO: 1.22 X10(3)/MCL (ref 0.6–4.6)
LYMPHOCYTES NFR BLD AUTO: 35.7 %
MCH RBC QN AUTO: 28.5 PG (ref 27–31)
MCHC RBC AUTO-ENTMCNC: 32.8 G/DL (ref 33–36)
MCV RBC AUTO: 87 FL (ref 80–94)
MONOCYTES # BLD AUTO: 0.05 X10(3)/MCL (ref 0.1–1.3)
MONOCYTES NFR BLD AUTO: 1.5 %
NEUTROPHILS # BLD AUTO: 2.04 X10(3)/MCL (ref 2.1–9.2)
NEUTROPHILS NFR BLD AUTO: 59.6 %
NRBC BLD AUTO-RTO: 0 %
PLATELET # BLD AUTO: 163 X10(3)/MCL (ref 130–400)
PMV BLD AUTO: 9.6 FL (ref 7.4–10.4)
POTASSIUM SERPL-SCNC: 4.2 MMOL/L (ref 3.5–5.1)
PROT SERPL-MCNC: 6.9 GM/DL (ref 6.4–8.3)
RBC # BLD AUTO: 4.84 X10(6)/MCL (ref 4.2–5.4)
SODIUM SERPL-SCNC: 134 MMOL/L (ref 136–145)
URATE SERPL-MCNC: 3.1 MG/DL (ref 2.6–6)
WBC # BLD AUTO: 3.42 X10(3)/MCL (ref 4.5–11.5)

## 2024-08-06 PROCEDURE — 4010F ACE/ARB THERAPY RXD/TAKEN: CPT | Mod: CPTII,,,

## 2024-08-06 PROCEDURE — 1159F MED LIST DOCD IN RCRD: CPT | Mod: CPTII,,,

## 2024-08-06 PROCEDURE — 80053 COMPREHEN METABOLIC PANEL: CPT

## 2024-08-06 PROCEDURE — 84550 ASSAY OF BLOOD/URIC ACID: CPT

## 2024-08-06 PROCEDURE — 36415 COLL VENOUS BLD VENIPUNCTURE: CPT

## 2024-08-06 PROCEDURE — 85025 COMPLETE CBC W/AUTO DIFF WBC: CPT

## 2024-08-06 PROCEDURE — 3078F DIAST BP <80 MM HG: CPT | Mod: CPTII,,,

## 2024-08-06 PROCEDURE — 99215 OFFICE O/P EST HI 40 MIN: CPT | Mod: S$PBB,,,

## 2024-08-06 PROCEDURE — 3075F SYST BP GE 130 - 139MM HG: CPT | Mod: CPTII,,,

## 2024-08-06 PROCEDURE — 83615 LACTATE (LD) (LDH) ENZYME: CPT

## 2024-08-06 PROCEDURE — 1160F RVW MEDS BY RX/DR IN RCRD: CPT | Mod: CPTII,,,

## 2024-08-06 PROCEDURE — 3046F HEMOGLOBIN A1C LEVEL >9.0%: CPT | Mod: CPTII,,,

## 2024-08-06 PROCEDURE — 3008F BODY MASS INDEX DOCD: CPT | Mod: CPTII,,,

## 2024-08-06 PROCEDURE — 99214 OFFICE O/P EST MOD 30 MIN: CPT | Mod: PBBFAC

## 2024-08-07 ENCOUNTER — DOCUMENTATION ONLY (OUTPATIENT)
Dept: REHABILITATION | Facility: HOSPITAL | Age: 44
End: 2024-08-07

## 2024-08-12 ENCOUNTER — DOCUMENTATION ONLY (OUTPATIENT)
Dept: HEMATOLOGY/ONCOLOGY | Facility: CLINIC | Age: 44
End: 2024-08-12
Payer: MEDICAID

## 2024-08-12 ENCOUNTER — DOCUMENTATION ONLY (OUTPATIENT)
Dept: INFUSION THERAPY | Facility: HOSPITAL | Age: 44
End: 2024-08-12
Payer: MEDICAID

## 2024-08-12 ENCOUNTER — LAB VISIT (OUTPATIENT)
Dept: HEMATOLOGY/ONCOLOGY | Facility: CLINIC | Age: 44
End: 2024-08-12
Payer: MEDICAID

## 2024-08-12 DIAGNOSIS — D70.1 CHEMOTHERAPY-INDUCED NEUTROPENIA: Primary | ICD-10-CM

## 2024-08-12 DIAGNOSIS — C83.39 DIFFUSE LARGE B-CELL LYMPHOMA OF SOLID ORGAN EXCLUDING SPLEEN: ICD-10-CM

## 2024-08-12 DIAGNOSIS — T45.1X5A CHEMOTHERAPY-INDUCED NEUTROPENIA: Primary | ICD-10-CM

## 2024-08-12 DIAGNOSIS — C85.99 MALIGNANT LYMPHOMA OF THYROID GLAND: ICD-10-CM

## 2024-08-12 DIAGNOSIS — G89.4 CHRONIC PAIN SYNDROME: ICD-10-CM

## 2024-08-12 PROBLEM — D70.9 NEUTROPENIA: Status: ACTIVE | Noted: 2024-08-12

## 2024-08-12 LAB
ALBUMIN SERPL-MCNC: 3.6 G/DL (ref 3.5–5)
ALBUMIN/GLOB SERPL: 1.2 RATIO (ref 1.1–2)
ALP SERPL-CCNC: 91 UNIT/L (ref 40–150)
ALT SERPL-CCNC: 49 UNIT/L (ref 0–55)
ANION GAP SERPL CALC-SCNC: 8 MEQ/L
AST SERPL-CCNC: 36 UNIT/L (ref 5–34)
BASOPHILS # BLD AUTO: 0.01 X10(3)/MCL
BASOPHILS NFR BLD AUTO: 0.5 %
BILIRUB SERPL-MCNC: 0.4 MG/DL
BUN SERPL-MCNC: 11.2 MG/DL (ref 7–18.7)
CALCIUM SERPL-MCNC: 9.5 MG/DL (ref 8.4–10.2)
CHLORIDE SERPL-SCNC: 99 MMOL/L (ref 98–107)
CO2 SERPL-SCNC: 30 MMOL/L (ref 22–29)
CREAT SERPL-MCNC: 0.77 MG/DL (ref 0.55–1.02)
CREAT/UREA NIT SERPL: 15
EOSINOPHIL # BLD AUTO: 0.03 X10(3)/MCL (ref 0–0.9)
EOSINOPHIL NFR BLD AUTO: 1.5 %
ERYTHROCYTE [DISTWIDTH] IN BLOOD BY AUTOMATED COUNT: 13.1 % (ref 11.5–17)
GFR SERPLBLD CREATININE-BSD FMLA CKD-EPI: >60 ML/MIN/1.73/M2
GLOBULIN SER-MCNC: 3.1 GM/DL (ref 2.4–3.5)
GLUCOSE SERPL-MCNC: 351 MG/DL (ref 74–100)
HCT VFR BLD AUTO: 38.9 % (ref 37–47)
HGB BLD-MCNC: 12.9 G/DL (ref 12–16)
IMM GRANULOCYTES # BLD AUTO: 0.03 X10(3)/MCL (ref 0–0.04)
IMM GRANULOCYTES NFR BLD AUTO: 1.5 %
LDH SERPL-CCNC: 156 U/L (ref 125–220)
LYMPHOCYTES # BLD AUTO: 1.29 X10(3)/MCL (ref 0.6–4.6)
LYMPHOCYTES NFR BLD AUTO: 64.2 %
MCH RBC QN AUTO: 29.1 PG (ref 27–31)
MCHC RBC AUTO-ENTMCNC: 33.2 G/DL (ref 33–36)
MCV RBC AUTO: 87.8 FL (ref 80–94)
MONOCYTES # BLD AUTO: 0.43 X10(3)/MCL (ref 0.1–1.3)
MONOCYTES NFR BLD AUTO: 21.4 %
NEUTROPHILS # BLD AUTO: 0.22 X10(3)/MCL (ref 2.1–9.2)
NEUTROPHILS NFR BLD AUTO: 10.9 %
NRBC BLD AUTO-RTO: 0 %
PLATELET # BLD AUTO: 251 X10(3)/MCL (ref 130–400)
PLATELET # BLD EST: ADEQUATE 10*3/UL
PMV BLD AUTO: 9.4 FL (ref 7.4–10.4)
POTASSIUM SERPL-SCNC: 3.7 MMOL/L (ref 3.5–5.1)
PROT SERPL-MCNC: 6.7 GM/DL (ref 6.4–8.3)
RBC # BLD AUTO: 4.43 X10(6)/MCL (ref 4.2–5.4)
RBC MORPH BLD: NORMAL
SODIUM SERPL-SCNC: 137 MMOL/L (ref 136–145)
URATE SERPL-MCNC: 3.2 MG/DL (ref 2.6–6)
WBC # BLD AUTO: 2.01 X10(3)/MCL (ref 4.5–11.5)

## 2024-08-12 PROCEDURE — 36415 COLL VENOUS BLD VENIPUNCTURE: CPT

## 2024-08-12 PROCEDURE — 80053 COMPREHEN METABOLIC PANEL: CPT

## 2024-08-12 PROCEDURE — 83615 LACTATE (LD) (LDH) ENZYME: CPT

## 2024-08-12 PROCEDURE — 85025 COMPLETE CBC W/AUTO DIFF WBC: CPT

## 2024-08-12 PROCEDURE — 84550 ASSAY OF BLOOD/URIC ACID: CPT

## 2024-08-12 RX ORDER — CIPROFLOXACIN 500 MG/1
500 TABLET ORAL EVERY 12 HOURS
Qty: 14 TABLET | Refills: 0 | Status: SHIPPED | OUTPATIENT
Start: 2024-08-12

## 2024-08-12 NOTE — PROGRESS NOTES
"Pt at [facility] for weekly labs; pt presented to Infusion Clinic stating she thinks her implanted port flipped last night when she did "push up like maneuver"; felt port site & could not feel the three nodules on top of port, attempted to access and hit metal/back of port device; informed REINA Rivers RN/Infusion Supervisor of same & instructed to notify providers via LPN/MA in Oncology Clinic; explained to pt that there is no harm to port or pt while the port is flipped; she will need to see Surgery Clinic to assess and will want to have it addressed so her chemo will stay on schedule; pt verbalized understanding.     Message sent:  Good morning - Fred Berman's mediport is flipped; I tried to access her port & couldn't; ; she has thyroid cancer, received C1 chemo & had tox check with Eduardo last week. Please let Eduardo know that pt needs a referral to surgery for flipped port, her next chemo is scheduled for 8/19/24 & her port will be to be addressed soon to keep her chemotherapy on schedule. Thank you.     Supriya Quiroz LPN response:  Eduardo is out today. I will let her know.  "

## 2024-08-12 NOTE — PROGRESS NOTES
Called patient to inform her that her ANC and WBC count was low and she is being placed under neutropenic precautions. Patient stated that she had seen that her levels were low on the portal. Educated patient about neutropenic precautions and to report to the ER if she develops a fever of 100.4 or greater. Per Stacy Ruff NP patient is to wear a mask at all times and she has called her in some antibiotics. Patient verbalized understanding and stated that she will go and  her antibiotics. Patient will also be starting Neupogen injections. Patient verbalized understanding.

## 2024-08-12 NOTE — PROGRESS NOTES
ANC 0.22  Neutropenic    Due to receive cycle 2 of RCHOP on 7/19/24      Start Neupogen 480mcg SQ x 5 days   Recheck CBC prior to injection on Friday 8/16/24      Neutropenic Precautions   Start Cipro 500mg po bid x 7 days-rx sent to pharmacy  Monitor for fever, Report to the ER for any fever >100.4

## 2024-08-13 ENCOUNTER — DOCUMENTATION ONLY (OUTPATIENT)
Dept: HEMATOLOGY/ONCOLOGY | Facility: CLINIC | Age: 44
End: 2024-08-13
Payer: MEDICAID

## 2024-08-13 ENCOUNTER — INFUSION (OUTPATIENT)
Dept: INFUSION THERAPY | Facility: HOSPITAL | Age: 44
End: 2024-08-13
Attending: INTERNAL MEDICINE
Payer: MEDICAID

## 2024-08-13 VITALS
BODY MASS INDEX: 42.35 KG/M2 | HEIGHT: 67 IN | TEMPERATURE: 98 F | OXYGEN SATURATION: 95 % | RESPIRATION RATE: 20 BRPM | SYSTOLIC BLOOD PRESSURE: 118 MMHG | WEIGHT: 269.81 LBS | HEART RATE: 80 BPM | DIASTOLIC BLOOD PRESSURE: 74 MMHG

## 2024-08-13 DIAGNOSIS — D70.1 CHEMOTHERAPY-INDUCED NEUTROPENIA: Primary | ICD-10-CM

## 2024-08-13 DIAGNOSIS — T45.1X5A CHEMOTHERAPY-INDUCED NEUTROPENIA: Primary | ICD-10-CM

## 2024-08-13 PROCEDURE — 96372 THER/PROPH/DIAG INJ SC/IM: CPT

## 2024-08-13 PROCEDURE — 63600175 PHARM REV CODE 636 W HCPCS: Mod: JZ,JB,JG | Performed by: NURSE PRACTITIONER

## 2024-08-13 RX ORDER — HEPARIN 100 UNIT/ML
500 SYRINGE INTRAVENOUS
OUTPATIENT
Start: 2024-08-19

## 2024-08-13 RX ORDER — EPINEPHRINE 0.3 MG/.3ML
0.3 INJECTION SUBCUTANEOUS ONCE AS NEEDED
OUTPATIENT
Start: 2024-08-19

## 2024-08-13 RX ORDER — PROCHLORPERAZINE EDISYLATE 5 MG/ML
10 INJECTION INTRAMUSCULAR; INTRAVENOUS ONCE AS NEEDED
OUTPATIENT
Start: 2024-08-19

## 2024-08-13 RX ORDER — FAMOTIDINE 10 MG/ML
20 INJECTION INTRAVENOUS
OUTPATIENT
Start: 2024-08-19

## 2024-08-13 RX ORDER — SODIUM CHLORIDE 0.9 % (FLUSH) 0.9 %
10 SYRINGE (ML) INJECTION
OUTPATIENT
Start: 2024-08-19

## 2024-08-13 RX ORDER — ACETAMINOPHEN 325 MG/1
650 TABLET ORAL
OUTPATIENT
Start: 2024-08-19

## 2024-08-13 RX ORDER — DIPHENHYDRAMINE HYDROCHLORIDE 50 MG/ML
25 INJECTION INTRAMUSCULAR; INTRAVENOUS
Start: 2024-08-19

## 2024-08-13 RX ORDER — DOXORUBICIN HYDROCHLORIDE 2 MG/ML
50 INJECTION, SOLUTION INTRAVENOUS
OUTPATIENT
Start: 2024-08-19

## 2024-08-13 RX ORDER — MEPERIDINE HYDROCHLORIDE 50 MG/ML
25 INJECTION INTRAMUSCULAR; INTRAVENOUS; SUBCUTANEOUS
OUTPATIENT
Start: 2024-08-19 | End: 2024-08-14

## 2024-08-13 RX ORDER — DIPHENHYDRAMINE HYDROCHLORIDE 50 MG/ML
50 INJECTION INTRAMUSCULAR; INTRAVENOUS ONCE AS NEEDED
OUTPATIENT
Start: 2024-08-19

## 2024-08-13 RX ADMIN — FILGRASTIM-SNDZ 480 MCG: 480 INJECTION, SOLUTION INTRAVENOUS; SUBCUTANEOUS at 12:08

## 2024-08-13 NOTE — NURSING
Pt presented for Zarxio #1of 5, no complaints, Zarxio given in right arm without incident; pt denied fever, pt & spouse wearing a mask, neutrogenic precautions reinforced; AVS given.

## 2024-08-13 NOTE — PROGRESS NOTES
Received a call from [employer] requesting information regarding an order for Zarxio. The physician asked for patient's lab results and if patient had experienced any fevers. Patients anc is 220 and was placed under neutropenic precautions. Per the MD with the insurance company because the patient has not had a fever and is neutropenic due to chemotherapy she does not meet the criteria for Zarxio nor the preferred drug neupogen. Eduardo Blakely NP spoke with the physician as well and was notified that the medications would not be covered as well.   Notified Ms. Hernandez and Taisha Ruff with infusion.

## 2024-08-14 ENCOUNTER — OFFICE VISIT (OUTPATIENT)
Dept: OTOLARYNGOLOGY | Facility: CLINIC | Age: 44
End: 2024-08-14
Payer: MEDICAID

## 2024-08-14 ENCOUNTER — INFUSION (OUTPATIENT)
Dept: INFUSION THERAPY | Facility: HOSPITAL | Age: 44
End: 2024-08-14
Attending: INTERNAL MEDICINE
Payer: MEDICAID

## 2024-08-14 VITALS
DIASTOLIC BLOOD PRESSURE: 79 MMHG | BODY MASS INDEX: 43.07 KG/M2 | HEIGHT: 66 IN | TEMPERATURE: 99 F | HEART RATE: 82 BPM | SYSTOLIC BLOOD PRESSURE: 114 MMHG | WEIGHT: 268 LBS | OXYGEN SATURATION: 100 % | RESPIRATION RATE: 20 BRPM

## 2024-08-14 VITALS
DIASTOLIC BLOOD PRESSURE: 80 MMHG | SYSTOLIC BLOOD PRESSURE: 124 MMHG | RESPIRATION RATE: 20 BRPM | TEMPERATURE: 99 F | OXYGEN SATURATION: 95 % | HEART RATE: 85 BPM

## 2024-08-14 DIAGNOSIS — C85.99 MALIGNANT LYMPHOMA OF THYROID GLAND: ICD-10-CM

## 2024-08-14 DIAGNOSIS — C73 PRIMARY THYROID MALIGNANCY: Primary | ICD-10-CM

## 2024-08-14 DIAGNOSIS — J38.01 COMPLETE PARALYSIS OF LEFT VOCAL CORD: ICD-10-CM

## 2024-08-14 DIAGNOSIS — T45.1X5A CHEMOTHERAPY-INDUCED NEUTROPENIA: Primary | ICD-10-CM

## 2024-08-14 DIAGNOSIS — D70.1 CHEMOTHERAPY-INDUCED NEUTROPENIA: Primary | ICD-10-CM

## 2024-08-14 DIAGNOSIS — C83.39 DIFFUSE LARGE B-CELL LYMPHOMA OF SOLID ORGAN EXCLUDING SPLEEN: ICD-10-CM

## 2024-08-14 PROCEDURE — 99215 OFFICE O/P EST HI 40 MIN: CPT | Mod: PBBFAC,25 | Performed by: STUDENT IN AN ORGANIZED HEALTH CARE EDUCATION/TRAINING PROGRAM

## 2024-08-14 PROCEDURE — 63600175 PHARM REV CODE 636 W HCPCS: Mod: JZ,JB,JG | Performed by: NURSE PRACTITIONER

## 2024-08-14 PROCEDURE — 96372 THER/PROPH/DIAG INJ SC/IM: CPT

## 2024-08-14 RX ORDER — PANTOPRAZOLE SODIUM 40 MG/1
40 TABLET, DELAYED RELEASE ORAL EVERY MORNING
COMMUNITY
Start: 2024-08-08

## 2024-08-14 RX ORDER — LORAZEPAM 1 MG/1
1 TABLET ORAL 2 TIMES DAILY
COMMUNITY
Start: 2024-07-30

## 2024-08-14 RX ADMIN — FILGRASTIM-SNDZ 480 MCG: 480 INJECTION, SOLUTION INTRAVENOUS; SUBCUTANEOUS at 11:08

## 2024-08-14 NOTE — PROGRESS NOTES
[facility]  Otolaryngology Clinic Note    Fred Berman  Encounter Date: 8/14/2024  YOB: 1980    Chief Complaint: tongue lesion    HPI: Fred Berman is a 43 y.o. female referred for a tongue lesion. She reports that she first noticed it about a year ago and says that it has slowly been enlarging since then. It has never bled. She doesn't think it hurts but does bite it occasionally which makes it sore. It's never been biopsied. She says that her dentist told her that he was not going to do any more work on her teeth until she got it checked out. She also feels like she has developed allergies over the past few months.  She endorses frequent tearing and redness of her eyes in feels like her nose is running frequently.  She also endorses nasal congestion.  She is been using Allegra and does not think it is helped any.  She has not tried Flonase.  She does use Afrin she thinks maybe once a month.  She was diagnosed with a sinus infection over the summer and got a few days of antibiotics.  She denies facial pain or pressure but does note that when she wakes up in the morning she feels like the outside of her nose is swollen over the bridge of her nose.  She also endorses facial numbness bilaterally in all 3 distributions which has been going on for some time.  She reports that her PCP is work this up with an MRI scan.  She is also been dealing with intermittent dysphagia.  She feels like things are not going down, both solids and liquids.  This does not happen with every meal but comes and goes.  She follows with Gastroenterology and has had EGDs and dilations in the past.  She reports that she has another 1 scheduled in November.  As far as her voice goes, she notes intermittent dysphonia where her voice gets roughed and then returns to normal.  It usually comes back to normal on its own.  She is on Protonix daily for gastritis that has been seen on prior scopes she  reports.  As far as other medical problems she has diabetes, hypertension, gastroparesis, anxiety and depression.  For past surgical history she is had a cholecystectomy and .  She is had her tonsils removed but no other history of head and neck surgery.  She is a former smoker and quit 1 year ago.  She does not drink and denies any other drug use.      2024:  Presents today in follow up.  Eager for total thyroidectomy given results of FNA.  Has multiple concerns for surgical standpoint.  She would like to stay at least 1 night due to family history of von Willebrand's disease.  She also takes Subutex and would like to talk to the anesthesia team regarding use of opioids.  She will stop taking this she does prior to surgery and is only allowed 1 week of narcotics postop.  She states that she sees endocrinology for diabetes and thyroid dysfunction.  She is on at least 200 mcg of Synthroid already and her sugars are often uncontrolled.    2024:  Presents today for 1st postop appointment after a left hemithyroidectomy in recurrent nerve sacrificed on May 10th.  Reports healing well with no pain. Hasn't taken any narcotics. Anxious about pathology results. Still having issues with oral mucositis.     24:  Patient presents today for a follow-up.  She got a MediPort in July.  She is currently undergoing chop treatment.  She is expected to undergo PET scan in October.  Patient has left vocal cord paralysis from sacrificing the left recurrent laryngeal nerve.  Discussed that she may have swallowing issues because of this.  She says she is coughing and choking on water.  She is unable to undergo speech therapy as she has many appointments for her chemo treatment    ROS:   General: Negative except per HPI  Skin: Denies rash, ulcer, or lesion.  Eyes: Denies vision changes or diplopia.  Ears: Negative except per HPI  Nose: Negative except per HPI  Throat/mouth: Negative except per HPI  Cardiovascular:  "Negative except per HPI  Respiratory: Negative except per HPI  Neck: Negative except per HPI  Endocrine: Negative except per HPI  Neurologic: Negative except per HPI    Other 10-point review of systems negative except per HPI      Review of patient's allergies indicates:  No Known Allergies      Past Medical History:   Diagnosis Date    Adrenal mass     Anxiety     Arthritis     Bradycardia     Depression     Diabetes mellitus, type 2     Gastroparesis     General anesthetics causing adverse effect in therapeutic use     "hard time waking up"    GERD (gastroesophageal reflux disease)     Hip pain     Hypertension     Menstrual cramps     Ovarian cyst     Palpitations     Pyosalpingitis     Thyroid disease        Past Surgical History:   Procedure Laterality Date    ABLATION OF VAGINAL TISSUE USING LASER      BONE MARROW ASPIRATION N/A 2024    Procedure: ASPIRATION, BONE MARROW;  Surgeon: Patrizia Bhatt MD;  Location: [facility] ENDOSCOPY;  Service: General;  Laterality: N/A;    BONE MARROW BIOPSY N/A 2024    Procedure: Biopsy-bone marrow;  Surgeon: Patrizia Bhatt MD;  Location: [facility] ENDOSCOPY;  Service: General;  Laterality: N/A;     SECTION      CHOLECYSTECTOMY      DISSECTION OF NECK Left 5/10/2024    Procedure: DISSECTION, NECK;  Surgeon: Cali Trujillo MD;  Location: [facility] OR;  Service: ENT;  Laterality: Left;    INSERTION OF TUNNELED CENTRAL VENOUS CATHETER (CVC) WITH SUBCUTANEOUS PORT Right 2024    Procedure: [id number];  Surgeon: Lio Storey Jr., MD;  Location: [facility] OR;  Service: General;  Laterality: Right;    THYROIDECTOMY Left 5/10/2024    Procedure: THYROIDECTOMY;  Surgeon: Cali Trujillo MD;  Location: [facility] OR;  Service: ENT;  Laterality: Left;    TUBAL LIGATION         Social History     Socioeconomic History    Marital status: Single   Tobacco Use    Smoking status: Former     Types: Cigarettes    Smokeless tobacco: Never   Substance and Sexual Activity "    Alcohol use: Never     Alcohol/week: 6.0 standard drinks of alcohol     Types: 6 Shots of liquor per week    Drug use: Not Currently     Types: Methamphetamines     Comment: Various Opoids    Sexual activity: Yes     Partners: Male     Social Determinants of Health     Financial Resource Strain: Medium Risk (6/27/2024)    Overall Financial Resource Strain (CARDIA)     Difficulty of Paying Living Expenses: Somewhat hard   Food Insecurity: No Food Insecurity (6/27/2024)    Hunger Vital Sign     Worried About Running Out of Food in the Last Year: Never true     Ran Out of Food in the Last Year: Never true   Transportation Needs: No Transportation Needs (5/12/2024)    TRANSPORTATION NEEDS     Transportation : No   Physical Activity: Insufficiently Active (6/27/2024)    Exercise Vital Sign     Days of Exercise per Week: 2 days     Minutes of Exercise per Session: 30 min   Stress: No Stress Concern Present (6/27/2024)    [employer] - Occupational Stress Questionnaire     Feeling of Stress : Only a little   Recent Concern: Stress - Stress Concern Present (5/12/2024)    [employer] - Occupational Stress Questionnaire     Feeling of Stress : To some extent   Housing Stability: Low Risk  (6/27/2024)    Housing Stability Vital Sign     Unable to Pay for Housing in the Last Year: No     Homeless in the Last Year: No       Family History   Problem Relation Name Age of Onset    Alcohol abuse Mother Pat         Pat    Arthritis Mother Pat     Breast cancer Mother Pat     COPD Mother Pat     Depression Mother Pat     Diabetes Mother Pat     Drug abuse Mother Pat     Hypertension Mother Pat     Miscarriages / Stillbirths Mother Pat     Depression Father Juventino     Drug abuse Father Juventino     Hypertension Father Juventino     Prostate cancer Father Juventino     Thyroid cancer Father Juventino     Drug abuse Sister Elana     Heart disease Sister Elana     Miscarriages / Stillbirths Sister Elana      Drug abuse Brother Juventino     Early death Brother Izaiah     Asthma Son Mojgan     Hypertension Son Mojgan     Miscarriages / Stillbirths Maternal Grandmother Elsa     Cancer Maternal Grandfather Janay     Hearing loss Paternal Grandfather Karthik        Outpatient Encounter Medications as of 2024   Medication Sig Dispense Refill    LORazepam (ATIVAN) 1 MG tablet Take 1 mg by mouth 2 (two) times daily.      pantoprazole (PROTONIX) 40 MG tablet Take 40 mg by mouth every morning.      acetaminophen (TYLENOL) 500 MG tablet Take 2 tablets (1,000 mg total) by mouth every 6 (six) hours. (Patient not taking: Reported on 2024) 60 tablet 0    [] allopurinoL (ZYLOPRIM) 100 MG tablet Take 1 tablet (100 mg total) by mouth 2 (two) times daily. for 14 days 28 tablet 0    buprenorphine HCL (SUBUTEX) 8 mg Subl Place 8 mg under the tongue 3 (three) times daily.      calcium carbonate/vitamin D3 (CALCIUM 600 + D,3, ORAL) Take 1 tablet by mouth 2 (two) times a day.      ciprofloxacin HCl (CIPRO) 500 MG tablet Take 1 tablet (500 mg total) by mouth every 12 (twelve) hours. 14 tablet 0    COMPOUND HORMONE REPLACEMENT Take by mouth once daily.      diphenhydrAMINE-aluminum-magnesium hydroxide-simethicone-LIDOcaine viscous HCl 2% Swish and spit 15 mLs every 4 (four) hours as needed (Mouth sores). 240 each 0    hydrocortisone (ANUSOL-HC) 2.5 % rectal cream Place 1 application  rectally 2 (two) times daily.      insulin (LANTUS SOLOSTAR U-100 INSULIN) glargine 100 units/mL SubQ pen Inject 15 Units into the skin 2 (two) times daily. (Patient taking differently: Inject 90 Units into the skin 2 (two) times a day.) 27 mL 3    insulin lispro 100 unit/mL pen Inject 35 Units into the skin 3 (three) times daily. With meals      levothyroxine (SYNTHROID) 200 MCG tablet Take 1 tablet (200 mcg total) by mouth before breakfast. 30 tablet 11    LORazepam (ATIVAN) 0.5 MG tablet Take 1 mg by mouth every 6 (six) hours as needed for Anxiety.    "   losartan (COZAAR) 50 MG tablet Take 1 tablet (50 mg total) by mouth once daily. 90 tablet 3    OLANZapine (ZYPREXA) 5 MG tablet Take 1 tablet by mouth nightly on days 1-3 of each chemotherapy cycle. 3 tablet 5    ondansetron (ZOFRAN-ODT) 4 MG TbDL Take 1 tablet (4 mg total) by mouth every 8 (eight) hours as needed (NAUSEA). 30 tablet 1    pen needle, diabetic (BD ULTRA-FINE ROSALEE PEN NEEDLE) 32 gauge x 5/32" Ndle 1 each by Misc.(Non-Drug; Combo Route) route 2 (two) times a day. 100 each 9    polyethylene glycol (GLYCOLAX) 17 gram PwPk Take 17 g by mouth 2 (two) times daily. 30 each 1    predniSONE (DELTASONE) 50 MG Tab Take 2 tablets (100 mg total) by mouth once daily. On days 2-5 of your chemotherapy cycles. Take with food. 8 tablet 5    prochlorperazine (COMPAZINE) 5 MG tablet Take 2 tablets (10 mg total) by mouth every 6 (six) hours as needed for Nausea. 20 tablet 5     Facility-Administered Encounter Medications as of 8/14/2024   Medication Dose Route Frequency Provider Last Rate Last Admin    dextrose 10% bolus 125 mL 125 mL  12.5 g Intravenous PRN Chuckie Saenz MD        dextrose 10% bolus 250 mL 250 mL  25 g Intravenous PRN Chuckie Saenz MD        [COMPLETED] filgrastim-sndz (ZARXIO) injection 480 mcg/0.8 mL (Preferred Regimen)  480 mcg Subcutaneous Once Stacy Ruff FNP   480 mcg at 08/13/24 1227    [COMPLETED] filgrastim-sndz (ZARXIO) injection 480 mcg/0.8 mL (Preferred Regimen)  480 mcg Subcutaneous Once Stacy Ruff FNP   480 mcg at 08/14/24 1115    glucagon (human recombinant) injection 1 mg  1 mg Intramuscular PRN Chuckie Saenz MD           Physical Exam:  Vitals:    08/14/24 1202   BP: 114/79   BP Location: Right arm   Patient Position: Sitting   BP Method: Large (Automatic)   Pulse: 82   Resp: 20   Temp: 98.8 °F (37.1 °C)   TempSrc: Oral   SpO2: 100%   Weight: 121.6 kg (268 lb)   Height: 5' 6" (1.676 m)       Constitutional  General Appearance: well nourished, well-developed, " AAO x3, NAD  HEENT  Eyes: PEERLA, EOMI, normal conjunctivae  Ears: Hearing well at conversation level   AD: auricle normal, EAC normal, TM intact, no PEGGY   AS: auricle normal, EAC normal, TM intact, no PEGGY  Nose: septum midline deviated to the left anteriorly,   OC/OP: dentition moderate, soft, nontender, tongue/FOM- soft, no leukoplakia/ulcerations/ tenderness, tonsils absent  Nasopharynx, Hypopharynx, and Larynx:    Indirect: attempted, limited view due to patient intolerance  Neck: soft, incision c/d/I. aTTP.   Neuro: CN II - XII intact   Cardiovascular: peripheral pulses palpable  Respiratory: non-labored respirations  Psychiatric: oriented to time, place and person, no depression, anxiety or agitation    Previous FFL with documented vocal cord motion.   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       Pertinent Data:  ? LABS:  ? AUDIO:  ? CT Scan:    Imaging:   I personally reviewed the following images: CT neck from June reviewed at which point her sinuses were clear. In July, her MRI noted pansinusitis.    US Thyroid: 2/14/2024  Impression:     Large, lobulated hypoechoic mass lesion(s) at the level of the left lobe of the thyroid, new from previous exam. This may be related to thyroid mass or conglomerate lymphadenopathy. .  Recommend contrast enhanced CT of the neck to evaluate further.    CT Neck: 3/8/2024:  Impression:     1. Increasing hypodense soft tissue in the left thyroid bed compared to 08/04/2023.  2. No definite cervical lymphadenopathy.     Pathology:  Supplemental Report   Thyroseq V3 GC Results Summary     Left Thyroid, 4.2 CM Nodule, FNA Smear     Test Result Probability of Cancer or NIFTP Potential Management   POSITIVE Intermediate-high (50-60%) Surgical consultation*  *See interpretation below for details         Final Diagnosis      Fine-needle aspiration of left thyroid nodule:      - Atypical cells of undetermined significance.    See comment.      Electronically signed by Patrizia Bhatt  MD AMPARO on 3/28/2024 at 1424   Specimen 1 Interpretation    Abnormal   [facility] Thyroid Cytology Category III: Atypia Undetermined Significance (AUS)   Electronically signed by Patrizia Bhatt MD on 3/28/2024 at 1424   Comment     The specimen shows numerous lymphocytes, with a significant large lymphocyte population, admixed with follicular oncocytic cells and atypical follicular cell clusters. The specimen is suspicious for Hashimoto's thyroiditis. The lymphoid infiltrate has atypical features, which may represent a lymphoid germinal center or other lymphoid entity, and some of the follicular cells show atypia.  The specimen is sent for ThyroSeq testing which will be reported in an addendum.       Pathology:  Prelim:  Intraoperative Consult P   1. Thyroid: left hemithyroid  Poorly differentiated malignancy.  Defer to permanent for final diagnosis.     Results are reported by phone to Dr. Saenz at 1045 by Dr. Cruz.     2. Thyroid: Paratracheal tissue  Reactive lymphoid tissue.       Summary of Outside Records:      Assessment/Plan:  Fred Berman is a 43 y.o. female with 4 cm thyroid nodule, FNA with a [place] 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     Patient has diffuse B-cell lymphoma, currently undergoing chemotherapy scheduled to finish in October  Patient has dysphagia, most likely secondary to her left vocal cord paralysis     - discussed restarting reflux medication  - discussed turning her head to the left of she is eating to help medialize vocal cord  - she would like to hold off on scope exam until after she is done with chemo.  We discussed the possibility of injecting her vocal cord with filler  - return to clinic in October    I. Anusha Rutherford MD  [facility] Department of Otolaryngology  [facility]

## 2024-08-15 ENCOUNTER — HOSPITAL ENCOUNTER (OUTPATIENT)
Dept: RADIOLOGY | Facility: HOSPITAL | Age: 44
Discharge: HOME | End: 2024-08-15
Payer: MEDICAID

## 2024-08-15 ENCOUNTER — OFFICE VISIT (OUTPATIENT)
Dept: SURGERY | Facility: CLINIC | Age: 44
End: 2024-08-15
Payer: MEDICAID

## 2024-08-15 ENCOUNTER — INFUSION (OUTPATIENT)
Dept: INFUSION THERAPY | Facility: HOSPITAL | Age: 44
End: 2024-08-15
Attending: INTERNAL MEDICINE
Payer: MEDICAID

## 2024-08-15 VITALS
OXYGEN SATURATION: 93 % | RESPIRATION RATE: 20 BRPM | DIASTOLIC BLOOD PRESSURE: 94 MMHG | SYSTOLIC BLOOD PRESSURE: 143 MMHG | TEMPERATURE: 98 F | HEART RATE: 94 BPM

## 2024-08-15 VITALS
BODY MASS INDEX: 43.55 KG/M2 | OXYGEN SATURATION: 98 % | HEIGHT: 66 IN | SYSTOLIC BLOOD PRESSURE: 131 MMHG | HEART RATE: 94 BPM | WEIGHT: 271 LBS | RESPIRATION RATE: 18 BRPM | DIASTOLIC BLOOD PRESSURE: 82 MMHG

## 2024-08-15 DIAGNOSIS — D70.1 CHEMOTHERAPY-INDUCED NEUTROPENIA: Primary | ICD-10-CM

## 2024-08-15 DIAGNOSIS — C83.39 DIFFUSE LARGE B-CELL LYMPHOMA OF SOLID ORGAN EXCLUDING SPLEEN: ICD-10-CM

## 2024-08-15 DIAGNOSIS — C73 PRIMARY THYROID MALIGNANCY: ICD-10-CM

## 2024-08-15 DIAGNOSIS — C85.99 MALIGNANT LYMPHOMA OF THYROID GLAND: ICD-10-CM

## 2024-08-15 DIAGNOSIS — T45.1X5A CHEMOTHERAPY-INDUCED NEUTROPENIA: Primary | ICD-10-CM

## 2024-08-15 DIAGNOSIS — C83.39 DIFFUSE LARGE B-CELL LYMPHOMA OF SOLID ORGAN EXCLUDING SPLEEN: Primary | ICD-10-CM

## 2024-08-15 PROCEDURE — 71045 X-RAY EXAM CHEST 1 VIEW: CPT | Mod: TC

## 2024-08-15 PROCEDURE — 99214 OFFICE O/P EST MOD 30 MIN: CPT | Mod: PBBFAC,25

## 2024-08-15 NOTE — PROGRESS NOTES
"[facility] Clinic Note    HPI: Ms. Berman is 43 year-old female with a PMHx of HTN, GERD, thyroid disease, T2DM, depression, and diffuse B-cell lymphoma who presents to clinic after difficulty with chemotherapy infusion via mediport which was placed 7/8/24. Per patient, she was lifting her daughter when she felt a pop in the area. During her most recent chemotherapy session, she states that the nurses had difficulty accessing the port and stuck her multiple times without any success. The nurses believed that the port may have flipped and recommended that she be seen in general surgery clinic.     PMH:   Past Medical History:   Diagnosis Date    Adrenal mass     Anxiety     Arthritis     Bradycardia     Depression     Diabetes mellitus, type 2     Gastroparesis     General anesthetics causing adverse effect in therapeutic use     "hard time waking up"    GERD (gastroesophageal reflux disease)     Hip pain     Hypertension     Menstrual cramps     Ovarian cyst     Palpitations     Pyosalpingitis     Thyroid disease       Meds:   Current Outpatient Medications:     buprenorphine HCL (SUBUTEX) 8 mg Subl, Place 8 mg under the tongue 3 (three) times daily., Disp: , Rfl:     calcium carbonate/vitamin D3 (CALCIUM 600 + D,3, ORAL), Take 1 tablet by mouth 2 (two) times a day., Disp: , Rfl:     ciprofloxacin HCl (CIPRO) 500 MG tablet, Take 1 tablet (500 mg total) by mouth every 12 (twelve) hours., Disp: 14 tablet, Rfl: 0    COMPOUND HORMONE REPLACEMENT, Take by mouth once daily., Disp: , Rfl:     diphenhydrAMINE-aluminum-magnesium hydroxide-simethicone-LIDOcaine viscous HCl 2%, Swish and spit 15 mLs every 4 (four) hours as needed (Mouth sores)., Disp: 240 each, Rfl: 0    hydrocortisone (ANUSOL-HC) 2.5 % rectal cream, Place 1 application  rectally 2 (two) times daily., Disp: , Rfl:     insulin (LANTUS SOLOSTAR U-100 INSULIN) glargine 100 units/mL SubQ pen, Inject 15 Units into the skin 2 (two) times daily. " "(Patient taking differently: Inject 90 Units into the skin 2 (two) times a day.), Disp: 27 mL, Rfl: 3    insulin lispro 100 unit/mL pen, Inject 35 Units into the skin 3 (three) times daily. With meals, Disp: , Rfl:     levothyroxine (SYNTHROID) 200 MCG tablet, Take 1 tablet (200 mcg total) by mouth before breakfast., Disp: 30 tablet, Rfl: 11    LORazepam (ATIVAN) 0.5 MG tablet, Take 1 mg by mouth every 6 (six) hours as needed for Anxiety., Disp: , Rfl:     LORazepam (ATIVAN) 1 MG tablet, Take 1 mg by mouth 2 (two) times daily., Disp: , Rfl:     losartan (COZAAR) 50 MG tablet, Take 1 tablet (50 mg total) by mouth once daily., Disp: 90 tablet, Rfl: 3    OLANZapine (ZYPREXA) 5 MG tablet, Take 1 tablet by mouth nightly on days 1-3 of each chemotherapy cycle., Disp: 3 tablet, Rfl: 5    ondansetron (ZOFRAN-ODT) 4 MG TbDL, Take 1 tablet (4 mg total) by mouth every 8 (eight) hours as needed (NAUSEA)., Disp: 30 tablet, Rfl: 1    pantoprazole (PROTONIX) 40 MG tablet, Take 40 mg by mouth every morning., Disp: , Rfl:     pen needle, diabetic (BD ULTRA-FINE ROSALEE PEN NEEDLE) 32 gauge x 5/32" Ndle, 1 each by Misc.(Non-Drug; Combo Route) route 2 (two) times a day., Disp: 100 each, Rfl: 9    polyethylene glycol (GLYCOLAX) 17 gram PwPk, Take 17 g by mouth 2 (two) times daily., Disp: 30 each, Rfl: 1    predniSONE (DELTASONE) 50 MG Tab, Take 2 tablets (100 mg total) by mouth once daily. On days 2-5 of your chemotherapy cycles. Take with food., Disp: 8 tablet, Rfl: 5    prochlorperazine (COMPAZINE) 5 MG tablet, Take 2 tablets (10 mg total) by mouth every 6 (six) hours as needed for Nausea., Disp: 20 tablet, Rfl: 5    acetaminophen (TYLENOL) 500 MG tablet, Take 2 tablets (1,000 mg total) by mouth every 6 (six) hours. (Patient not taking: Reported on 7/9/2024), Disp: 60 tablet, Rfl: 0  No current facility-administered medications for this visit.    Facility-Administered Medications Ordered in Other Visits:     dextrose 10% bolus 125 mL " 125 mL, 12.5 g, Intravenous, PRN, Chuckie Saenz MD    dextrose 10% bolus 250 mL 250 mL, 25 g, Intravenous, PRN, Chuckie Saenz MD    glucagon (human recombinant) injection 1 mg, 1 mg, Intramuscular, PRN, Chuckie Saenz MD  Allergies: Review of patient's allergies indicates:  No Known Allergies  Social History:   Social History     Tobacco Use    Smoking status: Former     Types: Cigarettes    Smokeless tobacco: Never   Substance Use Topics    Alcohol use: Never     Alcohol/week: 6.0 standard drinks of alcohol     Types: 6 Shots of liquor per week    Drug use: Not Currently     Types: Methamphetamines     Comment: Various Opoids     Family History:   Family History   Problem Relation Name Age of Onset    Alcohol abuse Mother Pat         Pat    Arthritis Mother Pat     Breast cancer Mother Pat     COPD Mother Pat     Depression Mother Pat     Diabetes Mother Pat     Drug abuse Mother Pat     Hypertension Mother Pat     Miscarriages / Stillbirths Mother Pat     Depression Father Juventino     Drug abuse Father Juventino     Hypertension Father Juventino     Prostate cancer Father Juventino     Thyroid cancer Father Juventino     Drug abuse Sister Elana     Heart disease Sister Elana     Miscarriages / Stillbirths Sister Elana     Drug abuse Brother Juventino     Early death Brother Izaiah     Asthma Son Lonoke     Hypertension Son Mojgan     Miscarriages / Stillbirths Maternal Grandmother Arlean     Cancer Maternal Grandfather Janay     Hearing loss Paternal Grandfather Lyod      Surgical History:   Past Surgical History:   Procedure Laterality Date    ABLATION OF VAGINAL TISSUE USING LASER      BONE MARROW ASPIRATION N/A 2024    Procedure: ASPIRATION, BONE MARROW;  Surgeon: Patrizia Bhatt MD;  Location: [facility] ENDOSCOPY;  Service: General;  Laterality: N/A;    BONE MARROW BIOPSY N/A 2024    Procedure: Biopsy-bone marrow;  Surgeon: Patrizia Bhatt MD;  Location: [facility] ENDOSCOPY;  Service: General;  Laterality: N/A;      SECTION      CHOLECYSTECTOMY      DISSECTION OF NECK Left 5/10/2024    Procedure: DISSECTION, NECK;  Surgeon: Cali Trujillo MD;  Location: [facility] OR;  Service: ENT;  Laterality: Left;    INSERTION OF TUNNELED CENTRAL VENOUS CATHETER (CVC) WITH SUBCUTANEOUS PORT Right 7/8/2024    Procedure: [id number];  Surgeon: Lio Storey Jr., MD;  Location: [facility] OR;  Service: General;  Laterality: Right;    THYROIDECTOMY Left 5/10/2024    Procedure: THYROIDECTOMY;  Surgeon: Cali Trujillo MD;  Location: [facility] OR;  Service: ENT;  Laterality: Left;    TUBAL LIGATION       Review of Systems:  Negative unless as stated above in HPI     Objective:    Vitals:  Vitals:    08/15/24 1124   BP: 131/82   Pulse: 94   Resp: 18        Physical Exam:  Gen: NAD  Neuro: awake, alert, answering questions appropriately  CV: RRR; mediport easily palpated   Resp: non-labored breathing, AREN  Abd: soft, ND, NT  Ext: moves all 4 spontaneously and purposefully  Skin: warm, well perfused    Pertinent Labs:  N/A    Imaging:  CXR - read pending; per my read, the mediport appears to be appropriately placed     Micro/Path/Other:  N/A    Assessment/Plan:  43 year-old female with a PMHx of diffuse large B-cell lymphoma with mediport placement 7/8 who presents to clinic today after being unable to receive chemotherapy due to difficulty accessing mediport.     - In clinic, using sterile method, the mediport site was prepped and using a Johnston needle, the port was accessed and easily aspirated and flushed. Heparin was then inserted. The site was again cleaned and bandaged.   - OK for use   - Return to clinic as needed     Scarlet Mackey MD   [facility], PGY-1  08/15/2024 12:09 PM

## 2024-08-15 NOTE — NURSING
"Patient here for labs. Eduardo Blakely NP, ordered a CBC. ANC 9486. Eduardo Blakely NP, gives an order to d/c zarxio and follow up next week with her. Patient has an appointment Monday 8/19 at 7 for labs/provider/infusion. Patient is aware of appointment.   Patient had a radiology appointment to access her mediport if it was flipped or not. Patient states "the radiologist assessed  my mediport and said it was ok not flippped.   Reported conversations to Eduardo Blakely NP.  Patient instructed on neutropenia precautions. Patient verbalizes understanding to check temperature daily and seek medical treatment for temperature over than 100.4 F. To wash fruits and vegetables, limit exposure to germs and public outings, and get enough rest.   "

## 2024-08-16 NOTE — PROGRESS NOTES
I have reviewed the notes, assessments, and/or procedures performed by Key, I concur with her/his documentation of Fred Berman.  Date of Service: 8/15/2024    [facility]

## 2024-08-19 ENCOUNTER — DOCUMENTATION ONLY (OUTPATIENT)
Dept: HEMATOLOGY/ONCOLOGY | Facility: CLINIC | Age: 44
End: 2024-08-19

## 2024-08-19 ENCOUNTER — INFUSION (OUTPATIENT)
Dept: INFUSION THERAPY | Facility: HOSPITAL | Age: 44
End: 2024-08-19
Attending: INTERNAL MEDICINE
Payer: MEDICAID

## 2024-08-19 ENCOUNTER — OFFICE VISIT (OUTPATIENT)
Dept: HEMATOLOGY/ONCOLOGY | Facility: CLINIC | Age: 44
End: 2024-08-19
Attending: INTERNAL MEDICINE
Payer: MEDICAID

## 2024-08-19 ENCOUNTER — LAB VISIT (OUTPATIENT)
Dept: HEMATOLOGY/ONCOLOGY | Facility: CLINIC | Age: 44
End: 2024-08-19
Payer: MEDICAID

## 2024-08-19 ENCOUNTER — TELEPHONE (OUTPATIENT)
Dept: INTERNAL MEDICINE | Facility: CLINIC | Age: 44
End: 2024-08-19
Payer: MEDICAID

## 2024-08-19 VITALS
RESPIRATION RATE: 20 BRPM | OXYGEN SATURATION: 96 % | HEART RATE: 76 BPM | BODY MASS INDEX: 43.26 KG/M2 | TEMPERATURE: 99 F | SYSTOLIC BLOOD PRESSURE: 121 MMHG | HEIGHT: 66 IN | WEIGHT: 269.19 LBS | DIASTOLIC BLOOD PRESSURE: 76 MMHG

## 2024-08-19 VITALS
HEIGHT: 66 IN | OXYGEN SATURATION: 96 % | SYSTOLIC BLOOD PRESSURE: 138 MMHG | BODY MASS INDEX: 43.26 KG/M2 | WEIGHT: 269.19 LBS | TEMPERATURE: 99 F | RESPIRATION RATE: 20 BRPM | DIASTOLIC BLOOD PRESSURE: 81 MMHG | HEART RATE: 90 BPM

## 2024-08-19 DIAGNOSIS — Z83.2 FAMILY HISTORY OF VON WILLEBRAND DISEASE: ICD-10-CM

## 2024-08-19 DIAGNOSIS — C85.99 MALIGNANT LYMPHOMA OF THYROID GLAND: ICD-10-CM

## 2024-08-19 DIAGNOSIS — C73 PRIMARY THYROID MALIGNANCY: ICD-10-CM

## 2024-08-19 DIAGNOSIS — L98.9 SKIN LESION OF CHEST WALL: ICD-10-CM

## 2024-08-19 DIAGNOSIS — L98.9 SKIN LESION OF CHEST WALL: Primary | ICD-10-CM

## 2024-08-19 DIAGNOSIS — E27.9 LESION OF ADRENAL GLAND: ICD-10-CM

## 2024-08-19 DIAGNOSIS — F41.9 ANXIETY: ICD-10-CM

## 2024-08-19 DIAGNOSIS — C83.39 DIFFUSE LARGE B-CELL LYMPHOMA OF SOLID ORGAN EXCLUDING SPLEEN: ICD-10-CM

## 2024-08-19 DIAGNOSIS — F11.11 HISTORY OF OPIOID ABUSE: ICD-10-CM

## 2024-08-19 DIAGNOSIS — D10.1 FIBROMA OF TONGUE: ICD-10-CM

## 2024-08-19 DIAGNOSIS — G89.4 CHRONIC PAIN SYNDROME: ICD-10-CM

## 2024-08-19 DIAGNOSIS — E04.1 THYROID NODULE: ICD-10-CM

## 2024-08-19 DIAGNOSIS — C83.39 DIFFUSE LARGE B-CELL LYMPHOMA OF SOLID ORGAN EXCLUDING SPLEEN: Primary | ICD-10-CM

## 2024-08-19 DIAGNOSIS — G89.4 CHRONIC PAIN SYNDROME: Primary | ICD-10-CM

## 2024-08-19 DIAGNOSIS — E03.9 HYPOTHYROIDISM, UNSPECIFIED TYPE: ICD-10-CM

## 2024-08-19 DIAGNOSIS — F41.1 GENERALIZED ANXIETY DISORDER: Primary | ICD-10-CM

## 2024-08-19 DIAGNOSIS — J38.00 VOCAL CORD PARALYSIS: ICD-10-CM

## 2024-08-19 LAB
ABS NEUT CALC (OHS): 4.9 X10(3)/MCL (ref 2.1–9.2)
ALBUMIN SERPL-MCNC: 3.8 G/DL (ref 3.5–5)
ALBUMIN/GLOB SERPL: 1.2 RATIO (ref 1.1–2)
ALP SERPL-CCNC: 111 UNIT/L (ref 40–150)
ALT SERPL-CCNC: 54 UNIT/L (ref 0–55)
ANION GAP SERPL CALC-SCNC: 9 MEQ/L
AST SERPL-CCNC: 47 UNIT/L (ref 5–34)
BASOPHILS NFR BLD MANUAL: 0.07 X10(3)/MCL (ref 0–0.2)
BASOPHILS NFR BLD MANUAL: 1 % (ref 0–2)
BILIRUB SERPL-MCNC: 0.4 MG/DL
BUN SERPL-MCNC: 11.1 MG/DL (ref 7–18.7)
CALCIUM SERPL-MCNC: 10 MG/DL (ref 8.4–10.2)
CHLORIDE SERPL-SCNC: 99 MMOL/L (ref 98–107)
CO2 SERPL-SCNC: 30 MMOL/L (ref 22–29)
CREAT SERPL-MCNC: 0.79 MG/DL (ref 0.55–1.02)
CREAT/UREA NIT SERPL: 14
ERYTHROCYTE [DISTWIDTH] IN BLOOD BY AUTOMATED COUNT: 13.3 % (ref 11.5–17)
GFR SERPLBLD CREATININE-BSD FMLA CKD-EPI: >60 ML/MIN/1.73/M2
GLOBULIN SER-MCNC: 3.3 GM/DL (ref 2.4–3.5)
GLUCOSE SERPL-MCNC: 328 MG/DL (ref 74–100)
HCT VFR BLD AUTO: 39.7 % (ref 37–47)
HGB BLD-MCNC: 13.1 G/DL (ref 12–16)
LDH SERPL-CCNC: 238 U/L (ref 125–220)
LYMPHOCYTES NFR BLD MANUAL: 1.61 X10(3)/MCL
LYMPHOCYTES NFR BLD MANUAL: 22 % (ref 13–40)
MCH RBC QN AUTO: 29.1 PG (ref 27–31)
MCHC RBC AUTO-ENTMCNC: 33 G/DL (ref 33–36)
MCV RBC AUTO: 88.2 FL (ref 80–94)
METAMYELOCYTES NFR BLD MANUAL: 4 %
MONOCYTES NFR BLD MANUAL: 0.37 X10(3)/MCL (ref 0.1–1.3)
MONOCYTES NFR BLD MANUAL: 5 % (ref 2–11)
MYELOCYTES NFR BLD MANUAL: 1 %
NEUTROPHILS NFR BLD MANUAL: 65 % (ref 47–80)
NEUTS BAND NFR BLD MANUAL: 2 % (ref 0–11)
NRBC BLD AUTO-RTO: 0 %
PLATELET # BLD AUTO: 195 X10(3)/MCL (ref 130–400)
PLATELET # BLD EST: NORMAL 10*3/UL
PMV BLD AUTO: 9.8 FL (ref 7.4–10.4)
POTASSIUM SERPL-SCNC: 4.1 MMOL/L (ref 3.5–5.1)
PROT SERPL-MCNC: 7.1 GM/DL (ref 6.4–8.3)
RBC # BLD AUTO: 4.5 X10(6)/MCL (ref 4.2–5.4)
RBC MORPH BLD: NORMAL
SODIUM SERPL-SCNC: 138 MMOL/L (ref 136–145)
URATE SERPL-MCNC: 3.6 MG/DL (ref 2.6–6)
WBC # BLD AUTO: 7.31 X10(3)/MCL (ref 4.5–11.5)

## 2024-08-19 PROCEDURE — 63600175 PHARM REV CODE 636 W HCPCS: Performed by: INTERNAL MEDICINE

## 2024-08-19 PROCEDURE — 3046F HEMOGLOBIN A1C LEVEL >9.0%: CPT | Mod: CPTII,,, | Performed by: INTERNAL MEDICINE

## 2024-08-19 PROCEDURE — 96417 CHEMO IV INFUS EACH ADDL SEQ: CPT

## 2024-08-19 PROCEDURE — 99214 OFFICE O/P EST MOD 30 MIN: CPT | Mod: PBBFAC | Performed by: INTERNAL MEDICINE

## 2024-08-19 PROCEDURE — 85027 COMPLETE CBC AUTOMATED: CPT

## 2024-08-19 PROCEDURE — 4010F ACE/ARB THERAPY RXD/TAKEN: CPT | Mod: CPTII,,, | Performed by: INTERNAL MEDICINE

## 2024-08-19 PROCEDURE — 3078F DIAST BP <80 MM HG: CPT | Mod: CPTII,,, | Performed by: INTERNAL MEDICINE

## 2024-08-19 PROCEDURE — 94618 PULMONARY STRESS TESTING: CPT

## 2024-08-19 PROCEDURE — 3008F BODY MASS INDEX DOCD: CPT | Mod: CPTII,,, | Performed by: INTERNAL MEDICINE

## 2024-08-19 PROCEDURE — 96413 CHEMO IV INFUSION 1 HR: CPT

## 2024-08-19 PROCEDURE — 1159F MED LIST DOCD IN RCRD: CPT | Mod: CPTII,,, | Performed by: INTERNAL MEDICINE

## 2024-08-19 PROCEDURE — 99900035 HC TECH TIME PER 15 MIN (STAT)

## 2024-08-19 PROCEDURE — 96375 TX/PRO/DX INJ NEW DRUG ADDON: CPT

## 2024-08-19 PROCEDURE — 84550 ASSAY OF BLOOD/URIC ACID: CPT

## 2024-08-19 PROCEDURE — 25000003 PHARM REV CODE 250: Performed by: INTERNAL MEDICINE

## 2024-08-19 PROCEDURE — 83615 LACTATE (LD) (LDH) ENZYME: CPT

## 2024-08-19 PROCEDURE — 99215 OFFICE O/P EST HI 40 MIN: CPT | Mod: S$PBB,,, | Performed by: INTERNAL MEDICINE

## 2024-08-19 PROCEDURE — 36415 COLL VENOUS BLD VENIPUNCTURE: CPT

## 2024-08-19 PROCEDURE — 96415 CHEMO IV INFUSION ADDL HR: CPT

## 2024-08-19 PROCEDURE — 96411 CHEMO IV PUSH ADDL DRUG: CPT

## 2024-08-19 PROCEDURE — 3074F SYST BP LT 130 MM HG: CPT | Mod: CPTII,,, | Performed by: INTERNAL MEDICINE

## 2024-08-19 PROCEDURE — 1160F RVW MEDS BY RX/DR IN RCRD: CPT | Mod: CPTII,,, | Performed by: INTERNAL MEDICINE

## 2024-08-19 PROCEDURE — 80053 COMPREHEN METABOLIC PANEL: CPT

## 2024-08-19 RX ORDER — PROCHLORPERAZINE EDISYLATE 5 MG/ML
10 INJECTION INTRAMUSCULAR; INTRAVENOUS ONCE AS NEEDED
Status: DISCONTINUED | OUTPATIENT
Start: 2024-08-19 | End: 2024-08-19 | Stop reason: HOSPADM

## 2024-08-19 RX ORDER — DIPHENHYDRAMINE HYDROCHLORIDE 50 MG/ML
50 INJECTION INTRAMUSCULAR; INTRAVENOUS ONCE AS NEEDED
Status: DISCONTINUED | OUTPATIENT
Start: 2024-08-19 | End: 2024-08-19 | Stop reason: HOSPADM

## 2024-08-19 RX ORDER — HEPARIN 100 UNIT/ML
500 SYRINGE INTRAVENOUS
Status: DISCONTINUED | OUTPATIENT
Start: 2024-08-19 | End: 2024-08-19 | Stop reason: HOSPADM

## 2024-08-19 RX ORDER — DIPHENHYDRAMINE HYDROCHLORIDE 50 MG/ML
50 INJECTION INTRAMUSCULAR; INTRAVENOUS
Status: COMPLETED | OUTPATIENT
Start: 2024-08-19 | End: 2024-08-19

## 2024-08-19 RX ORDER — SODIUM CHLORIDE 0.9 % (FLUSH) 0.9 %
10 SYRINGE (ML) INJECTION
Status: DISCONTINUED | OUTPATIENT
Start: 2024-08-19 | End: 2024-08-19 | Stop reason: HOSPADM

## 2024-08-19 RX ORDER — FAMOTIDINE 10 MG/ML
20 INJECTION INTRAVENOUS
Status: COMPLETED | OUTPATIENT
Start: 2024-08-19 | End: 2024-08-19

## 2024-08-19 RX ORDER — EPINEPHRINE 0.3 MG/.3ML
0.3 INJECTION SUBCUTANEOUS ONCE AS NEEDED
Status: DISCONTINUED | OUTPATIENT
Start: 2024-08-19 | End: 2024-08-19 | Stop reason: HOSPADM

## 2024-08-19 RX ORDER — PALONOSETRON 0.05 MG/ML
0.25 INJECTION, SOLUTION INTRAVENOUS ONCE
Status: COMPLETED | OUTPATIENT
Start: 2024-08-19 | End: 2024-08-19

## 2024-08-19 RX ORDER — ACETAMINOPHEN 325 MG/1
650 TABLET ORAL
Status: COMPLETED | OUTPATIENT
Start: 2024-08-19 | End: 2024-08-19

## 2024-08-19 RX ORDER — DOXORUBICIN HYDROCHLORIDE 2 MG/ML
50 INJECTION, SOLUTION INTRAVENOUS
Status: COMPLETED | OUTPATIENT
Start: 2024-08-19 | End: 2024-08-19

## 2024-08-19 RX ORDER — MEPERIDINE HYDROCHLORIDE 25 MG/ML
25 INJECTION INTRAMUSCULAR; INTRAVENOUS; SUBCUTANEOUS
Status: DISCONTINUED | OUTPATIENT
Start: 2024-08-19 | End: 2024-08-19 | Stop reason: HOSPADM

## 2024-08-19 RX ADMIN — HYDROCORTISONE SODIUM SUCCINATE 100 MG: 100 INJECTION, POWDER, FOR SOLUTION INTRAMUSCULAR; INTRAVENOUS at 09:08

## 2024-08-19 RX ADMIN — HEPARIN 500 UNITS: 100 SYRINGE at 03:08

## 2024-08-19 RX ADMIN — SODIUM CHLORIDE 900 MG: 9 INJECTION, SOLUTION INTRAVENOUS at 10:08

## 2024-08-19 RX ADMIN — PALONOSETRON HYDROCHLORIDE 0.25 MG: 0.25 INJECTION, SOLUTION INTRAVENOUS at 02:08

## 2024-08-19 RX ADMIN — VINCRISTINE SULFATE 2 MG: 1 INJECTION, SOLUTION INTRAVENOUS at 02:08

## 2024-08-19 RX ADMIN — SODIUM CHLORIDE: 9 INJECTION, SOLUTION INTRAVENOUS at 09:08

## 2024-08-19 RX ADMIN — CYCLOPHOSPHAMIDE 1760 MG: 1 INJECTION INTRAVENOUS at 03:08

## 2024-08-19 RX ADMIN — DIPHENHYDRAMINE HYDROCHLORIDE 50 MG: 50 INJECTION INTRAMUSCULAR; INTRAVENOUS at 09:08

## 2024-08-19 RX ADMIN — FAMOTIDINE 20 MG: 10 INJECTION, SOLUTION INTRAVENOUS at 09:08

## 2024-08-19 RX ADMIN — DOXORUBICIN HYDROCHLORIDE 118 MG: 2 INJECTION, SOLUTION INTRAVENOUS at 03:08

## 2024-08-19 RX ADMIN — ACETAMINOPHEN 650 MG: 325 TABLET, FILM COATED ORAL at 09:08

## 2024-08-19 NOTE — RESPIRATORY THERAPY
@0930 resting room air saturation = 95%  @0936 post-ambulatory (6-minute walk) saturation room air = 90% lowest desaturation  @0937 return to rest, post-ambulation saturation room air = 96%    Patient does not qualify for Home Oxygen at this time.

## 2024-08-19 NOTE — Clinical Note
-will inform her PCP that diabetes uncontrolled continue chemotherapy every 3 weeks  -re-stage with PET-CT after completion of 3 cycles of chemotherapy (weeks after cycle 3 of chemotherapy)  Check CBC and CMP weekly  From cycle 2 of chemotherapy onwards, add Neulasta Follow-up with NP in 3 weeks

## 2024-08-19 NOTE — PROGRESS NOTES
"History:  Past Medical History:   Diagnosis Date    Adrenal mass     Anxiety     Arthritis     Bradycardia     Depression     Diabetes mellitus, type 2     Gastroparesis     General anesthetics causing adverse effect in therapeutic use     "hard time waking up"    GERD (gastroesophageal reflux disease)     Hip pain     Hypertension     Menstrual cramps     Ovarian cyst     Palpitations     Pyosalpingitis     Thyroid disease    Past medical history:  Adrenal mass; anxiety; arthritis; bradycardia; depression; NIDDM; gastroparesis; GERD; hip pain; hypertension; menstrual cramps; ovarian cyst; palpitations; bile salpingitis; thyroid disease    Procedure/surgical history:  Ablation of vaginal tissue using laser; ; cholecystectomy; dissection of neck, left 05/10/2024; thyroidectomy 05/10/2024; tubal ligation    Past Surgical History:   Procedure Laterality Date    ABLATION OF VAGINAL TISSUE USING LASER      BONE MARROW ASPIRATION N/A 2024    Procedure: ASPIRATION, BONE MARROW;  Surgeon: Patrizia Bhatt MD;  Location: [facility] ENDOSCOPY;  Service: General;  Laterality: N/A;    BONE MARROW BIOPSY N/A 2024    Procedure: Biopsy-bone marrow;  Surgeon: Patrizia Bhatt MD;  Location: [facility] ENDOSCOPY;  Service: General;  Laterality: N/A;     SECTION      CHOLECYSTECTOMY      DISSECTION OF NECK Left 5/10/2024    Procedure: DISSECTION, NECK;  Surgeon: Cali Trujillo MD;  Location: [facility] OR;  Service: ENT;  Laterality: Left;    INSERTION OF TUNNELED CENTRAL VENOUS CATHETER (CVC) WITH SUBCUTANEOUS PORT Right 2024    Procedure: RMNSAIBRE-ASGN-X-CATH;  Surgeon: Lio Storey Jr., MD;  Location: [facility] OR;  Service: General;  Laterality: Right;    THYROIDECTOMY Left 5/10/2024    Procedure: THYROIDECTOMY;  Surgeon: Cali Trujillo MD;  Location: [facility] OR;  Service: ENT;  Laterality: Left;    TUBAL LIGATION        Social History     Socioeconomic History    Marital status: Single   Tobacco Use    " Smoking status: Former     Types: Cigarettes    Smokeless tobacco: Never   Substance and Sexual Activity    Alcohol use: Never     Alcohol/week: 6.0 standard drinks of alcohol     Types: 6 Shots of liquor per week    Drug use: Not Currently     Types: Methamphetamines     Comment: Various Opoids    Sexual activity: Yes     Partners: Male     Social Determinants of Health     Financial Resource Strain: Medium Risk (6/27/2024)    Overall Financial Resource Strain (CARDIA)     Difficulty of Paying Living Expenses: Somewhat hard   Food Insecurity: No Food Insecurity (6/27/2024)    Hunger Vital Sign     Worried About Running Out of Food in the Last Year: Never true     Ran Out of Food in the Last Year: Never true   Transportation Needs: No Transportation Needs (5/12/2024)    TRANSPORTATION NEEDS     Transportation : No   Physical Activity: Insufficiently Active (6/27/2024)    Exercise Vital Sign     Days of Exercise per Week: 2 days     Minutes of Exercise per Session: 30 min   Stress: No Stress Concern Present (6/27/2024)    [employer] - Occupational Stress Questionnaire     Feeling of Stress : Only a little   Recent Concern: Stress - Stress Concern Present (5/12/2024)    [employer] - Occupational Stress Questionnaire     Feeling of Stress : To some extent   Housing Stability: Low Risk  (6/27/2024)    Housing Stability Vital Sign     Unable to Pay for Housing in the Last Year: No     Homeless in the Last Year: No      Family History   Problem Relation Name Age of Onset    Alcohol abuse Mother Pat         Pat    Arthritis Mother Pat     Breast cancer Mother Pat     COPD Mother Pat     Depression Mother Pat     Diabetes Mother Pat     Drug abuse Mother Pat     Hypertension Mother Pat     Miscarriages / Stillbirths Mother Pat     Depression Father Juventino     Drug abuse Father Juventino     Hypertension Father Juventino     Prostate cancer Father Juventino     Thyroid cancer Father  Juventino     Drug abuse Sister Elana     Heart disease Sister Elana     Miscarriages / Stillbirths Sister Elana     Drug abuse Brother Juventino     Early death Brother Izaiah     Asthma Son Mojgan     Hypertension Son Mojgan     Miscarriages / Stillbirths Maternal Grandmother Arlean     Cancer Maternal Grandfather Janay     Hearing loss Paternal Grandfather Lyod         Reason for Follow-up:  -diffuse large B-cell lymphoma of thyroid gland  -family history of von Willebrand disease   -oral fibroma of tongue  -anxiety, depression, NIDDM, hypothyroidism, history of opioid abuse, etc.    History of Present Illness:   Follow-up (Follow up/)        Oncologic/Hematologic History:  Oncology History   Diffuse large B-cell lymphoma of solid organ excluding spleen   6/14/2024 Initial Diagnosis    Diffuse large B-cell lymphoma of solid organ excluding spleen     7/29/2024 -  Chemotherapy    Treatment Summary   Plan Name: OP LYM RCHOP (rituximab, cycloPHOSphamide, DOXOrubicin, vinCRIStine, predniSONE) Q3W  Treatment Goal: Curative  Status: Active  Start Date: 7/29/2024  End Date: 11/12/2024 (Planned)  Provider: Virgil Collins MD  Chemotherapy: DOXOrubicin chemo injection 118 mg, 50 mg/m2 = 118 mg, Intravenous, Clinic/HOD 1 time, 1 of 6 cycles  Administration: 118 mg (7/29/2024)  vinCRIStine (ONCOVIN) 2 mg in 0.9% NaCl 65 mL chemo infusion, 2 mg (100 % of original dose 2 mg), Intravenous, Clinic/HOD 1 time, 1 of 6 cycles  Dose modification: 2 mg (original dose 2 mg, Cycle 1)  Administration: 2 mg (7/29/2024)  cycloPHOSphamide 750 mg/m2 = 1,760 mg in 0.9% NaCl 293.8 mL chemo infusion, 750 mg/m2 = 1,760 mg, Intravenous, Clinic/HOD 1 time, 1 of 6 cycles  Administration: 1,760 mg (7/29/2024)  riTUXimab-pvvr (RUXIENCE) 900 mg in 0.9% NaCl 900 mL infusion (conc: 1 mg/mL), 881 mg, Intravenous, Clinic/HOD 1 time, 1 of 6 cycles  Administration: 900 mg (7/29/2024)     43-year-old lady, referred from [facility], with diffuse large B-cell lymphoma of  thyroid.      Past medical history:  Adrenal mass; anxiety; arthritis; bradycardia; depression; NIDDM; gastroparesis; GERD; hip pain; hypertension; menstrual cramps; ovarian cyst; palpitations; pyosalpingitis; hypothyroidism, history of opioid abuse (on 2024, she tells me that she used opioids for 10-15 years; apparently, prescription opioids; on buprenorphine with a psychiatrist for last 2 years)  history of opiate abuse (maintained on Suboxone), history of C difficile colitis (unable to tolerate C difficile treatment); diagnosed with unspecified bipolar disorder during hospitalization at our [facility], [place], [place], [facility] (enteritis, left pelvic adnexal fluid collection, PID, etc.); history of dysphagia, S/P EGD and dilatation; history of smoking (quit )  -2023: EGD:  Mild gastric erythema with scattered erosions: Pathology:  Small bowel biopsy:  Normal small bowel mucosa; stomach biopsy:  Mild superficial chronic gastritis, negative for H pylori)  -2023: Colonoscopy:  Normal:  Pathology:  Colon, random sites, biopsy:  Normal colonic mucosa  -08/10/2020: MRI abdomen with and without contrast (comparison:  CT 2023) (adrenal gland protocol):  Small left adrenal nodule stable, most compatible with adenoma (1 cm)  Procedure/surgical history:  Endometrial ablation; ; cholecystectomy; dissection of neck, left 05/10/2024; thyroidectomy 05/10/2024; tubal ligation; tonsillectomy  Social history:  In her relationship.  Lives in [place], [place].  Has 3 children.  Does not work.  Has not worked in 1 year.  Before that, used to clean houses.  Smoked a pack of cigarettes daily for 15 years; quit smoking 1 year ago.  Social alcohol. History of opioid abuse (on 2024, she tells me that she used opioids for 10-15 years; apparently, prescription opioids; on buprenorphine with a psychiatrist for last 2 years)  Family history:  Positive for von Willebrand disease in her  son.  Health maintenance:  -11/16/2022:  Bilateral screening mammogram:  BI-RADS 2-benign  -according to her, colonoscopy performed as outpatient by Dr. Yang [place], showed precancerous colon polyp.  =========================================      Patient is being followed for diffuse large B-cell lymphoma of thyroid gland.    Please see assessment and plan section for details.    06/18/2024:  Pleasant lady who presents for initial medical oncology consultation.  In no acute discomfort.  Feels poorly.  Main complaint is in in the back and hips for last 1 year.  History of opioid abuse.  On buprenorphine with a psychiatrist for last years.  Drenching sweats all the time.  Has to change clothes.  Lost 50 lb unintentionally in 2023, apparently due to gastroparesis side effects of Ozempic; regained her weight back after Ozempic was discontinued.    ECOG 1.  No recurrent fevers.  Fatigue.  Generalized weakness.  Night sweats and hot flashes.  Sweats all the time for 1 year.  Bones hurt all the time.  Occasional chest pains and leg swelling as well.  Exertional dyspnea.  Some constipation.  Some nausea.  Great appetite.    Interval History:  [No matching plan found]   OP LYM RCHOP (rituximab, cycloPHOSphamide, DOXOrubicin, vinCRIStine, predniSONE) Q3W   08/19/2024:   -07/23/2024:  Bone marrow aspiration and core biopsy:  Normocellular bone marrow with trilineage hematopoiesis with maturation; no evidence of involvement by lymphoma; cellularity 50%; normal female karyotype; FISH study negative for MYC, BCL2, or BCL6 gene rearrangements  -dysphagia, most likely secondary to left vocal cord paralysis (per ENT)  -her ANC dropped 0.22 on 08/12/2024 (14 days post cycle 1 of R-CHOP); treated with Neupogen x2 cycles  -08/19/2024:  Tells me that her O2 sats are dropping to high 80s at night; history of smoking; refer to PCP ASAP to assess for supplemental oxygen   -08/19/2024: Today, have respiratory therapist check her  pulse oximetry at rest, then after a 6 mm walk test  -she has an erythematous spot on her chest; she is concerned; refer her to Dermatology for biopsy, etc.  Presents for a follow-up visit.  Super anxious.  No psychotic, suicidal, or homicidal manifestations.  Multiple symptoms.  O2 sats dropping to high 80s night.  Past smoker.  Low-grade fevers at night.  Small, about 2 mm red spot on anterior chest; she has concern because her aunt experienced melanoma. Today, at rest, O2 sats 96%.  We will inform her PCP of her concerns.  We will refer to Dermatology.  We will order a 6 minute walk test.  She is also concerned about her high LDH.  Legs hurt.  Because of pain in legs, ambulation is difficult.  Weakness, fatigue, chills, night sweats, hot flashes, headaches, chest pain, leg swelling, dyspnea, cough, trouble swallowing, abdominal pain, nausea, numbness and tingling.  I asked her if she wanted to start chemotherapy; she said, no.    Medications:  Current Outpatient Medications on File Prior to Visit   Medication Sig Dispense Refill    acetaminophen (TYLENOL) 500 MG tablet Take 2 tablets (1,000 mg total) by mouth every 6 (six) hours. 60 tablet 0    buprenorphine HCL (SUBUTEX) 8 mg Subl Place 8 mg under the tongue 3 (three) times daily.      calcium carbonate/vitamin D3 (CALCIUM 600 + D,3, ORAL) Take 1 tablet by mouth 2 (two) times a day.      ciprofloxacin HCl (CIPRO) 500 MG tablet Take 1 tablet (500 mg total) by mouth every 12 (twelve) hours. 14 tablet 0    COMPOUND HORMONE REPLACEMENT Take by mouth once daily.      diphenhydrAMINE-aluminum-magnesium hydroxide-simethicone-LIDOcaine viscous HCl 2% Swish and spit 15 mLs every 4 (four) hours as needed (Mouth sores). 240 each 0    hydrocortisone (ANUSOL-HC) 2.5 % rectal cream Place 1 application  rectally 2 (two) times daily.      insulin (LANTUS SOLOSTAR U-100 INSULIN) glargine 100 units/mL SubQ pen Inject 15 Units into the skin 2 (two) times daily. (Patient taking  "differently: Inject 90 Units into the skin 2 (two) times a day.) 27 mL 3    insulin lispro 100 unit/mL pen Inject 35 Units into the skin 3 (three) times daily. With meals      levothyroxine (SYNTHROID) 200 MCG tablet Take 1 tablet (200 mcg total) by mouth before breakfast. 30 tablet 11    LORazepam (ATIVAN) 0.5 MG tablet Take 1 mg by mouth every 6 (six) hours as needed for Anxiety.      LORazepam (ATIVAN) 1 MG tablet Take 1 mg by mouth 2 (two) times daily.      losartan (COZAAR) 50 MG tablet Take 1 tablet (50 mg total) by mouth once daily. 90 tablet 3    OLANZapine (ZYPREXA) 5 MG tablet Take 1 tablet by mouth nightly on days 1-3 of each chemotherapy cycle. 3 tablet 5    ondansetron (ZOFRAN-ODT) 4 MG TbDL Take 1 tablet (4 mg total) by mouth every 8 (eight) hours as needed (NAUSEA). 30 tablet 1    pantoprazole (PROTONIX) 40 MG tablet Take 40 mg by mouth every morning.      pen needle, diabetic (BD ULTRA-FINE ROSALEE PEN NEEDLE) 32 gauge x 5/32" Ndle 1 each by Misc.(Non-Drug; Combo Route) route 2 (two) times a day. 100 each 9    polyethylene glycol (GLYCOLAX) 17 gram PwPk Take 17 g by mouth 2 (two) times daily. 30 each 1    predniSONE (DELTASONE) 50 MG Tab Take 2 tablets (100 mg total) by mouth once daily. On days 2-5 of your chemotherapy cycles. Take with food. 8 tablet 5    prochlorperazine (COMPAZINE) 5 MG tablet Take 2 tablets (10 mg total) by mouth every 6 (six) hours as needed for Nausea. 20 tablet 5     Current Facility-Administered Medications on File Prior to Visit   Medication Dose Route Frequency Provider Last Rate Last Admin    dextrose 10% bolus 125 mL 125 mL  12.5 g Intravenous PRN Chuckie Saenz MD        dextrose 10% bolus 250 mL 250 mL  25 g Intravenous PRN Chuckie Saenz MD        glucagon (human recombinant) injection 1 mg  1 mg Intramuscular PRN Chuckie Saenz MD           Review of Systems:   All systems reviewed and found to be negative except for the symptoms detailed above    Physical " Examination:   VITAL SIGNS:   Vitals:    08/19/24 0830   BP: 121/76   Pulse: 76   Resp: 20   Temp: 98.7 °F (37.1 °C)     GENERAL:  In no apparent distress.    HEAD:  No signs of head trauma.  EYES:  Pupils are equal.  Extraocular motions intact.    EARS:  Hearing grossly intact.  MOUTH:  Oropharynx is normal.   NECK:  No adenopathy, no JVD.     CHEST:  Chest with clear breath sounds bilaterally.  No wheezes, rales, rhonchi.    CARDIAC:  Regular rate and rhythm.  S1 and S2, without murmurs, gallops, rubs.  VASCULAR:  No Edema.  Peripheral pulses normal and equal in all extremities.  ABDOMEN:  Soft, without detectable tenderness.  No sign of distention.  No   rebound or guarding, and no masses palpated.   Bowel Sounds normal.  MUSCULOSKELETAL:  Good range of motion of all major joints. Extremities without clubbing, cyanosis or edema.    NEUROLOGIC EXAM:  Alert and oriented x 3.  No focal sensory or strength deficits.   Speech normal.  Follows commands.  PSYCHIATRIC:  Mood normal.  -07/09/2024:  Erythema around MediPort site; mild tenderness; no fluctuance; no tunnelitis    Results for orders placed or performed during the hospital encounter of 05/10/24   CBC Auto Differential    Narrative    The following orders were created for panel order CBC Auto Differential.  Procedure                               Abnormality         Status                     ---------                               -----------         ------                     CBC with Differential[[id number]]                           Final result                 Please view results for these tests on the individual orders.   CBC with Differential   Result Value Ref Range    WBC 8.70 4.50 - 11.50 x10(3)/mcL    RBC 4.72 4.20 - 5.40 x10(6)/mcL    Hgb 13.4 12.0 - 16.0 g/dL    Hct 40.5 37.0 - 47.0 %    MCV 85.8 80.0 - 94.0 fL    MCH 28.4 27.0 - 31.0 pg    MCHC 33.1 33.0 - 36.0 g/dL    RDW 12.6 11.5 - 17.0 %    Platelet 171 130 - 400 x10(3)/mcL    MPV 9.3 7.4 -  10.4 fL    Neut % 71.4 %    Lymph % 21.0 %    Mono % 7.0 %    Eos % 0.3 %    Basophil % 0.1 %    Lymph # 1.83 0.6 - 4.6 x10(3)/mcL    Neut # 6.20 2.1 - 9.2 x10(3)/mcL    Mono # 0.61 0.1 - 1.3 x10(3)/mcL    Eos # 0.03 0 - 0.9 x10(3)/mcL    Baso # 0.01 <=0.2 x10(3)/mcL    IG# 0.02 0 - 0.04 x10(3)/mcL    IG% 0.2 %    NRBC% 0.0 %     Results for orders placed or performed during the hospital encounter of 05/10/24   Comprehensive Metabolic Panel   Result Value Ref Range    Sodium 140 136 - 145 mmol/L    Potassium 4.3 3.5 - 5.1 mmol/L    Chloride 102 98 - 107 mmol/L    CO2 28 22 - 29 mmol/L    Glucose 229 (H) 74 - 100 mg/dL    Blood Urea Nitrogen 9.1 7.0 - 18.7 mg/dL    Creatinine 0.75 0.55 - 1.02 mg/dL    Calcium 9.7 8.4 - 10.2 mg/dL    Protein Total 6.8 6.4 - 8.3 gm/dL    Albumin 3.8 3.5 - 5.0 g/dL    Globulin 3.0 2.4 - 3.5 gm/dL    Albumin/Globulin Ratio 1.3 1.1 - 2.0 ratio    Bilirubin Total 1.1 <=1.5 mg/dL    ALP 56 40 - 150 unit/L    ALT 40 0 - 55 unit/L    AST 31 5 - 34 unit/L    eGFR >60 mL/min/1.73/m2       Assessment:  Problem List Items Addressed This Visit          Neuro    Chronic pain syndrome - Primary       Psychiatric    Anxiety    History of opioid abuse       ENT    Vocal cord paralysis       Hematology    Family history of von Willebrand disease       Oncology    Diffuse large B-cell lymphoma of solid organ excluding spleen    Overview     Diffuse large B-cell lymphoma of thyroid gland.         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   -03/28/2024:  Thyroglobulin antibody 150 IU/mL, elevated (reference Range:  < 1.8)  -03/28/2024: Thyroglobulin, tumor marker: < 0.1 (reference Range:  Athyroid < 0.1; intact thyroid </= 33)  -left hemithyroidectomy with left central neck dissection level 6 (05/10/2024):   Diffuse large B-cell lymphoma left oscar thyroid, germinal center B-cell type, not a double expressor, not double hit lymphoma; morphologically, thyroid extensively involved; paratracheal tissue biopsy negative; left central neck dissection 13 benign lymph nodes,  -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 during left hemithyroidectomy 05/10/2024, noted on FFL exam by ENT  -06/18/2024: ECOG 1; chronic fatigue; sweats all the time; no consistent weight loss; appetite is great  -06/18/2024: CBC, CMP essentially unremarkable   -06/18/2024: LDH normal, beta 2 microglobulin normal  -06/18/2024: Hep B core antibody total, hep B surface antigen, hep C antibody, HIV: Nonreactive  -FDG PET-CT 06/18/2024:  No other sites of disease  -TTE 06/26/2024: LVEF 55-60%  -right IJ MediPort placed 07/08/2024  -S/P tubal ligation in the past  -bone marrow evaluation 07/23/2024: Report pending   -R-CHOP started 07/29/2024; experienced allergic reaction to rituximab; managed with corticosteroids and antihistamines  -bone marrow exam 07/23/2024: Negative for lymphoma  (Diffuse large B-cell lymphoma of thyroid gland, stage I)  -dysphagia, most likely secondary to postoperative left vocal cord paralysis (per ENT)  -her ANC dropped 0.22 on 08/12/2024 (14 days post cycle 1 of R-CHOP); treated with Neupogen x2 doses)    Stage I disease:  -nonbulky  -smIPI 0    Stage I disease:  -06/18/2024: Initial consultation:  ECOG 1; sweats all the time  -IPI:  Low (0)  -age adjusted IP!:  Low (0)  -stage modified IPI (smIPI) low (0)  -NCCN IPI:   Low (1, by virtue of age 43)  -prognostic model to assess the risk of CNS disease (CNS IPI):  Low risk (0)      Family history of von Willebrand disease:  -04/25/2024:  Von Willebrand factor activity 75%, normal      Oral fibroma of tongue:   -tongue lesion: 1st noted around 02/2023; slowly enlarging since; no bleeding; no pain  -tongue biopsy 02/08/2024: Oral fibroma      Comorbid medical conditions:  Anxiety, depression, NIDDM, gastroparesis, hypertension, history of PID  Hypothyroidism  History of opioid abuse, maintained on Suboxone  History of dysphagia, S/P endoscopic dilatation         Plan:   -will inform her PCP that diabetes uncontrolled continue chemotherapy every 3 weeks   -re-stage with PET-CT after completion of 3 cycles of chemotherapy (weeks after cycle 3 of chemotherapy)   Check CBC and CMP weekly   From cycle 2 of chemotherapy onwards, add Neulasta  -08/19/2024:  Tells me that her O2 sats are dropping to high 80s at night; history of smoking; refer to PCP ASAP to assess for supplemental oxygen   -08/19/2024: Today, have respiratory therapist check her pulse oximetry at rest, then after a 6 mm walk test  -she has an erythematous spot on her chest; she is concerned; refer her to Dermatology for biopsy, etc.  -08/19/2024: Refer to behavioral health for management of anxiety  Follow-up with NP in 3 weeks  -----------------------------------------        -08/19/2024:  Tells me that her O2 sats are dropping to high 80s at night; history of smoking; refer to PCP ASAP to assess for supplemental oxygen   -08/19/2024: Today, have respiratory therapist check her pulse oximetry at rest, then after a 6 mm walk test  -she has an erythematous spot on her chest; she is concerned; refer her to Dermatology for biopsy, etc.    -08/19/2024:  CBC normal; CO2 30, elevated; glucose 328; , elevated  >>>  -will inform her PCP that diabetes uncontrolled continue chemotherapy every 3 weeks  "    Lymphoma:  >>>  -continue R-CHOP every 3 weeks; then, interim staging with PET-CT; if complete response, then, R-CHOP x1 cycle (total of 4 cycles), etc. (see below)  -developed grade 4 neutropenia 14 days post cycle 1 of chemotherapy; therefore, we will add GCSF support from 2nd cycle onwards (Neulasta)  -S/P tubal ligation in the past   -check CBC and CMP weekly  -allopurinol 200 mg p.o. b.i.d. for TLS prophylaxis: Begin 2-3 days prior to chemo immunotherapy end continue for 10-14 days  -re-stage with FDG PET-CT after 3 cycles of R-CHOP  -aggressive oral hydration prevent hemorrhagic cystitis with cyclophosphamide  -monitor for neurotoxicity with vincristine  -treatment should be delayed until ANC is> 1500 mm3 and platelet count> 100 K mm3    -08/19/2024: Super anxious; no psychotic count, homicidal, or suicidal ideation; a multitude of unrelated symptoms; I asked her if she wanted to start chemotherapy, she has had "no"; she wishes to continue with chemotherapy  >>> we will refer to behavioral health for management of anxiety    Stage I disease:  -nonbulky  -smIPI 0  -recommendation:  R-CHOP x3 cycles;   -followed by interim staging with PET-CT:   1. If complete response (PET negative, i.e., 5-PS 1-3), then:  R-CHOP x1 cycle (total of 4 cycles); or ISRT, followed by surveillance  2. If partial response (PET positive, i.e., 5-PS for), then:  Repeat biopsy, etc.  3. If progressive disease (PET positive, i.e.,5-PS 5), then:  Repeat biopsy, etc.    R-CHOP: Monitoring parameters:  CBC with differential and platelet count weekly during treatment.  Assess basic metabolic panel (creatinine and electrolytes) and liver function prior to each subsequent treatment cycle.  LVEF should be evaluated periodically based on LVEF at initiation of therapy and cumulative dose of doxorubicin.  Carriers of hepatitis B or C should be monitored for clinical and laboratory signs of active infection during and following completion of " therapy. Rituximab should be discontinued if reactivation occurs.    TLS prophylaxis:  Allopurinol beginning 2-3 days prior to chemo immunotherapy and continued for 10-14 days  (Allopurinol: 100 mg per m2 every 8 hours, maximum 800 mg per day)    R-CHOP:  -Patients receiving cyclophosphamide should maintain adequate oral hydration (2 to 3 L/day) and void frequently to reduce risk of hemorrhagic cystitis.  -The risk of febrile neutropenia with this regimen is 10 to 20%; primary prophylaxis with hematopoietic growth factors should be considered on an individual basis, particularly for high-risk patients such as those with preexisting neutropenia, advanced disease, poor performance status, or patients age 65 years or older.  -Adjustment of initial cyclophosphamide, doxorubicin, and vincristine doses may be needed for preexisting liver dysfunction.  In addition, dose adjustment of cyclophosphamide may be required for kidney dysfunction.  -LVEF should be evaluated prior to initiation of therapy. Dose alterations should be considered for LVEF <50%, and doxorubicin therapy is contraindicated in patients with LVEF <30% at initiation. Infusion times and schedule may be adjusted to decrease the risk of cardiotoxicity in individuals at high risk for its development.  -Neurotoxicity: Vincristine may cause constipation, and in severe cases, paralytic ileus. A routine prophylactic regimen against constipation is recommended in all patients receiving vincristine.    R-CHOP:  Dose modifications for myelotoxicity and neuropathy:  # Treatment should be delayed until ANC is >1500/microL and platelet count is >100,000/microL.  # if a patient develops grade 4 (ANC <500/microL) neutropenia or febrile neutropenia with any cycle, G-CSF support is added to the regimen for subsequent cycles.  # If grade 4 neutropenia or febrile neutropenia occurs despite G-CSF support, or if the patient develops grade 3 (25,000 to 50,000/microL) or 4  (<25,000/microL) thrombocytopenia with any cycle, the doses of cyclophosphamide and doxorubicin should be decreased by 50% for subsequent cycles.  # Neuropathy: Dose adjustment of vincristine may be necessary if the severity of neuropathy persists or worsens. No specific guidelines are available for dose adjustments     Family history of von Willebrand disease   -04/25/2024: Von Willebrand factor activity 75%, normal    Follow-up with NP in 3 weeks    Above discussed at length with the patient.  All questions answered.    Plan of management discussed.  She understands and agrees with this plan.      Follow-up:  No follow-ups on file.    Answers submitted by the patient for this visit:  Review of Systems Questionnaire (Submitted on 8/16/2024)  appetite change : Yes  unexpected weight change: No  mouth sores: Yes  visual disturbance: Yes  cough: Yes  shortness of breath: Yes  chest pain: Yes  abdominal pain: Yes  diarrhea: No  frequency: No  back pain: Yes  rash: No  headaches: Yes  adenopathy: No  nervous/ anxious: Yes

## 2024-08-19 NOTE — PROGRESS NOTES
Per respiratory therapist:   Pulse oximetry at rest: 95%   Pulse oximetry after 6 minute walk: 90%  My understanding is the patient qualifies for supplemental oxygen only when the pulse oximetry drops to 88%.    Therefore, per my understanding, she does not qualify for supplemental oxygen.  However, she told me that her pulse oximetry drops to late 80s at night.      Plan:   Have instructed the patient that she must follow-up with her PCP for further evaluation of oxygen needs.

## 2024-08-19 NOTE — NURSING
Infusion Note:  Pt has an appointment today for: Labs, Dr. Collins, and Infusion    Treatment Regimen: R-CHOP   Cycle: 2 Day: 1 every Q3 weeks;      Given via Right Mediport    UPT done: Not done  UPT exception: Tubal ligation    Treatment parameters: were met    Nursing note:  Pt & sister in law escorted to infusion clininc, pt anxious but otherwise without complaint, VS taken; Dr Hernandez ordered for w6 min walking pulse ox check per respiratory.     Pt O2 sat dropped from 96% resting to 90% after ambulation.  Per Dr Collins, informed pt  Does not qualify for home O2 at this time, pt will need to f/u with PCP for f/u testing and possible sleep study.  Glucose level was elevated and pt to f/u with PCP to deena Zhu referred pt to Behavorial health for counseling for anxiety & new diagnosis  Dr Collins referred pt to Dermatology Clinic for lesion on chest.  Explained Dr Collins manages pt's lymphoma & PCP manages other medical issues; pt verbalized understanding to all 4 topics discussed.    Pt received premeds & C2D1 R-CHOP without incident.     Future appts scheduled: Infusion on 8/20/24 for C2D2 Neulasta injection, weekly labs & 9/9/24 labs/A Tahmina Blakely & C3D1 R-CHOP.

## 2024-08-19 NOTE — Clinical Note
-08/19/2024:  Tells me that her O2 sats are dropping to high 80s at night; history of smoking; refer to PCP ASAP to assess for supplemental oxygen  -08/19/2024: Today, have respiratory therapist check her pulse oximetry at rest, then after a 6 mm walk test -she has an erythematous spot on her chest; she is concerned; refer her to Dermatology for biopsy, etc.

## 2024-08-19 NOTE — TELEPHONE ENCOUNTER
----- Message from Luke Brannon LPN sent at 8/19/2024 10:08 AM CDT -----  Good morning,    Dr. Collins saw this patient in clinic today and wanted to notify Dr. Souza that the above patients diabetes is uncontrolled. We also referred the patient back to internal medicine to assess for supplemental oxygen.     Could you please assist?  Thank you!

## 2024-08-20 ENCOUNTER — DOCUMENTATION ONLY (OUTPATIENT)
Dept: INFUSION THERAPY | Facility: HOSPITAL | Age: 44
End: 2024-08-20
Payer: MEDICAID

## 2024-08-20 ENCOUNTER — HOSPITAL ENCOUNTER (EMERGENCY)
Facility: HOSPITAL | Age: 44
Discharge: HOME | End: 2024-08-20
Attending: INTERNAL MEDICINE
Payer: MEDICAID

## 2024-08-20 ENCOUNTER — INFUSION (OUTPATIENT)
Dept: INFUSION THERAPY | Facility: HOSPITAL | Age: 44
End: 2024-08-20
Attending: INTERNAL MEDICINE
Payer: MEDICAID

## 2024-08-20 VITALS
RESPIRATION RATE: 20 BRPM | WEIGHT: 269.19 LBS | OXYGEN SATURATION: 95 % | TEMPERATURE: 98 F | DIASTOLIC BLOOD PRESSURE: 87 MMHG | HEART RATE: 75 BPM | SYSTOLIC BLOOD PRESSURE: 141 MMHG | HEIGHT: 67 IN | BODY MASS INDEX: 42.25 KG/M2

## 2024-08-20 VITALS
BODY MASS INDEX: 42.39 KG/M2 | TEMPERATURE: 98 F | DIASTOLIC BLOOD PRESSURE: 89 MMHG | SYSTOLIC BLOOD PRESSURE: 136 MMHG | OXYGEN SATURATION: 96 % | HEIGHT: 67 IN | WEIGHT: 270.06 LBS | RESPIRATION RATE: 16 BRPM | HEART RATE: 75 BPM

## 2024-08-20 DIAGNOSIS — M79.89 LEG SWELLING: ICD-10-CM

## 2024-08-20 DIAGNOSIS — C83.39 DIFFUSE LARGE B-CELL LYMPHOMA OF SOLID ORGAN EXCLUDING SPLEEN: Primary | ICD-10-CM

## 2024-08-20 DIAGNOSIS — R06.02 SOB (SHORTNESS OF BREATH): ICD-10-CM

## 2024-08-20 DIAGNOSIS — J98.4 PNEUMONITIS: Primary | ICD-10-CM

## 2024-08-20 LAB
FLUAV AG UPPER RESP QL IA.RAPID: NOT DETECTED
FLUBV AG UPPER RESP QL IA.RAPID: NOT DETECTED
OHS QRS DURATION: 92 MS
OHS QTC CALCULATION: 452 MS
SARS-COV-2 RNA RESP QL NAA+PROBE: NOT DETECTED

## 2024-08-20 PROCEDURE — 63600175 PHARM REV CODE 636 W HCPCS: Mod: JZ,TB | Performed by: INTERNAL MEDICINE

## 2024-08-20 PROCEDURE — 99285 EMERGENCY DEPT VISIT HI MDM: CPT | Mod: 25

## 2024-08-20 PROCEDURE — 25500020 PHARM REV CODE 255: Performed by: INTERNAL MEDICINE

## 2024-08-20 PROCEDURE — 82962 GLUCOSE BLOOD TEST: CPT

## 2024-08-20 PROCEDURE — 96372 THER/PROPH/DIAG INJ SC/IM: CPT

## 2024-08-20 PROCEDURE — 93005 ELECTROCARDIOGRAM TRACING: CPT

## 2024-08-20 PROCEDURE — 0240U COVID/FLU A&B PCR: CPT

## 2024-08-20 RX ADMIN — PEGFILGRASTIM 6 MG: 6 INJECTION SUBCUTANEOUS at 04:08

## 2024-08-20 RX ADMIN — IOHEXOL 100 ML: 350 INJECTION, SOLUTION INTRAVENOUS at 06:08

## 2024-08-20 NOTE — PROGRESS NOTES
"1300 Patient called to ask if she could come at 4pm to get her "shot." Patient is scheduled for C2D2 Flynetra today. I informed patient she can come now. She then proceeds to tell me that last night her throat "swolled-up" and her lymph nodes in her neck feel swollen. Her voice sounded hoarse. I asked if she was having trouble breathing? She stated, "no." I informed her she needed to come in now for her injection & evaluated, or report to ED for evaluation. She stated she was coming here.   "

## 2024-08-20 NOTE — NURSING
"Pt presented to Infusion Clinic unaccompanied for C2D2 Fylnetra injection; pt stated her lymph nodes on both sides of her neck swolled up last yesterday evening & she was too tired to go to the ED after her day of chemo yesterday; pt rated pain on both sides of her neck as 5/10, pt added she took her post chemo steroids today and that seems to "help with the swelling"; Dr Collins notified that pt called earlier today about her neck swelling and that pt planned to go to the ED; pt offered to be taken to ED by wheelchair which she declined as her  in waiting in the car for her & they will go together; report called to ED, spoke with SARTHAK Hall.; AVS given.    "

## 2024-08-21 LAB — POCT GLUCOSE: 376 MG/DL (ref 70–110)

## 2024-08-22 ENCOUNTER — OFFICE VISIT (OUTPATIENT)
Dept: INTERNAL MEDICINE | Facility: CLINIC | Age: 44
End: 2024-08-22
Payer: MEDICAID

## 2024-08-22 VITALS
TEMPERATURE: 98 F | OXYGEN SATURATION: 93 % | SYSTOLIC BLOOD PRESSURE: 140 MMHG | HEART RATE: 80 BPM | RESPIRATION RATE: 15 BRPM | BODY MASS INDEX: 39.53 KG/M2 | DIASTOLIC BLOOD PRESSURE: 76 MMHG | HEIGHT: 66 IN | WEIGHT: 246 LBS

## 2024-08-22 DIAGNOSIS — Z72.0 TOBACCO ABUSE: ICD-10-CM

## 2024-08-22 DIAGNOSIS — F41.1 GENERALIZED ANXIETY DISORDER: ICD-10-CM

## 2024-08-22 DIAGNOSIS — C85.99 MALIGNANT LYMPHOMA OF THYROID GLAND: ICD-10-CM

## 2024-08-22 DIAGNOSIS — I49.9 IRREGULAR HEARTBEAT: Primary | ICD-10-CM

## 2024-08-22 DIAGNOSIS — R06.00 DYSPNEA, UNSPECIFIED TYPE: ICD-10-CM

## 2024-08-22 DIAGNOSIS — C83.39 DIFFUSE LARGE B-CELL LYMPHOMA OF SOLID ORGAN EXCLUDING SPLEEN: ICD-10-CM

## 2024-08-22 LAB
OHS QRS DURATION: 98 MS
OHS QTC CALCULATION: 455 MS

## 2024-08-22 PROCEDURE — 99215 OFFICE O/P EST HI 40 MIN: CPT | Mod: PBBFAC,25 | Performed by: INTERNAL MEDICINE

## 2024-08-22 PROCEDURE — 93005 ELECTROCARDIOGRAM TRACING: CPT

## 2024-08-22 NOTE — PROGRESS NOTES
[facility] INTERNAL MEDICINE  OUTPATIENT OFFICE VISIT NOTE    SUBJECTIVE:      HPI: Fred Berman is a 43 y.o. female here today for f/u  43 y.o. yo female w/ PMH of DM II, HTN, gastroparesis, hypothyroidism, who presents today for for a preop visit.  Recently seen in ENT- patient recently underwent a left hemithyroidectomy on May 10th-she is healing well.  Pathology revealed a diffuse large B-cell lymphoma.  She has already established care with Dr. Falnnery in Hematology-Oncology-an extensive workup has been initiated and she will start immunotherapy plus chemotherapy-MediPort to be placed     The patient sees an endocrinologist for control of her diabetes.  She is aware the present time diabetes is under sub- optimal control- she had seen her endocrinologist outside recently and her insulin dose was increased     PATIENT REQUESTED AN URGENT APPOINTMENT TODAY.  SHE WAS TOLD by her oncologist that her blood sugar levels are running high.  She sees an outside endocrinologist and has an appointment to follow-up with them next month.  At this time I have advised her to increase her insulin dosage.  I do not have any blood sugar levels are logs to compare to.      She is also concerned because her oxygenation has been dropping at night-she did have a history of smoking in the past-we will order sleep study and PFTs   She is also concerned about palpitations.  She had a cardiologist with whom she was lost to follow-up.    She had a vaginal boil- much improved    She was advised to f/u with us sooner because of her oxygen levels falling- / need for home oxygen    Current Outpatient Medications   Medication Instructions    acetaminophen (TYLENOL) 1,000 mg, Oral, Every 6 hours    buprenorphine HCL (SUBUTEX) 8 mg, Sublingual, 3 times daily    calcium carbonate/vitamin D3 (CALCIUM 600 + D,3, ORAL) 1 tablet, 2 times daily    ciprofloxacin HCl (CIPRO) 500 mg, Oral, Every 12 hours    COMPOUND HORMONE REPLACEMENT Oral, Daily     "diphenhydrAMINE-aluminum-magnesium hydroxide-simethicone-LIDOcaine viscous HCl 2% 15 mLs, Swish & Spit, Every 4 hours PRN    hydrocortisone (ANUSOL-HC) 2.5 % rectal cream 1 application , 2 times daily    insulin lispro 35 Units, Subcutaneous, 3 times daily, With meals    LANTUS SOLOSTAR U-100 INSULIN 15 Units, Subcutaneous, 2 times daily    levothyroxine (SYNTHROID) 200 mcg, Oral, Before breakfast    LORazepam (ATIVAN) 1 mg, Every 6 hours PRN    LORazepam (ATIVAN) 1 mg, Oral, 2 times daily    losartan (COZAAR) 50 mg, Oral, Daily    OLANZapine (ZYPREXA) 5 MG tablet Take 1 tablet by mouth nightly on days 1-3 of each chemotherapy cycle.    ondansetron (ZOFRAN-ODT) 4 mg, Oral, Every 8 hours PRN    pantoprazole (PROTONIX) 40 mg, Oral, Every morning    pen needle, diabetic (BD ULTRA-FINE ROSALEE PEN NEEDLE) 32 gauge x 5/32" Ndle 1 each, Misc.(Non-Drug; Combo Route), 2 times daily    polyethylene glycol (GLYCOLAX) 17 g, Oral, 2 times daily    predniSONE (DELTASONE) 100 mg, Oral, Daily, On days 2-5 of your chemotherapy cycles. Take with food.    prochlorperazine (COMPAZINE) 10 mg, Oral, Every 6 hours PRN       ROS:  CONSTITUTIONAL: Denies weight loss, fever and chills.  HEENT: Denies changes in vision and hearing.  ?RESPIRATORY: Denies SOB and cough.?  CV: Denies palpitations and CP. ?  GI: Denies abdominal pain, nausea, vomiting and diarrhea.?  : Denies dysuria and urinary frequency.?  MSK: Denies myalgia and joint pain.?  SKIN: Denies rash and pruritus.  ?NEUROLOGICAL: Denies headache and syncope.?  PSYCHIATRIC: Denies recent changes in mood. Denies anxiety and depression.     OBJECTIVE:       Visit Vitals  BP (!) 140/76 (BP Location: Left arm, Patient Position: Sitting, BP Method: Medium (Automatic))   Pulse 80   Temp 98.3 °F (36.8 °C) (Oral)   Resp 15   Ht 5' 6" (1.676 m)   Wt 111.6 kg (246 lb)   LMP  (LMP Unknown)   SpO2 (!) 93%   BMI 39.71 kg/m²        General: Well nourished w/o distress  HEENT: NC/AT; PERRLA; nasal " and oral mucosa moist and clear; no sinus tenderness; no thyromegaly  Neck: Full ROM; no lymphadenopathy  Pulm: CTA bilaterally, normal work of breathing  CV: S1, S2 w/o murmurs or gallops; no edema noted  GI: Soft with normal bowel sounds in all quadrants, no masses on palpation  MSK: Full ROM of all extremities and spine w/o limitation or discomfort  Derm: No rashes, abnormal bruising, or skin lesions  Neuro: AAOx4; CN II-XII intact; motor/sensory function intact  Psych: Cooperative; appropriate mood and affect       ASSESSMENT & PLAN:     DM II  Gastroparesis  - patient was seeing an endocrinologist out in [place]-  -  she is currently on insulin lispro 35 units t.i.d. , Lantus 90 units BID  - advised to increase to 100 units BID  - EDIE TO SEE ENDO NEXT MONTH     Hypothyroidism  - continues on levothyroxine 200 mcg     HTN  -Continue losartan 50mg daily     Chronic pain syndrome  HX of polysubstance abuse  -Currently F/U with suboxone clinic and on buprenorphine 8mg TID     Generalized anxiety disorder   -Currently seeing an outside psychiatrist    Dyspnea- copd  - Referred to pulm med    Sleep apnea- sleep study    Boil- Vagina  - IMPROVING    Arrhythmia  -        1. Irregular heartbeat  -     IN OFFICE EKG 12-LEAD (to Muse)  -     Cardiac Monitor - 3-15 Day Adult (Cupid Only); Future; Expected date: 08/22/2024    2. Diffuse large B-cell lymphoma of solid organ excluding spleen  Overview:  Diffuse large B-cell lymphoma of thyroid gland.    Orders:  -     Ambulatory referral/consult to Internal Medicine    3. Malignant lymphoma of thyroid gland  -     Ambulatory referral/consult to Internal Medicine    4. Tobacco abuse  -     Ambulatory referral/consult to Pulmonology; Future; Expected date: 08/29/2024  -     Complete PFT w/ bronchodilator; Future  -     Polysomnogram (CPAP will be added if patient meets diagnostic criteria.); Future    5. Dyspnea, unspecified type  -     Polysomnogram (CPAP will be added if  patient meets diagnostic criteria.); Future    6. Generalized anxiety disorder           Follow up in about 6 months (around 2/22/2025).     Future Appointments   Date Time Provider Department Center   8/23/2024 11:30 AM [facility] VASCULAR 01 [facility] CARDIA [place]    8/26/2024  8:30 AM NURSE, [facility] HEMATOLOGY ONCOLOGY [facility] HEMOM [place]   9/3/2024  8:45 AM NURSE, [facility] HEMATOLOGY ONCOLOGY [facility] HEMOM [place]   9/4/2024 10:30 AM CV [facility] CARDIAC MONITOR 01 [facility] CARDIA [place]   9/9/2024  7:00 AM NURSE, [facility] HEMATOLOGY ONCOLOGY [facility] HEMOMC [place]   9/9/2024  8:00 AM Eduardo Blakely FNP [facility] HEMOMC [place]   9/9/2024  8:30 AM INFUSION ROOM 533, [facility] CHEMOTHERAPY INFUSION [facility] CHEMO [place]   9/10/2024  1:00 PM INFUSION ROOM 530, [facility] CHEMOTHERAPY INFUSION [facility] CHEMO [place]   10/24/2024  1:00 PM Jacquelin Souza MD [facility] IM RES [place]   10/31/2024  2:00 PM RESIDENT 1, [facility] OTORHINOLARYNGOLOGY [facility] ENT [place]   12/13/2024  8:00 AM RESIDENTS, [facility] ENDOCRINOLOGY [facility] ENDOCR [place]        Jacquelin Souza MD

## 2024-08-23 ENCOUNTER — HOSPITAL ENCOUNTER (OUTPATIENT)
Dept: CARDIOLOGY | Facility: HOSPITAL | Age: 44
Discharge: HOME | End: 2024-08-23
Attending: INTERNAL MEDICINE
Payer: MEDICAID

## 2024-08-23 DIAGNOSIS — M79.89 LEG SWELLING: ICD-10-CM

## 2024-08-23 PROCEDURE — 93970 EXTREMITY STUDY: CPT | Mod: TC

## 2024-08-23 PROCEDURE — 93970 EXTREMITY STUDY: CPT | Mod: 26,,, | Performed by: SURGERY

## 2024-08-26 ENCOUNTER — LAB VISIT (OUTPATIENT)
Dept: HEMATOLOGY/ONCOLOGY | Facility: CLINIC | Age: 44
End: 2024-08-26
Payer: MEDICAID

## 2024-08-26 DIAGNOSIS — T45.1X5A CHEMOTHERAPY-INDUCED NEUTROPENIA: ICD-10-CM

## 2024-08-26 DIAGNOSIS — C83.39 DIFFUSE LARGE B-CELL LYMPHOMA OF SOLID ORGAN EXCLUDING SPLEEN: Primary | ICD-10-CM

## 2024-08-26 DIAGNOSIS — D70.1 CHEMOTHERAPY-INDUCED NEUTROPENIA: ICD-10-CM

## 2024-08-26 LAB
ALBUMIN SERPL-MCNC: 3.5 G/DL (ref 3.5–5)
ALBUMIN/GLOB SERPL: 1.3 RATIO (ref 1.1–2)
ALP SERPL-CCNC: 115 UNIT/L (ref 40–150)
ALT SERPL-CCNC: 67 UNIT/L (ref 0–55)
ANION GAP SERPL CALC-SCNC: 6 MEQ/L
AST SERPL-CCNC: 38 UNIT/L (ref 5–34)
BASOPHILS # BLD AUTO: 0.03 X10(3)/MCL
BASOPHILS NFR BLD AUTO: 2.7 %
BILIRUB SERPL-MCNC: 0.5 MG/DL
BUN SERPL-MCNC: 9.8 MG/DL (ref 7–18.7)
CALCIUM SERPL-MCNC: 9.1 MG/DL (ref 8.4–10.2)
CHLORIDE SERPL-SCNC: 100 MMOL/L (ref 98–107)
CO2 SERPL-SCNC: 34 MMOL/L (ref 22–29)
CREAT SERPL-MCNC: 0.81 MG/DL (ref 0.55–1.02)
CREAT/UREA NIT SERPL: 12
EOSINOPHIL # BLD AUTO: 0.04 X10(3)/MCL (ref 0–0.9)
EOSINOPHIL NFR BLD AUTO: 3.6 %
ERYTHROCYTE [DISTWIDTH] IN BLOOD BY AUTOMATED COUNT: 13.3 % (ref 11.5–17)
GFR SERPLBLD CREATININE-BSD FMLA CKD-EPI: >60 ML/MIN/1.73/M2
GLOBULIN SER-MCNC: 2.8 GM/DL (ref 2.4–3.5)
GLUCOSE SERPL-MCNC: 273 MG/DL (ref 74–100)
HCT VFR BLD AUTO: 37.6 % (ref 37–47)
HGB BLD-MCNC: 12.3 G/DL (ref 12–16)
IMM GRANULOCYTES # BLD AUTO: 0.01 X10(3)/MCL (ref 0–0.04)
IMM GRANULOCYTES NFR BLD AUTO: 0.9 %
LYMPHOCYTES # BLD AUTO: 0.72 X10(3)/MCL (ref 0.6–4.6)
LYMPHOCYTES NFR BLD AUTO: 64.9 %
MCH RBC QN AUTO: 28.6 PG (ref 27–31)
MCHC RBC AUTO-ENTMCNC: 32.7 G/DL (ref 33–36)
MCV RBC AUTO: 87.4 FL (ref 80–94)
MONOCYTES # BLD AUTO: 0.1 X10(3)/MCL (ref 0.1–1.3)
MONOCYTES NFR BLD AUTO: 9 %
NEUTROPHILS # BLD AUTO: 0.21 X10(3)/MCL (ref 2.1–9.2)
NEUTROPHILS NFR BLD AUTO: 18.9 %
NRBC BLD AUTO-RTO: 0 %
PLATELET # BLD AUTO: 91 X10(3)/MCL (ref 130–400)
PMV BLD AUTO: 10.6 FL (ref 7.4–10.4)
POTASSIUM SERPL-SCNC: 4 MMOL/L (ref 3.5–5.1)
PROT SERPL-MCNC: 6.3 GM/DL (ref 6.4–8.3)
RBC # BLD AUTO: 4.3 X10(6)/MCL (ref 4.2–5.4)
SODIUM SERPL-SCNC: 140 MMOL/L (ref 136–145)
WBC # BLD AUTO: 1.11 X10(3)/MCL (ref 4.5–11.5)

## 2024-08-26 PROCEDURE — 36415 COLL VENOUS BLD VENIPUNCTURE: CPT

## 2024-08-26 PROCEDURE — 80053 COMPREHEN METABOLIC PANEL: CPT

## 2024-08-26 PROCEDURE — 85025 COMPLETE CBC W/AUTO DIFF WBC: CPT

## 2024-08-26 RX ORDER — CIPROFLOXACIN 500 MG/1
500 TABLET ORAL EVERY 12 HOURS
Qty: 14 TABLET | Refills: 0 | Status: SHIPPED | OUTPATIENT
Start: 2024-08-26

## 2024-08-27 ENCOUNTER — HOSPITAL ENCOUNTER (OUTPATIENT)
Dept: PULMONOLOGY | Facility: HOSPITAL | Age: 44
Discharge: HOME | End: 2024-08-27
Attending: INTERNAL MEDICINE
Payer: MEDICAID

## 2024-08-27 ENCOUNTER — TELEPHONE (OUTPATIENT)
Dept: HEMATOLOGY/ONCOLOGY | Facility: CLINIC | Age: 44
End: 2024-08-27
Payer: MEDICAID

## 2024-08-27 DIAGNOSIS — Z72.0 TOBACCO ABUSE: ICD-10-CM

## 2024-08-27 PROCEDURE — 94726 PLETHYSMOGRAPHY LUNG VOLUMES: CPT

## 2024-08-27 PROCEDURE — 94729 DIFFUSING CAPACITY: CPT

## 2024-08-27 PROCEDURE — 94060 EVALUATION OF WHEEZING: CPT

## 2024-08-27 PROCEDURE — 94640 AIRWAY INHALATION TREATMENT: CPT | Mod: XB

## 2024-08-27 NOTE — TELEPHONE ENCOUNTER
received call from patient. patient stated that her daughter was diagnosed with MONO and COVID. stated that she has mild cough, low grade temp at night, along with sore throat. states she went to the ER on the 08/20/24 and was negative for covid. advised patient to report to ER if she starts running a temp of 100.4 or higher along with any fever or chills. Patient voices understanding and states she will be here for labs tomorrow.

## 2024-08-28 ENCOUNTER — LAB VISIT (OUTPATIENT)
Dept: HEMATOLOGY/ONCOLOGY | Facility: CLINIC | Age: 44
End: 2024-08-28
Payer: MEDICAID

## 2024-08-28 ENCOUNTER — DOCUMENTATION ONLY (OUTPATIENT)
Dept: INFUSION THERAPY | Facility: HOSPITAL | Age: 44
End: 2024-08-28
Payer: MEDICAID

## 2024-08-28 DIAGNOSIS — C85.99 MALIGNANT LYMPHOMA OF THYROID GLAND: ICD-10-CM

## 2024-08-28 DIAGNOSIS — C83.39 DIFFUSE LARGE B-CELL LYMPHOMA OF SOLID ORGAN EXCLUDING SPLEEN: ICD-10-CM

## 2024-08-28 DIAGNOSIS — G89.4 CHRONIC PAIN SYNDROME: ICD-10-CM

## 2024-08-28 LAB
BASOPHILS # BLD AUTO: 0.07 X10(3)/MCL
BASOPHILS NFR BLD AUTO: 2.8 %
EOSINOPHIL # BLD AUTO: 0.06 X10(3)/MCL (ref 0–0.9)
EOSINOPHIL NFR BLD AUTO: 2.4 %
ERYTHROCYTE [DISTWIDTH] IN BLOOD BY AUTOMATED COUNT: 13.5 % (ref 11.5–17)
HCT VFR BLD AUTO: 39.8 % (ref 37–47)
HGB BLD-MCNC: 12.7 G/DL (ref 12–16)
IMM GRANULOCYTES # BLD AUTO: 0.42 X10(3)/MCL (ref 0–0.04)
IMM GRANULOCYTES NFR BLD AUTO: 16.6 %
LYMPHOCYTES # BLD AUTO: 0.85 X10(3)/MCL (ref 0.6–4.6)
LYMPHOCYTES NFR BLD AUTO: 33.6 %
MCH RBC QN AUTO: 28.1 PG (ref 27–31)
MCHC RBC AUTO-ENTMCNC: 31.9 G/DL (ref 33–36)
MCV RBC AUTO: 88.1 FL (ref 80–94)
MONOCYTES # BLD AUTO: 0.62 X10(3)/MCL (ref 0.1–1.3)
MONOCYTES NFR BLD AUTO: 24.5 %
NEUTROPHILS # BLD AUTO: 0.51 X10(3)/MCL (ref 2.1–9.2)
NEUTROPHILS NFR BLD AUTO: 20.1 %
NRBC BLD AUTO-RTO: 1.2 %
PLATELET # BLD AUTO: 126 X10(3)/MCL (ref 130–400)
PMV BLD AUTO: 10.6 FL (ref 7.4–10.4)
RBC # BLD AUTO: 4.52 X10(6)/MCL (ref 4.2–5.4)
WBC # BLD AUTO: 2.53 X10(3)/MCL (ref 4.5–11.5)

## 2024-08-28 PROCEDURE — 36415 COLL VENOUS BLD VENIPUNCTURE: CPT

## 2024-08-28 PROCEDURE — 85025 COMPLETE CBC W/AUTO DIFF WBC: CPT

## 2024-08-28 NOTE — PROGRESS NOTES
1240  Pt here for labs only.  Pt asking for someone to review her labs.  A [place]dy NP messaged to review labs.  Plt 126,000 and .  Pt reported that her daughter tested positive yesterday for covid and mono.  NP stated for pt to go to urgent care and get tested so we would know how to proceed.  Pt was prescribed cipro on 8/26/24.  Pt informed to call us and let us know the results of her testing.

## 2024-08-30 LAB
DLCO SINGLE BREATH LLN: 21.84
DLCO SINGLE BREATH PRE REF: 68.9 %
DLCO SINGLE BREATH REF: 27.57
DLCOC SBVA LLN: 3.57
DLCOC SBVA REF: 4.91
DLCOC SINGLE BREATH LLN: 21.84
DLCOC SINGLE BREATH REF: 27.57
DLCOVA LLN: 3.57
DLCOVA PRE REF: 105.3 %
DLCOVA PRE: 5.17 ML/(MIN*MMHG*L) (ref 3.57–6.26)
DLCOVA REF: 4.91
ERV LLN: -16448.92
ERV PRE REF: 48.8 %
ERV REF: 1.08
FEF 25 75 LLN: 2.18
FEF 25 75 PRE REF: 58.3 %
FEF 25 75 REF: 3.58
FEV1 FVC LLN: 70
FEV1 FVC PRE REF: 81 %
FEV1 FVC REF: 81
FEV1 LLN: 2.6
FEV1 PRE REF: 79.3 %
FEV1 REF: 3.3
FRCPLETH LLN: 2.09
FRCPLETH PREREF: 72.4 %
FRCPLETH REF: 2.91
FVC LLN: 3.25
FVC PRE REF: 97.1 %
FVC REF: 4.11
IVC PRE: 2.55 L (ref 3.25–5)
IVC SINGLE BREATH LLN: 3.25
IVC SINGLE BREATH PRE REF: 62.1 %
IVC SINGLE BREATH REF: 4.11
MVV LLN: 106
MVV PRE REF: 61.5 %
MVV REF: 125
PEF LLN: 5.6
PEF PRE REF: 40 %
PEF REF: 7.54
PRE DLCO: 18.99 ML/(MIN*MMHG) (ref 21.84–33.3)
PRE ERV: 0.53 L (ref -16448.92–16451.08)
PRE FEF 25 75: 2.09 L/S (ref 2.18–4.98)
PRE FET 100: 7.22 SEC
PRE FEV1 FVC: 65.5 % (ref 69.92–89.89)
PRE FEV1: 2.61 L (ref 2.6–3.97)
PRE FRC PL: 2.11 L (ref 2.09–3.74)
PRE FVC: 3.99 L (ref 3.25–5)
PRE MVV: 76.87 L/MIN (ref 106.16–143.63)
PRE PEF: 3.01 L/S (ref 5.6–9.47)
PRE RV: 1.58 L (ref 1.25–2.41)
PRE TLC: 5.57 L (ref 4.62–6.6)
RAW LLN: 3.06
RAW PRE REF: 144.9 %
RAW PRE: 4.43 CMH2O*S/L (ref 3.06–3.06)
RAW REF: 3.06
RV LLN: 1.25
RV PRE REF: 86.3 %
RV REF: 1.83
RVTLC LLN: 24
RVTLC PRE REF: 83.6 %
RVTLC PRE: 28.37 % (ref 24.33–43.51)
RVTLC REF: 34
TLC LLN: 4.62
TLC PRE REF: 99.3 %
TLC REF: 5.61
VA PRE: 3.67 L (ref 5.46–5.46)
VA SINGLE BREATH LLN: 5.46
VA SINGLE BREATH PRE REF: 67.2 %
VA SINGLE BREATH REF: 5.46
VC LLN: 3.25
VC PRE REF: 97.1 %
VC PRE: 3.99 L (ref 3.25–5)
VC REF: 4.11

## 2024-09-03 ENCOUNTER — LAB VISIT (OUTPATIENT)
Dept: HEMATOLOGY/ONCOLOGY | Facility: CLINIC | Age: 44
End: 2024-09-03
Payer: MEDICAID

## 2024-09-03 DIAGNOSIS — C83.39 DIFFUSE LARGE B-CELL LYMPHOMA OF SOLID ORGAN EXCLUDING SPLEEN: Primary | ICD-10-CM

## 2024-09-03 LAB
ABS NEUT CALC (OHS): 5.2 X10(3)/MCL (ref 2.1–9.2)
ALBUMIN SERPL-MCNC: 3.6 G/DL (ref 3.5–5)
ALBUMIN/GLOB SERPL: 1.1 RATIO (ref 1.1–2)
ALP SERPL-CCNC: 126 UNIT/L (ref 40–150)
ALT SERPL-CCNC: 42 UNIT/L (ref 0–55)
ANION GAP SERPL CALC-SCNC: 7 MEQ/L
AST SERPL-CCNC: 28 UNIT/L (ref 5–34)
BASOPHILS # BLD AUTO: ABNORMAL 10*3/UL
BASOPHILS NFR BLD AUTO: ABNORMAL %
BILIRUB SERPL-MCNC: 0.4 MG/DL
BUN SERPL-MCNC: 7.9 MG/DL (ref 7–18.7)
CALCIUM SERPL-MCNC: 9.4 MG/DL (ref 8.4–10.2)
CHLORIDE SERPL-SCNC: 98 MMOL/L (ref 98–107)
CO2 SERPL-SCNC: 32 MMOL/L (ref 22–29)
CREAT SERPL-MCNC: 0.84 MG/DL (ref 0.55–1.02)
CREAT/UREA NIT SERPL: 9
EOSINOPHIL # BLD AUTO: ABNORMAL 10*3/UL
EOSINOPHIL NFR BLD AUTO: ABNORMAL %
ERYTHROCYTE [DISTWIDTH] IN BLOOD BY AUTOMATED COUNT: 14.2 % (ref 11.5–17)
GFR SERPLBLD CREATININE-BSD FMLA CKD-EPI: >60 ML/MIN/1.73/M2
GLOBULIN SER-MCNC: 3.3 GM/DL (ref 2.4–3.5)
GLUCOSE SERPL-MCNC: 381 MG/DL (ref 74–100)
HCT VFR BLD AUTO: 36.3 % (ref 37–47)
HGB BLD-MCNC: 11.7 G/DL (ref 12–16)
IMM GRANULOCYTES # BLD AUTO: ABNORMAL 10*3/UL
IMM GRANULOCYTES NFR BLD AUTO: ABNORMAL %
LYMPH ABN # BLD MANUAL: 2 %
LYMPHOCYTES # BLD AUTO: ABNORMAL 10*3/UL
LYMPHOCYTES NFR BLD AUTO: ABNORMAL %
LYMPHOCYTES NFR BLD MANUAL: 0.93 X10(3)/MCL
LYMPHOCYTES NFR BLD MANUAL: 13 % (ref 13–40)
MCH RBC QN AUTO: 28.5 PG (ref 27–31)
MCHC RBC AUTO-ENTMCNC: 32.2 G/DL (ref 33–36)
MCV RBC AUTO: 88.5 FL (ref 80–94)
METAMYELOCYTES NFR BLD MANUAL: 3 %
MONOCYTES # BLD AUTO: ABNORMAL 10*3/UL
MONOCYTES NFR BLD AUTO: ABNORMAL %
MONOCYTES NFR BLD MANUAL: 0.64 X10(3)/MCL (ref 0.1–1.3)
MONOCYTES NFR BLD MANUAL: 9 % (ref 2–11)
NEUTROPHILS # BLD AUTO: ABNORMAL 10*3/UL
NEUTROPHILS NFR BLD AUTO: ABNORMAL %
NEUTROPHILS NFR BLD MANUAL: 64 % (ref 47–80)
NEUTS BAND NFR BLD MANUAL: 9 % (ref 0–11)
NRBC BLD AUTO-RTO: ABNORMAL %
OVALOCYTES (OLG): SLIGHT
PLATELET # BLD AUTO: 176 X10(3)/MCL (ref 130–400)
PLATELET # BLD EST: ADEQUATE 10*3/UL
PMV BLD AUTO: 10 FL (ref 7.4–10.4)
POLYCHROMASIA BLD QL SMEAR: SLIGHT
POTASSIUM SERPL-SCNC: 4.1 MMOL/L (ref 3.5–5.1)
PROT SERPL-MCNC: 6.9 GM/DL (ref 6.4–8.3)
RBC # BLD AUTO: 4.1 X10(6)/MCL (ref 4.2–5.4)
SODIUM SERPL-SCNC: 137 MMOL/L (ref 136–145)
STIPPLED RBC (OHS): SLIGHT
TEAR DROP CELL (OLG): ABNORMAL
TOXIC GRANULES BLD QL SMEAR: ABNORMAL
WBC # BLD AUTO: 7.13 X10(3)/MCL (ref 4.5–11.5)

## 2024-09-03 PROCEDURE — 85007 BL SMEAR W/DIFF WBC COUNT: CPT

## 2024-09-03 PROCEDURE — 85027 COMPLETE CBC AUTOMATED: CPT

## 2024-09-03 PROCEDURE — 36415 COLL VENOUS BLD VENIPUNCTURE: CPT

## 2024-09-03 PROCEDURE — 80053 COMPREHEN METABOLIC PANEL: CPT

## 2024-09-04 ENCOUNTER — TELEPHONE (OUTPATIENT)
Dept: INTERNAL MEDICINE | Facility: CLINIC | Age: 44
End: 2024-09-04
Payer: MEDICAID

## 2024-09-04 NOTE — TELEPHONE ENCOUNTER
MSZoraida NEWMANJOSE LVM ASKING THAT A NURSE GIVE HER A CALL.  NORA WAS GIVEN THERE MESSAGE A FEW DAYS AGO, BUT APPARENTLY SHE WAS NEVER CALLED.  I TRIED TO RETURN HER CALL, BUT NO ANSWER.  WHEN TIME PERMITS PLEASE GIVE HER A CALL.  THANK YOU.

## 2024-09-05 ENCOUNTER — DOCUMENTATION ONLY (OUTPATIENT)
Dept: HEMATOLOGY/ONCOLOGY | Facility: CLINIC | Age: 44
End: 2024-09-05
Payer: MEDICAID

## 2024-09-05 DIAGNOSIS — C73 PRIMARY THYROID MALIGNANCY: Primary | ICD-10-CM

## 2024-09-05 NOTE — PROGRESS NOTES
Returned patient's call regarding her lab values. Patient's lab values are not a concern for Eduardo Blakely NP at this time and she will review labs again on Monday for her scheduled appointment.

## 2024-09-06 ENCOUNTER — TELEPHONE (OUTPATIENT)
Dept: HEMATOLOGY/ONCOLOGY | Facility: CLINIC | Age: 44
End: 2024-09-06
Payer: MEDICAID

## 2024-09-06 NOTE — PROGRESS NOTES
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    History:  Past medical history:    Adrenal mass; anxiety; arthritis; bradycardia; depression; NIDDM; gastroparesis; GERD; hip pain; hypertension; menstrual cramps; ovarian cyst; palpitations; bile salpingitis; thyroid disease    Procedure/surgical history:    Ablation of vaginal tissue using laser; ; cholecystectomy; dissection of neck, left 05/10/2024; thyroidectomy 05/10/2024; tubal ligation        History of Present Illness:   No chief complaint on file.        Oncologic/Hematologic History:  Oncology History   Diffuse large B-cell lymphoma of solid organ excluding spleen   2024 Initial Diagnosis    Diffuse large B-cell lymphoma of solid organ excluding spleen     2024 -  Chemotherapy    Treatment Summary   Plan Name: OP LYM RCHOP (rituximab, cycloPHOSphamide, DOXOrubicin, vinCRIStine, predniSONE) Q3W  Treatment Goal: Curative  Status: Active  Start Date: 2024  End Date: 2024 (Planned)  Provider: Virgil Collins MD  Chemotherapy: DOXOrubicin chemo injection 118 mg, 50 mg/m2 = 118 mg, Intravenous, Clinic/HOD 1 time, 3 of 6 cycles  Administration: 118 mg (2024), 118 mg (2024)  vinCRIStine (ONCOVIN) 2 mg in 0.9% NaCl 65 mL chemo infusion, 2 mg (100 % of original dose 2 mg), Intravenous, Clinic/HOD 1 time, 3 of 6 cycles  Dose modification: 2 mg (original dose 2 mg, Cycle 1)  Administration: 2 mg (2024), 2 mg (2024)  cycloPHOSphamide 750 mg/m2 = 1,760 mg in 0.9% NaCl 293.8 mL chemo infusion, 750 mg/m2 = 1,760 mg, Intravenous, Clinic/HOD 1 time, 3 of 6 cycles  Administration: 1,760 mg (2024), 1,760 mg (2024)  riTUXimab-pvvr (RUXIENCE) 900 mg in 0.9% NaCl 900 mL infusion (conc: 1 mg/mL), 881 mg, Intravenous, Clinic/HOD 1 time, 3 of 6  cycles  Administration: 900 mg (2024), 900 mg (2024)     43-year-old lady, referred from [facility]NT, with diffuse large B-cell lymphoma of thyroid.      Past medical history:  Adrenal mass; anxiety; arthritis; bradycardia; depression; NIDDM; gastroparesis; GERD; hip pain; hypertension; menstrual cramps; ovarian cyst; palpitations; pyosalpingitis; hypothyroidism, history of opioid abuse (on 2024, she tells me that she used opioids for 10-15 years; apparently, prescription opioids; on buprenorphine with a psychiatrist for last 2 years)  history of opiate abuse (maintained on Suboxone), history of C difficile colitis (unable to tolerate C difficile treatment); diagnosed with unspecified bipolar disorder during hospitalization at [facility], [place] [id number] (enteritis, left pelvic adnexal fluid collection, PID, etc.); history of dysphagia, S/P EGD and dilatation; history of smoking (quit )  -2023: EGD:  Mild gastric erythema with scattered erosions: Pathology:  Small bowel biopsy:  Normal small bowel mucosa; stomach biopsy:  Mild superficial chronic gastritis, negative for H pylori)  -2023: Colonoscopy:  Normal:  Pathology:  Colon, random sites, biopsy:  Normal colonic mucosa  -08/10/2020: MRI abdomen with and without contrast (comparison:  CT 2023) (adrenal gland protocol):  Small left adrenal nodule stable, most compatible with adenoma (1 cm)  Procedure/surgical history:  Endometrial ablation; ; cholecystectomy; dissection of neck, left 05/10/2024; thyroidectomy 05/10/2024; tubal ligation; tonsillectomy  Social history:  In her relationship.  Lives in [place], [place].  Has 3 children.  Does not work.  Has not worked in 1 year.  Before that, used to clean houses.  Smoked a pack of cigarettes daily for 15 years; quit smoking 1 year ago.  Social alcohol. History of opioid abuse (on 2024, she tells me that she used opioids for 10-15 years;  apparently, prescription opioids; on buprenorphine with a psychiatrist for last 2 years)  Family history:  Positive for von Willebrand disease in her son.  Health maintenance:  -11/16/2022:  Bilateral screening mammogram:  BI-RADS 2-benign  -according to her, colonoscopy performed as outpatient by Nawaf Le, showed precancerous colon polyp.      Records reviewed:  05/21/2024:  [facility] ENT office note:  Fred Berman is a 43 y.o. female referred for a tongue lesion.   She reports that she first noticed it about a year ago and says that it has slowly been enlarging since then. It has never bled. She doesn't think it hurts but does bite it occasionally which makes it sore. It's never been biopsied. She says that her dentist told her that he was not going to do any more work on her teeth until she got it checked out.   She also feels like she has developed allergies over the past few months.  She endorses frequent tearing and redness of her eyes in feels like her nose is running frequently.  She also endorses nasal congestion.  She is been using Allegra and does not think it is helped any.  She has not tried Flonase.  She does use Afrin she thinks maybe once a month.  She was diagnosed with a sinus infection over the summer and got a few days of antibiotics.  She denies facial pain or pressure but does note that when she wakes up in the morning she feels like the outside of her nose is swollen over the bridge of her nose.    She also endorses facial numbness bilaterally in all 3 distributions which has been going on for some time.  She reports that her PCP is work this up with an MRI scan.    She is also been dealing with intermittent dysphagia.  She feels like things are not going down, both solids and liquids.  This does not happen with every meal but comes and goes.    She follows with Gastroenterology and has had EGDs and dilations in the past.  She reports that she has another 1 scheduled in  November.    As far as her voice goes, she notes intermittent dysphonia where her voice gets roughed and then returns to normal.  It usually comes back to normal on its own.  She is on Protonix daily for gastritis that has been seen on prior scopes she reports.    As far as other medical problems she has diabetes, hypertension, gastroparesis, anxiety and depression.  For past surgical history she is had a cholecystectomy and .  She is had her tonsils removed but no other history of head and neck surgery.  She is a former smoker and quit 1 year ago.  She does not drink and denies any other drug use.     2024:  Presents today in follow up.  Eager for total thyroidectomy given results of FNA.  Has multiple concerns for surgical standpoint.  She would like to stay at least 1 night due to family history of von Willebrand's disease.  She also takes Suboxone and would like to talk to the anesthesia team regarding use of opioids.  She will stop taking this she does prior to surgery and is only allowed 1 week of narcotics postop.  She states that she sees endocrinology for diabetes and thyroid dysfunction.  She is on at least 200 mcg of Synthroid already and her sugars are often uncontrolled.   2024:  Presents today for 1st postop appointment after a left hemithyroidectomy in recurrent nerve sacrificed on May 10th.  Reports healing well with no pain. Hasn't taken any narcotics. Anxious about pathology results. Still having issues with oral mucositis.   Postop FFL:  Left TVC paralysis (preop FFL documented vocal cord motion)      Clinical events/investigations reviewed:  -2023:  CT neck and chest with contrast (evaluate thyroid mass) right lobe thyroid gland markedly atrophic; left lobe of the thyroid demonstrates a heterogeneous appearance with heterogeneous contrast enhancement with no obvious, dominant, or worrisome mass appreciated  -2024:  Tongue, biopsy: Oral fibroma  -2024:  Ultrasound thyroid (nontoxic single thyroid nodule) (comparison: Thyroid ultrasound 06/12/2023, CT neck 08/04/2023):  Large lobulated hypoechoic mass lesion at the level of the left lobe of the thyroid, new from prior exam (maybe related to thyroid mass or conglomerate lymphadenopathy)  -03/08/2024: CT soft tissues of the neck with contrast (localized swelling, mass, lumps) (comparison:  Ultrasound 02/14/2024; CT neck and chest): Increasing hypodense soft tissue in the left thyroid bed compared to 08/04/2023 (2.1 x 2.7 x 4.2 cm); minimal mass effect on the trachea; no definite cervical lymphadenopathy  -03/28/2024:  FNA left thyroid nodule:  Atypical cells of undetermined significance (numerous lymphocytes with a significant large lymphocytes population, admixed with follicular oncocytic cells and atypical follicle cell clusters, etc., suspicious for Hashimoto's thyroiditis, etc.) (Thyroseq V3 GC positive; probability of cancer or NIFTP intermediate-high, 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 Thyroid, left hemithyroid, excision:  Diffuse large B-cell lymphoma, germinal center B-cell phenotype; BCL 2 positive/MYC negative by IHC (not a double expressor); negative for MYC gene rearrangement by interface FISH (not double hit lymphoma) (morphologically, the thyroid is extensive involved by a diffuse proliferation of large atypical lymphoid cells with irregular nuclear contours, vesicular chromatin, and ample cytoplasm)  2. Paratracheal tissue, biopsy:  Reactive lymphoid tissue   3. Left central neck dissection:  13 benign lymph nodes; no malignancy      06/18/2024:  Pleasant lady who presents for initial medical oncology consultation.  In no acute discomfort.  Feels poorly.  Main complaint is in in the back and hips for last 1  "year.  History of opioid abuse.  On buprenorphine with a psychiatrist for last years.  Drenching sweats all the time.  Has to change clothes.  Lost 50 lb unintentionally in 2023, apparently due to gastroparesis side effects of Ozempic; regained her weight back after Ozempic was discontinued.    ECOG 1.  No recurrent fevers.  Fatigue.  Generalized weakness.  Night sweats and hot flashes.  Sweats all the time for 1 year.  Bones hurt all the time.  Occasional chest pains and leg swelling as well.  Exertional dyspnea.  Some constipation.  Some nausea.  Great appetite.    Interval History 9/9/24:  Patient presented to the clinic today for a scheduled clinic visit after starting R-CHOP on 07/29. Today she is due to receive C3D1 in infusion.  She reports this has been the worst time ever She reports having a fall on 8/31 and only remembers being picked up by her son. She did not go to the ER because "she is tired" of the ED. She states after her last cycle her neck was swollen and the ER physician suggested that she get a dosage reduction this time. She denies having mouth sores, bleeding gums, sore throat and states she has no appetite.  She denies any diarrhea.  Denies any nausea.  Denies any fever, chills, shortness for breath or any other signs and symptoms associated with infection. Labwork was reviewed with the patient. All future appointments were discussed with the patient.     Review of Systems:   All systems reviewed and found to be negative except for the symptoms detailed above    Physical Examination:   VITAL SIGNS:   Vitals:    09/09/24 0803   BP: 118/80   Pulse: 78   Temp: 99.5 °F (37.5 °C)       Physical Exam  Vitals reviewed.   Constitutional:       Appearance: Normal appearance.   HENT:      Head: Normocephalic and atraumatic.      Mouth/Throat:      Mouth: Mucous membranes are moist.   Cardiovascular:      Rate and Rhythm: Normal rate and regular rhythm.      Pulses: Normal pulses.      Heart sounds: " Normal heart sounds.   Pulmonary:      Effort: Pulmonary effort is normal.      Breath sounds: Normal breath sounds.   Abdominal:      General: Bowel sounds are normal.      Palpations: Abdomen is soft.   Skin:     General: Skin is warm and dry.   Neurological:      Mental Status: She is alert and oriented to person, place, and time.   Psychiatric:         Mood and Affect: Mood normal.         Behavior: Behavior normal.         Thought Content: Thought content normal.         Judgment: Judgment normal.            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   -03/28/2024:  Thyroglobulin antibody 150 IU/mL, elevated (reference Range:  < 1.8)  -03/28/2024: Thyroglobulin, tumor marker: < 0.1 (reference Range:  Athyroid < 0.1; intact thyroid </= 33)  -left hemithyroidectomy with left central neck dissection level 6 (05/10/2024):   Diffuse large B-cell lymphoma left oscar thyroid, germinal center B-cell type, not a double expressor, not double hit lymphoma; morphologically, thyroid extensively involved; paratracheal tissue biopsy negative; left central neck dissection 13 benign lymph nodes,  -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 during left hemithyroidectomy 05/10/2024, noted on FFL exam by ENT  -06/18/2024: ECOG 1; chronic fatigue; sweats all the time; no consistent weight loss; appetite is great  -06/18/2024: CBC, CMP essentially unremarkable   -06/18/2024: LDH normal, beta 2 microglobulin normal  -06/18/2024: Hep B core antibody  total, hep B surface antigen, hep C antibody, HIV: Nonreactive  -FDG PET-CT 06/18/2024:  No other sites of disease  -TTE 06/26/2024: LVEF 55-60%  -right IJ MediPort placed 07/08/2024      Family history of von Willebrand disease:  -04/25/2024:  Von Willebrand factor activity 75%, normal      Oral fibroma of tongue:   -tongue lesion: 1st noted around 02/2023; slowly enlarging since; no bleeding; no pain  -tongue biopsy 02/08/2024: Oral fibroma      Comorbid medical conditions:  Anxiety, depression, NIDDM, gastroparesis, hypertension, history of PID  Hypothyroidism  History of opioid abuse, maintained on Suboxone  History of dysphagia, S/P endoscopic dilatation         Plan:   Diffuse Large B-Cell Lymphoma  Continue R-CHOP every 3 weeks   Proceed with C3D1 today in infusion   Return to infusion on 9/10 for C3D2 of Fylnetra injection   Check CBC and CMP weekly   Check LDH and uric acid level weekly   Allopurinol 100 mg p.o. b.i.d.   Re-stage with FDG PET-CT after 3 cycles of chemotherapy- orders placed today  RTC with MD  approximately 3 days after PET- CT is scheduled with labs prior (CBC/CMP/Ma Mag level) to discuss PET-CT results and for post completion of chemotherapy     -diffuse large B-cell lymphoma of left thyroid lobe, S/P left thyroidectomy 05/10/2024  -06/18/2024: CBC, CMP essentially unremarkable   -06/18/2024: LDH normal, beta 2 microglobulin normal  -06/18/2024: Hep B core antibody total, hep B surface antigen, hep C antibody, HIV: Nonreactive  -FDG PET-CT 06/18/2024:  No other sites of disease  -06/26/2024: Bilateral diagnostic mammogram, bilateral limited ultrasound (comparison: 11/16/2022) (indication: Bilateral lateral breast pain, right nipple pain, right nipple white/yellow discharge):  Right breast BI-RADS 2; left breast BI-RADS 1  -TTE 06/26/2024: LVEF 55-60%  -right IJ MediPort placed 07/08/2024  -S/P tubal ligation in the past  -bone marrow evaluation 07/23/2024: Report pending   -R-CHOP started  07/29/2024; experienced allergic reaction to rituximab; managed with corticosteroids and antihistamines  >>>  -continue R-CHOP every 3 weeks   -awaiting bone marrow aspiration and core biopsy report  -S/P tubal ligation in the past   -check CBC and CMP weekly  -check LDH and uric acid level now  -allopurinol 200 mg p.o. b.i.d. for TLS prophylaxis  Re-stage with FDG PET-CT after 3 cycles of R-CHOP  -aggressive oral hydration prevent hemorrhagic cystitis with cyclophosphamide  -monitor for neurotoxicity with vincristine  -treatment should be delayed until ANC is> 1500 mm3 and platelet count> 100 K mm3    R-CHOP: Monitoring parameters:  CBC with differential and platelet count weekly during treatment.  Assess basic metabolic panel (creatinine and electrolytes) and liver function prior to each subsequent treatment cycle.  LVEF should be evaluated periodically based on LVEF at initiation of therapy and cumulative dose of doxorubicin.  Carriers of hepatitis B or C should be monitored for clinical and laboratory signs of active infection during and following completion of therapy. Rituximab should be discontinued if reactivation occurs.    If bone marrow examination is negative, then, for stage I disease, treatment plan will be as follows:  -06/18/2024: Initial consultation:  ECOG 1; sweats all the time  -IPI:  Low (0)  -age adjusted IP!:  Low (0)  -stage modified IPI (smIPI) low (0)  -NCCN IPI:  Low (1, by virtue of age 43)  -prognostic model to assess the risk of CNS disease (CNS IPI):  Low risk (0)    Assuming stage I disease:  -nonbulky  -smIPI 0  -recommendation:  R-CHOP x3 cycles;   -followed by interim staging with PET-CT:   1. If complete response (PET negative, i.e., 5-PS 1-3), then:  R-CHOP x1 cycle (total of 4 cycles); or ISRT, followed by surveillance  2. If partial response (PET positive, i.e., 5-PS for), then:  Repeat biopsy, etc.  3. If progressive disease (PET positive, i.e.,5-PS 5), then:  Repeat biopsy,  etc.    TLS prophylaxis:  Allopurinol beginning 2-3 days prior to chemo immunotherapy and continued for 10-14 days  (Allopurinol: 100 mg per m2 every 8 hours, maximum 800 mg per day)    R-CHOP:  -Patients receiving cyclophosphamide should maintain adequate oral hydration (2 to 3 L/day) and void frequently to reduce risk of hemorrhagic cystitis.  -The risk of febrile neutropenia with this regimen is 10 to 20%; primary prophylaxis with hematopoietic growth factors should be considered on an individual basis, particularly for high-risk patients such as those with preexisting neutropenia, advanced disease, poor performance status, or patients age 65 years or older.  -Adjustment of initial cyclophosphamide, doxorubicin, and vincristine doses may be needed for preexisting liver dysfunction.  In addition, dose adjustment of cyclophosphamide may be required for kidney dysfunction.  -LVEF should be evaluated prior to initiation of therapy. Dose alterations should be considered for LVEF <50%, and doxorubicin therapy is contraindicated in patients with LVEF <30% at initiation. Infusion times and schedule may be adjusted to decrease the risk of cardiotoxicity in individuals at high risk for its development.  -Neurotoxicity: Vincristine may cause constipation, and in severe cases, paralytic ileus. A routine prophylactic regimen against constipation is recommended in all patients receiving vincristine.    R-CHOP:  Dose modifications for myelotoxicity and neuropathy:  # Treatment should be delayed until ANC is >1500/microL and platelet count is >100,000/microL.  # if a patient develops grade 4 (ANC <500/microL) neutropenia or febrile neutropenia with any cycle, G-CSF support is added to the regimen for subsequent cycles.  # If grade 4 neutropenia or febrile neutropenia occurs despite G-CSF support, or if the patient develops grade 3 (25,000 to 50,000/microL) or 4 (<25,000/microL) thrombocytopenia with any cycle, the doses of  cyclophosphamide and doxorubicin should be decreased by 50% for subsequent cycles.  # Neuropathy: Dose adjustment of vincristine may be necessary if the severity of neuropathy persists or worsens. No specific guidelines are available for dose adjustments     If bone marrow is negative for lymphoma, then, stage I disease    Family history of von Willebrand disease   -04/25/2024: Von Willebrand factor activity 75%, normal    Above discussed at length with the patient.  All questions answered.    Plan of management discussed.  She understands and agrees with this plan.      Follow-up:  No follow-ups on file.

## 2024-09-06 NOTE — TELEPHONE ENCOUNTER
Called patient to confirm appointment for 9/9/24. Patient did not answer the phone.  Patient returned clinic phone call and confirmed appointment for 9/9/24.

## 2024-09-09 ENCOUNTER — OFFICE VISIT (OUTPATIENT)
Dept: HEMATOLOGY/ONCOLOGY | Facility: CLINIC | Age: 44
End: 2024-09-09
Payer: MEDICAID

## 2024-09-09 ENCOUNTER — INFUSION (OUTPATIENT)
Dept: INFUSION THERAPY | Facility: HOSPITAL | Age: 44
End: 2024-09-09
Attending: INTERNAL MEDICINE
Payer: MEDICAID

## 2024-09-09 VITALS
DIASTOLIC BLOOD PRESSURE: 80 MMHG | OXYGEN SATURATION: 95 % | TEMPERATURE: 100 F | WEIGHT: 272.81 LBS | HEIGHT: 66 IN | SYSTOLIC BLOOD PRESSURE: 118 MMHG | HEART RATE: 78 BPM | BODY MASS INDEX: 43.84 KG/M2

## 2024-09-09 VITALS
BODY MASS INDEX: 42.8 KG/M2 | OXYGEN SATURATION: 95 % | DIASTOLIC BLOOD PRESSURE: 95 MMHG | HEART RATE: 72 BPM | TEMPERATURE: 98 F | SYSTOLIC BLOOD PRESSURE: 131 MMHG | RESPIRATION RATE: 20 BRPM | WEIGHT: 272.69 LBS | HEIGHT: 67 IN

## 2024-09-09 DIAGNOSIS — C83.39 DIFFUSE LARGE B-CELL LYMPHOMA OF SOLID ORGAN EXCLUDING SPLEEN: Primary | ICD-10-CM

## 2024-09-09 DIAGNOSIS — Z09 CHEMOTHERAPY FOLLOW-UP EXAMINATION: ICD-10-CM

## 2024-09-09 PROCEDURE — 99215 OFFICE O/P EST HI 40 MIN: CPT | Mod: S$PBB,,,

## 2024-09-09 PROCEDURE — 96413 CHEMO IV INFUSION 1 HR: CPT

## 2024-09-09 PROCEDURE — 63600175 PHARM REV CODE 636 W HCPCS: Mod: JZ,JG

## 2024-09-09 PROCEDURE — 25000003 PHARM REV CODE 250

## 2024-09-09 PROCEDURE — A4216 STERILE WATER/SALINE, 10 ML: HCPCS

## 2024-09-09 PROCEDURE — 99215 OFFICE O/P EST HI 40 MIN: CPT | Mod: PBBFAC

## 2024-09-09 PROCEDURE — 4010F ACE/ARB THERAPY RXD/TAKEN: CPT | Mod: CPTII,,,

## 2024-09-09 PROCEDURE — 96411 CHEMO IV PUSH ADDL DRUG: CPT

## 2024-09-09 PROCEDURE — 1160F RVW MEDS BY RX/DR IN RCRD: CPT | Mod: CPTII,,,

## 2024-09-09 PROCEDURE — 1159F MED LIST DOCD IN RCRD: CPT | Mod: CPTII,,,

## 2024-09-09 PROCEDURE — 96417 CHEMO IV INFUS EACH ADDL SEQ: CPT

## 2024-09-09 PROCEDURE — 3008F BODY MASS INDEX DOCD: CPT | Mod: CPTII,,,

## 2024-09-09 PROCEDURE — 3074F SYST BP LT 130 MM HG: CPT | Mod: CPTII,,,

## 2024-09-09 PROCEDURE — 3046F HEMOGLOBIN A1C LEVEL >9.0%: CPT | Mod: CPTII,,,

## 2024-09-09 PROCEDURE — 96375 TX/PRO/DX INJ NEW DRUG ADDON: CPT

## 2024-09-09 PROCEDURE — 96415 CHEMO IV INFUSION ADDL HR: CPT

## 2024-09-09 PROCEDURE — 3079F DIAST BP 80-89 MM HG: CPT | Mod: CPTII,,,

## 2024-09-09 RX ORDER — DIPHENHYDRAMINE HYDROCHLORIDE 50 MG/ML
50 INJECTION INTRAMUSCULAR; INTRAVENOUS
Status: CANCELLED
Start: 2024-09-09

## 2024-09-09 RX ORDER — ACETAMINOPHEN 325 MG/1
650 TABLET ORAL
Status: CANCELLED | OUTPATIENT
Start: 2024-09-09

## 2024-09-09 RX ORDER — PALONOSETRON 0.05 MG/ML
0.25 INJECTION, SOLUTION INTRAVENOUS ONCE
Status: CANCELLED
Start: 2024-09-09

## 2024-09-09 RX ORDER — DOXORUBICIN HYDROCHLORIDE 2 MG/ML
50 INJECTION, SOLUTION INTRAVENOUS
Status: COMPLETED | OUTPATIENT
Start: 2024-09-09 | End: 2024-09-09

## 2024-09-09 RX ORDER — MEPERIDINE HYDROCHLORIDE 50 MG/ML
25 INJECTION INTRAMUSCULAR; INTRAVENOUS; SUBCUTANEOUS
Status: CANCELLED | OUTPATIENT
Start: 2024-09-09 | End: 2024-09-10

## 2024-09-09 RX ORDER — DIPHENHYDRAMINE HYDROCHLORIDE 50 MG/ML
50 INJECTION INTRAMUSCULAR; INTRAVENOUS
Status: COMPLETED | OUTPATIENT
Start: 2024-09-09 | End: 2024-09-09

## 2024-09-09 RX ORDER — FAMOTIDINE 10 MG/ML
20 INJECTION INTRAVENOUS
Status: CANCELLED | OUTPATIENT
Start: 2024-09-09

## 2024-09-09 RX ORDER — EPINEPHRINE 0.3 MG/.3ML
0.3 INJECTION SUBCUTANEOUS ONCE AS NEEDED
Status: CANCELLED | OUTPATIENT
Start: 2024-09-09

## 2024-09-09 RX ORDER — FAMOTIDINE 10 MG/ML
20 INJECTION INTRAVENOUS
Status: COMPLETED | OUTPATIENT
Start: 2024-09-09 | End: 2024-09-09

## 2024-09-09 RX ORDER — DIPHENHYDRAMINE HYDROCHLORIDE 50 MG/ML
50 INJECTION INTRAMUSCULAR; INTRAVENOUS ONCE AS NEEDED
Status: CANCELLED | OUTPATIENT
Start: 2024-09-09

## 2024-09-09 RX ORDER — PROCHLORPERAZINE EDISYLATE 5 MG/ML
10 INJECTION INTRAMUSCULAR; INTRAVENOUS ONCE AS NEEDED
Status: DISCONTINUED | OUTPATIENT
Start: 2024-09-09 | End: 2024-09-09 | Stop reason: HOSPADM

## 2024-09-09 RX ORDER — SODIUM CHLORIDE 0.9 % (FLUSH) 0.9 %
10 SYRINGE (ML) INJECTION
Status: DISCONTINUED | OUTPATIENT
Start: 2024-09-09 | End: 2024-09-09 | Stop reason: HOSPADM

## 2024-09-09 RX ORDER — SODIUM CHLORIDE 0.9 % (FLUSH) 0.9 %
10 SYRINGE (ML) INJECTION
Status: CANCELLED | OUTPATIENT
Start: 2024-09-09

## 2024-09-09 RX ORDER — DIPHENHYDRAMINE HYDROCHLORIDE 50 MG/ML
50 INJECTION INTRAMUSCULAR; INTRAVENOUS ONCE AS NEEDED
Status: DISCONTINUED | OUTPATIENT
Start: 2024-09-09 | End: 2024-09-09 | Stop reason: HOSPADM

## 2024-09-09 RX ORDER — PROCHLORPERAZINE EDISYLATE 5 MG/ML
10 INJECTION INTRAMUSCULAR; INTRAVENOUS ONCE AS NEEDED
Status: CANCELLED | OUTPATIENT
Start: 2024-09-09

## 2024-09-09 RX ORDER — ACETAMINOPHEN 325 MG/1
650 TABLET ORAL
Status: COMPLETED | OUTPATIENT
Start: 2024-09-09 | End: 2024-09-09

## 2024-09-09 RX ORDER — MEPERIDINE HYDROCHLORIDE 25 MG/ML
25 INJECTION INTRAMUSCULAR; INTRAVENOUS; SUBCUTANEOUS
Status: DISCONTINUED | OUTPATIENT
Start: 2024-09-09 | End: 2024-09-09 | Stop reason: HOSPADM

## 2024-09-09 RX ORDER — PALONOSETRON 0.05 MG/ML
0.25 INJECTION, SOLUTION INTRAVENOUS ONCE
Status: COMPLETED | OUTPATIENT
Start: 2024-09-09 | End: 2024-09-09

## 2024-09-09 RX ORDER — HEPARIN 100 UNIT/ML
500 SYRINGE INTRAVENOUS
Status: CANCELLED | OUTPATIENT
Start: 2024-09-09

## 2024-09-09 RX ORDER — EPINEPHRINE 0.3 MG/.3ML
0.3 INJECTION SUBCUTANEOUS ONCE AS NEEDED
Status: DISCONTINUED | OUTPATIENT
Start: 2024-09-09 | End: 2024-09-09 | Stop reason: HOSPADM

## 2024-09-09 RX ORDER — DOXORUBICIN HYDROCHLORIDE 2 MG/ML
50 INJECTION, SOLUTION INTRAVENOUS
Status: CANCELLED | OUTPATIENT
Start: 2024-09-09

## 2024-09-09 RX ORDER — HEPARIN 100 UNIT/ML
500 SYRINGE INTRAVENOUS
Status: DISCONTINUED | OUTPATIENT
Start: 2024-09-09 | End: 2024-09-09 | Stop reason: HOSPADM

## 2024-09-09 RX ADMIN — DOXORUBICIN HYDROCHLORIDE 118 MG: 2 INJECTION, SOLUTION INTRAVENOUS at 01:09

## 2024-09-09 RX ADMIN — CYCLOPHOSPHAMIDE 1760 MG: 200 INJECTION, SOLUTION INTRAVENOUS at 01:09

## 2024-09-09 RX ADMIN — ACETAMINOPHEN 650 MG: 325 TABLET, FILM COATED ORAL at 09:09

## 2024-09-09 RX ADMIN — SODIUM CHLORIDE: 9 INJECTION, SOLUTION INTRAVENOUS at 08:09

## 2024-09-09 RX ADMIN — SODIUM CHLORIDE 900 MG: 9 INJECTION, SOLUTION INTRAVENOUS at 10:09

## 2024-09-09 RX ADMIN — HYDROCORTISONE SODIUM SUCCINATE 100 MG: 100 INJECTION, POWDER, FOR SOLUTION INTRAMUSCULAR; INTRAVENOUS at 09:09

## 2024-09-09 RX ADMIN — VINCRISTINE SULFATE 2 MG: 1 INJECTION, SOLUTION INTRAVENOUS at 01:09

## 2024-09-09 RX ADMIN — HEPARIN 500 UNITS: 100 SYRINGE at 02:09

## 2024-09-09 RX ADMIN — Medication 10 ML: at 02:09

## 2024-09-09 RX ADMIN — PALONOSETRON HYDROCHLORIDE 0.25 MG: 0.25 INJECTION, SOLUTION INTRAVENOUS at 01:09

## 2024-09-09 RX ADMIN — DIPHENHYDRAMINE HYDROCHLORIDE 50 MG: 50 INJECTION INTRAMUSCULAR; INTRAVENOUS at 09:09

## 2024-09-09 RX ADMIN — FAMOTIDINE 20 MG: 10 INJECTION, SOLUTION INTRAVENOUS at 09:09

## 2024-09-09 NOTE — NURSING
9022  Pt did labwork, saw A Reddy NP, and is here for C 3 D 1 R-CHOP.  Pt accomp by her friend.  Pt reports LOP 7/10 to generalized body.  Pt reports some SOB, a cough, nausea, constipation, fatigue, and edema to hands and feet.  Pt reports CP with palpitations.  Pt is wearing a rhythm monitor device on rt chest for 21 days and states she has 8 more days.

## 2024-09-09 NOTE — Clinical Note
Proceed with chemotherapy today in infusion Return to clinic weekly for labs only (CBC/CMP) Schedule PET-CT in the next 2-3 weeks-orders are in After PET-CT approximately 2-3 days later need to follow up with MD with labs prior (CBC/CMP/Mag level/uric acid/LDH) to discuss PET-CT results and for post completion of chemotherapy * patient will need weekly labs every week until the week she is seen by MD* Please let me know if anything is confusing with this wrap up

## 2024-09-10 ENCOUNTER — INFUSION (OUTPATIENT)
Dept: INFUSION THERAPY | Facility: HOSPITAL | Age: 44
End: 2024-09-10
Attending: INTERNAL MEDICINE
Payer: MEDICAID

## 2024-09-10 VITALS
DIASTOLIC BLOOD PRESSURE: 83 MMHG | HEART RATE: 82 BPM | OXYGEN SATURATION: 95 % | SYSTOLIC BLOOD PRESSURE: 124 MMHG | RESPIRATION RATE: 20 BRPM | TEMPERATURE: 98 F

## 2024-09-10 DIAGNOSIS — C83.39 DIFFUSE LARGE B-CELL LYMPHOMA OF SOLID ORGAN EXCLUDING SPLEEN: Primary | ICD-10-CM

## 2024-09-10 PROCEDURE — 63600175 PHARM REV CODE 636 W HCPCS: Mod: JZ,TB

## 2024-09-10 PROCEDURE — 96372 THER/PROPH/DIAG INJ SC/IM: CPT

## 2024-09-10 RX ADMIN — PEGFILGRASTIM 6 MG: 6 INJECTION SUBCUTANEOUS at 01:09

## 2024-09-10 NOTE — NURSING
1308  Pt arrived for C 3 D 2 fylnetra.  Pt accomp by her .  Pt rates LOP 9/10 to generalized body.  Pt also reports nausea which she has her antinausea meds.  Also reports constipation and fatigue.  Pt stated her neck lymph nodes/glands have swollen up hard again but seem to be getting better.  Pt has been taking her steroids and benadryl.  Denies any problem breathing and only slight difficulty swallowing.  Pr very aware to go to ER if it worsens.

## 2024-09-12 ENCOUNTER — HOSPITAL ENCOUNTER (OUTPATIENT)
Dept: CARDIOLOGY | Facility: HOSPITAL | Age: 44
Discharge: HOME | End: 2024-09-12
Attending: INTERNAL MEDICINE
Payer: MEDICAID

## 2024-09-12 DIAGNOSIS — I49.9 IRREGULAR HEARTBEAT: ICD-10-CM

## 2024-09-16 ENCOUNTER — LAB VISIT (OUTPATIENT)
Dept: HEMATOLOGY/ONCOLOGY | Facility: CLINIC | Age: 44
End: 2024-09-16
Payer: MEDICAID

## 2024-09-16 ENCOUNTER — TELEPHONE (OUTPATIENT)
Dept: INTERNAL MEDICINE | Facility: CLINIC | Age: 44
End: 2024-09-16

## 2024-09-16 DIAGNOSIS — Z72.0 TOBACCO ABUSE: ICD-10-CM

## 2024-09-16 DIAGNOSIS — J44.9 CHRONIC OBSTRUCTIVE PULMONARY DISEASE, UNSPECIFIED COPD TYPE: Primary | ICD-10-CM

## 2024-09-16 DIAGNOSIS — C83.39 DIFFUSE LARGE B-CELL LYMPHOMA OF SOLID ORGAN EXCLUDING SPLEEN: Primary | ICD-10-CM

## 2024-09-16 LAB
ABS NEUT CALC (OHS): 0.98 X10(3)/MCL (ref 2.1–9.2)
ALBUMIN SERPL-MCNC: 3.5 G/DL (ref 3.5–5)
ALBUMIN/GLOB SERPL: 1.2 RATIO (ref 1.1–2)
ALP SERPL-CCNC: 111 UNIT/L (ref 40–150)
ALT SERPL-CCNC: 56 UNIT/L (ref 0–55)
ANION GAP SERPL CALC-SCNC: 7 MEQ/L
AST SERPL-CCNC: 34 UNIT/L (ref 5–34)
BASOPHILS NFR BLD MANUAL: 0.02 X10(3)/MCL (ref 0–0.2)
BASOPHILS NFR BLD MANUAL: 1 % (ref 0–2)
BILIRUB SERPL-MCNC: 0.4 MG/DL
BUN SERPL-MCNC: 8.6 MG/DL (ref 7–18.7)
CALCIUM SERPL-MCNC: 9.2 MG/DL (ref 8.4–10.2)
CHLORIDE SERPL-SCNC: 100 MMOL/L (ref 98–107)
CO2 SERPL-SCNC: 31 MMOL/L (ref 22–29)
CREAT SERPL-MCNC: 0.83 MG/DL (ref 0.55–1.02)
CREAT/UREA NIT SERPL: 10
EOSINOPHIL NFR BLD MANUAL: 0.04 X10(3)/MCL (ref 0–0.9)
EOSINOPHIL NFR BLD MANUAL: 2 % (ref 0–8)
ERYTHROCYTE [DISTWIDTH] IN BLOOD BY AUTOMATED COUNT: 14.6 % (ref 11.5–17)
GFR SERPLBLD CREATININE-BSD FMLA CKD-EPI: >60 ML/MIN/1.73/M2
GLOBULIN SER-MCNC: 2.9 GM/DL (ref 2.4–3.5)
GLUCOSE SERPL-MCNC: 263 MG/DL (ref 74–100)
HCT VFR BLD AUTO: 35.2 % (ref 37–47)
HGB BLD-MCNC: 11.3 G/DL (ref 12–16)
LYMPHOCYTES NFR BLD MANUAL: 0.86 X10(3)/MCL
LYMPHOCYTES NFR BLD MANUAL: 42 % (ref 13–40)
MCH RBC QN AUTO: 28.6 PG (ref 27–31)
MCHC RBC AUTO-ENTMCNC: 32.1 G/DL (ref 33–36)
MCV RBC AUTO: 89.1 FL (ref 80–94)
MONOCYTES NFR BLD MANUAL: 0.14 X10(3)/MCL (ref 0.1–1.3)
MONOCYTES NFR BLD MANUAL: 7 % (ref 2–11)
NEUTROPHILS NFR BLD MANUAL: 32 % (ref 47–80)
NEUTS BAND NFR BLD MANUAL: 16 % (ref 0–11)
NRBC BLD AUTO-RTO: 0 %
PLATELET # BLD AUTO: 134 X10(3)/MCL (ref 130–400)
PLATELET # BLD EST: ADEQUATE 10*3/UL
PMV BLD AUTO: 9.7 FL (ref 7.4–10.4)
POTASSIUM SERPL-SCNC: 3.7 MMOL/L (ref 3.5–5.1)
PROT SERPL-MCNC: 6.4 GM/DL (ref 6.4–8.3)
RBC # BLD AUTO: 3.95 X10(6)/MCL (ref 4.2–5.4)
RBC MORPH BLD: NORMAL
SODIUM SERPL-SCNC: 138 MMOL/L (ref 136–145)
WBC # BLD AUTO: 2.05 X10(3)/MCL (ref 4.5–11.5)

## 2024-09-16 PROCEDURE — 80053 COMPREHEN METABOLIC PANEL: CPT

## 2024-09-16 PROCEDURE — 85007 BL SMEAR W/DIFF WBC COUNT: CPT

## 2024-09-16 PROCEDURE — 36415 COLL VENOUS BLD VENIPUNCTURE: CPT

## 2024-09-16 NOTE — TELEPHONE ENCOUNTER
Return call to patient. Informed patient Froylan Marshall pulmonologist will except her insurance ; However the practice is located in [place]. Pt states she's ok with referral . Notified patient I will be sending PCP the referral.

## 2024-09-16 NOTE — TELEPHONE ENCOUNTER
Pt called requesting her referral be sent to another Pulmonologist because the sent to [facility] Pul[facility]ologist has a year long wait list before pt can be seen. Pt states she can't wait that ling because her breathing is already labored. She need to be seen ASAP. Please Advise

## 2024-09-23 ENCOUNTER — LAB VISIT (OUTPATIENT)
Dept: HEMATOLOGY/ONCOLOGY | Facility: CLINIC | Age: 44
End: 2024-09-23
Payer: MEDICAID

## 2024-09-23 DIAGNOSIS — C83.39 DIFFUSE LARGE B-CELL LYMPHOMA OF SOLID ORGAN EXCLUDING SPLEEN: Primary | ICD-10-CM

## 2024-09-23 LAB
ABS NEUT CALC (OHS): 5.04 X10(3)/MCL (ref 2.1–9.2)
ALBUMIN SERPL-MCNC: 3.6 G/DL (ref 3.5–5)
ALBUMIN/GLOB SERPL: 1.1 RATIO (ref 1.1–2)
ALP SERPL-CCNC: 95 UNIT/L (ref 40–150)
ALT SERPL-CCNC: 35 UNIT/L (ref 0–55)
ANION GAP SERPL CALC-SCNC: 6 MEQ/L
AST SERPL-CCNC: 33 UNIT/L (ref 5–34)
BASOPHILS NFR BLD MANUAL: 0.15 X10(3)/MCL (ref 0–0.2)
BASOPHILS NFR BLD MANUAL: 2 % (ref 0–2)
BILIRUB SERPL-MCNC: 0.5 MG/DL
BUN SERPL-MCNC: 9.6 MG/DL (ref 7–18.7)
CALCIUM SERPL-MCNC: 9.8 MG/DL (ref 8.4–10.2)
CHLORIDE SERPL-SCNC: 102 MMOL/L (ref 98–107)
CO2 SERPL-SCNC: 32 MMOL/L (ref 22–29)
CREAT SERPL-MCNC: 0.85 MG/DL (ref 0.55–1.02)
CREAT/UREA NIT SERPL: 11
EOSINOPHIL NFR BLD MANUAL: 0.08 X10(3)/MCL (ref 0–0.9)
EOSINOPHIL NFR BLD MANUAL: 1 % (ref 0–8)
ERYTHROCYTE [DISTWIDTH] IN BLOOD BY AUTOMATED COUNT: 15.8 % (ref 11.5–17)
GFR SERPLBLD CREATININE-BSD FMLA CKD-EPI: >60 ML/MIN/1.73/M2
GLOBULIN SER-MCNC: 3.2 GM/DL (ref 2.4–3.5)
GLUCOSE SERPL-MCNC: 228 MG/DL (ref 74–100)
HCT VFR BLD AUTO: 36.2 % (ref 37–47)
HGB BLD-MCNC: 11.5 G/DL (ref 12–16)
LYMPH ABN # BLD MANUAL: 1 %
LYMPHOCYTES NFR BLD MANUAL: 1.71 X10(3)/MCL
LYMPHOCYTES NFR BLD MANUAL: 22 % (ref 13–40)
MCH RBC QN AUTO: 29.1 PG (ref 27–31)
MCHC RBC AUTO-ENTMCNC: 31.8 G/DL (ref 33–36)
MCV RBC AUTO: 91.6 FL (ref 80–94)
MONOCYTES NFR BLD MANUAL: 0.7 X10(3)/MCL (ref 0.1–1.3)
MONOCYTES NFR BLD MANUAL: 9 % (ref 2–11)
NEUTROPHILS NFR BLD MANUAL: 51 % (ref 47–80)
NEUTS BAND NFR BLD MANUAL: 14 % (ref 0–11)
NRBC BLD AUTO-RTO: 0 %
PLATELET # BLD AUTO: 199 X10(3)/MCL (ref 130–400)
PMV BLD AUTO: 9.8 FL (ref 7.4–10.4)
POTASSIUM SERPL-SCNC: 4 MMOL/L (ref 3.5–5.1)
PROT SERPL-MCNC: 6.8 GM/DL (ref 6.4–8.3)
RBC # BLD AUTO: 3.95 X10(6)/MCL (ref 4.2–5.4)
SODIUM SERPL-SCNC: 140 MMOL/L (ref 136–145)
WBC # BLD AUTO: 7.75 X10(3)/MCL (ref 4.5–11.5)

## 2024-09-23 PROCEDURE — 85007 BL SMEAR W/DIFF WBC COUNT: CPT

## 2024-09-23 PROCEDURE — 80053 COMPREHEN METABOLIC PANEL: CPT

## 2024-09-23 PROCEDURE — 85027 COMPLETE CBC AUTOMATED: CPT

## 2024-09-23 PROCEDURE — 36415 COLL VENOUS BLD VENIPUNCTURE: CPT

## 2024-09-24 ENCOUNTER — OFFICE VISIT (OUTPATIENT)
Dept: FAMILY MEDICINE | Facility: CLINIC | Age: 44
End: 2024-09-24
Payer: MEDICAID

## 2024-09-24 VITALS
TEMPERATURE: 99 F | HEIGHT: 67 IN | BODY MASS INDEX: 42.38 KG/M2 | WEIGHT: 270 LBS | RESPIRATION RATE: 18 BRPM | OXYGEN SATURATION: 99 % | DIASTOLIC BLOOD PRESSURE: 66 MMHG | HEART RATE: 103 BPM | SYSTOLIC BLOOD PRESSURE: 124 MMHG

## 2024-09-24 DIAGNOSIS — L98.9 SKIN LESION OF CHEST WALL: ICD-10-CM

## 2024-09-24 PROCEDURE — 99215 OFFICE O/P EST HI 40 MIN: CPT | Mod: PBBFAC

## 2024-09-24 PROCEDURE — 88305 TISSUE EXAM BY PATHOLOGIST: CPT | Mod: TC

## 2024-09-24 PROCEDURE — 11104 PUNCH BX SKIN SINGLE LESION: CPT | Mod: PBBFAC

## 2024-09-24 RX ORDER — LIDOCAINE HYDROCHLORIDE 10 MG/ML
2 INJECTION, SOLUTION EPIDURAL; INFILTRATION; INTRACAUDAL; PERINEURAL
Status: COMPLETED | OUTPATIENT
Start: 2024-09-24 | End: 2024-09-24

## 2024-09-24 RX ADMIN — LIDOCAINE HYDROCHLORIDE 20 MG: 10 INJECTION, SOLUTION EPIDURAL; INFILTRATION; INTRACAUDAL; PERINEURAL at 03:09

## 2024-09-24 NOTE — PROGRESS NOTES
"[facility] Clinic Note    CHIEF COMPLAINT:  Chest wall mass evaluation    HISTORY OF  PRESENT ILLNESS:  Fred Berman is a 43 y.o. female w/PMHx of diffuse large B-cell lymphoma undergoing chemotherapy, who presents to clinic today for chest wall lesion. Patient was referred by Hem/onc about chest wall lesion. Patient states onset was 8 months ago when she noticed of her nephews playing with the lesion on her left breast. She states since then she noticed the lesion of her left breast progressively grow. She does admit she has a family history of various cancer, including her aunt who had a similar lesion on her body that was later diagnosed as melanoma and later passed from melanoma. Out of concern she presents to minor procedure clinic for further evaluation/treatment of that left breast lesion. She also admits of another lesion on her left inguinal crease that has been there for several for evaluation as well. She admits that has tried expressing the lesion on her left inguinal crease but denies any drainage.    PHYSICAL EXAMINATION:  Blood pressure 124/66, pulse 103, temperature 98.6 °F (37 °C), resp. rate 18, height 5' 6.93" (1.7 m), weight 122.5 kg (270 lb), SpO2 99%.    General:  VSS. No acute distress. Well-dress  Skin: 2.1 mm flesh-colored mass present on the outer left breast area (10 o'clock) position; nontender; no erythema surrounding the lesion and no drainage present at the lesion.     Skin punch biopsy procedure  Location: left breast area  Indication: tissue for pathology   Performing Resident Physician: Dr. Sid Rodriguez MD  Supervising Attending Physician: Dr. Haque going  Procedure, benefits, risks (including those of bleeding, infection, anesthesia & allergic reaction), and alternatives explained to the patient who voiced understanding of the information. Patient agreed to proceed with the punch biopsy. Informed consent signed and on chart.   Description: Time out performed - patient, " procedure, side & site confirmed. Patient positioned comfortably sitting up. Skin prepped with alcohol pads, anesthesia performed with 1% lidocaine without epinephrine. Skin again cleaned with ChloraPrep. Incision made with 3 mm punch biopsy. Tissue removed using for forceps & scissors. Tissue submitted for pathology. Hemostasis achieved with pressure & gel foam. Wound covered with island dressing.   Estimated Blood Loss: <1cc   Disposition: Patient tolerated procedure well with no complications. 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       Sid Rodriguez MD   [facility] Family Medicine Resident HO-1  09/24/2024

## 2024-09-26 LAB
ESTROGEN SERPL-MCNC: NORMAL PG/ML
INSULIN SERPL-ACNC: NORMAL U[IU]/ML
LAB AP CLINICAL INFORMATION: NORMAL
LAB AP GROSS DESCRIPTION: NORMAL
LAB AP REPORT FOOTNOTES: NORMAL

## 2024-09-26 NOTE — PROGRESS NOTES
I saw and evaluated the patient and was present for the key portions of the exam and separately billed procedures, if any. I have reveiwed and agree with the resident's findings, including all diagnostic interpretations and plans as written.   Correction to physical exam- lesion is erythematous, not flesh colored

## 2024-09-30 ENCOUNTER — HOSPITAL ENCOUNTER (OUTPATIENT)
Dept: RADIOLOGY | Facility: HOSPITAL | Age: 44
Discharge: HOME | End: 2024-09-30
Payer: MEDICAID

## 2024-09-30 DIAGNOSIS — C83.398 DIFFUSE LARGE B-CELL LYMPHOMA OF SOLID ORGAN EXCLUDING SPLEEN: ICD-10-CM

## 2024-09-30 PROCEDURE — 78815 PET IMAGE W/CT SKULL-THIGH: CPT | Mod: TC

## 2024-09-30 PROCEDURE — A9552 F18 FDG: HCPCS

## 2024-09-30 RX ORDER — FLUDEOXYGLUCOSE F18 500 MCI/ML
10 INJECTION INTRAVENOUS
Status: COMPLETED | OUTPATIENT
Start: 2024-09-30 | End: 2024-09-30

## 2024-09-30 RX ADMIN — FLUDEOXYGLUCOSE F-18 10.5 MILLICURIE: 500 INJECTION INTRAVENOUS at 08:09

## 2024-10-01 ENCOUNTER — TELEPHONE (OUTPATIENT)
Dept: FAMILY MEDICINE | Facility: CLINIC | Age: 44
End: 2024-10-01
Payer: MEDICAID

## 2024-10-01 LAB — POCT GLUCOSE: 157 MG/DL (ref 70–110)

## 2024-10-01 NOTE — TELEPHONE ENCOUNTER
Contacted patient regarding biopsy results and stated it was a cherry hemangioma like we were suspected which is nothing malignant to be concerned about. Patient voiced understanding and had no complaints at this time or questions. Informed patient to contact minor procedure clinic in case of any new lesions of concern requiring further biopsying due to family history of melanoma. Patient voiced understanding.    Sid Rodriguez MD   [facility] Family Medicine Resident  10/01/2024

## 2024-10-02 ENCOUNTER — TELEPHONE (OUTPATIENT)
Dept: HEMATOLOGY/ONCOLOGY | Facility: CLINIC | Age: 44
End: 2024-10-02
Payer: MEDICAID

## 2024-10-02 DIAGNOSIS — C83.398 DIFFUSE LARGE B-CELL LYMPHOMA OF SOLID ORGAN EXCLUDING SPLEEN: Primary | ICD-10-CM

## 2024-10-03 ENCOUNTER — OFFICE VISIT (OUTPATIENT)
Dept: HEMATOLOGY/ONCOLOGY | Facility: CLINIC | Age: 44
End: 2024-10-03
Attending: INTERNAL MEDICINE
Payer: MEDICAID

## 2024-10-03 VITALS
HEIGHT: 67 IN | BODY MASS INDEX: 42.25 KG/M2 | SYSTOLIC BLOOD PRESSURE: 128 MMHG | TEMPERATURE: 98 F | WEIGHT: 269.19 LBS | OXYGEN SATURATION: 95 % | HEART RATE: 81 BPM | DIASTOLIC BLOOD PRESSURE: 86 MMHG | RESPIRATION RATE: 18 BRPM

## 2024-10-03 DIAGNOSIS — F11.11 HISTORY OF OPIOID ABUSE: ICD-10-CM

## 2024-10-03 DIAGNOSIS — R16.2 HEPATOSPLENOMEGALY: ICD-10-CM

## 2024-10-03 DIAGNOSIS — C83.398 DIFFUSE LARGE B-CELL LYMPHOMA OF SOLID ORGAN EXCLUDING SPLEEN: ICD-10-CM

## 2024-10-03 DIAGNOSIS — K76.0 HEPATIC STEATOSIS: ICD-10-CM

## 2024-10-03 DIAGNOSIS — K76.0 FATTY LIVER: ICD-10-CM

## 2024-10-03 DIAGNOSIS — J38.00 VOCAL CORD PARALYSIS: ICD-10-CM

## 2024-10-03 DIAGNOSIS — E03.9 HYPOTHYROIDISM, UNSPECIFIED TYPE: ICD-10-CM

## 2024-10-03 DIAGNOSIS — D10.1 FIBROMA OF TONGUE: ICD-10-CM

## 2024-10-03 DIAGNOSIS — G89.4 CHRONIC PAIN SYNDROME: Primary | ICD-10-CM

## 2024-10-03 DIAGNOSIS — C85.99 MALIGNANT LYMPHOMA OF THYROID GLAND: Primary | ICD-10-CM

## 2024-10-03 DIAGNOSIS — C85.99 MALIGNANT LYMPHOMA OF THYROID GLAND: ICD-10-CM

## 2024-10-03 DIAGNOSIS — Z51.11 CHEMOTHERAPY MANAGEMENT, ENCOUNTER FOR: ICD-10-CM

## 2024-10-03 DIAGNOSIS — Z83.2 FAMILY HISTORY OF VON WILLEBRAND DISEASE: ICD-10-CM

## 2024-10-03 PROCEDURE — 3074F SYST BP LT 130 MM HG: CPT | Mod: CPTII,,, | Performed by: INTERNAL MEDICINE

## 2024-10-03 PROCEDURE — 3046F HEMOGLOBIN A1C LEVEL >9.0%: CPT | Mod: CPTII,,, | Performed by: INTERNAL MEDICINE

## 2024-10-03 PROCEDURE — 1160F RVW MEDS BY RX/DR IN RCRD: CPT | Mod: CPTII,,, | Performed by: INTERNAL MEDICINE

## 2024-10-03 PROCEDURE — 1159F MED LIST DOCD IN RCRD: CPT | Mod: CPTII,,, | Performed by: INTERNAL MEDICINE

## 2024-10-03 PROCEDURE — 3079F DIAST BP 80-89 MM HG: CPT | Mod: CPTII,,, | Performed by: INTERNAL MEDICINE

## 2024-10-03 PROCEDURE — 4010F ACE/ARB THERAPY RXD/TAKEN: CPT | Mod: CPTII,,, | Performed by: INTERNAL MEDICINE

## 2024-10-03 PROCEDURE — 99215 OFFICE O/P EST HI 40 MIN: CPT | Mod: PBBFAC | Performed by: INTERNAL MEDICINE

## 2024-10-03 PROCEDURE — 3008F BODY MASS INDEX DOCD: CPT | Mod: CPTII,,, | Performed by: INTERNAL MEDICINE

## 2024-10-03 PROCEDURE — 99215 OFFICE O/P EST HI 40 MIN: CPT | Mod: S$PBB,,, | Performed by: INTERNAL MEDICINE

## 2024-10-03 RX ORDER — PALONOSETRON 0.05 MG/ML
0.25 INJECTION, SOLUTION INTRAVENOUS ONCE
Status: CANCELLED
Start: 2024-10-07

## 2024-10-03 RX ORDER — ACETAMINOPHEN 325 MG/1
650 TABLET ORAL
Status: CANCELLED | OUTPATIENT
Start: 2024-10-07

## 2024-10-03 RX ORDER — MEPERIDINE HYDROCHLORIDE 50 MG/ML
25 INJECTION INTRAMUSCULAR; INTRAVENOUS; SUBCUTANEOUS
Status: CANCELLED | OUTPATIENT
Start: 2024-10-07 | End: 2024-10-04

## 2024-10-03 RX ORDER — SODIUM CHLORIDE 0.9 % (FLUSH) 0.9 %
10 SYRINGE (ML) INJECTION
Status: CANCELLED | OUTPATIENT
Start: 2024-10-07

## 2024-10-03 RX ORDER — HEPARIN 100 UNIT/ML
500 SYRINGE INTRAVENOUS
Status: CANCELLED | OUTPATIENT
Start: 2024-10-07

## 2024-10-03 RX ORDER — FAMOTIDINE 10 MG/ML
20 INJECTION INTRAVENOUS
Status: CANCELLED | OUTPATIENT
Start: 2024-10-07

## 2024-10-03 RX ORDER — DIPHENHYDRAMINE HYDROCHLORIDE 50 MG/ML
50 INJECTION INTRAMUSCULAR; INTRAVENOUS
Status: CANCELLED
Start: 2024-10-07

## 2024-10-03 RX ORDER — EPINEPHRINE 0.3 MG/.3ML
0.3 INJECTION SUBCUTANEOUS ONCE AS NEEDED
Status: CANCELLED | OUTPATIENT
Start: 2024-10-07

## 2024-10-03 RX ORDER — DOXORUBICIN HYDROCHLORIDE 2 MG/ML
50 INJECTION, SOLUTION INTRAVENOUS
Status: CANCELLED | OUTPATIENT
Start: 2024-10-07

## 2024-10-03 RX ORDER — DIPHENHYDRAMINE HYDROCHLORIDE 50 MG/ML
50 INJECTION INTRAMUSCULAR; INTRAVENOUS ONCE AS NEEDED
Status: CANCELLED | OUTPATIENT
Start: 2024-10-07

## 2024-10-03 RX ORDER — PROCHLORPERAZINE EDISYLATE 5 MG/ML
10 INJECTION INTRAMUSCULAR; INTRAVENOUS ONCE AS NEEDED
Status: CANCELLED | OUTPATIENT
Start: 2024-10-07

## 2024-10-03 NOTE — Clinical Note
Administer 1 more cycle of R-CHOP Subsequently, surveillance Follow-up with behavioral health for management of anxiety Follow-up with PCP for evaluation for supplemental oxygen  Need the report of PFTs performed 08/30/2024 Follow-up with PCP for management of diabetes mellitus Follow-up with NP in 3 weeks

## 2024-10-03 NOTE — PROGRESS NOTES
"History:  Past Medical History:   Diagnosis Date    Adrenal mass     Anxiety     Arthritis     Bradycardia     Depression     Diabetes mellitus, type 2     Gastroparesis     General anesthetics causing adverse effect in therapeutic use     "hard time waking up"    GERD (gastroesophageal reflux disease)     Hip pain     Hypertension     Menstrual cramps     Ovarian cyst     Palpitations     Pyosalpingitis     Thyroid disease    Past medical history:  Adrenal mass; anxiety; arthritis; bradycardia; depression; NIDDM; gastroparesis; GERD; hip pain; hypertension; menstrual cramps; ovarian cyst; palpitations; bile salpingitis; thyroid disease    Procedure/surgical history:  Ablation of vaginal tissue using laser; ; cholecystectomy; dissection of neck, left 05/10/2024; thyroidectomy 05/10/2024; tubal ligation    Past Surgical History:   Procedure Laterality Date    ABLATION OF VAGINAL TISSUE USING LASER      BONE MARROW ASPIRATION N/A 2024    Procedure: ASPIRATION, BONE MARROW;  Surgeon: Patrizia Bhatt MD;  Location: [facility] ENDOSCOPY;  Service: General;  Laterality: N/A;    BONE MARROW BIOPSY N/A 2024    Procedure: Biopsy-bone marrow;  Surgeon: Patrizia Bhatt MD;  Location: [facility] ENDOSCOPY;  Service: General;  Laterality: N/A;     SECTION      CHOLECYSTECTOMY      DISSECTION OF NECK Left 5/10/2024    Procedure: DISSECTION, NECK;  Surgeon: Cali Trujillo MD;  Location: [facility] OR;  Service: ENT;  Laterality: Left;    INSERTION OF TUNNELED CENTRAL VENOUS CATHETER (CVC) WITH SUBCUTANEOUS PORT Right 2024    Procedure: [id number];  Surgeon: Lio Storey Jr., MD;  Location: [facility] OR;  Service: General;  Laterality: Right;    THYROIDECTOMY Left 5/10/2024    Procedure: THYROIDECTOMY;  Surgeon: Cali Trujillo MD;  Location: [facility] OR;  Service: ENT;  Laterality: Left;    TUBAL LIGATION        Social History     Socioeconomic History    Marital status: Single   Tobacco Use    " Smoking status: Former     Types: Cigarettes    Smokeless tobacco: Never   Substance and Sexual Activity    Alcohol use: Never     Alcohol/week: 6.0 standard drinks of alcohol     Types: 6 Shots of liquor per week    Drug use: Not Currently     Types: Methamphetamines     Comment: Various Opoids    Sexual activity: Yes     Partners: Male     Social Drivers of Health     Financial Resource Strain: Medium Risk (6/27/2024)    Overall Financial Resource Strain (CARDIA)     Difficulty of Paying Living Expenses: Somewhat hard   Food Insecurity: No Food Insecurity (6/27/2024)    Hunger Vital Sign     Worried About Running Out of Food in the Last Year: Never true     Ran Out of Food in the Last Year: Never true   Transportation Needs: No Transportation Needs (5/12/2024)    TRANSPORTATION NEEDS     Transportation : No   Physical Activity: Insufficiently Active (6/27/2024)    Exercise Vital Sign     Days of Exercise per Week: 2 days     Minutes of Exercise per Session: 30 min   Stress: No Stress Concern Present (6/27/2024)    [employer] [place] [employer] - Occupational Stress Questionnaire     Feeling of Stress : Only a little   Recent Concern: Stress - Stress Concern Present (5/12/2024)    [employer] - Occupational Stress Questionnaire     Feeling of Stress : To some extent   Housing Stability: Low Risk  (6/27/2024)    Housing Stability Vital Sign     Unable to Pay for Housing in the Last Year: No     Homeless in the Last Year: No      Family History   Problem Relation Name Age of Onset    Alcohol abuse Mother Pat         Pat    Arthritis Mother Pat     Breast cancer Mother Pat     COPD Mother Pat     Depression Mother Pat     Diabetes Mother Pat     Drug abuse Mother Pat     Hypertension Mother Pat     Miscarriages / Stillbirths Mother Pat     Depression Father Juventino     Drug abuse Father Juventino     Hypertension Father Juventino     Prostate cancer Father Juventino     Thyroid cancer Father Juventino      Drug abuse Sister Elana     Heart disease Sister Elana     Miscarriages / Stillbirths Sister Elana     Drug abuse Brother Juventino     Early death Brother Izaiah     Asthma Son Franklin     Hypertension Son Mojgan     Miscarriages / Stillbirths Maternal Grandmother Arledenisse     Cancer Maternal Grandfather Janay     Hearing loss Paternal Grandfather Lyod         Reason for Follow-up:  -diffuse large B-cell lymphoma of thyroid gland  -family history of von Willebrand disease   -oral fibroma of tongue  -anxiety, depression, NIDDM, hypothyroidism, history of opioid abuse, etc.  -hepatosplenomegaly; hepatic steatosis    History of Present Illness:   Cancer (Primary thyroid malignancy)        Oncologic/Hematologic History:  Oncology History   Diffuse large B-cell lymphoma of solid organ excluding spleen   6/14/2024 Initial Diagnosis    Diffuse large B-cell lymphoma of solid organ excluding spleen     7/29/2024 -  Chemotherapy    Treatment Summary   Plan Name: OP LYM RCHOP (rituximab, cycloPHOSphamide, DOXOrubicin, vinCRIStine, predniSONE) Q3W  Treatment Goal: Curative  Status: Active  Start Date: 7/29/2024  End Date: 10/1/2024 (Planned)  Provider: Virgil Collins MD  Chemotherapy: DOXOrubicin chemo injection 118 mg, 50 mg/m2 = 118 mg, Intravenous, Clinic/HOD 1 time, 3 of 4 cycles  Administration: 118 mg (7/29/2024), 118 mg (8/19/2024), 118 mg (9/9/2024)  vinCRIStine (ONCOVIN) 2 mg in 0.9% NaCl 65 mL chemo infusion, 2 mg (100 % of original dose 2 mg), Intravenous, Clinic/HOD 1 time, 3 of 4 cycles  Dose modification: 2 mg (original dose 2 mg, Cycle 1)  Administration: 2 mg (7/29/2024), 2 mg (8/19/2024), 2 mg (9/9/2024)  cycloPHOSphamide 750 mg/m2 = 1,760 mg in 0.9% NaCl 293.8 mL chemo infusion, 750 mg/m2 = 1,760 mg, Intravenous, Clinic/HOD 1 time, 3 of 4 cycles  Administration: 1,760 mg (7/29/2024), 1,760 mg (8/19/2024), 1,760 mg (9/9/2024)  riTUXimab-pvvr (RUXIENCE) 900 mg in 0.9% NaCl 900 mL infusion (conc: 1 mg/mL), 881 mg,  Intravenous, Clinic/HOD 1 time, 3 of 4 cycles  Administration: 900 mg (2024), 900 mg (2024), 900 mg (2024)     43-year-old lady, referred from [facility], with diffuse large B-cell lymphoma of thyroid.      Past medical history:  Adrenal mass; anxiety; arthritis; bradycardia; depression; NIDDM; gastroparesis; GERD; hip pain; hypertension; menstrual cramps; ovarian cyst; palpitations; pyosalpingitis; hypothyroidism, history of opioid abuse (on 2024, she tells me that she used opioids for 10-15 years; apparently, prescription opioids; on buprenorphine with a psychiatrist for last 2 years)  history of opiate abuse (maintained on Suboxone), history of C difficile colitis (unable to tolerate C difficile treatment); diagnosed with unspecified bipolar disorder during hospitalization at [facility] [place] [address] (enteritis, left pelvic adnexal fluid collection, PID, etc.); history of dysphagia, S/P EGD and dilatation; history of smoking (quit )  -2023: EGD:  Mild gastric erythema with scattered erosions: Pathology:  Small bowel biopsy:  Normal small bowel mucosa; stomach biopsy:  Mild superficial chronic gastritis, negative for H pylori)  -2023: Colonoscopy:  Normal:  Pathology:  Colon, random sites, biopsy:  Normal colonic mucosa  -08/10/2020: MRI abdomen with and without contrast (comparison:  CT 2023) (adrenal gland protocol):  Small left adrenal nodule stable, most compatible with adenoma (1 cm)  Procedure/surgical history:  Endometrial ablation; ; cholecystectomy; dissection of neck, left 05/10/2024; thyroidectomy 05/10/2024; tubal ligation; tonsillectomy  Social history:  In her relationship.  Lives in [place], [place].  Has 3 children.  Does not work.  Has not worked in 1 year.  Before that, used to clean houses.  Smoked a pack of cigarettes daily for 15 years; quit smoking 1 year ago.  Social alcohol. History of opioid abuse (on 2024,  she tells me that she used opioids for 10-15 years; apparently, prescription opioids; on buprenorphine with a psychiatrist for last 2 years)  Family history:  Positive for von Willebrand disease in her son.  Health maintenance:  -11/16/2022:  Bilateral screening mammogram:  BI-RADS 2-benign  -according to her, colonoscopy performed as outpatient by Naw[facility], showed precancerous colon polyp.  =========================================      Patient is being followed for diffuse large B-cell lymphoma of thyroid gland.    Please see assessment and plan section for details.    06/18/2024:  Pleasant lady who presents for initial medical oncology consultation.  In no acute discomfort.  Feels poorly.  Main complaint is in in the back and hips for last 1 year.  History of opioid abuse.  On buprenorphine with a psychiatrist for last years.  Drenching sweats all the time.  Has to change clothes.  Lost 50 lb unintentionally in 2023, apparently due to gastroparesis side effects of Ozempic; regained her weight back after Ozempic was discontinued.    ECOG 1.  No recurrent fevers.  Fatigue.  Generalized weakness.  Night sweats and hot flashes.  Sweats all the time for 1 year.  Bones hurt all the time.  Occasional chest pains and leg swelling as well.  Exertional dyspnea.  Some constipation.  Some nausea.  Great appetite.    Interval History:  [No matching plan found]   OP LYM RCHOP (rituximab, cycloPHOSphamide, DOXOrubicin, vinCRIStine, predniSONE) Q3W     10/03/2024:  -08/19/2024:   Per respiratory therapist:   Pulse oximetry at rest: 95%   Pulse oximetry after 6 minute walk: 90%  My understanding is the patient qualifies for supplemental oxygen only when the pulse oximetry drops to 88%.    Therefore, per my understanding, she does not qualify for supplemental oxygen.  However, she told me that her pulse oximetry drops to late 80s at night.  -R-CHOP:  Cycle 2 on 08/19/2024; cycle 3 on 09/09/2024  -08/23/2024: CV  ultrasound lower extremity veins bilateral (leg swelling): Negative for deep and superficial vein thrombosis in bilateral lower extremities  -08/20/2024:  CTA chest (comparison: CT chest 07/02/2023, chest x-ray 08/15/2024; rule out PE):  1.  No pulmonary embolism identified.   2.  Right middle lung lobe and lingular segment atelectasis and/or scarring without convincing acute consolidation.  -08/30/2024: PFTs (need report)  -09/24/2024:  Skin lesion, left breast, biopsy: Capillary hemangioma  -09/30/2024:  Interim restaging with FDG PET-CT, post R-CHOP-3 cycles (comparison: PET-CT 06/18/2024):  No suspicious areas of radiotracer activity identified; hepatosplenomegaly with hepatic steatosis (hepatomegaly 23 cm; severe generalized hepatic steatosis; splenomegaly 15 cm)  -10/03/2024:  Hemoglobin 11.3; rest of CBC unremarkable; CMP, magnesium, LDH, uric acid level are pending  Presents for a follow-up visit.  Accompanied by a male family member.  In no acute discomfort.  Chronic stable multitude of symptoms including weakness, fatigue, fevers, chills, low-grade fevers at night, night sweats, hot flashes, generalized pain/abdomen/upper chest (7/10), headaches, chest pain, cough, dyspnea, trouble swallowing, abdominal pain on both sides, nausea, constipation, numbness, tingling, fell about 3 weeks ago without any injuries.  No head trauma.  Appetite is okay.  ECOG 1.    Medications:  Current Outpatient Medications on File Prior to Visit   Medication Sig Dispense Refill    acetaminophen (TYLENOL) 500 MG tablet Take 2 tablets (1,000 mg total) by mouth every 6 (six) hours. 60 tablet 0    buprenorphine HCL (SUBUTEX) 8 mg Subl Place 8 mg under the tongue 3 (three) times daily.      COMPOUND HORMONE REPLACEMENT Take by mouth once daily.      diphenhydrAMINE-aluminum-magnesium hydroxide-simethicone-LIDOcaine viscous HCl 2% Swish and spit 15 mLs every 4 (four) hours as needed (Mouth sores). 240 each 0    insulin (LANTUS SOLOSTAR  "U-100 INSULIN) glargine 100 units/mL SubQ pen Inject 15 Units into the skin 2 (two) times daily. 27 mL 3    insulin lispro 100 unit/mL pen Inject 35 Units into the skin 3 (three) times daily. With meals      levothyroxine (SYNTHROID) 200 MCG tablet Take 1 tablet (200 mcg total) by mouth before breakfast. 30 tablet 11    LORazepam (ATIVAN) 0.5 MG tablet Take 1 mg by mouth every 6 (six) hours as needed for Anxiety.      LORazepam (ATIVAN) 1 MG tablet Take 1 mg by mouth 2 (two) times daily.      losartan (COZAAR) 50 MG tablet Take 1 tablet (50 mg total) by mouth once daily. 90 tablet 3    ondansetron (ZOFRAN-ODT) 4 MG TbDL Take 1 tablet (4 mg total) by mouth every 8 (eight) hours as needed (NAUSEA). 30 tablet 1    pen needle, diabetic (BD ULTRA-FINE ROSALEE PEN NEEDLE) 32 gauge x 5/32" Ndle 1 each by Misc.(Non-Drug; Combo Route) route 2 (two) times a day. 100 each 9    polyethylene glycol (GLYCOLAX) 17 gram PwPk Take 17 g by mouth 2 (two) times daily. 30 each 1    predniSONE (DELTASONE) 50 MG Tab Take 2 tablets (100 mg total) by mouth once daily. On days 2-5 of your chemotherapy cycles. Take with food. 8 tablet 5    prochlorperazine (COMPAZINE) 5 MG tablet Take 2 tablets (10 mg total) by mouth every 6 (six) hours as needed for Nausea. 20 tablet 5    calcium carbonate/vitamin D3 (CALCIUM 600 + D,3, ORAL) Take 1 tablet by mouth 2 (two) times a day. (Patient not taking: Reported on 8/22/2024)      ciprofloxacin HCl (CIPRO) 500 MG tablet Take 1 tablet (500 mg total) by mouth every 12 (twelve) hours. (Patient not taking: Reported on 10/3/2024) 14 tablet 0    hydrocortisone (ANUSOL-HC) 2.5 % rectal cream Place 1 application  rectally 2 (two) times daily. (Patient not taking: Reported on 10/3/2024)      OLANZapine (ZYPREXA) 5 MG tablet Take 1 tablet by mouth nightly on days 1-3 of each chemotherapy cycle. (Patient not taking: Reported on 10/3/2024) 3 tablet 5    pantoprazole (PROTONIX) 40 MG tablet Take 40 mg by mouth every " morning. (Patient not taking: Reported on 10/3/2024)       Current Facility-Administered Medications on File Prior to Visit   Medication Dose Route Frequency Provider Last Rate Last Admin    dextrose 10% bolus 125 mL 125 mL  12.5 g Intravenous PRN Chuckie Saenz MD        dextrose 10% bolus 250 mL 250 mL  25 g Intravenous PRN Chuckie Saenz MD        glucagon (human recombinant) injection 1 mg  1 mg Intramuscular PRN Chuckie Saenz MD           Review of Systems:   All systems reviewed and found to be negative except for the symptoms detailed above    Physical Examination:   VITAL SIGNS:   Vitals:    10/03/24 1057   BP: 128/86   Pulse: 81   Resp: 18   Temp: 97.8 °F (36.6 °C)       GENERAL:  In no apparent distress.    HEAD:  No signs of head trauma.  EYES:  Pupils are equal.  Extraocular motions intact.    EARS:  Hearing grossly intact.  MOUTH:  Oropharynx is normal.   NECK:  No adenopathy, no JVD.     CHEST:  Chest with clear breath sounds bilaterally.  No wheezes, rales, rhonchi.    CARDIAC:  Regular rate and rhythm.  S1 and S2, without murmurs, gallops, rubs.  VASCULAR:  No Edema.  Peripheral pulses normal and equal in all extremities.  ABDOMEN:  Soft, without detectable tenderness.  No sign of distention.  No   rebound or guarding, and no masses palpated.   Bowel Sounds normal.  MUSCULOSKELETAL:  Good range of motion of all major joints. Extremities without clubbing, cyanosis or edema.    NEUROLOGIC EXAM:  Alert and oriented x 3.  No focal sensory or strength deficits.   Speech normal.  Follows commands.  PSYCHIATRIC:  Mood normal.  -07/09/2024:  Erythema around MediPort site; mild tenderness; no fluctuance; no tunnelitis    Results for orders placed or performed during the hospital encounter of 05/10/24   CBC Auto Differential    Narrative    The following orders were created for panel order CBC Auto Differential.  Procedure                               Abnormality         Status                      ---------                               -----------         ------                     CBC with Differential[[id number]]                           Final result                 Please view results for these tests on the individual orders.   CBC with Differential   Result Value Ref Range    WBC 8.70 4.50 - 11.50 x10(3)/mcL    RBC 4.72 4.20 - 5.40 x10(6)/mcL    Hgb 13.4 12.0 - 16.0 g/dL    Hct 40.5 37.0 - 47.0 %    MCV 85.8 80.0 - 94.0 fL    MCH 28.4 27.0 - 31.0 pg    MCHC 33.1 33.0 - 36.0 g/dL    RDW 12.6 11.5 - 17.0 %    Platelet 171 130 - 400 x10(3)/mcL    MPV 9.3 7.4 - 10.4 fL    Neut % 71.4 %    Lymph % 21.0 %    Mono % 7.0 %    Eos % 0.3 %    Basophil % 0.1 %    Lymph # 1.83 0.6 - 4.6 x10(3)/mcL    Neut # 6.20 2.1 - 9.2 x10(3)/mcL    Mono # 0.61 0.1 - 1.3 x10(3)/mcL    Eos # 0.03 0 - 0.9 x10(3)/mcL    Baso # 0.01 <=0.2 x10(3)/mcL    IG# 0.02 0 - 0.04 x10(3)/mcL    IG% 0.2 %    NRBC% 0.0 %     Results for orders placed or performed during the hospital encounter of 05/10/24   Comprehensive Metabolic Panel   Result Value Ref Range    Sodium 140 136 - 145 mmol/L    Potassium 4.3 3.5 - 5.1 mmol/L    Chloride 102 98 - 107 mmol/L    CO2 28 22 - 29 mmol/L    Glucose 229 (H) 74 - 100 mg/dL    Blood Urea Nitrogen 9.1 7.0 - 18.7 mg/dL    Creatinine 0.75 0.55 - 1.02 mg/dL    Calcium 9.7 8.4 - 10.2 mg/dL    Protein Total 6.8 6.4 - 8.3 gm/dL    Albumin 3.8 3.5 - 5.0 g/dL    Globulin 3.0 2.4 - 3.5 gm/dL    Albumin/Globulin Ratio 1.3 1.1 - 2.0 ratio    Bilirubin Total 1.1 <=1.5 mg/dL    ALP 56 40 - 150 unit/L    ALT 40 0 - 55 unit/L    AST 31 5 - 34 unit/L    eGFR >60 mL/min/1.73/m2       Assessment:  Problem List Items Addressed This Visit          Neuro    Chronic pain syndrome - Primary       Psychiatric    History of opioid abuse       ENT    Vocal cord paralysis       Hematology    Family history of von Willebrand disease       Oncology    Diffuse large B-cell lymphoma of solid organ excluding spleen    Overview      Diffuse large B-cell lymphoma of thyroid gland.         Malignant lymphoma of thyroid gland    Fibroma of tongue       Endocrine    Hypothyroidism       Orders for 10/03/2024:  Administer 1 more cycle of R-CHOP  Subsequently, surveillance  Follow-up with behavioral health for management of anxiety  Follow-up with PCP for evaluation for supplemental oxygen   Need the report of PFTs performed 08/30/2024  Follow-up with PCP for management of diabetes mellitus  Follow-up with NP in 3 weeks     Above discussed with the patient.  All questions answered.  Discussed labs and scans and gave her copies of relevant results.  She understands and agrees with this plan.  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   -03/28/2024:  Thyroglobulin antibody 150 IU/mL, elevated (reference Range:  < 1.8)  -03/28/2024: Thyroglobulin, tumor marker: < 0.1 (reference Range:  Athyroid < 0.1; intact thyroid </= 33)  -left hemithyroidectomy with left central neck dissection level 6 (05/10/2024):   Diffuse large B-cell lymphoma left oscar thyroid, germinal center B-cell type, not a double expressor, not double hit lymphoma; morphologically, thyroid extensively involved; paratracheal tissue biopsy negative; left central neck dissection 13 benign lymph nodes,  -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 during left hemithyroidectomy 05/10/2024, noted on FFL exam by ENT  -06/18/2024: ECOG 1; chronic fatigue; sweats all the time; no  consistent weight loss; appetite is great  -06/18/2024: CBC, CMP essentially unremarkable   -06/18/2024: LDH normal, beta 2 microglobulin normal  -06/18/2024: Hep B core antibody total, hep B surface antigen, hep C antibody, HIV: Nonreactive  -FDG PET-CT 06/18/2024:  No other sites of disease  -TTE 06/26/2024: LVEF 55-60%  -right IJ MediPort placed 07/08/2024  -S/P tubal ligation in the past  -bone marrow evaluation 07/23/2024: Report pending   -R-CHOP started 07/29/2024; experienced allergic reaction to rituximab; managed with corticosteroids and antihistamines  -bone marrow exam 07/23/2024: Negative for lymphoma  (Diffuse large B-cell lymphoma of thyroid gland, stage IE)  -dysphagia, most likely secondary to postoperative left vocal cord paralysis (per ENT)  -her ANC dropped 0.22 on 08/12/2024 (14 days post cycle 1 of R-CHOP); treated with Neupogen x2 doses)  -R-CHOP:  Cycle 2 on 08/19/2024; cycle 3 on 09/09/2024  -interim restaging FDG PET-CT 09/30/2024, post R-CHOP x3 cycles:  No lymphoma; hepatosplenomegaly with hepatic steatosis( epatomegaly 23 cm; severe generalized hepatic steatosis; splenomegaly 15 cm)  >>>  Plan:  -continue R-CHOP every 3 weeks; then, after 3 cycles of R-CHOP, interim staging with PET-CT; if complete response, then, R-CHOP x1 cycle (total of 4 cycles), etc. (see below)  -no lymphoma on interim restaging PET-CT after 3 cycles of R-CHOP; therefore, administer 1 more cycle of R-CHOP, followed by surveillance  -developed grade 4 neutropenia 14 days post cycle 1 of chemotherapy; therefore, we added GCSF support from 2nd cycle onwards (Neulasta)  -S/P tubal ligation in the past; did not desire fertility preservation before starting chemotherapy  -check CBC and CMP weekly  -allopurinol 200 mg p.o. b.i.d. for TLS prophylaxis: Begin 2-3 days prior to chemo immunotherapy end continue for 10-14 days  -aggressive oral hydration prevent hemorrhagic cystitis with cyclophosphamide  -monitor for  "neurotoxicity with vincristine  -treatment should be delayed until ANC is> 1500 mm3 and platelet count> 100 K mm3    After completion of 4 cycles of R-CHOP, follow-up:  1. History and physical and labs, every 3-6 months for 5 years, then annually or as clinically indicated  2. Imaging:  CTs C/A/P with contrast no more often than every 6 months for 2 years after completion of treatment, then only as clinically indicated    Stage I disease:  -nonbulky  -smIPI 0    Stage I disease:  -06/18/2024: Initial consultation:  ECOG 1; sweats all the time  -IPI:  Low (0)  -age adjusted IP!:  Low (0)  -stage modified IPI (smIPI) low (0)  -NCCN IPI:  Low (1, by virtue of age 43)  -prognostic model to assess the risk of CNS disease (CNS IPI):  Low risk (0)      -08/19/2024: Super anxious; no psychotic count, homicidal, or suicidal ideation; a multitude of unrelated symptoms; I asked her if she wanted to start chemotherapy, she has had "no"; she wishes to continue with chemotherapy  >>> we referred her to behavioral health for management of anxiety      Hepatosplenomegaly and hepatic steatosis:   -identified on FDG PET-CT 09/30/2024  >>>  -10/03/2024:  will refer to GI for management      Family history of von Willebrand disease:  -04/25/2024:  Von Willebrand factor activity 75%, normal      Oral fibroma of tongue:   -tongue lesion: 1st noted around 02/2023; slowly enlarging since; no bleeding; no pain  -tongue biopsy 02/08/2024: Oral fibroma      -08/19/2024:  Tells me that her O2 sats are dropping to high 80s at night; history of smoking; refer to PCP ASAP to assess for supplemental oxygen   -08/19/2024:  6 minute walk test:  Per respiratory therapist:  Pulse oximetry at rest: 95%   Pulse oximetry after 6 minute walk: 90%  My understanding is the patient qualifies for supplemental oxygen only when the pulse oximetry drops to 88%.    Therefore, per my understanding, she does not qualify for supplemental oxygen.  >>>  -follow-up " with PCP/pulmonary for further evaluation  -08/30/2024: PFTs (need report)      -she has an erythematous spot on her chest; she is concerned; refer her to Dermatology for biopsy, etc.  -skin lesion, left breast, biopsy 09/24/2024: Capillary hemangioma      -08/19/2024:  CBC normal; CO2 30, elevated; glucose 328; , elevated  >>>  -we informed her PCP that diabetes uncontrolled      Comorbid medical conditions:  Anxiety, depression, NIDDM, gastroparesis, hypertension, history of PID  Hypothyroidism  History of opioid abuse, maintained on Suboxone  History of dysphagia, S/P endoscopic dilatation     ====================================    TLS prophylaxis:  Allopurinol beginning 2-3 days prior to chemo immunotherapy and continued for 10-14 days  (Allopurinol: 100 mg per m2 every 8 hours, maximum 800 mg per day)    R-CHOP:  -Patients receiving cyclophosphamide should maintain adequate oral hydration (2 to 3 L/day) and void frequently to reduce risk of hemorrhagic cystitis.  -The risk of febrile neutropenia with this regimen is 10 to 20%; primary prophylaxis with hematopoietic growth factors should be considered on an individual basis, particularly for high-risk patients such as those with preexisting neutropenia, advanced disease, poor performance status, or patients age 65 years or older.  -Adjustment of initial cyclophosphamide, doxorubicin, and vincristine doses may be needed for preexisting liver dysfunction.  In addition, dose adjustment of cyclophosphamide may be required for kidney dysfunction.  -LVEF should be evaluated prior to initiation of therapy. Dose alterations should be considered for LVEF <50%, and doxorubicin therapy is contraindicated in patients with LVEF <30% at initiation. Infusion times and schedule may be adjusted to decrease the risk of cardiotoxicity in individuals at high risk for its development.  -Neurotoxicity: Vincristine may cause constipation, and in severe cases, paralytic ileus.  A routine prophylactic regimen against constipation is recommended in all patients receiving vincristine.    R-CHOP:  Dose modifications for myelotoxicity and neuropathy:  # Treatment should be delayed until ANC is >1500/microL and platelet count is >100,000/microL.  # if a patient develops grade 4 (ANC <500/microL) neutropenia or febrile neutropenia with any cycle, G-CSF support is added to the regimen for subsequent cycles.  # If grade 4 neutropenia or febrile neutropenia occurs despite G-CSF support, or if the patient develops grade 3 (25,000 to 50,000/microL) or 4 (<25,000/microL) thrombocytopenia with any cycle, the doses of cyclophosphamide and doxorubicin should be decreased by 50% for subsequent cycles.  # Neuropathy: Dose adjustment of vincristine may be necessary if the severity of neuropathy persists or worsens. No specific guidelines are available for dose adjustments     Follow-up:  No follow-ups on file.  Answers submitted by the patient for this visit:  Review of Systems Questionnaire (Submitted on 10/1/2024)  appetite change : No  unexpected weight change: Yes  mouth sores: Yes  visual disturbance: Yes  cough: Yes  shortness of breath: Yes  chest pain: Yes  abdominal pain: Yes  diarrhea: No  frequency: Yes  back pain: Yes  rash: No  headaches: Yes  adenopathy: Yes  nervous/ anxious: Yes

## 2024-10-07 ENCOUNTER — LAB VISIT (OUTPATIENT)
Dept: HEMATOLOGY/ONCOLOGY | Facility: CLINIC | Age: 44
End: 2024-10-07
Payer: MEDICAID

## 2024-10-07 ENCOUNTER — INFUSION (OUTPATIENT)
Dept: INFUSION THERAPY | Facility: HOSPITAL | Age: 44
End: 2024-10-07
Attending: INTERNAL MEDICINE
Payer: MEDICAID

## 2024-10-07 VITALS
DIASTOLIC BLOOD PRESSURE: 74 MMHG | HEART RATE: 66 BPM | RESPIRATION RATE: 20 BRPM | OXYGEN SATURATION: 98 % | SYSTOLIC BLOOD PRESSURE: 119 MMHG | TEMPERATURE: 99 F | HEIGHT: 67 IN | BODY MASS INDEX: 42.46 KG/M2 | WEIGHT: 270.5 LBS

## 2024-10-07 DIAGNOSIS — C85.99 MALIGNANT LYMPHOMA OF THYROID GLAND: Primary | ICD-10-CM

## 2024-10-07 DIAGNOSIS — C83.398 DIFFUSE LARGE B-CELL LYMPHOMA OF SOLID ORGAN EXCLUDING SPLEEN: Primary | ICD-10-CM

## 2024-10-07 LAB
ALBUMIN SERPL-MCNC: 3.6 G/DL (ref 3.5–5)
ALBUMIN/GLOB SERPL: 1.1 RATIO (ref 1.1–2)
ALP SERPL-CCNC: 83 UNIT/L (ref 40–150)
ALT SERPL-CCNC: 40 UNIT/L (ref 0–55)
ANION GAP SERPL CALC-SCNC: 7 MEQ/L
AST SERPL-CCNC: 51 UNIT/L (ref 5–34)
BASOPHILS # BLD AUTO: 0.03 X10(3)/MCL
BASOPHILS NFR BLD AUTO: 0.4 %
BILIRUB SERPL-MCNC: 0.5 MG/DL
BUN SERPL-MCNC: 9.7 MG/DL (ref 7–18.7)
CALCIUM SERPL-MCNC: 9.4 MG/DL (ref 8.4–10.2)
CHLORIDE SERPL-SCNC: 102 MMOL/L (ref 98–107)
CO2 SERPL-SCNC: 31 MMOL/L (ref 22–29)
CREAT SERPL-MCNC: 0.81 MG/DL (ref 0.55–1.02)
CREAT/UREA NIT SERPL: 12
EOSINOPHIL # BLD AUTO: 0.1 X10(3)/MCL (ref 0–0.9)
EOSINOPHIL NFR BLD AUTO: 1.5 %
ERYTHROCYTE [DISTWIDTH] IN BLOOD BY AUTOMATED COUNT: 16.2 % (ref 11.5–17)
GFR SERPLBLD CREATININE-BSD FMLA CKD-EPI: >60 ML/MIN/1.73/M2
GLOBULIN SER-MCNC: 3.3 GM/DL (ref 2.4–3.5)
GLUCOSE SERPL-MCNC: 240 MG/DL (ref 74–100)
HCT VFR BLD AUTO: 35.9 % (ref 37–47)
HGB BLD-MCNC: 11.6 G/DL (ref 12–16)
IMM GRANULOCYTES # BLD AUTO: 0.08 X10(3)/MCL (ref 0–0.04)
IMM GRANULOCYTES NFR BLD AUTO: 1.2 %
LYMPHOCYTES # BLD AUTO: 1.22 X10(3)/MCL (ref 0.6–4.6)
LYMPHOCYTES NFR BLD AUTO: 18 %
MCH RBC QN AUTO: 29.5 PG (ref 27–31)
MCHC RBC AUTO-ENTMCNC: 32.3 G/DL (ref 33–36)
MCV RBC AUTO: 91.3 FL (ref 80–94)
MONOCYTES # BLD AUTO: 0.85 X10(3)/MCL (ref 0.1–1.3)
MONOCYTES NFR BLD AUTO: 12.6 %
NEUTROPHILS # BLD AUTO: 4.49 X10(3)/MCL (ref 2.1–9.2)
NEUTROPHILS NFR BLD AUTO: 66.3 %
NRBC BLD AUTO-RTO: 0 %
PLATELET # BLD AUTO: 220 X10(3)/MCL (ref 130–400)
PMV BLD AUTO: 9.8 FL (ref 7.4–10.4)
POTASSIUM SERPL-SCNC: 4.2 MMOL/L (ref 3.5–5.1)
PROT SERPL-MCNC: 6.9 GM/DL (ref 6.4–8.3)
RBC # BLD AUTO: 3.93 X10(6)/MCL (ref 4.2–5.4)
SODIUM SERPL-SCNC: 140 MMOL/L (ref 136–145)
WBC # BLD AUTO: 6.77 X10(3)/MCL (ref 4.5–11.5)

## 2024-10-07 PROCEDURE — 80053 COMPREHEN METABOLIC PANEL: CPT

## 2024-10-07 PROCEDURE — 96413 CHEMO IV INFUSION 1 HR: CPT

## 2024-10-07 PROCEDURE — 25000003 PHARM REV CODE 250: Performed by: INTERNAL MEDICINE

## 2024-10-07 PROCEDURE — 96417 CHEMO IV INFUS EACH ADDL SEQ: CPT

## 2024-10-07 PROCEDURE — 63600175 PHARM REV CODE 636 W HCPCS: Performed by: INTERNAL MEDICINE

## 2024-10-07 PROCEDURE — 96411 CHEMO IV PUSH ADDL DRUG: CPT

## 2024-10-07 PROCEDURE — 85025 COMPLETE CBC W/AUTO DIFF WBC: CPT

## 2024-10-07 PROCEDURE — 36415 COLL VENOUS BLD VENIPUNCTURE: CPT

## 2024-10-07 PROCEDURE — 96415 CHEMO IV INFUSION ADDL HR: CPT

## 2024-10-07 PROCEDURE — A4216 STERILE WATER/SALINE, 10 ML: HCPCS | Performed by: INTERNAL MEDICINE

## 2024-10-07 PROCEDURE — 96375 TX/PRO/DX INJ NEW DRUG ADDON: CPT

## 2024-10-07 RX ORDER — SODIUM CHLORIDE 0.9 % (FLUSH) 0.9 %
10 SYRINGE (ML) INJECTION
Status: DISCONTINUED | OUTPATIENT
Start: 2024-10-07 | End: 2024-10-07 | Stop reason: HOSPADM

## 2024-10-07 RX ORDER — PROCHLORPERAZINE EDISYLATE 5 MG/ML
10 INJECTION INTRAMUSCULAR; INTRAVENOUS ONCE AS NEEDED
Status: DISCONTINUED | OUTPATIENT
Start: 2024-10-07 | End: 2024-10-07 | Stop reason: HOSPADM

## 2024-10-07 RX ORDER — EPINEPHRINE 0.3 MG/.3ML
0.3 INJECTION SUBCUTANEOUS ONCE AS NEEDED
Status: DISCONTINUED | OUTPATIENT
Start: 2024-10-07 | End: 2024-10-07 | Stop reason: HOSPADM

## 2024-10-07 RX ORDER — PALONOSETRON 0.05 MG/ML
0.25 INJECTION, SOLUTION INTRAVENOUS ONCE
Status: COMPLETED | OUTPATIENT
Start: 2024-10-07 | End: 2024-10-07

## 2024-10-07 RX ORDER — FAMOTIDINE 10 MG/ML
20 INJECTION INTRAVENOUS
Status: COMPLETED | OUTPATIENT
Start: 2024-10-07 | End: 2024-10-07

## 2024-10-07 RX ORDER — HEPARIN 100 UNIT/ML
500 SYRINGE INTRAVENOUS
Status: DISCONTINUED | OUTPATIENT
Start: 2024-10-07 | End: 2024-10-07 | Stop reason: HOSPADM

## 2024-10-07 RX ORDER — MEPERIDINE HYDROCHLORIDE 25 MG/ML
25 INJECTION INTRAMUSCULAR; INTRAVENOUS; SUBCUTANEOUS
Status: DISCONTINUED | OUTPATIENT
Start: 2024-10-07 | End: 2024-10-07 | Stop reason: HOSPADM

## 2024-10-07 RX ORDER — ACETAMINOPHEN 325 MG/1
650 TABLET ORAL
Status: COMPLETED | OUTPATIENT
Start: 2024-10-07 | End: 2024-10-07

## 2024-10-07 RX ORDER — DOXORUBICIN HYDROCHLORIDE 2 MG/ML
50 INJECTION, SOLUTION INTRAVENOUS
Status: COMPLETED | OUTPATIENT
Start: 2024-10-07 | End: 2024-10-07

## 2024-10-07 RX ORDER — DIPHENHYDRAMINE HYDROCHLORIDE 50 MG/ML
50 INJECTION INTRAMUSCULAR; INTRAVENOUS ONCE AS NEEDED
Status: DISCONTINUED | OUTPATIENT
Start: 2024-10-07 | End: 2024-10-07 | Stop reason: HOSPADM

## 2024-10-07 RX ORDER — DIPHENHYDRAMINE HYDROCHLORIDE 50 MG/ML
50 INJECTION INTRAMUSCULAR; INTRAVENOUS
Status: COMPLETED | OUTPATIENT
Start: 2024-10-07 | End: 2024-10-07

## 2024-10-07 RX ADMIN — HEPARIN 500 UNITS: 100 SYRINGE at 02:10

## 2024-10-07 RX ADMIN — ACETAMINOPHEN 650 MG: 325 TABLET ORAL at 09:10

## 2024-10-07 RX ADMIN — DOXORUBICIN HYDROCHLORIDE 118 MG: 2 INJECTION, SOLUTION INTRAVENOUS at 01:10

## 2024-10-07 RX ADMIN — DIPHENHYDRAMINE HYDROCHLORIDE 50 MG: 50 INJECTION INTRAMUSCULAR; INTRAVENOUS at 09:10

## 2024-10-07 RX ADMIN — SODIUM CHLORIDE, PRESERVATIVE FREE 10 ML: 5 INJECTION INTRAVENOUS at 02:10

## 2024-10-07 RX ADMIN — SODIUM CHLORIDE 900 MG: 9 INJECTION, SOLUTION INTRAVENOUS at 09:10

## 2024-10-07 RX ADMIN — CYCLOPHOSPHAMIDE 1760 MG: 200 INJECTION, SOLUTION INTRAVENOUS at 01:10

## 2024-10-07 RX ADMIN — FAMOTIDINE 20 MG: 10 INJECTION, SOLUTION INTRAVENOUS at 09:10

## 2024-10-07 RX ADMIN — SODIUM CHLORIDE: 9 INJECTION, SOLUTION INTRAVENOUS at 09:10

## 2024-10-07 RX ADMIN — PALONOSETRON HYDROCHLORIDE 0.25 MG: 0.25 INJECTION, SOLUTION INTRAVENOUS at 12:10

## 2024-10-07 RX ADMIN — VINCRISTINE SULFATE 2 MG: 1 INJECTION, SOLUTION INTRAVENOUS at 01:10

## 2024-10-07 RX ADMIN — HYDROCORTISONE SODIUM SUCCINATE 100 MG: 100 INJECTION, POWDER, FOR SOLUTION INTRAMUSCULAR; INTRAVENOUS at 09:10

## 2024-10-07 NOTE — NURSING
"Infusion Note:  Pt has an appointment today for: Labs and Infusion    Treatment Regimen: R-CHOP   Cycle: 4 Day: 1 every Q3 weeks;      Given via Right Mediport    UPT done: Not done  UPT exception: Tubal ligation    Treatment parameters: were met    Nursing note:  Pt and spouse ambulated to Infusion Clinic after lab draw, pt reported stomach pain 8/10, alternating diarrhea and constipation & nauseau; pt stated she has an ongoing nonproductive cough & "runny nose", not on antibiotics; today afebrile and labs within parameters.    Future appts scheduled: Infusion 10/8/24 for [place] and 10/29/24 labs & NP.    "

## 2024-10-08 ENCOUNTER — INFUSION (OUTPATIENT)
Dept: INFUSION THERAPY | Facility: HOSPITAL | Age: 44
End: 2024-10-08
Attending: INTERNAL MEDICINE
Payer: MEDICAID

## 2024-10-08 VITALS
HEIGHT: 67 IN | SYSTOLIC BLOOD PRESSURE: 125 MMHG | WEIGHT: 270.5 LBS | BODY MASS INDEX: 42.46 KG/M2 | TEMPERATURE: 99 F | RESPIRATION RATE: 20 BRPM | DIASTOLIC BLOOD PRESSURE: 80 MMHG | HEART RATE: 68 BPM | OXYGEN SATURATION: 94 %

## 2024-10-08 DIAGNOSIS — C83.398 DIFFUSE LARGE B-CELL LYMPHOMA OF SOLID ORGAN EXCLUDING SPLEEN: Primary | ICD-10-CM

## 2024-10-08 PROCEDURE — 96372 THER/PROPH/DIAG INJ SC/IM: CPT

## 2024-10-08 PROCEDURE — 63600175 PHARM REV CODE 636 W HCPCS: Mod: JZ,TB | Performed by: INTERNAL MEDICINE

## 2024-10-08 RX ADMIN — PEGFILGRASTIM 6 MG: 6 INJECTION SUBCUTANEOUS at 01:10

## 2024-10-08 NOTE — NURSING
"Pt ambulated to Infusion Clinic for C4D2 Fylentra injection, stated feels "yucky like every time after chemo", then added I'm ok; Fylentra given in right arm without incident; pt declined AVS as given yesterday.  "

## 2024-10-10 DIAGNOSIS — R06.00 DYSPNEA, UNSPECIFIED TYPE: Primary | ICD-10-CM

## 2024-10-17 ENCOUNTER — TELEPHONE (OUTPATIENT)
Dept: INTERNAL MEDICINE | Facility: CLINIC | Age: 44
End: 2024-10-17
Payer: MEDICAID

## 2024-10-17 NOTE — TELEPHONE ENCOUNTER
Contacted patient ID and  verified. Patient c/o right breast pain with drainage and pain noted. Patient stated she had issues as a child with a area on her right breast, which was noted during her last mammogram.This nurse advised patient to present to ER for further evaluation and treatment. Patient verbalized complete understanding.

## 2024-10-17 NOTE — TELEPHONE ENCOUNTER
----- Message from Fatimah sent at 10/17/2024  8:41 AM CDT -----  Regarding: Souza--Medication Concerns  Caller is:  (x) Patient       Provider:[facility]     Last Visit:08/22/2024    Next Visit:10/24/2024    Reason for Call: Patient called and left message that she would like someone to give her a call. States that just finished a round of chemo and she now has an infection. She would like if someone could give her a call back at your earliest convenience.    Preferred Phone Number: [phone]        Thanks for all that you do,  Fatimah

## 2024-10-28 ENCOUNTER — DOCUMENTATION ONLY (OUTPATIENT)
Dept: HEMATOLOGY/ONCOLOGY | Facility: CLINIC | Age: 44
End: 2024-10-28
Payer: MEDICAID

## 2024-10-28 DIAGNOSIS — C83.398 DIFFUSE LARGE B-CELL LYMPHOMA OF SOLID ORGAN EXCLUDING SPLEEN: Primary | ICD-10-CM

## 2024-10-28 DIAGNOSIS — C73 PRIMARY THYROID MALIGNANCY: ICD-10-CM

## 2024-10-29 ENCOUNTER — OFFICE VISIT (OUTPATIENT)
Dept: HEMATOLOGY/ONCOLOGY | Facility: CLINIC | Age: 44
End: 2024-10-29
Payer: MEDICAID

## 2024-10-29 ENCOUNTER — DOCUMENTATION ONLY (OUTPATIENT)
Dept: HEMATOLOGY/ONCOLOGY | Facility: CLINIC | Age: 44
End: 2024-10-29
Payer: MEDICAID

## 2024-10-29 ENCOUNTER — LAB VISIT (OUTPATIENT)
Dept: HEMATOLOGY/ONCOLOGY | Facility: CLINIC | Age: 44
End: 2024-10-29
Payer: MEDICAID

## 2024-10-29 VITALS
TEMPERATURE: 98 F | HEART RATE: 74 BPM | WEIGHT: 267 LBS | HEIGHT: 66 IN | RESPIRATION RATE: 20 BRPM | SYSTOLIC BLOOD PRESSURE: 112 MMHG | DIASTOLIC BLOOD PRESSURE: 77 MMHG | OXYGEN SATURATION: 96 % | BODY MASS INDEX: 42.91 KG/M2

## 2024-10-29 DIAGNOSIS — C85.99 MALIGNANT LYMPHOMA OF THYROID GLAND: ICD-10-CM

## 2024-10-29 DIAGNOSIS — G89.4 CHRONIC PAIN SYNDROME: ICD-10-CM

## 2024-10-29 DIAGNOSIS — R07.9 CHEST PAIN, UNSPECIFIED TYPE: ICD-10-CM

## 2024-10-29 DIAGNOSIS — C83.398 DIFFUSE LARGE B-CELL LYMPHOMA OF SOLID ORGAN EXCLUDING SPLEEN: ICD-10-CM

## 2024-10-29 DIAGNOSIS — C83.398 DIFFUSE LARGE B-CELL LYMPHOMA OF SOLID ORGAN EXCLUDING SPLEEN: Primary | ICD-10-CM

## 2024-10-29 DIAGNOSIS — E03.9 HYPOTHYROIDISM, UNSPECIFIED TYPE: ICD-10-CM

## 2024-10-29 DIAGNOSIS — C73 PRIMARY THYROID MALIGNANCY: ICD-10-CM

## 2024-10-29 DIAGNOSIS — E55.9 VITAMIN D DEFICIENCY: ICD-10-CM

## 2024-10-29 LAB
25(OH)D3+25(OH)D2 SERPL-MCNC: 16 NG/ML (ref 30–80)
ALBUMIN SERPL-MCNC: 3.7 G/DL (ref 3.5–5)
ALBUMIN/GLOB SERPL: 1.2 RATIO (ref 1.1–2)
ALP SERPL-CCNC: 72 UNIT/L (ref 40–150)
ALT SERPL-CCNC: 51 UNIT/L (ref 0–55)
ANION GAP SERPL CALC-SCNC: 7 MEQ/L
AST SERPL-CCNC: 62 UNIT/L (ref 5–34)
BASOPHILS # BLD AUTO: 0.02 X10(3)/MCL
BASOPHILS NFR BLD AUTO: 0.3 %
BILIRUB SERPL-MCNC: 0.6 MG/DL
BUN SERPL-MCNC: 10.6 MG/DL (ref 7–18.7)
CALCIUM SERPL-MCNC: 9.4 MG/DL (ref 8.4–10.2)
CHLORIDE SERPL-SCNC: 100 MMOL/L (ref 98–107)
CO2 SERPL-SCNC: 33 MMOL/L (ref 22–29)
CREAT SERPL-MCNC: 0.84 MG/DL (ref 0.55–1.02)
CREAT/UREA NIT SERPL: 13
EOSINOPHIL # BLD AUTO: 0.04 X10(3)/MCL (ref 0–0.9)
EOSINOPHIL NFR BLD AUTO: 0.6 %
ERYTHROCYTE [DISTWIDTH] IN BLOOD BY AUTOMATED COUNT: 16.6 % (ref 11.5–17)
FERRITIN SERPL-MCNC: 376.99 NG/ML (ref 4.63–204)
FOLATE SERPL-MCNC: 13 NG/ML (ref 7–31.4)
GFR SERPLBLD CREATININE-BSD FMLA CKD-EPI: >60 ML/MIN/1.73/M2
GLOBULIN SER-MCNC: 3.1 GM/DL (ref 2.4–3.5)
GLUCOSE SERPL-MCNC: 192 MG/DL (ref 74–100)
HCT VFR BLD AUTO: 36.7 % (ref 37–47)
HGB BLD-MCNC: 11.7 G/DL (ref 12–16)
IMM GRANULOCYTES # BLD AUTO: 0.02 X10(3)/MCL (ref 0–0.04)
IMM GRANULOCYTES NFR BLD AUTO: 0.3 %
IRON SATN MFR SERPL: 23 % (ref 20–50)
IRON SERPL-MCNC: 71 UG/DL (ref 50–170)
LDH SERPL-CCNC: 185 U/L (ref 125–220)
LYMPHOCYTES # BLD AUTO: 1.23 X10(3)/MCL (ref 0.6–4.6)
LYMPHOCYTES NFR BLD AUTO: 19.1 %
MAGNESIUM SERPL-MCNC: 1.9 MG/DL (ref 1.6–2.6)
MCH RBC QN AUTO: 29.8 PG (ref 27–31)
MCHC RBC AUTO-ENTMCNC: 31.9 G/DL (ref 33–36)
MCV RBC AUTO: 93.4 FL (ref 80–94)
MONOCYTES # BLD AUTO: 0.54 X10(3)/MCL (ref 0.1–1.3)
MONOCYTES NFR BLD AUTO: 8.4 %
NEUTROPHILS # BLD AUTO: 4.59 X10(3)/MCL (ref 2.1–9.2)
NEUTROPHILS NFR BLD AUTO: 71.3 %
NRBC BLD AUTO-RTO: 0.3 %
PLATELET # BLD AUTO: 210 X10(3)/MCL (ref 130–400)
PMV BLD AUTO: 9.5 FL (ref 7.4–10.4)
POTASSIUM SERPL-SCNC: 3.5 MMOL/L (ref 3.5–5.1)
PROT SERPL-MCNC: 6.8 GM/DL (ref 6.4–8.3)
RBC # BLD AUTO: 3.93 X10(6)/MCL (ref 4.2–5.4)
SODIUM SERPL-SCNC: 140 MMOL/L (ref 136–145)
T4 FREE SERPL-MCNC: 0.86 NG/DL (ref 0.7–1.48)
TIBC SERPL-MCNC: 243 UG/DL (ref 70–310)
TIBC SERPL-MCNC: 314 UG/DL (ref 250–450)
TRANSFERRIN SERPL-MCNC: 284 MG/DL (ref 180–382)
TSH SERPL-ACNC: 39.78 UIU/ML (ref 0.35–4.94)
URATE SERPL-MCNC: 4.7 MG/DL (ref 2.6–6)
VIT B12 SERPL-MCNC: 952 PG/ML (ref 213–816)
WBC # BLD AUTO: 6.44 X10(3)/MCL (ref 4.5–11.5)

## 2024-10-29 PROCEDURE — 99499 UNLISTED E&M SERVICE: CPT | Mod: S$PBB,,, | Performed by: SOCIAL WORKER

## 2024-10-29 PROCEDURE — 83540 ASSAY OF IRON: CPT | Performed by: NURSE PRACTITIONER

## 2024-10-29 PROCEDURE — 4010F ACE/ARB THERAPY RXD/TAKEN: CPT | Mod: CPTII,,, | Performed by: NURSE PRACTITIONER

## 2024-10-29 PROCEDURE — 82607 VITAMIN B-12: CPT

## 2024-10-29 PROCEDURE — 99215 OFFICE O/P EST HI 40 MIN: CPT | Mod: PBBFAC,27 | Performed by: NURSE PRACTITIONER

## 2024-10-29 PROCEDURE — 99214 OFFICE O/P EST MOD 30 MIN: CPT | Mod: S$PBB,,, | Performed by: NURSE PRACTITIONER

## 2024-10-29 PROCEDURE — 1160F RVW MEDS BY RX/DR IN RCRD: CPT | Mod: CPTII,,, | Performed by: NURSE PRACTITIONER

## 2024-10-29 PROCEDURE — 80053 COMPREHEN METABOLIC PANEL: CPT

## 2024-10-29 PROCEDURE — 82525 ASSAY OF COPPER: CPT

## 2024-10-29 PROCEDURE — 84443 ASSAY THYROID STIM HORMONE: CPT | Performed by: NURSE PRACTITIONER

## 2024-10-29 PROCEDURE — 3008F BODY MASS INDEX DOCD: CPT | Mod: CPTII,,, | Performed by: NURSE PRACTITIONER

## 2024-10-29 PROCEDURE — 85025 COMPLETE CBC W/AUTO DIFF WBC: CPT

## 2024-10-29 PROCEDURE — 84550 ASSAY OF BLOOD/URIC ACID: CPT

## 2024-10-29 PROCEDURE — 3078F DIAST BP <80 MM HG: CPT | Mod: CPTII,,, | Performed by: NURSE PRACTITIONER

## 2024-10-29 PROCEDURE — 82306 VITAMIN D 25 HYDROXY: CPT | Performed by: NURSE PRACTITIONER

## 2024-10-29 PROCEDURE — 3046F HEMOGLOBIN A1C LEVEL >9.0%: CPT | Mod: CPTII,,, | Performed by: NURSE PRACTITIONER

## 2024-10-29 PROCEDURE — 99999 PR PBB SHADOW E&M-EST. PATIENT-LVL I: CPT | Mod: PBBFAC,,, | Performed by: SOCIAL WORKER

## 2024-10-29 PROCEDURE — 84439 ASSAY OF FREE THYROXINE: CPT | Performed by: NURSE PRACTITIONER

## 2024-10-29 PROCEDURE — 82728 ASSAY OF FERRITIN: CPT | Performed by: NURSE PRACTITIONER

## 2024-10-29 PROCEDURE — 36415 COLL VENOUS BLD VENIPUNCTURE: CPT

## 2024-10-29 PROCEDURE — 83735 ASSAY OF MAGNESIUM: CPT

## 2024-10-29 PROCEDURE — 99211 OFF/OP EST MAY X REQ PHY/QHP: CPT | Mod: PBBFAC | Performed by: SOCIAL WORKER

## 2024-10-29 PROCEDURE — 3074F SYST BP LT 130 MM HG: CPT | Mod: CPTII,,, | Performed by: NURSE PRACTITIONER

## 2024-10-29 PROCEDURE — 83615 LACTATE (LD) (LDH) ENZYME: CPT

## 2024-10-29 PROCEDURE — 1159F MED LIST DOCD IN RCRD: CPT | Mod: CPTII,,, | Performed by: NURSE PRACTITIONER

## 2024-10-29 PROCEDURE — 82746 ASSAY OF FOLIC ACID SERUM: CPT

## 2024-10-29 RX ORDER — ASPIRIN 325 MG
50000 TABLET, DELAYED RELEASE (ENTERIC COATED) ORAL
Qty: 12 CAPSULE | Refills: 0 | Status: SHIPPED | OUTPATIENT
Start: 2024-10-29

## 2024-10-29 RX ORDER — VIT C/E/ZN/COPPR/LUTEIN/ZEAXAN 250MG-90MG
1000 CAPSULE ORAL DAILY
Qty: 30 CAPSULE | Refills: 3 | Status: SHIPPED | OUTPATIENT
Start: 2024-10-29

## 2024-10-29 NOTE — PROGRESS NOTES
"History:  Past Medical History:   Diagnosis Date    Adrenal mass     Anxiety     Arthritis     Bradycardia     Depression     Diabetes mellitus, type 2     Gastroparesis     General anesthetics causing adverse effect in therapeutic use     "hard time waking up"    GERD (gastroesophageal reflux disease)     Hip pain     Hypertension     Menstrual cramps     Ovarian cyst     Palpitations     Pyosalpingitis     Thyroid disease    Past medical history:  Adrenal mass; anxiety; arthritis; bradycardia; depression; NIDDM; gastroparesis; GERD; hip pain; hypertension; menstrual cramps; ovarian cyst; palpitations; bile salpingitis; thyroid disease    Procedure/surgical history:  Ablation of vaginal tissue using laser; ; cholecystectomy; dissection of neck, left 05/10/2024; thyroidectomy 05/10/2024; tubal ligation    Past Surgical History:   Procedure Laterality Date    ABLATION OF VAGINAL TISSUE USING LASER      ADENOIDECTOMY      BONE MARROW ASPIRATION N/A 2024    Procedure: ASPIRATION, BONE MARROW;  Surgeon: Patrizia Bhatt MD;  Location: [facility] ENDOSCOPY;  Service: General;  Laterality: N/A;    BONE MARROW BIOPSY N/A 2024    Procedure: Biopsy-bone marrow;  Surgeon: Patrizia Bhatt MD;  Location: [facility] ENDOSCOPY;  Service: General;  Laterality: N/A;     SECTION      CHOLECYSTECTOMY      DISSECTION OF NECK Left 05/10/2024    Procedure: DISSECTION, NECK;  Surgeon: Cali Trujillo MD;  Location: [facility] OR;  Service: ENT;  Laterality: Left;    INSERTION OF TUNNELED CENTRAL VENOUS CATHETER (CVC) WITH SUBCUTANEOUS PORT Right 2024    Procedure: UKYDXLALB-RPOP-A-CATH;  Surgeon: Lio Storey Jr., MD;  Location: [facility] OR;  Service: General;  Laterality: Right;    THYROIDECTOMY Left 05/10/2024    Procedure: THYROIDECTOMY;  Surgeon: Cali Trujillo MD;  Location: [facility] OR;  Service: ENT;  Laterality: Left;    TONSILLECTOMY  1985    TUBAL LIGATION        Social History     Socioeconomic " History    Marital status: Single   Tobacco Use    Smoking status: Former     Current packs/day: 0.00     Average packs/day: 1.5 packs/day for 15.0 years (22.5 ttl pk-yrs)     Types: Cigarettes     Quit date: 3/5/2003     Years since quittin.6    Smokeless tobacco: Never    Tobacco comments:     Quit over a year ago.   Substance and Sexual Activity    Alcohol use: Never     Alcohol/week: 6.0 standard drinks of alcohol     Types: 6 Shots of liquor per week    Drug use: Not Currently     Types: Methamphetamines     Comment: Im on Subutex    Sexual activity: Yes     Partners: Male     Birth control/protection: Post-menopausal     Social Drivers of Health     Financial Resource Strain: Medium Risk (2024)    Overall Financial Resource Strain (CARDIA)     Difficulty of Paying Living Expenses: Somewhat hard   Food Insecurity: No Food Insecurity (2024)    Hunger Vital Sign     Worried About Running Out of Food in the Last Year: Never true     Ran Out of Food in the Last Year: Never true   Transportation Needs: No Transportation Needs (2024)    TRANSPORTATION NEEDS     Transportation : No   Physical Activity: Insufficiently Active (2024)    Exercise Vital Sign     Days of Exercise per Week: 2 days     Minutes of Exercise per Session: 30 min   Stress: No Stress Concern Present (2024)    [employer] - Occupational Stress Questionnaire     Feeling of Stress : Only a little   Recent Concern: Stress - Stress Concern Present (2024)    [employer] - Occupational Stress Questionnaire     Feeling of Stress : To some extent   Housing Stability: Low Risk  (2024)    Housing Stability Vital Sign     Unable to Pay for Housing in the Last Year: No     Homeless in the Last Year: No      Family History   Problem Relation Name Age of Onset    Alcohol abuse Mother Pat         Pat    Arthritis Mother Pat     Breast cancer Mother Pat     COPD Mother  Pat     Depression Mother Pat     Diabetes Mother Pat     Drug abuse Mother Pat     Hypertension Mother Pat     Miscarriages / Stillbirths Mother Pat     Cancer Mother Pat     Depression Father Juventino     Drug abuse Father Juventino     Hypertension Father Juventino     Prostate cancer Father Juventino     Thyroid cancer Father Juventino     Drug abuse Sister Elana     Heart disease Sister Elana     Miscarriages / Stillbirths Sister Elana     Drug abuse Brother Juventino     Early death Brother Izaiah     Asthma Son Mojgan     Hypertension Son Clarendon     Miscarriages / Stillbirths Maternal Grandmother Arlean     Cancer Maternal Grandfather Janay     Hearing loss Paternal Grandfather Lyod         Reason for Follow-up:  -diffuse large B-cell lymphoma of thyroid gland  -family history of von Willebrand disease   -oral fibroma of tongue  -anxiety, depression, NIDDM, hypothyroidism, history of opioid abuse, etc.  -hepatosplenomegaly; hepatic steatosis        Oncologic/Hematologic History:  Oncology History   Diffuse large B-cell lymphoma of solid organ excluding spleen   6/14/2024 Initial Diagnosis    Diffuse large B-cell lymphoma of solid organ excluding spleen     7/29/2024 -  Chemotherapy    Treatment Summary   Plan Name: OP LYM RCHOP (rituximab, cycloPHOSphamide, DOXOrubicin, vinCRIStine, predniSONE) Q3W  Treatment Goal: Curative  Status: Active  Start Date: 7/29/2024  End Date: 10/8/2024  Provider: Virgil Collins MD  Chemotherapy: DOXOrubicin chemo injection 118 mg, 50 mg/m2 = 118 mg, Intravenous, Clinic/HOD 1 time, 4 of 4 cycles  Administration: 118 mg (7/29/2024), 118 mg (8/19/2024), 118 mg (9/9/2024), 118 mg (10/7/2024)  vinCRIStine (ONCOVIN) 2 mg in 0.9% NaCl 65 mL chemo infusion, 2 mg (100 % of original dose 2 mg), Intravenous, Clinic/HOD 1 time, 4 of 4 cycles  Dose modification: 2 mg (original dose 2 mg, Cycle 1)  Administration: 2 mg (7/29/2024), 2 mg (8/19/2024), 2 mg (9/9/2024), 2 mg (10/7/2024)  cycloPHOSphamide 750 mg/m2 = 1,760 mg in  0.9% NaCl 293.8 mL chemo infusion, 750 mg/m2 = 1,760 mg, Intravenous, Clinic/HOD 1 time, 4 of 4 cycles  Administration: 1,760 mg (2024), 1,760 mg (2024), 1,760 mg (2024), 1,760 mg (10/7/2024)  riTUXimab-pvvr (RUXIENCE) 900 mg in 0.9% NaCl 900 mL infusion (conc: 1 mg/mL), 881 mg, Intravenous, Clinic/HOD 1 time, 4 of 4 cycles  Administration: 900 mg (2024), 900 mg (2024), 900 mg (2024), 900 mg (10/7/2024)     43-year-old lady, referred from [facility] ENT, with diffuse large B-cell lymphoma of thyroid.      Past medical history:  Adrenal mass; anxiety; arthritis; bradycardia; depression; NIDDM; gastroparesis; GERD; hip pain; hypertension; menstrual cramps; ovarian cyst; palpitations; pyosalpingitis; hypothyroidism, history of opioid abuse (on 2024, she tells me that she used opioids for 10-15 years; apparently, prescription opioids; on buprenorphine with a psychiatrist for last 2 years)  history of opiate abuse (maintained on Suboxone), history of C difficile colitis (unable to tolerate C difficile treatment); diagnosed with unspecified bipolar disorder during hospitalization at [facility], [place] [address] (enteritis, left pelvic adnexal fluid collection, PID, etc.); history of dysphagia, S/P EGD and dilatation; history of smoking (quit )  -2023: EGD:  Mild gastric erythema with scattered erosions: Pathology:  Small bowel biopsy:  Normal small bowel mucosa; stomach biopsy:  Mild superficial chronic gastritis, negative for H pylori)  -2023: Colonoscopy:  Normal:  Pathology:  Colon, random sites, biopsy:  Normal colonic mucosa  -08/10/2020: MRI abdomen with and without contrast (comparison:  CT 2023) (adrenal gland protocol):  Small left adrenal nodule stable, most compatible with adenoma (1 cm)  Procedure/surgical history:  Endometrial ablation; ; cholecystectomy; dissection of neck, left 05/10/2024; thyroidectomy 05/10/2024; tubal  ligation; tonsillectomy  Social history:  In her relationship.  Lives in [place], [place].  Has 3 children.  Does not work.  Has not worked in 1 year.  Before that, used to clean houses.  Smoked a pack of cigarettes daily for 15 years; quit smoking 1 year ago.  Social alcohol. History of opioid abuse (on 06/18/2024, she tells me that she used opioids for 10-15 years; apparently, prescription opioids; on buprenorphine with a psychiatrist for last 2 years)  Family history:  Positive for von Willebrand disease in her son.  Health maintenance:  -11/16/2022:  Bilateral screening mammogram:  BI-RADS 2-benign  -according to her, colonoscopy performed as outpatient by Dr. Yang [place], showed precancerous colon polyp.  =========================================      Patient is being followed for diffuse large B-cell lymphoma of thyroid gland.    Please see assessment and plan section for details.    INTERVAL HISTORY:     10/29/24  Ms. Berman presents today for follow up of the diffuse large B-cell lymphoma of thyroid gland. Her last day of chemo with R-CHOP was 10/7/24. She has numerous complaints including severe fatigue, nausea, abdominal pain, night sweats, hot flashes, poor appetite, intermittent chest pain. She has been taking her levothyroxine 200mcg daily, and continues to follow-up with endocrinology for the management of hypothyroidism. She admits to intermittent chest pain and sob for which she has been referred to pulmonary for further evaluation. She will be seeing GI for consult of her hepatosplenomegaly, elevated transaminase, and abdominal pain.         10/03/2024:  -08/19/2024:   Per respiratory therapist:   Pulse oximetry at rest: 95%   Pulse oximetry after 6 minute walk: 90%  My understanding is the patient qualifies for supplemental oxygen only when the pulse oximetry drops to 88%.    Therefore, per my understanding, she does not qualify for supplemental oxygen.  However, she told me that her  pulse oximetry drops to late 80s at night.  -R-CHOP:  Cycle 2 on 08/19/2024; cycle 3 on 09/09/2024  -08/23/2024: CV ultrasound lower extremity veins bilateral (leg swelling): Negative for deep and superficial vein thrombosis in bilateral lower extremities  -08/20/2024:  CTA chest (comparison: CT chest 07/02/2023, chest x-ray 08/15/2024; rule out PE):  1.  No pulmonary embolism identified.   2.  Right middle lung lobe and lingular segment atelectasis and/or scarring without convincing acute consolidation.  -08/30/2024: PFTs (need report)  -09/24/2024:  Skin lesion, left breast, biopsy: Capillary hemangioma  -09/30/2024:  Interim restaging with FDG PET-CT, post R-CHOP-3 cycles (comparison: PET-CT 06/18/2024):  No suspicious areas of radiotracer activity identified; hepatosplenomegaly with hepatic steatosis (hepatomegaly 23 cm; severe generalized hepatic steatosis; splenomegaly 15 cm)  -10/03/2024:  Hemoglobin 11.3; rest of CBC unremarkable; CMP, magnesium, LDH, uric acid level are pending  Presents for a follow-up visit.  Accompanied by a male family member.  In no acute discomfort.  Chronic stable multitude of symptoms including weakness, fatigue, fevers, chills, low-grade fevers at night, night sweats, hot flashes, generalized pain/abdomen/upper chest (7/10), headaches, chest pain, cough, dyspnea, trouble swallowing, abdominal pain on both sides, nausea, constipation, numbness, tingling, fell about 3 weeks ago without any injuries.  No head trauma.  Appetite is okay.  ECOG 1.    06/18/2024:  Pleasant lady who presents for initial medical oncology consultation.  In no acute discomfort.  Feels poorly.  Main complaint is in in the back and hips for last 1 year.  History of opioid abuse.  On buprenorphine with a psychiatrist for last years.  Drenching sweats all the time.  Has to change clothes.  Lost 50 lb unintentionally in 2023, apparently due to gastroparesis side effects of Ozempic; regained her weight back after  "Ozempic was discontinued.    ECOG 1.  No recurrent fevers.  Fatigue.  Generalized weakness.  Night sweats and hot flashes.  Sweats all the time for 1 year.  Bones hurt all the time.  Occasional chest pains and leg swelling as well.  Exertional dyspnea.  Some constipation.  Some nausea.  Great appetite.    Medications:  Current Outpatient Medications on File Prior to Visit   Medication Sig Dispense Refill    acetaminophen (TYLENOL) 500 MG tablet Take 2 tablets (1,000 mg total) by mouth every 6 (six) hours. 60 tablet 0    buprenorphine HCL (SUBUTEX) 8 mg Subl Place 8 mg under the tongue 3 (three) times daily.      COMPOUND HORMONE REPLACEMENT Take by mouth once daily.      diphenhydrAMINE-aluminum-magnesium hydroxide-simethicone-LIDOcaine viscous HCl 2% Swish and spit 15 mLs every 4 (four) hours as needed (Mouth sores). 240 each 0    insulin (LANTUS SOLOSTAR U-100 INSULIN) glargine 100 units/mL SubQ pen Inject 15 Units into the skin 2 (two) times daily. 27 mL 3    insulin lispro 100 unit/mL pen Inject 35 Units into the skin 3 (three) times daily. With meals      levothyroxine (SYNTHROID) 200 MCG tablet Take 1 tablet (200 mcg total) by mouth before breakfast. 30 tablet 11    LORazepam (ATIVAN) 0.5 MG tablet Take 1 mg by mouth every 6 (six) hours as needed for Anxiety.      LORazepam (ATIVAN) 1 MG tablet Take 1 mg by mouth 2 (two) times daily.      losartan (COZAAR) 50 MG tablet Take 1 tablet (50 mg total) by mouth once daily. 90 tablet 3    ondansetron (ZOFRAN-ODT) 4 MG TbDL Take 1 tablet (4 mg total) by mouth every 8 (eight) hours as needed (NAUSEA). 30 tablet 1    pen needle, diabetic (BD ULTRA-FINE ROSALEE PEN NEEDLE) 32 gauge x 5/32" Ndle 1 each by Misc.(Non-Drug; Combo Route) route 2 (two) times a day. 100 each 9    polyethylene glycol (GLYCOLAX) 17 gram PwPk Take 17 g by mouth 2 (two) times daily. 30 each 1    predniSONE (DELTASONE) 50 MG Tab Take 2 tablets (100 mg total) by mouth once daily. On days 2-5 of your " chemotherapy cycles. Take with food. 8 tablet 5    prochlorperazine (COMPAZINE) 5 MG tablet Take 2 tablets (10 mg total) by mouth every 6 (six) hours as needed for Nausea. 20 tablet 5    calcium carbonate/vitamin D3 (CALCIUM 600 + D,3, ORAL) Take 1 tablet by mouth 2 (two) times a day. (Patient not taking: Reported on 8/22/2024)      ciprofloxacin HCl (CIPRO) 500 MG tablet Take 1 tablet (500 mg total) by mouth every 12 (twelve) hours. (Patient not taking: Reported on 10/29/2024) 14 tablet 0    hydrocortisone (ANUSOL-HC) 2.5 % rectal cream Place 1 application  rectally 2 (two) times daily. (Patient not taking: Reported on 8/22/2024)      OLANZapine (ZYPREXA) 5 MG tablet Take 1 tablet by mouth nightly on days 1-3 of each chemotherapy cycle. (Patient not taking: Reported on 8/22/2024) 3 tablet 5    pantoprazole (PROTONIX) 40 MG tablet Take 40 mg by mouth every morning. (Patient not taking: Reported on 10/29/2024)       Current Facility-Administered Medications on File Prior to Visit   Medication Dose Route Frequency Provider Last Rate Last Admin    dextrose 10% bolus 125 mL 125 mL  12.5 g Intravenous PRN Chuckie Saenz MD        dextrose 10% bolus 250 mL 250 mL  25 g Intravenous PRN Chuckie Saenz MD        glucagon (human recombinant) injection 1 mg  1 mg Intramuscular PRN Chuckie Saenz MD           Review of Systems:   All systems reviewed and found to be negative except for the symptoms detailed above    Physical Examination:   VITAL SIGNS:   Vitals:    10/29/24 1331   BP: 112/77   Pulse: 74   Resp: 20   Temp: 98.3 °F (36.8 °C)       GENERAL:  In no apparent distress.    HEAD:  No signs of head trauma.  EYES:  Pupils are equal.  Extraocular motions intact.    EARS:  Hearing grossly intact.  MOUTH:  Oropharynx is normal.   NECK:  No adenopathy, no JVD.     CHEST:  Chest with clear breath sounds bilaterally.  No wheezes, rales, rhonchi.    CARDIAC:  Regular rate and rhythm.  S1 and S2, without murmurs,  gallops, rubs.  VASCULAR:  No Edema.  Peripheral pulses normal and equal in all extremities.  ABDOMEN:  Soft, without detectable tenderness.  No sign of distention.  No   rebound or guarding, and no masses palpated.   Bowel Sounds normal.  MUSCULOSKELETAL:  Good range of motion of all major joints. Extremities without clubbing, cyanosis or edema.    NEUROLOGIC EXAM:  Alert and oriented x 3.  No focal sensory or strength deficits.   Speech normal.  Follows commands.  PSYCHIATRIC:  Mood normal.  -07/09/2024:  Erythema around MediPort site; mild tenderness; no fluctuance; no tunnelitis    Results for orders placed or performed during the hospital encounter of 05/10/24   CBC Auto Differential    Narrative    The following orders were created for panel order CBC Auto Differential.  Procedure                               Abnormality         Status                     ---------                               -----------         ------                     CBC with Differential[[id number]]                           Final result                 Please view results for these tests on the individual orders.   CBC with Differential   Result Value Ref Range    WBC 8.70 4.50 - 11.50 x10(3)/mcL    RBC 4.72 4.20 - 5.40 x10(6)/mcL    Hgb 13.4 12.0 - 16.0 g/dL    Hct 40.5 37.0 - 47.0 %    MCV 85.8 80.0 - 94.0 fL    MCH 28.4 27.0 - 31.0 pg    MCHC 33.1 33.0 - 36.0 g/dL    RDW 12.6 11.5 - 17.0 %    Platelet 171 130 - 400 x10(3)/mcL    MPV 9.3 7.4 - 10.4 fL    Neut % 71.4 %    Lymph % 21.0 %    Mono % 7.0 %    Eos % 0.3 %    Basophil % 0.1 %    Lymph # 1.83 0.6 - 4.6 x10(3)/mcL    Neut # 6.20 2.1 - 9.2 x10(3)/mcL    Mono # 0.61 0.1 - 1.3 x10(3)/mcL    Eos # 0.03 0 - 0.9 x10(3)/mcL    Baso # 0.01 <=0.2 x10(3)/mcL    IG# 0.02 0 - 0.04 x10(3)/mcL    IG% 0.2 %    NRBC% 0.0 %     Results for orders placed or performed during the hospital encounter of 05/10/24   Comprehensive Metabolic Panel   Result Value Ref Range    Sodium 140 136 - 145  mmol/L    Potassium 4.3 3.5 - 5.1 mmol/L    Chloride 102 98 - 107 mmol/L    CO2 28 22 - 29 mmol/L    Glucose 229 (H) 74 - 100 mg/dL    Blood Urea Nitrogen 9.1 7.0 - 18.7 mg/dL    Creatinine 0.75 0.55 - 1.02 mg/dL    Calcium 9.7 8.4 - 10.2 mg/dL    Protein Total 6.8 6.4 - 8.3 gm/dL    Albumin 3.8 3.5 - 5.0 g/dL    Globulin 3.0 2.4 - 3.5 gm/dL    Albumin/Globulin Ratio 1.3 1.1 - 2.0 ratio    Bilirubin Total 1.1 <=1.5 mg/dL    ALP 56 40 - 150 unit/L    ALT 40 0 - 55 unit/L    AST 31 5 - 34 unit/L    eGFR >60 mL/min/1.73/m2       Assessment:  Problem List Items Addressed This Visit       Diffuse large B-cell lymphoma of solid organ excluding spleen - Primary    Overview     Diffuse large B-cell lymphoma of thyroid gland.         Relevant Orders    US Thyroid    CT Soft Tissue Neck With Contrast    CT Chest Abdomen Pelvis With IV Contrast (XPD) Routine Oral Contrast    CBC Auto Differential    Comprehensive Metabolic Panel    Magnesium       Orders for 10/29/2024:  S/P 4 cycles of R-CHOP from 7/23/24-10/7/24.  Subsequently, surveillance as follows:  A. History and physical and labs, every 3-6 months for 5 years, then annually or as clinically indicated  B. Imaging:  CTs C/A/P with contrast no more often than every 6 months for 2 years after completion of treatment, then only as clinically indicated  CT CAP, CT neck, US Thyroid to be done March 2025   Continue with behavioral health for management of anxiety  Follow-up with PCP for management of diabetes mellitus  Follow-up with Endocrinology for thyroid/levothyroxine management.   Refer to Cardiology for chest pain.  Earliest appointment with Pulmonary July 2025. She is on the wait list.  Anemia/Fatigue- Will check iron, vitamin D, TSH, T4, Copper, Folate, B12.  Referred to GI for Hepatosplenomegaly, steatosis, elevated, transaminase, abdominal pain.  Appointment 11/6/24  Port flush every 8 weeks   Follow-up with MD in  3mos.    Above discussed with the patient.  All  questions answered.  Discussed labs and scans and gave her copies of relevant results.  She understands and agrees with this plan.  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   -03/28/2024:  Thyroglobulin antibody 150 IU/mL, elevated (reference Range:  < 1.8)  -03/28/2024: Thyroglobulin, tumor marker: < 0.1 (reference Range:  Athyroid < 0.1; intact thyroid </= 33)  -left hemithyroidectomy with left central neck dissection level 6 (05/10/2024):   Diffuse large B-cell lymphoma left oscar thyroid, germinal center B-cell type, not a double expressor, not double hit lymphoma; morphologically, thyroid extensively involved; paratracheal tissue biopsy negative; left central neck dissection 13 benign lymph nodes,  -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 during left hemithyroidectomy 05/10/2024, noted on FFL exam by ENT  -06/18/2024: ECOG 1; chronic fatigue; sweats all the time; no consistent weight loss; appetite is great  -06/18/2024: CBC, CMP essentially unremarkable   -06/18/2024: LDH normal, beta 2 microglobulin normal  -06/18/2024: Hep B core antibody total, hep B surface antigen, hep C antibody, HIV: Nonreactive  -FDG PET-CT 06/18/2024:  No other sites of disease  -TTE 06/26/2024: LVEF 55-60%  -right IJ MediPort placed 07/08/2024  -S/P tubal ligation in the past  -bone marrow evaluation 07/23/2024: Report pending   -R-CHOP started 07/29/2024; experienced allergic reaction to rituximab; managed with  corticosteroids and antihistamines  -bone marrow exam 07/23/2024: Negative for lymphoma  (Diffuse large B-cell lymphoma of thyroid gland, stage IE)  -dysphagia, most likely secondary to postoperative left vocal cord paralysis (per ENT)  -her ANC dropped 0.22 on 08/12/2024 (14 days post cycle 1 of R-CHOP); treated with Neupogen x2 doses)  -R-CHOP:  Cycle 2 on 08/19/2024; cycle 3 on 09/09/2024  -interim restaging FDG PET-CT 09/30/2024, post R-CHOP x3 cycles:  No lymphoma; hepatosplenomegaly with hepatic steatosis( epatomegaly 23 cm; severe generalized hepatic steatosis; splenomegaly 15 cm)  >>>  Plan:  -continue R-CHOP every 3 weeks; then, after 3 cycles of R-CHOP, interim staging with PET-CT; if complete response, then, R-CHOP x1 cycle (total of 4 cycles), etc. (see below)  -no lymphoma on interim restaging PET-CT after 3 cycles of R-CHOP; therefore, administer 1 more cycle of R-CHOP, followed by surveillance  -developed grade 4 neutropenia 14 days post cycle 1 of chemotherapy; therefore, we added GCSF support from 2nd cycle onwards (Neulasta)  -S/P tubal ligation in the past; did not desire fertility preservation before starting chemotherapy  -check CBC and CMP weekly  -allopurinol 200 mg p.o. b.i.d. for TLS prophylaxis: Begin 2-3 days prior to chemo immunotherapy end continue for 10-14 days  -aggressive oral hydration prevent hemorrhagic cystitis with cyclophosphamide  -monitor for neurotoxicity with vincristine  -treatment should be delayed until ANC is> 1500 mm3 and platelet count> 100 K mm3    After completion of 4 cycles of R-CHOP, follow-up:  1. History and physical and labs, every 3-6 months for 5 years, then annually or as clinically indicated  2. Imaging:  CTs C/A/P with contrast no more often than every 6 months for 2 years after completion of treatment, then only as clinically indicated    Stage I disease:  -nonbulky  -smIPI 0    Stage I disease:  -06/18/2024: Initial consultation:  ECOG 1; sweats all the  "time  -IPI:  Low (0)  -age adjusted IP!:  Low (0)  -stage modified IPI (smIPI) low (0)  -NCCN IPI:  Low (1, by virtue of age 43)  -prognostic model to assess the risk of CNS disease (CNS IPI):  Low risk (0)      -08/19/2024: Super anxious; no psychotic count, homicidal, or suicidal ideation; a multitude of unrelated symptoms; I asked her if she wanted to start chemotherapy, she has had "no"; she wishes to continue with chemotherapy  >>> we referred her to behavioral health for management of anxiety      Hepatosplenomegaly and hepatic steatosis:   -identified on FDG PET-CT 09/30/2024  >>>  -10/03/2024:  will refer to GI for management      Family history of von Willebrand disease:  -04/25/2024:  Von Willebrand factor activity 75%, normal      Oral fibroma of tongue:   -tongue lesion: 1st noted around 02/2023; slowly enlarging since; no bleeding; no pain  -tongue biopsy 02/08/2024: Oral fibroma      -08/19/2024:  Tells me that her O2 sats are dropping to high 80s at night; history of smoking; refer to PCP ASAP to assess for supplemental oxygen   -08/19/2024:  6 minute walk test:  Per respiratory therapist:  Pulse oximetry at rest: 95%   Pulse oximetry after 6 minute walk: 90%  My understanding is the patient qualifies for supplemental oxygen only when the pulse oximetry drops to 88%.    Therefore, per my understanding, she does not qualify for supplemental oxygen.  >>>  -follow-up with PCP/pulmonary for further evaluation  -08/30/2024: PFTs (need report)      -she has an erythematous spot on her chest; she is concerned; refer her to Dermatology for biopsy, etc.  -skin lesion, left breast, biopsy 09/24/2024: Capillary hemangioma      -08/19/2024:  CBC normal; CO2 30, elevated; glucose 328; , elevated  >>>  -we informed her PCP that diabetes uncontrolled      Comorbid medical conditions:  Anxiety, depression, NIDDM, gastroparesis, hypertension, history of PID  Hypothyroidism  History of opioid abuse, maintained " on Suboxone  History of dysphagia, S/P endoscopic dilatation     ====================================    TLS prophylaxis:  Allopurinol beginning 2-3 days prior to chemo immunotherapy and continued for 10-14 days  (Allopurinol: 100 mg per m2 every 8 hours, maximum 800 mg per day)    R-CHOP:  -Patients receiving cyclophosphamide should maintain adequate oral hydration (2 to 3 L/day) and void frequently to reduce risk of hemorrhagic cystitis.  -The risk of febrile neutropenia with this regimen is 10 to 20%; primary prophylaxis with hematopoietic growth factors should be considered on an individual basis, particularly for high-risk patients such as those with preexisting neutropenia, advanced disease, poor performance status, or patients age 65 years or older.  -Adjustment of initial cyclophosphamide, doxorubicin, and vincristine doses may be needed for preexisting liver dysfunction.  In addition, dose adjustment of cyclophosphamide may be required for kidney dysfunction.  -LVEF should be evaluated prior to initiation of therapy. Dose alterations should be considered for LVEF <50%, and doxorubicin therapy is contraindicated in patients with LVEF <30% at initiation. Infusion times and schedule may be adjusted to decrease the risk of cardiotoxicity in individuals at high risk for its development.  -Neurotoxicity: Vincristine may cause constipation, and in severe cases, paralytic ileus. A routine prophylactic regimen against constipation is recommended in all patients receiving vincristine.    R-CHOP:  Dose modifications for myelotoxicity and neuropathy:  # Treatment should be delayed until ANC is >1500/microL and platelet count is >100,000/microL.  # if a patient develops grade 4 (ANC <500/microL) neutropenia or febrile neutropenia with any cycle, G-CSF support is added to the regimen for subsequent cycles.  # If grade 4 neutropenia or febrile neutropenia occurs despite G-CSF support, or if the patient develops grade 3  (25,000 to 50,000/microL) or 4 (<25,000/microL) thrombocytopenia with any cycle, the doses of cyclophosphamide and doxorubicin should be decreased by 50% for subsequent cycles.  # Neuropathy: Dose adjustment of vincristine may be necessary if the severity of neuropathy persists or worsens. No specific guidelines are available for dose adjustments     Follow-up:  Follow up in about 3 months (around 1/29/2025) for NP+ Labs .      Addendum: Vitamin D returned low, started patient on Vitamin D 50,000IU once weekly and Vitamin D 1000IU daily. Her TSH was also very elevated. She spoke to Endocrinologist and will be seeing them this week for adjustment of her levothyroxine.

## 2024-11-01 LAB — BEAKER SEE SCANNED REPORT: NORMAL

## 2024-11-05 ENCOUNTER — OFFICE VISIT (OUTPATIENT)
Dept: FAMILY MEDICINE | Facility: CLINIC | Age: 44
End: 2024-11-05
Payer: MEDICAID

## 2024-11-05 ENCOUNTER — OFFICE VISIT (OUTPATIENT)
Dept: GYNECOLOGY | Facility: CLINIC | Age: 44
End: 2024-11-05
Payer: MEDICAID

## 2024-11-05 VITALS
DIASTOLIC BLOOD PRESSURE: 78 MMHG | HEIGHT: 72 IN | HEART RATE: 84 BPM | RESPIRATION RATE: 20 BRPM | WEIGHT: 272 LBS | OXYGEN SATURATION: 100 % | BODY MASS INDEX: 36.84 KG/M2 | TEMPERATURE: 98 F | SYSTOLIC BLOOD PRESSURE: 138 MMHG

## 2024-11-05 DIAGNOSIS — R35.0 URINARY FREQUENCY: ICD-10-CM

## 2024-11-05 DIAGNOSIS — Z12.4 ENCOUNTER FOR PAPANICOLAOU SMEAR FOR CERVICAL CANCER SCREENING: Primary | ICD-10-CM

## 2024-11-05 DIAGNOSIS — L72.0 EPIDERMAL INCLUSION CYST: Primary | ICD-10-CM

## 2024-11-05 DIAGNOSIS — N64.52 PURULENT DISCHARGE FROM NIPPLE: ICD-10-CM

## 2024-11-05 LAB
BILIRUB UR QL STRIP: NEGATIVE
GLUCOSE UR QL STRIP: NEGATIVE
KETONES UR QL STRIP: NEGATIVE
LEUKOCYTE ESTERASE UR QL STRIP: NEGATIVE
PH, POC UA: 5.5
POC BLOOD, URINE: NEGATIVE
POC NITRATES, URINE: NEGATIVE
PROT UR QL STRIP: NEGATIVE
SP GR UR STRIP: 1.03 (ref 1–1.03)
UROBILINOGEN UR STRIP-ACNC: 0.2 (ref 0.1–1.1)

## 2024-11-05 PROCEDURE — 4010F ACE/ARB THERAPY RXD/TAKEN: CPT | Mod: CPTII,,,

## 2024-11-05 PROCEDURE — 3046F HEMOGLOBIN A1C LEVEL >9.0%: CPT | Mod: CPTII,,,

## 2024-11-05 PROCEDURE — 1159F MED LIST DOCD IN RCRD: CPT | Mod: CPTII,,,

## 2024-11-05 PROCEDURE — 88174 CYTOPATH C/V AUTO IN FLUID: CPT

## 2024-11-05 PROCEDURE — 3078F DIAST BP <80 MM HG: CPT | Mod: CPTII,,,

## 2024-11-05 PROCEDURE — 99386 PREV VISIT NEW AGE 40-64: CPT | Mod: S$PBB,,,

## 2024-11-05 PROCEDURE — 81003 URINALYSIS AUTO W/O SCOPE: CPT | Mod: PBBFAC

## 2024-11-05 PROCEDURE — 11402 EXC TR-EXT B9+MARG 1.1-2 CM: CPT | Mod: PBBFAC | Performed by: FAMILY MEDICINE

## 2024-11-05 PROCEDURE — 87624 HPV HI-RISK TYP POOLED RSLT: CPT

## 2024-11-05 PROCEDURE — 99213 OFFICE O/P EST LOW 20 MIN: CPT | Mod: PBBFAC,27

## 2024-11-05 PROCEDURE — 3075F SYST BP GE 130 - 139MM HG: CPT | Mod: CPTII,,,

## 2024-11-05 PROCEDURE — 99215 OFFICE O/P EST HI 40 MIN: CPT | Mod: PBBFAC

## 2024-11-05 PROCEDURE — 3008F BODY MASS INDEX DOCD: CPT | Mod: CPTII,,,

## 2024-11-05 RX ORDER — LIDOCAINE HYDROCHLORIDE 10 MG/ML
5 INJECTION, SOLUTION EPIDURAL; INFILTRATION; INTRACAUDAL; PERINEURAL
Status: COMPLETED | OUTPATIENT
Start: 2024-11-05 | End: 2024-11-05

## 2024-11-05 RX ORDER — SULFAMETHOXAZOLE AND TRIMETHOPRIM 800; 160 MG/1; MG/1
1 TABLET ORAL 2 TIMES DAILY
Qty: 10 TABLET | Refills: 0 | Status: SHIPPED | OUTPATIENT
Start: 2024-11-05 | End: 2024-11-10

## 2024-11-05 RX ADMIN — LIDOCAINE HYDROCHLORIDE 50 MG: 10 INJECTION, SOLUTION EPIDURAL; INFILTRATION; INTRACAUDAL; PERINEURAL at 02:11

## 2024-11-05 NOTE — PROGRESS NOTES
[facility] -  Gynecology / Women's Health Clinic     Subjective:      Patient ID: Fred Berman is a 43 y.o. female.    Chief Complaint:  Gynecologic Exam      History of Present Illness:  The patient  here for annual exam. Last GYN exam 2yrs ago. 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 Admits currently complete with chemo treatment, waiting for next plan, followed by Hem/Onc. Denies history of abnormal paps. Last pap -NIL, admitted had Pap in [place]  normal. MG- 24 & BIRADS 2; pt had c/o white purulent nipple discharge at time of MG, benign. Denies urinary complaints. Pt c/o Rt breast white purulent nipple discharge with tenderness/pain at site, starting 10/10/24, still draining occ, especially notice on clothing. Denies fever, odorous drainage or redness/heat at Rt breast site. Denies pelvic pain or discharge. Pt reports hx of HSV with no outbreaks since diagnosis, and no concerns today. Admit hot flashes, reported hx of HT use in past prior to diagnosis of lymphoma. Declines contraception. Denies tobacco use. Dep. screening 0. Denies fly hx of ovarian, uterine or colon cancer. Mother with breast cancer. Admits currently seeing Endocrinology from [place], Dr. Molina, for thyroid and other hormonal imbalances; hx of dissection of neck, left 05/10/2024; thyroidectomy 05/10/2024. Pt states will soon have MRI of brain done d/t labs with Endocrinology.     GYN & OB History:  No LMP recorded (lmp unknown). Patient is postmenopausal.   Date of Last Pap: 2017    OB History    Para Term  AB Living   5 3 1 2 2 0   SAB IAB Ectopic Multiple Live Births   2       0      # Outcome Date GA Lbr Blas/2nd Weight Sex Type Anes PTL Lv   5       CS-LTranv      4 SAB            3       CS-LTranv      2 Term      Vag-Spont      1 SAB                Past Medical  "History:   Diagnosis Date    Adrenal mass     Anxiety     Arthritis     Bradycardia     Depression     Diabetes mellitus, type 2     Gastroparesis     General anesthetics causing adverse effect in therapeutic use     "hard time waking up"    GERD (gastroesophageal reflux disease)     Hip pain     Hypertension     Menstrual cramps     Ovarian cyst     Palpitations     Pyosalpingitis     Thyroid disease         Past Surgical History:   Procedure Laterality Date    ABLATION OF VAGINAL TISSUE USING LASER      ADENOIDECTOMY      BONE MARROW ASPIRATION N/A 2024    Procedure: ASPIRATION, BONE MARROW;  Surgeon: Patrizia Bhatt MD;  Location: [facility] ENDOSCOPY;  Service: General;  Laterality: N/A;    BONE MARROW BIOPSY N/A 2024    Procedure: Biopsy-bone marrow;  Surgeon: Patrizia Bhatt MD;  Location: [facility] ENDOSCOPY;  Service: General;  Laterality: N/A;     SECTION      CHOLECYSTECTOMY      DISSECTION OF NECK Left 05/10/2024    Procedure: DISSECTION, NECK;  Surgeon: Cali Trujillo MD;  Location: [facility] OR;  Service: ENT;  Laterality: Left;    INSERTION OF TUNNELED CENTRAL VENOUS CATHETER (CVC) WITH SUBCUTANEOUS PORT Right 2024    Procedure: [id number];  Surgeon: Lio Storey Jr., MD;  Location: [facility] OR;  Service: General;  Laterality: Right;    THYROIDECTOMY Left 05/10/2024    Procedure: THYROIDECTOMY;  Surgeon: Cali Trujillo MD;  Location: [facility] OR;  Service: ENT;  Laterality: Left;    TONSILLECTOMY  1985    TUBAL LIGATION          Social History     Tobacco Use    Smoking status: Former     Current packs/day: 0.00     Average packs/day: 1.5 packs/day for 15.0 years (22.5 ttl pk-yrs)     Types: Cigarettes     Quit date: 3/5/2003     Years since quittin.6    Smokeless tobacco: Never    Tobacco comments:     Quit over a year ago.   Substance and Sexual Activity    Alcohol use: Never     Alcohol/week: 6.0 standard drinks of alcohol     Types: 6 Shots of liquor per " "week    Drug use: Not Currently     Types: Methamphetamines     Comment: Im on Subutex    Sexual activity: Yes     Partners: Male     Birth control/protection: Post-menopausal        Current Outpatient Medications   Medication Instructions    acetaminophen (TYLENOL) 1,000 mg, Oral, Every 6 hours    buprenorphine HCL (SUBUTEX) 8 mg, 3 times daily    calcium carbonate/vitamin D3 (CALCIUM 600 + D,3, ORAL) 1 tablet, 2 times daily    cholecalciferol (vitamin D3) (VITAMIN D3) 1,000 Units, Oral, Daily    cholecalciferol (vitamin D3) 50,000 Units, Oral, Every 7 days    ciprofloxacin HCl (CIPRO) 500 mg, Oral, Every 12 hours    COMPOUND HORMONE REPLACEMENT Daily    diphenhydrAMINE-aluminum-magnesium hydroxide-simethicone-LIDOcaine viscous HCl 2% 15 mLs, Swish & Spit, Every 4 hours PRN    hydrocortisone (ANUSOL-HC) 2.5 % rectal cream 1 application , 2 times daily    insulin lispro 35 Units, 3 times daily    LANTUS SOLOSTAR U-100 INSULIN 15 Units, Subcutaneous, 2 times daily    levothyroxine (SYNTHROID) 200 mcg, Oral, Before breakfast    LORazepam (ATIVAN) 1 mg, Every 6 hours PRN    LORazepam (ATIVAN) 1 mg, 2 times daily    losartan (COZAAR) 50 mg, Oral, Daily    OLANZapine (ZYPREXA) 5 MG tablet Take 1 tablet by mouth nightly on days 1-3 of each chemotherapy cycle.    ondansetron (ZOFRAN-ODT) 4 mg, Oral, Every 8 hours PRN    pantoprazole (PROTONIX) 40 mg, Every morning    pen needle, diabetic (BD ULTRA-FINE ROSALEE PEN NEEDLE) 32 gauge x 5/32" Ndle 1 each, Misc.(Non-Drug; Combo Route), 2 times daily    polyethylene glycol (GLYCOLAX) 17 g, Oral, 2 times daily    predniSONE (DELTASONE) 100 mg, Oral, Daily, On days 2-5 of your chemotherapy cycles. Take with food.    prochlorperazine (COMPAZINE) 10 mg, Oral, Every 6 hours PRN    sulfamethoxazole-trimethoprim 800-160mg (BACTRIM DS) 800-160 mg Tab 1 tablet, Oral, 2 times daily       Review of patient's allergies indicates:  No Known Allergies      Review of Systems:  Review of " "Systems  Negative except for pertinent findings for positives per HPI.     Objective:     Physical Exam   Visit Vitals  /78 (Patient Position: Sitting)   Pulse 84   Temp 98.4 °F (36.9 °C) (Oral)   Resp 20   Ht 6' 8" (2.032 m)   Wt 123.4 kg (272 lb)   LMP  (LMP Unknown)   SpO2 100%   BMI 29.88 kg/m²       GENERAL: Well-developed female. No acute distress.    SKIN: Normal to inspection, warm and intact.  BREASTS: No rashes or erythema. No masses, lumps, discharge. Tenderness noted to Rt breast at nipple noted.   VULVA: General appearance normal; external genitalia with no lesions or erythema.  VAGINA: Mucosa/vaginal vault pink, no abnormal discharge or lesions.  CERVIX: Pink, parous appearing os, no erythema or abnormal discharge.  BIMANUAL EXAM: limited d/t body habitus. The uterus is non tender. Bilateral adnexa reveal no tenderness.  PSYCHIATRIC: Patient is oriented to person, place, and time. Mood and affect are normal.    Assessment:       ICD-10-CM ICD-9-CM   1. Encounter for Papanicolaou smear for cervical cancer screening  Z12.4 V76.2   2. Urinary frequency  R35.0 788.41   3. Purulent discharge from nipple  N64.52 611.79       Plan:     1. Encounter for Papanicolaou smear for cervical cancer screening  -     Liquid-Based Pap Smear, Screening    2. Urinary frequency  -     POCT Urinalysis, Dipstick, Automated, W/O Scope    3. Purulent discharge from nipple  -     sulfamethoxazole-trimethoprim 800-160mg (BACTRIM DS) 800-160 mg Tab; Take 1 tablet by mouth 2 (two) times daily. for 5 days  Dispense: 10 tablet; Refill: 0    Pap today    Rt breast nipple discharge, white pustular per patient starting 10/10/24    Encouraged well balanced diet and exercise 3-4x per week; use of handheld and fan at bedside, keep home cooled. Ice packs. Encouraged wearing layers to remove as needed, light colored clothes and linen fabrics. Avoid spicy foods and excessive caffeine use. Maintain healthy body weight. Quit " smoking.  Exercise, yoga, meditation, paced respirations are all good coping techniques.   Discussed medication options including OTC regimens (Estroven, Amberen, black cohosh), HRT, SSRI/SNRI.   With PMH referral to GYN residents/docs for hot flash medical management, pt adamant about starting hormone use.  Refer to GYN residents/docs for amenorrhea since 41yrs which was prior to chemotherapy for B-cell lymphoma of thyroid. Pt states has Endocrinologist in [place], Dr. Molina. Will attain records from office.  Pt had prior GYN provider, will attain records from provider.    Vaginal dryness, use Replens OTC for vaginal moisture, use KY-jelly or other water based unscented lubricant for sexual intercourse.     Urine dip - neg    Follow up in about 1 year (around 11/5/2025) for Annual.

## 2024-11-05 NOTE — PROGRESS NOTES
Subjective:       Patient ID: Fred Berman is a 43 y.o. female.    Chief Complaint: cyst on left upper thigh    HPI  42 yo returns to minor surgery for excision of cyst on left upper thigh. She has tried squeezing/poking/removing contents without any success. No recent infection. No other cysts at this time    Review of Systems  As per HPI         Objective:      Vitals:    11/05/24 1411   BP: (P) 113/78   Pulse: (P) 82   Resp: (P) 18   Temp: (P) 98.6 °F (37 °C)       Physical Exam      Gen: alert, no acute distress  Skin: left upper thigh- 1.5cm cystic lesion with darkened contents and 2 punctum present  Psych: cooperative, appropriate mood and affect    Procedure Note:  Procedure:  Epidermal inclusion cyst excision  Performed by: Shabnam Bell MD  Consent: signed consent obtained after discussion of risks, benefits, and alternative treatments. Time out completed  Description:  Area cleaned with alcohol.  1% lidocaine used for anesthesia.  Incision made with 4 mm circular blade over cyst.  Cyst contents in wall removed with iris scissors and forceps.  Gelfoam placed into opening.  EBL less than 1 mL  The patient tolerated the procedure well with no complications.     Assessment/Plan:  Epidermal inclusion cyst    Other orders  -     LIDOcaine (PF) 10 mg/ml (1%) injection 50 mg    - excision today  - Post care instructions and return precautions discussed.        Follow up if symptoms worsen or fail to improve.

## 2024-11-06 ENCOUNTER — LAB VISIT (OUTPATIENT)
Dept: LAB | Facility: HOSPITAL | Age: 44
End: 2024-11-06
Attending: NURSE PRACTITIONER
Payer: MEDICAID

## 2024-11-06 ENCOUNTER — OFFICE VISIT (OUTPATIENT)
Dept: GASTROENTEROLOGY | Facility: CLINIC | Age: 44
End: 2024-11-06
Attending: INTERNAL MEDICINE
Payer: MEDICAID

## 2024-11-06 VITALS
WEIGHT: 274.38 LBS | DIASTOLIC BLOOD PRESSURE: 87 MMHG | BODY MASS INDEX: 41.59 KG/M2 | SYSTOLIC BLOOD PRESSURE: 126 MMHG | RESPIRATION RATE: 16 BRPM | OXYGEN SATURATION: 95 % | TEMPERATURE: 99 F | HEIGHT: 68 IN | HEART RATE: 74 BPM

## 2024-11-06 DIAGNOSIS — K59.04 CHRONIC IDIOPATHIC CONSTIPATION: ICD-10-CM

## 2024-11-06 DIAGNOSIS — R79.89 ELEVATED LFTS: ICD-10-CM

## 2024-11-06 DIAGNOSIS — K64.8 INTERNAL HEMORRHOIDS WITHOUT COMPLICATION: ICD-10-CM

## 2024-11-06 DIAGNOSIS — C83.398 DIFFUSE LARGE B-CELL LYMPHOMA OF SOLID ORGAN EXCLUDING SPLEEN: ICD-10-CM

## 2024-11-06 DIAGNOSIS — Z86.0100 HISTORY OF COLONIC POLYPS: ICD-10-CM

## 2024-11-06 DIAGNOSIS — C85.99 MALIGNANT LYMPHOMA OF THYROID GLAND: ICD-10-CM

## 2024-11-06 DIAGNOSIS — K76.0 HEPATIC STEATOSIS: Primary | ICD-10-CM

## 2024-11-06 DIAGNOSIS — G89.4 CHRONIC PAIN SYNDROME: ICD-10-CM

## 2024-11-06 DIAGNOSIS — K76.0 HEPATIC STEATOSIS: ICD-10-CM

## 2024-11-06 LAB
APTT PPP: 28.4 SECONDS (ref 23.2–33.7)
INR PPP: 0.9
LDH SERPL-CCNC: 221 U/L (ref 125–220)
PROTHROMBIN TIME: 12.7 SECONDS (ref 11.4–14)
URATE SERPL-MCNC: 4.1 MG/DL (ref 2.6–6)

## 2024-11-06 PROCEDURE — 4010F ACE/ARB THERAPY RXD/TAKEN: CPT | Mod: CPTII,,, | Performed by: NURSE PRACTITIONER

## 2024-11-06 PROCEDURE — 86364 TISS TRNSGLTMNASE EA IG CLAS: CPT

## 2024-11-06 PROCEDURE — 86381 MITOCHONDRIAL ANTIBODY EACH: CPT

## 2024-11-06 PROCEDURE — 85610 PROTHROMBIN TIME: CPT

## 2024-11-06 PROCEDURE — 86376 MICROSOMAL ANTIBODY EACH: CPT

## 2024-11-06 PROCEDURE — 99215 OFFICE O/P EST HI 40 MIN: CPT | Mod: PBBFAC | Performed by: NURSE PRACTITIONER

## 2024-11-06 PROCEDURE — 3008F BODY MASS INDEX DOCD: CPT | Mod: CPTII,,, | Performed by: NURSE PRACTITIONER

## 2024-11-06 PROCEDURE — 81256 HFE GENE: CPT

## 2024-11-06 PROCEDURE — 1160F RVW MEDS BY RX/DR IN RCRD: CPT | Mod: CPTII,,, | Performed by: NURSE PRACTITIONER

## 2024-11-06 PROCEDURE — 85730 THROMBOPLASTIN TIME PARTIAL: CPT

## 2024-11-06 PROCEDURE — 84550 ASSAY OF BLOOD/URIC ACID: CPT

## 2024-11-06 PROCEDURE — 99204 OFFICE O/P NEW MOD 45 MIN: CPT | Mod: S$PBB,,, | Performed by: NURSE PRACTITIONER

## 2024-11-06 PROCEDURE — 82390 ASSAY OF CERULOPLASMIN: CPT

## 2024-11-06 PROCEDURE — 1159F MED LIST DOCD IN RCRD: CPT | Mod: CPTII,,, | Performed by: NURSE PRACTITIONER

## 2024-11-06 PROCEDURE — 83516 IMMUNOASSAY NONANTIBODY: CPT

## 2024-11-06 PROCEDURE — 82103 ALPHA-1-ANTITRYPSIN TOTAL: CPT

## 2024-11-06 PROCEDURE — 3074F SYST BP LT 130 MM HG: CPT | Mod: CPTII,,, | Performed by: NURSE PRACTITIONER

## 2024-11-06 PROCEDURE — 36415 COLL VENOUS BLD VENIPUNCTURE: CPT

## 2024-11-06 PROCEDURE — 86039 ANTINUCLEAR ANTIBODIES (ANA): CPT

## 2024-11-06 PROCEDURE — 3079F DIAST BP 80-89 MM HG: CPT | Mod: CPTII,,, | Performed by: NURSE PRACTITIONER

## 2024-11-06 PROCEDURE — 3046F HEMOGLOBIN A1C LEVEL >9.0%: CPT | Mod: CPTII,,, | Performed by: NURSE PRACTITIONER

## 2024-11-06 PROCEDURE — 83615 LACTATE (LD) (LDH) ENZYME: CPT

## 2024-11-06 NOTE — ASSESSMENT & PLAN NOTE
Recommend soluble fiber supplementation  Avoid straining or sitting on the toilet for long periods of time  Continue MiraLax as directed

## 2024-11-06 NOTE — ASSESSMENT & PLAN NOTE
She underwent colonoscopy September 5, 2023 with findings of adenomatous polyp in the sigmoid colon with polypectomy and grade 2 internal hemorrhoids with infrared coagulation.

## 2024-11-06 NOTE — PROGRESS NOTES
Subjective:       Patient ID: Fred Berman is a 43 y.o. female.    Chief Complaint: Fatty Liver (Paper Records given)    This 43-year-old  female with large B-cell lymphoma (completed chemotherapy October 7, 2024), anxiety, depression, arthritis, diabetes mellitus, GERD, hypertension, cholecystectomy is referred for hepatic steatosis.  She presents accompanied by her significant other.  She reports taking MiraLax 1-2 times daily with good relief of bowel habits noted.  She has 1-2 formed stools daily.  She has occasional red blood with bowel movements noted on the tissue after wiping that she associates with hemorrhoids.  She denies melena, fecal urgency, fecal incontinence, or pain with defecation.  Her appetite is fair and her weight is stable.  She denies nocturnal symptoms, appetite changes, unintentional weight loss, fever, chills, nausea, vomiting, hematemesis, odynophagia, dysphagia, acid reflux, pyrosis, early satiety, or abdominal pain.  She denies icterus, jaundice, pruritus, confusion, headaches, vision changes, cough, shortness of breath, chest pain, abdominal/lower extremity swelling, dark-colored urine, or pale-colored stools.    Per review of laboratory results, LFTs have been intermittently elevated for the past several months.  Acute viral hepatitis panel was negative June 8, 2023.  Hepatitis testing for B and C was negative June 18, 2024.    PET scan October 2, 2024 revealed no suspicious areas of radiotracer activity identified.  Hepatosplenomegaly with hepatic steatosis.      She is a previous patient of Dr. Yang.  She underwent EGD August 16, 2023 with findings of esophagitis, gastritis, normal mucosa in the duodenum, esophageal hiatal hernia, ring in the gastroesophageal junction s/p dilation with 51 [place] Savary type dilator.  Pathology revealed gastric antral type mucosa with reactive gastropathy, negative for intestinal metaplasia and negative for Helicobacter pylori  "organisms.    She underwent EGD 2023 with findings of esophageal hiatal hernia, ring in the gastroesophageal junction s/p dilation, esophagitis, gastritis, and normal mucosa in the duodenum.    She underwent colonoscopy 2023 with findings of adenomatous polyp in the sigmoid colon with polypectomy and grade 2 internal hemorrhoids with infrared coagulation.    She denies regular NSAID use or use of blood thinners.  She is a former smoker and reports cessation one year ago.  She denies alcohol use.  She denies illicit drug use.  She denies a family history of IBD, colon polyps, or colon cancer.    Review of patient's allergies indicates:  No Known Allergies    Past Medical History:   Diagnosis Date    Adrenal mass     Anxiety     Arthritis     Bradycardia     Depression     Diabetes mellitus, type 2     Gastroparesis     General anesthetics causing adverse effect in therapeutic use     "hard time waking up"    GERD (gastroesophageal reflux disease)     Hip pain     Hypertension     Menstrual cramps     Ovarian cyst     Palpitations     Pyosalpingitis     Thyroid disease      Past Surgical History:   Procedure Laterality Date    ABLATION OF VAGINAL TISSUE USING LASER      ADENOIDECTOMY      BONE MARROW ASPIRATION N/A 2024    Procedure: ASPIRATION, BONE MARROW;  Surgeon: Patrizia Bhatt MD;  Location: [facility] ENDOSCOPY;  Service: General;  Laterality: N/A;    BONE MARROW BIOPSY N/A 2024    Procedure: Biopsy-bone marrow;  Surgeon: Patrizia Bhatt MD;  Location: [facility] ENDOSCOPY;  Service: General;  Laterality: N/A;     SECTION      CHOLECYSTECTOMY      DISSECTION OF NECK Left 05/10/2024    Procedure: DISSECTION, NECK;  Surgeon: Cali Trujillo MD;  Location: [facility] OR;  Service: ENT;  Laterality: Left;    INSERTION OF TUNNELED CENTRAL VENOUS CATHETER (CVC) WITH SUBCUTANEOUS PORT Right 2024    Procedure: [id number];  Surgeon: Lio Storey Jr., MD;  " [facility];  Service: General;  Laterality: Right;    THYROIDECTOMY Left 05/10/2024    Procedure: THYROIDECTOMY;  Surgeon: Cali Trujillo MD;  [facility];  Service: ENT;  Laterality: Left;    TONSILLECTOMY  1985    TUBAL LIGATION       Family History:   family history includes Alcohol abuse in her mother; Arthritis in her mother; Asthma in her son; Breast cancer in her mother; COPD in her mother; Cancer in her maternal grandfather and mother; Depression in her father and mother; Diabetes in her mother; Drug abuse in her brother, father, mother, and sister; Early death in her brother; Hearing loss in her paternal grandfather; Heart disease in her sister; Hypertension in her father, mother, and son; Miscarriages / Stillbirths in her maternal grandmother, mother, and sister; Prostate cancer in her father; Thyroid cancer in her father.    Social History:    reports that she quit smoking about 21 years ago. Her smoking use included cigarettes. She has a 22.5 pack-year smoking history. She has never used smokeless tobacco. She reports that she does not currently use drugs after having used the following drugs: Methamphetamines. She reports that she does not drink alcohol.    Review of Systems  Negative except as noted in the HPI.      Objective:      Physical Exam  Constitutional:       Appearance: Normal appearance.   HENT:      Head: Normocephalic.      Mouth/Throat:      Mouth: Mucous membranes are moist.   Eyes:      Extraocular Movements: Extraocular movements intact.      Conjunctiva/sclera: Conjunctivae normal.      Pupils: Pupils are equal, round, and reactive to light.   Cardiovascular:      Rate and Rhythm: Normal rate and regular rhythm.      Pulses: Normal pulses.      Heart sounds: Normal heart sounds.   Pulmonary:      Effort: Pulmonary effort is normal.      Breath sounds: Normal breath sounds.   Abdominal:      General: Bowel sounds are normal.      Palpations: Abdomen is soft.  "  Musculoskeletal:         General: Normal range of motion.      Cervical back: Normal range of motion and neck supple.   Skin:     General: Skin is warm and dry.   Neurological:      General: No focal deficit present.      Mental Status: She is alert and oriented to person, place, and time.   Psychiatric:         Mood and Affect: Mood normal.         Behavior: Behavior normal.         Thought Content: Thought content normal.         Judgment: Judgment normal.         Home Medications:     Current Outpatient Medications   Medication Sig    acetaminophen (TYLENOL) 500 MG tablet Take 2 tablets (1,000 mg total) by mouth every 6 (six) hours. (Patient taking differently: Take 1,000 mg by mouth every 6 (six) hours. PRN)    buprenorphine HCL (SUBUTEX) 8 mg Subl Place 8 mg under the tongue 3 (three) times daily.    cholecalciferol, vitamin D3, 1,250 mcg (50,000 unit) capsule Take 1 capsule (50,000 Units total) by mouth every 7 days.    diphenhydrAMINE-aluminum-magnesium hydroxide-simethicone-LIDOcaine viscous HCl 2% Swish and spit 15 mLs every 4 (four) hours as needed (Mouth sores).    insulin (LANTUS SOLOSTAR U-100 INSULIN) glargine 100 units/mL SubQ pen Inject 15 Units into the skin 2 (two) times daily.    insulin lispro 100 unit/mL pen Inject 35 Units into the skin 3 (three) times daily. With meals    levothyroxine (SYNTHROID) 200 MCG tablet Take 1 tablet (200 mcg total) by mouth before breakfast.    LORazepam (ATIVAN) 1 MG tablet Take 1 mg by mouth 2 (two) times daily.    losartan (COZAAR) 50 MG tablet Take 1 tablet (50 mg total) by mouth once daily.    ondansetron (ZOFRAN-ODT) 4 MG TbDL Take 1 tablet (4 mg total) by mouth every 8 (eight) hours as needed (NAUSEA).    pantoprazole (PROTONIX) 40 MG tablet Take 40 mg by mouth every morning. (Patient taking differently: Take 40 mg by mouth every morning. PRN)    pen needle, diabetic (BD ULTRA-FINE ROSALEE PEN NEEDLE) 32 gauge x 5/32" Ndle 1 each by Misc.(Non-Drug; Combo Route) " route 2 (two) times a day.    polyethylene glycol (GLYCOLAX) 17 gram PwPk Take 17 g by mouth 2 (two) times daily.    prochlorperazine (COMPAZINE) 5 MG tablet Take 2 tablets (10 mg total) by mouth every 6 (six) hours as needed for Nausea.    sulfamethoxazole-trimethoprim 800-160mg (BACTRIM DS) 800-160 mg Tab Take 1 tablet by mouth 2 (two) times daily. for 5 days    calcium carbonate/vitamin D3 (CALCIUM 600 + D,3, ORAL) Take 1 tablet by mouth 2 (two) times a day. (Patient not taking: Reported on 8/22/2024)    cholecalciferol, vitamin D3, (VITAMIN D3) 25 mcg (1,000 unit) capsule Take 1 capsule (1,000 Units total) by mouth once daily. (Patient not taking: Reported on 11/6/2024)    ciprofloxacin HCl (CIPRO) 500 MG tablet Take 1 tablet (500 mg total) by mouth every 12 (twelve) hours. (Patient not taking: Reported on 10/3/2024)    COMPOUND HORMONE REPLACEMENT Take by mouth once daily. (Patient not taking: Reported on 11/6/2024)    hydrocortisone (ANUSOL-HC) 2.5 % rectal cream Place 1 application  rectally 2 (two) times daily. (Patient not taking: Reported on 11/6/2024)    LORazepam (ATIVAN) 0.5 MG tablet Take 1 mg by mouth every 6 (six) hours as needed for Anxiety. (Patient not taking: Reported on 11/6/2024)    OLANZapine (ZYPREXA) 5 MG tablet Take 1 tablet by mouth nightly on days 1-3 of each chemotherapy cycle. (Patient not taking: Reported on 11/6/2024)    predniSONE (DELTASONE) 50 MG Tab Take 2 tablets (100 mg total) by mouth once daily. On days 2-5 of your chemotherapy cycles. Take with food. (Patient not taking: Reported on 11/6/2024)     No current facility-administered medications for this visit.     Facility-Administered Medications Ordered in Other Visits   Medication Frequency    dextrose 10% bolus 125 mL 125 mL PRN    dextrose 10% bolus 250 mL 250 mL PRN    glucagon (human recombinant) injection 1 mg PRN     Laboratory Results:     Recent Results (from the past 12 weeks)   CBC with Differential    Collection  Time: 08/15/24 11:02 AM   Result Value Ref Range    WBC 11.43 4.50 - 11.50 x10(3)/mcL    RBC 4.46 4.20 - 5.40 x10(6)/mcL    Hgb 12.5 12.0 - 16.0 g/dL    Hct 39.1 37.0 - 47.0 %    MCV 87.7 80.0 - 94.0 fL    MCH 28.0 27.0 - 31.0 pg    MCHC 32.0 (L) 33.0 - 36.0 g/dL    RDW 13.5 11.5 - 17.0 %    Platelet 293 130 - 400 x10(3)/mcL    MPV 9.4 7.4 - 10.4 fL    NRBC% 0.2 %   Manual Differential    Collection Time: 08/15/24 11:02 AM   Result Value Ref Range    Neutrophils % 73 47 - 80 %    Bands % 10 0 - 11 %    Lymphs % 11 (L) 13 - 40 %    Monocytes % 5 2 - 11 %    Eosinophils % 1 0 - 8 %    Neutrophils Abs Calc 9.4869 (H) 2.1 - 9.2 x10(3)/mcL    Lymphs Abs 1.2573 0.6 - 4.6 x10(3)/mcL    Eosinophils Abs 0.1143 0 - 0.9 x10(3)/mcL    Monocytes Abs 0.5715 0.1 - 1.3 x10(3)/mcL    Platelets Adequate Normal, Adequate    RBC Morph Normal Normal   Uric Acid    Collection Time: 08/19/24  7:17 AM   Result Value Ref Range    Uric Acid 3.6 2.6 - 6.0 mg/dL   Lactate Dehydrogenase    Collection Time: 08/19/24  7:17 AM   Result Value Ref Range    Lactate Dehydrogenase 238 (H) 125 - 220 U/L   CMP    Collection Time: 08/19/24  7:17 AM   Result Value Ref Range    Sodium 138 136 - 145 mmol/L    Potassium 4.1 3.5 - 5.1 mmol/L    Chloride 99 98 - 107 mmol/L    CO2 30 (H) 22 - 29 mmol/L    Glucose 328 (H) 74 - 100 mg/dL    Blood Urea Nitrogen 11.1 7.0 - 18.7 mg/dL    Creatinine 0.79 0.55 - 1.02 mg/dL    Calcium 10.0 8.4 - 10.2 mg/dL    Protein Total 7.1 6.4 - 8.3 gm/dL    Albumin 3.8 3.5 - 5.0 g/dL    Globulin 3.3 2.4 - 3.5 gm/dL    Albumin/Globulin Ratio 1.2 1.1 - 2.0 ratio    Bilirubin Total 0.4 <=1.5 mg/dL     40 - 150 unit/L    ALT 54 0 - 55 unit/L    AST 47 (H) 5 - 34 unit/L    eGFR >60 mL/min/1.73/m2    Anion Gap 9.0 mEq/L    BUN/Creatinine Ratio 14    CBC with Differential    Collection Time: 08/19/24  7:17 AM   Result Value Ref Range    WBC 7.31 4.50 - 11.50 x10(3)/mcL    RBC 4.50 4.20 - 5.40 x10(6)/mcL    Hgb 13.1 12.0 - 16.0 g/dL     Hct 39.7 37.0 - 47.0 %    MCV 88.2 80.0 - 94.0 fL    MCH 29.1 27.0 - 31.0 pg    MCHC 33.0 33.0 - 36.0 g/dL    RDW 13.3 11.5 - 17.0 %    Platelet 195 130 - 400 x10(3)/mcL    MPV 9.8 7.4 - 10.4 fL    NRBC% 0.0 %   Manual Differential    Collection Time: 08/19/24  7:17 AM   Result Value Ref Range    Neutrophils % 65 47 - 80 %    Bands % 2 0 - 11 %    Lymphs % 22 13 - 40 %    Monocytes % 5 2 - 11 %    Basophils % 1 0 - 2 %    Metamyelocytes % 4 %    Myelocytes % 1 %    Neutrophils Abs Calc 4.8977 2.1 - 9.2 x10(3)/mcL    Basophils Abs 0.0731 0 - 0.2 x10(3)/mcL    Lymphs Abs 1.6082 0.6 - 4.6 x10(3)/mcL    Monocytes Abs 0.3655 0.1 - 1.3 x10(3)/mcL    Platelets Normal Normal, Adequate    RBC Morph Normal Normal   EKG 12-lead (Shortness of Breath) Age > 50    Collection Time: 08/20/24  4:57 PM   Result Value Ref Range    QRS Duration 92 ms    [facility] QTC Calculation 452 ms   POCT glucose    Collection Time: 08/20/24  4:57 PM   Result Value Ref Range    POCT Glucose 376 (H) 70 - 110 mg/dL   COVID/FLU A&B PCR    Collection Time: 08/20/24  5:50 PM   Result Value Ref Range    Influenza A PCR Not Detected Not Detected    Influenza B PCR Not Detected Not Detected    SARS-CoV-2 PCR Not Detected Not Detected, Negative   IN OFFICE EKG 12-LEAD (to [place])    Collection Time: 08/22/24  1:43 PM   Result Value Ref Range    QRS Duration 98 ms    [facility] QTC Calculation 455 ms   CMP    Collection Time: 08/26/24  8:28 AM   Result Value Ref Range    Sodium 140 136 - 145 mmol/L    Potassium 4.0 3.5 - 5.1 mmol/L    Chloride 100 98 - 107 mmol/L    CO2 34 (H) 22 - 29 mmol/L    Glucose 273 (H) 74 - 100 mg/dL    Blood Urea Nitrogen 9.8 7.0 - 18.7 mg/dL    Creatinine 0.81 0.55 - 1.02 mg/dL    Calcium 9.1 8.4 - 10.2 mg/dL    Protein Total 6.3 (L) 6.4 - 8.3 gm/dL    Albumin 3.5 3.5 - 5.0 g/dL    Globulin 2.8 2.4 - 3.5 gm/dL    Albumin/Globulin Ratio 1.3 1.1 - 2.0 ratio    Bilirubin Total 0.5 <=1.5 mg/dL     40 - 150 unit/L    ALT 67 (H) 0 - 55 unit/L     AST 38 (H) 5 - 34 unit/L    eGFR >60 mL/min/1.73/m2    Anion Gap 6.0 mEq/L    BUN/Creatinine Ratio 12    CBC with Differential    Collection Time: 08/26/24  8:28 AM   Result Value Ref Range    WBC 1.11 (LL) 4.50 - 11.50 x10(3)/mcL    RBC 4.30 4.20 - 5.40 x10(6)/mcL    Hgb 12.3 12.0 - 16.0 g/dL    Hct 37.6 37.0 - 47.0 %    MCV 87.4 80.0 - 94.0 fL    MCH 28.6 27.0 - 31.0 pg    MCHC 32.7 (L) 33.0 - 36.0 g/dL    RDW 13.3 11.5 - 17.0 %    Platelet 91 (L) 130 - 400 x10(3)/mcL    MPV 10.6 (H) 7.4 - 10.4 fL    Neut % 18.9 %    Lymph % 64.9 %    Mono % 9.0 %    Eos % 3.6 %    Basophil % 2.7 %    Lymph # 0.72 0.6 - 4.6 x10(3)/mcL    Neut # 0.21 (L) 2.1 - 9.2 x10(3)/mcL    Mono # 0.10 0.1 - 1.3 x10(3)/mcL    Eos # 0.04 0 - 0.9 x10(3)/mcL    Baso # 0.03 <=0.2 x10(3)/mcL    IG# 0.01 0 - 0.04 x10(3)/mcL    IG% 0.9 %    NRBC% 0.0 %   CBC with Differential    Collection Time: 08/28/24 11:10 AM   Result Value Ref Range    WBC 2.53 (L) 4.50 - 11.50 x10(3)/mcL    RBC 4.52 4.20 - 5.40 x10(6)/mcL    Hgb 12.7 12.0 - 16.0 g/dL    Hct 39.8 37.0 - 47.0 %    MCV 88.1 80.0 - 94.0 fL    MCH 28.1 27.0 - 31.0 pg    MCHC 31.9 (L) 33.0 - 36.0 g/dL    RDW 13.5 11.5 - 17.0 %    Platelet 126 (L) 130 - 400 x10(3)/mcL    MPV 10.6 (H) 7.4 - 10.4 fL    Neut % 20.1 %    Lymph % 33.6 %    Mono % 24.5 %    Eos % 2.4 %    Basophil % 2.8 %    Lymph # 0.85 0.6 - 4.6 x10(3)/mcL    Neut # 0.51 (L) 2.1 - 9.2 x10(3)/mcL    Mono # 0.62 0.1 - 1.3 x10(3)/mcL    Eos # 0.06 0 - 0.9 x10(3)/mcL    Baso # 0.07 <=0.2 x10(3)/mcL    IG# 0.42 (H) 0 - 0.04 x10(3)/mcL    IG% 16.6 %    NRBC% 1.2 %   Complete PFT w/ bronchodilator    Collection Time: 08/30/24  8:57 AM   Result Value Ref Range    Pre FVC 3.99 3.25 - 5.00 L    Pre FEV1 2.61 2.60 - 3.97 L    Pre FEV1 FVC 65.50 (L) 69.92 - 89.89 %    Pre FEF 25 75 2.09 (L) 2.18 - 4.98 L/s    Pre PEF 3.01 (L) 5.60 - 9.47 L/s    Pre  7.22 sec    Pre MVV 76.87 (L) 106.16 - 143.63 L/min    Pre DLCO 18.99 (L) 21.84 - 33.30  ml/(min*mmHg)    DLCOVA PRE 5.17 3.57 - 6.26 ml/(min*mmHg*L)    VA PRE 3.67 (L) 5.46 - 5.46 L    IVC PRE 2.55 (L) 3.25 - 5.00 L    Pre TLC 5.57 4.62 - 6.60 L    VC PRE 3.99 3.25 - 5.00 L    Pre FRC PL 2.11 2.09 - 3.74 L    Pre ERV 0.53 -22369.92 - 25380.08 L    Pre RV 1.58 1.25 - 2.41 L    RVTLC PRE 28.37 24.33 - 43.51 %    Raw PRE 4.43 (H) 3.06 - 3.06 cmH2O*s/L    FVC Ref 4.11     FVC LLN 3.25     FVC Pre Ref 97.1 %    FEV1 Ref 3.30     FEV1 LLN 2.60     FEV1 Pre Ref 79.3 %    FEV1 FVC Ref 81     FEV1 FVC LLN 70     FEV1 FVC Pre Ref 81.0 %    FEF 25 75 Ref 3.58     FEF 25 75 LLN 2.18     FEF 25 75 Pre Ref 58.3 %    PEF Ref 7.54     PEF LLN 5.60     PEF Pre Ref 40.0 %    MVV Ref 125     MVV      MVV Pre Ref 61.5 %    TLC Ref 5.61     TLC LLN 4.62     TLC Pre Ref 99.3 %    VC Ref 4.11     VC LLN 3.25     VC Pre Ref 97.1 %    FRCpleth Ref 2.91     FRCpleth LLN 2.09     FRCpleth PreRef 72.4 %    ERV Ref 1.08     ERV LLN -82342.92     ERV Pre Ref 48.8 %    RV Ref 1.83     RV LLN 1.25     RV Pre Ref 86.3 %    RVTLC Ref 34     RVTLC LLN 24     RVTLC Pre Ref 83.6 %    Raw Ref 3.06     Raw LLN 3.06     Raw Pre Ref 144.9 %    DLCO Single Breath Ref 27.57     DLCO Single Breath LLN 21.84     DLCO Single Breath Pre Ref 68.9 %    DLCOc Single Breath Ref 27.57     DLCOc Single Breath LLN 21.84     DLCOVA Ref 4.91     DLCOVA LLN 3.57     DLCOVA Pre Ref 105.3 %    DLCOc SBVA Ref 4.91     DLCOc SBVA LLN 3.57     VA Single Breath Ref 5.46     VA Single Breath LLN 5.46     VA Single Breath Pre Ref 67.2 %    IVC Single Breath Ref 4.11     IVC Single Breath LLN 3.25     IVC Single Breath Pre Ref 62.1 %   CMP    Collection Time: 09/03/24  8:50 AM   Result Value Ref Range    Sodium 137 136 - 145 mmol/L    Potassium 4.1 3.5 - 5.1 mmol/L    Chloride 98 98 - 107 mmol/L    CO2 32 (H) 22 - 29 mmol/L    Glucose 381 (H) 74 - 100 mg/dL    Blood Urea Nitrogen 7.9 7.0 - 18.7 mg/dL    Creatinine 0.84 0.55 - 1.02 mg/dL    Calcium 9.4 8.4 -  10.2 mg/dL    Protein Total 6.9 6.4 - 8.3 gm/dL    Albumin 3.6 3.5 - 5.0 g/dL    Globulin 3.3 2.4 - 3.5 gm/dL    Albumin/Globulin Ratio 1.1 1.1 - 2.0 ratio    Bilirubin Total 0.4 <=1.5 mg/dL     40 - 150 unit/L    ALT 42 0 - 55 unit/L    AST 28 5 - 34 unit/L    eGFR >60 mL/min/1.73/m2    Anion Gap 7.0 mEq/L    BUN/Creatinine Ratio 9    CBC with Differential    Collection Time: 09/03/24  8:50 AM   Result Value Ref Range    WBC 7.13 4.50 - 11.50 x10(3)/mcL    RBC 4.10 (L) 4.20 - 5.40 x10(6)/mcL    Hgb 11.7 (L) 12.0 - 16.0 g/dL    Hct 36.3 (L) 37.0 - 47.0 %    MCV 88.5 80.0 - 94.0 fL    MCH 28.5 27.0 - 31.0 pg    MCHC 32.2 (L) 33.0 - 36.0 g/dL    RDW 14.2 11.5 - 17.0 %    Platelet 176 130 - 400 x10(3)/mcL    MPV 10.0 7.4 - 10.4 fL    Neut %      Lymph %      Mono %      Eos %      Basophil %      Lymph #      Neut #      Mono #      Eos #      Baso #      IG#      IG%      NRBC%     Manual Differential    Collection Time: 09/03/24  8:50 AM   Result Value Ref Range    Neutrophils % 64 47 - 80 %    Bands % 9 0 - 11 %    Lymphs % 13 13 - 40 %    Monocytes % 9 2 - 11 %    Metamyelocytes % 3 %    Abnormal Lymphs % 2 %    Neutrophils Abs Calc 5.2049 2.1 - 9.2 x10(3)/mcL    Lymphs Abs 0.9269 0.6 - 4.6 x10(3)/mcL    Monocytes Abs 0.6417 0.1 - 1.3 x10(3)/mcL    Platelets Adequate Normal, Adequate    Polychromasia Slight (A) (none)    Stippled RBCs Slight (A) (none)    Toxic Granulation 2+ (A) (none)    Tear Drops 1+ (A) (none)    Ovalocytes Slight (A) (none)   Comprehensive Metabolic Panel    Collection Time: 09/09/24  7:16 AM   Result Value Ref Range    Sodium 137 136 - 145 mmol/L    Potassium 3.8 3.5 - 5.1 mmol/L    Chloride 99 98 - 107 mmol/L    CO2 31 (H) 22 - 29 mmol/L    Glucose 328 (H) 74 - 100 mg/dL    Blood Urea Nitrogen 12.0 7.0 - 18.7 mg/dL    Creatinine 0.85 0.55 - 1.02 mg/dL    Calcium 9.3 8.4 - 10.2 mg/dL    Protein Total 6.9 6.4 - 8.3 gm/dL    Albumin 3.6 3.5 - 5.0 g/dL    Globulin 3.3 2.4 - 3.5 gm/dL     Albumin/Globulin Ratio 1.1 1.1 - 2.0 ratio    Bilirubin Total 0.5 <=1.5 mg/dL     40 - 150 unit/L    ALT 36 0 - 55 unit/L    AST 39 (H) 5 - 34 unit/L    eGFR >60 mL/min/1.73/m2    Anion Gap 7.0 mEq/L    BUN/Creatinine Ratio 14    Magnesium    Collection Time: 09/09/24  7:16 AM   Result Value Ref Range    Magnesium Level 1.80 1.60 - 2.60 mg/dL   Lactate Dehydrogenase    Collection Time: 09/09/24  7:16 AM   Result Value Ref Range    Lactate Dehydrogenase 185 125 - 220 U/L   Uric Acid    Collection Time: 09/09/24  7:16 AM   Result Value Ref Range    Uric Acid 4.2 2.6 - 6.0 mg/dL   CBC with Differential    Collection Time: 09/09/24  7:16 AM   Result Value Ref Range    WBC 8.64 4.50 - 11.50 x10(3)/mcL    RBC 4.03 (L) 4.20 - 5.40 x10(6)/mcL    Hgb 11.6 (L) 12.0 - 16.0 g/dL    Hct 35.8 (L) 37.0 - 47.0 %    MCV 88.8 80.0 - 94.0 fL    MCH 28.8 27.0 - 31.0 pg    MCHC 32.4 (L) 33.0 - 36.0 g/dL    RDW 15.3 11.5 - 17.0 %    Platelet 278 130 - 400 x10(3)/mcL    MPV 9.3 7.4 - 10.4 fL    Neut % 73.2 %    Lymph % 15.5 %    Mono % 9.4 %    Eos % 0.8 %    Basophil % 0.6 %    Lymph # 1.34 0.6 - 4.6 x10(3)/mcL    Neut # 6.33 2.1 - 9.2 x10(3)/mcL    Mono # 0.81 0.1 - 1.3 x10(3)/mcL    Eos # 0.07 0 - 0.9 x10(3)/mcL    Baso # 0.05 <=0.2 x10(3)/mcL    IG# 0.04 0 - 0.04 x10(3)/mcL    IG% 0.5 %    NRBC% 0.3 %   Cardiac Monitor - 3-15 Day Adult (Cupid Only)    Collection Time: 09/12/24 11:35 AM   Result Value Ref Range    Sinus min HR 71     Sinus max hr 118    CMP    Collection Time: 09/16/24  9:46 AM   Result Value Ref Range    Sodium 138 136 - 145 mmol/L    Potassium 3.7 3.5 - 5.1 mmol/L    Chloride 100 98 - 107 mmol/L    CO2 31 (H) 22 - 29 mmol/L    Glucose 263 (H) 74 - 100 mg/dL    Blood Urea Nitrogen 8.6 7.0 - 18.7 mg/dL    Creatinine 0.83 0.55 - 1.02 mg/dL    Calcium 9.2 8.4 - 10.2 mg/dL    Protein Total 6.4 6.4 - 8.3 gm/dL    Albumin 3.5 3.5 - 5.0 g/dL    Globulin 2.9 2.4 - 3.5 gm/dL    Albumin/Globulin Ratio 1.2 1.1 - 2.0 ratio     Bilirubin Total 0.4 <=1.5 mg/dL     40 - 150 unit/L    ALT 56 (H) 0 - 55 unit/L    AST 34 5 - 34 unit/L    eGFR >60 mL/min/1.73/m2    Anion Gap 7.0 mEq/L    BUN/Creatinine Ratio 10    CBC with Differential    Collection Time: 09/16/24  9:46 AM   Result Value Ref Range    WBC 2.05 (L) 4.50 - 11.50 x10(3)/mcL    RBC 3.95 (L) 4.20 - 5.40 x10(6)/mcL    Hgb 11.3 (L) 12.0 - 16.0 g/dL    Hct 35.2 (L) 37.0 - 47.0 %    MCV 89.1 80.0 - 94.0 fL    MCH 28.6 27.0 - 31.0 pg    MCHC 32.1 (L) 33.0 - 36.0 g/dL    RDW 14.6 11.5 - 17.0 %    Platelet 134 130 - 400 x10(3)/mcL    MPV 9.7 7.4 - 10.4 fL    NRBC% 0.0 %   Manual Differential    Collection Time: 09/16/24  9:46 AM   Result Value Ref Range    Neutrophils % 32 (L) 47 - 80 %    Bands % 16 (H) 0 - 11 %    Lymphs % 42 (H) 13 - 40 %    Monocytes % 7 2 - 11 %    Eosinophils % 2 0 - 8 %    Basophils % 1 0 - 2 %    Neutrophils Abs Calc 0.984 (L) 2.1 - 9.2 x10(3)/mcL    Basophils Abs 0.0205 0 - 0.2 x10(3)/mcL    Lymphs Abs 0.861 0.6 - 4.6 x10(3)/mcL    Eosinophils Abs 0.041 0 - 0.9 x10(3)/mcL    Monocytes Abs 0.1435 0.1 - 1.3 x10(3)/mcL    Platelets Adequate Normal, Adequate    RBC Morph Normal Normal   CMP    Collection Time: 09/23/24  9:53 AM   Result Value Ref Range    Sodium 140 136 - 145 mmol/L    Potassium 4.0 3.5 - 5.1 mmol/L    Chloride 102 98 - 107 mmol/L    CO2 32 (H) 22 - 29 mmol/L    Glucose 228 (H) 74 - 100 mg/dL    Blood Urea Nitrogen 9.6 7.0 - 18.7 mg/dL    Creatinine 0.85 0.55 - 1.02 mg/dL    Calcium 9.8 8.4 - 10.2 mg/dL    Protein Total 6.8 6.4 - 8.3 gm/dL    Albumin 3.6 3.5 - 5.0 g/dL    Globulin 3.2 2.4 - 3.5 gm/dL    Albumin/Globulin Ratio 1.1 1.1 - 2.0 ratio    Bilirubin Total 0.5 <=1.5 mg/dL    ALP 95 40 - 150 unit/L    ALT 35 0 - 55 unit/L    AST 33 5 - 34 unit/L    eGFR >60 mL/min/1.73/m2    Anion Gap 6.0 mEq/L    BUN/Creatinine Ratio 11    CBC with Differential    Collection Time: 09/23/24  9:53 AM   Result Value Ref Range    WBC 7.75 4.50 - 11.50  x10(3)/mcL    RBC 3.95 (L) 4.20 - 5.40 x10(6)/mcL    Hgb 11.5 (L) 12.0 - 16.0 g/dL    Hct 36.2 (L) 37.0 - 47.0 %    MCV 91.6 80.0 - 94.0 fL    MCH 29.1 27.0 - 31.0 pg    MCHC 31.8 (L) 33.0 - 36.0 g/dL    RDW 15.8 11.5 - 17.0 %    Platelet 199 130 - 400 x10(3)/mcL    MPV 9.8 7.4 - 10.4 fL    NRBC% 0.0 %   Manual Differential    Collection Time: 09/23/24  9:53 AM   Result Value Ref Range    Neutrophils % 51 47 - 80 %    Bands % 14 (H) 0 - 11 %    Lymphs % 22 13 - 40 %    Monocytes % 9 2 - 11 %    Eosinophils % 1 0 - 8 %    Basophils % 2 0 - 2 %    Abnormal Lymphs % 1 %    Neutrophils Abs Calc 5.0375 2.1 - 9.2 x10(3)/mcL    Basophils Abs 0.155 0 - 0.2 x10(3)/mcL    Lymphs Abs 1.705 0.6 - 4.6 x10(3)/mcL    Eosinophils Abs 0.0775 0 - 0.9 x10(3)/mcL    Monocytes Abs 0.6975 0.1 - 1.3 x10(3)/mcL   Specimen to Pathology Dermatology and skin neoplasms    Collection Time: 09/24/24  3:43 PM   Result Value Ref Range    Case Report       [facility] Surgical Pathology                            Case: [id number]                                 Authorizing Provider:  Sid Rodriguez MD           Collected:           09/24/2024 03:43 PM          Ordering Location:     [facility] -       Received:            09/25/2024 09:30 AM                                 Family Medicine                                                              Pathologist:           Patrizia Bhatt MD                                                        Specimen:    Breast, Left                                                                               Clinical Information       history of melanoma in family; 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             Gross Description       1. Breast, Left:   Received in 10% neutral buffered formalin is a punch biopsy of skin measuring 5 x 5 mm.  On the surface is a 2 x 2 mm soft tan polyp.  The specimen is bisected and entirely  "submitted.      Report Footnotes       Unless the case is a "gross only" or additional testing only, the final diagnosis for each specimen is based on a microscopic examination of appropriate tissue sections.     POCT glucose    Collection Time: 09/30/24  8:21 AM   Result Value Ref Range    POCT Glucose 157 (H) 70 - 110 mg/dL   Uric Acid    Collection Time: 10/03/24 10:37 AM   Result Value Ref Range    Uric Acid 3.8 2.6 - 6.0 mg/dL   Lactate Dehydrogenase    Collection Time: 10/03/24 10:37 AM   Result Value Ref Range    Lactate Dehydrogenase 186 125 - 220 U/L   Magnesium    Collection Time: 10/03/24 10:37 AM   Result Value Ref Range    Magnesium Level 2.00 1.60 - 2.60 mg/dL   Comprehensive Metabolic Panel    Collection Time: 10/03/24 10:37 AM   Result Value Ref Range    Sodium 139 136 - 145 mmol/L    Potassium 4.4 3.5 - 5.1 mmol/L    Chloride 100 98 - 107 mmol/L    CO2 30 (H) 22 - 29 mmol/L    Glucose 277 (H) 74 - 100 mg/dL    Blood Urea Nitrogen 8.0 7.0 - 18.7 mg/dL    Creatinine 0.77 0.55 - 1.02 mg/dL    Calcium 9.8 8.4 - 10.2 mg/dL    Protein Total 7.0 6.4 - 8.3 gm/dL    Albumin 3.5 3.5 - 5.0 g/dL    Globulin 3.5 2.4 - 3.5 gm/dL    Albumin/Globulin Ratio 1.0 (L) 1.1 - 2.0 ratio    Bilirubin Total 0.6 <=1.5 mg/dL    ALP 90 40 - 150 unit/L    ALT 37 0 - 55 unit/L    AST 53 (H) 5 - 34 unit/L    eGFR >60 mL/min/1.73/m2    Anion Gap 9.0 mEq/L    BUN/Creatinine Ratio 10    CBC with Differential    Collection Time: 10/03/24 10:37 AM   Result Value Ref Range    WBC 8.60 4.50 - 11.50 x10(3)/mcL    RBC 3.78 (L) 4.20 - 5.40 x10(6)/mcL    Hgb 11.3 (L) 12.0 - 16.0 g/dL    Hct 34.7 (L) 37.0 - 47.0 %    MCV 91.8 80.0 - 94.0 fL    MCH 29.9 27.0 - 31.0 pg    MCHC 32.6 (L) 33.0 - 36.0 g/dL    RDW 16.8 11.5 - 17.0 %    Platelet 215 130 - 400 x10(3)/mcL    MPV 9.1 7.4 - 10.4 fL    Neut % 70.4 %    Lymph % 15.7 %    Mono % 11.5 %    Eos % 0.9 %    Basophil % 0.3 %    Lymph # 1.35 0.6 - 4.6 x10(3)/mcL    Neut # 6.05 2.1 - 9.2 " x10(3)/mcL    Mono # 0.99 0.1 - 1.3 x10(3)/mcL    Eos # 0.08 0 - 0.9 x10(3)/mcL    Baso # 0.03 <=0.2 x10(3)/mcL    IG# 0.10 (H) 0 - 0.04 x10(3)/mcL    IG% 1.2 %    NRBC% 0.0 %   CMP    Collection Time: 10/07/24  8:11 AM   Result Value Ref Range    Sodium 140 136 - 145 mmol/L    Potassium 4.2 3.5 - 5.1 mmol/L    Chloride 102 98 - 107 mmol/L    CO2 31 (H) 22 - 29 mmol/L    Glucose 240 (H) 74 - 100 mg/dL    Blood Urea Nitrogen 9.7 7.0 - 18.7 mg/dL    Creatinine 0.81 0.55 - 1.02 mg/dL    Calcium 9.4 8.4 - 10.2 mg/dL    Protein Total 6.9 6.4 - 8.3 gm/dL    Albumin 3.6 3.5 - 5.0 g/dL    Globulin 3.3 2.4 - 3.5 gm/dL    Albumin/Globulin Ratio 1.1 1.1 - 2.0 ratio    Bilirubin Total 0.5 <=1.5 mg/dL    ALP 83 40 - 150 unit/L    ALT 40 0 - 55 unit/L    AST 51 (H) 5 - 34 unit/L    eGFR >60 mL/min/1.73/m2    Anion Gap 7.0 mEq/L    BUN/Creatinine Ratio 12    CBC with Differential    Collection Time: 10/07/24  8:11 AM   Result Value Ref Range    WBC 6.77 4.50 - 11.50 x10(3)/mcL    RBC 3.93 (L) 4.20 - 5.40 x10(6)/mcL    Hgb 11.6 (L) 12.0 - 16.0 g/dL    Hct 35.9 (L) 37.0 - 47.0 %    MCV 91.3 80.0 - 94.0 fL    MCH 29.5 27.0 - 31.0 pg    MCHC 32.3 (L) 33.0 - 36.0 g/dL    RDW 16.2 11.5 - 17.0 %    Platelet 220 130 - 400 x10(3)/mcL    MPV 9.8 7.4 - 10.4 fL    Neut % 66.3 %    Lymph % 18.0 %    Mono % 12.6 %    Eos % 1.5 %    Basophil % 0.4 %    Lymph # 1.22 0.6 - 4.6 x10(3)/mcL    Neut # 4.49 2.1 - 9.2 x10(3)/mcL    Mono # 0.85 0.1 - 1.3 x10(3)/mcL    Eos # 0.10 0 - 0.9 x10(3)/mcL    Baso # 0.03 <=0.2 x10(3)/mcL    IG# 0.08 (H) 0 - 0.04 x10(3)/mcL    IG% 1.2 %    NRBC% 0.0 %   Lactate Dehydrogenase    Collection Time: 10/29/24  1:05 PM   Result Value Ref Range    Lactate Dehydrogenase 185 125 - 220 U/L   Uric Acid    Collection Time: 10/29/24  1:05 PM   Result Value Ref Range    Uric Acid 4.7 2.6 - 6.0 mg/dL   Comprehensive Metabolic Panel    Collection Time: 10/29/24  1:05 PM   Result Value Ref Range    Sodium 140 136 - 145 mmol/L     Potassium 3.5 3.5 - 5.1 mmol/L    Chloride 100 98 - 107 mmol/L    CO2 33 (H) 22 - 29 mmol/L    Glucose 192 (H) 74 - 100 mg/dL    Blood Urea Nitrogen 10.6 7.0 - 18.7 mg/dL    Creatinine 0.84 0.55 - 1.02 mg/dL    Calcium 9.4 8.4 - 10.2 mg/dL    Protein Total 6.8 6.4 - 8.3 gm/dL    Albumin 3.7 3.5 - 5.0 g/dL    Globulin 3.1 2.4 - 3.5 gm/dL    Albumin/Globulin Ratio 1.2 1.1 - 2.0 ratio    Bilirubin Total 0.6 <=1.5 mg/dL    ALP 72 40 - 150 unit/L    ALT 51 0 - 55 unit/L    AST 62 (H) 5 - 34 unit/L    eGFR >60 mL/min/1.73/m2    Anion Gap 7.0 mEq/L    BUN/Creatinine Ratio 13    Magnesium    Collection Time: 10/29/24  1:05 PM   Result Value Ref Range    Magnesium Level 1.90 1.60 - 2.60 mg/dL   CBC with Differential    Collection Time: 10/29/24  1:05 PM   Result Value Ref Range    WBC 6.44 4.50 - 11.50 x10(3)/mcL    RBC 3.93 (L) 4.20 - 5.40 x10(6)/mcL    Hgb 11.7 (L) 12.0 - 16.0 g/dL    Hct 36.7 (L) 37.0 - 47.0 %    MCV 93.4 80.0 - 94.0 fL    MCH 29.8 27.0 - 31.0 pg    MCHC 31.9 (L) 33.0 - 36.0 g/dL    RDW 16.6 11.5 - 17.0 %    Platelet 210 130 - 400 x10(3)/mcL    MPV 9.5 7.4 - 10.4 fL    Neut % 71.3 %    Lymph % 19.1 %    Mono % 8.4 %    Eos % 0.6 %    Basophil % 0.3 %    Lymph # 1.23 0.6 - 4.6 x10(3)/mcL    Neut # 4.59 2.1 - 9.2 x10(3)/mcL    Mono # 0.54 0.1 - 1.3 x10(3)/mcL    Eos # 0.04 0 - 0.9 x10(3)/mcL    Baso # 0.02 <=0.2 x10(3)/mcL    IG# 0.02 0 - 0.04 x10(3)/mcL    IG% 0.3 %    NRBC% 0.3 %   T4, Free    Collection Time: 10/29/24  1:05 PM   Result Value Ref Range    Thyroxine Free 0.86 0.70 - 1.48 ng/dL   TSH    Collection Time: 10/29/24  1:05 PM   Result Value Ref Range    TSH 39.778 (H) 0.350 - 4.940 uIU/mL   Vitamin D    Collection Time: 10/29/24  1:05 PM   Result Value Ref Range    Vitamin D 16 (L) 30 - 80 ng/mL   Iron and TIBC    Collection Time: 10/29/24  1:05 PM   Result Value Ref Range    Iron Binding Capacity Unsaturated 243 70 - 310 ug/dL    Iron Level 71 50 - 170 ug/dL    Transferrin 284 180 - 382 mg/dL     Iron Binding Capacity Total 314 250 - 450 ug/dL    Iron Saturation 23 20 - 50 %   Ferritin    Collection Time: 10/29/24  1:05 PM   Result Value Ref Range    Ferritin Level 376.99 (H) 4.63 - 204.00 ng/mL   Folate    Collection Time: 10/29/24  2:42 PM   Result Value Ref Range    Folate Level 13.0 7.0 - 31.4 ng/mL   B-12    Collection Time: 10/29/24  2:43 PM   Result Value Ref Range    Vitamin B12 952 (H) 213 - 816 pg/mL   Miscellaneous Test, Sendout Copper, Serum    Collection Time: 10/29/24  2:44 PM   Result Value Ref Range    See Scanned Report See Scanned Result    POCT Urinalysis, Dipstick, Automated, W/O Scope    Collection Time: 11/05/24 10:01 AM   Result Value Ref Range    POC Blood, Urine Negative Negative    POC Bilirubin, Urine Negative Negative    POC Urobilinogen, Urine 0.2 0.1 - 1.1    POC Ketones, Urine Negative Negative    POC Protein, Urine Negative Negative    POC Nitrates, Urine Negative Negative    POC Glucose, Urine Negative Negative    pH, UA 5.5     POC Specific Gravity, Urine 1.030 (A) 1.003 - 1.029    POC Leukocytes, Urine Negative Negative     Imaging Results:     Narrative & Impression  EXAMINATION:  CTA CHEST NON CORONARY (PE STUDIES)     CLINICAL HISTORY:  Pulmonary embolism (PE) suspected, unknown D-dimer;     TECHNIQUE:  Sequential axial images performed of the chest using pulmonary embolism protocol following intravenous contrast bolus. Sagittal and contrast reformations performed.     Dose product length of 1222 mGycm. Automated exposure control was utilized to minimize radiation dose.     COMPARISON:  CT chest July 2, 2023.  Chest radiograph August 15, 2024.     FINDINGS:  Optimal contrast bolus timing with adequately opacified pulmonary artery system is without evidence of filling defects from pulmonary thromboembolism within the main pulmonary artery and the major branches.  Thoracic aorta is without aneurysmal dilatation or dissection.  Cardiac chambers are not enlarged in size  no pericardial effusion.     Right middle lung lobe and left lingular segment are remarkable for new opacities which may represent atelectasis or scarring.  These do not convince for acute consolidation.  Lungs otherwise are well expanded clear.  No fluid within the pleural spaces.  There are no enlarging chest lymph nodes.     Liver is remarkable for steatosis.  Adrenals are not enlarged in size.     Impression:     1.  No pulmonary embolism identified.     2.  Right middle lung lobe and lingular segment atelectasis and/or scarring without convincing acute consolidation.      Electronically signed by:Maurisio Morales  Date:                                            08/20/2024  Time:                                           18:33      Narrative & Impression  EXAMINATION:  NM PET CT FDG SKULL BASE TO MID THIGH     CLINICAL HISTORY:  B-Cell Lymphoma restaging after completion of chemotherapy; Diffuse large b-cell lymphoma, extranodal and solid organ sites     TECHNIQUE:  10.5 mCi of F18-FDG given intravenously with subsequent PET imaging from the skull base through the upper thighs. Corresponding CT images acquired for anatomic correlation and attenuation correction. Dose length product 1116 mGycm.     COMPARISON:  PET-CT 06/18/2024     FINDINGS:  No tracer avid cervical lymph node identified.     Right-sided MediPort catheter tip in the SVC.  No significant pleural or pericardial fluid.  No hypermetabolic pulmonary nodule or thoracic lymph node.     Normal activity in the liver, spleen, pancreas and adrenal glands.  Hepatomegaly measures 23 cm.  Severe generalized hepatic steatosis.  Splenomegaly measures 15 cm.  No hydronephrosis.  No tracer avid abdominal or pelvic lymph node appreciated.     Relatively generalized osseous hypermetabolism likely post treatment change.     Impression:     No suspicious areas of radiotracer activity identified.     Hepatosplenomegaly with hepatic steatosis.      Electronically signed  by:Joaquin Bossman  Date:                                            10/02/2024  Time:                                           10:50    Assessment/Plan:     Problem List Items Addressed This Visit          GI    Hepatic steatosis - Primary     Per review of laboratory results, LFTs have been intermittently elevated for the past several months.    Acute viral hepatitis panel was negative June 8, 2023.    Hepatitis testing for B and C was negative June 18, 2024.  PET scan October 2, 2024 revealed no suspicious areas of radiotracer activity identified.  Hepatosplenomegaly with hepatic steatosis.  Lifestyle and dietary modifications provided  Recommend good control of comorbidities  Will proceed with further workup for elevated LFTs (see below)   Abdominal ultrasound   FibroScan   Call with updates   Follow-up clinic visit with NP in 4 months         Relevant Orders    Actin (Smooth Muscle) Antibody (IgG)    Alpha-1-Antitrypsin    Anti-Liver, Kidney, Microsome Ab    Antimitochondrial Antibody    ANTINUCLEAR ANTIBODIES    APTT    Ceruloplasmin    US Abdomen Limited    Tissue Transglutaminase, IgA    Protime-INR    US Elastography Liver w/imaging    Hemochromatosis DNA Analysis (PCR)    Elevated LFTs     See above         Relevant Orders    Actin (Smooth Muscle) Antibody (IgG)    Alpha-1-Antitrypsin    Anti-Liver, Kidney, Microsome Ab    Antimitochondrial Antibody    ANTINUCLEAR ANTIBODIES    APTT    Ceruloplasmin    US Abdomen Limited    Tissue Transglutaminase, IgA    Protime-INR    US Elastography Liver w/imaging    Hemochromatosis DNA Analysis (PCR)    Chronic idiopathic constipation     Recommend soluble fiber supplementation  Avoid straining or sitting on the toilet for long periods of time  Continue MiraLax as directed         Internal hemorrhoids without complication     She underwent colonoscopy September 5, 2023 with findings of adenomatous polyp in the sigmoid colon with polypectomy and grade 2 internal  hemorrhoids with infrared coagulation.  Recommend soluble fiber supplementation  Avoid straining or sitting on the toilet for long periods of time  Continue MiraLax as directed  Hemorrhoidal banding discussed  Sitz baths and tucks pads         History of colonic polyps     She underwent colonoscopy September 5, 2023 with findings of adenomatous polyp in the sigmoid colon with polypectomy and grade 2 internal hemorrhoids with infrared coagulation.

## 2024-11-06 NOTE — ASSESSMENT & PLAN NOTE
Per review of laboratory results, LFTs have been intermittently elevated for the past several months.    Acute viral hepatitis panel was negative June 8, 2023.    Hepatitis testing for B and C was negative June 18, 2024.  PET scan October 2, 2024 revealed no suspicious areas of radiotracer activity identified.  Hepatosplenomegaly with hepatic steatosis.  Lifestyle and dietary modifications provided  Recommend good control of comorbidities  Will proceed with further workup for elevated LFTs (see below)   Abdominal ultrasound   FibroScan   Call with updates   Follow-up clinic visit with NP in 4 months

## 2024-11-06 NOTE — ASSESSMENT & PLAN NOTE
She underwent colonoscopy September 5, 2023 with findings of adenomatous polyp in the sigmoid colon with polypectomy and grade 2 internal hemorrhoids with infrared coagulation.  Recommend soluble fiber supplementation  Avoid straining or sitting on the toilet for long periods of time  Continue MiraLax as directed  Hemorrhoidal banding discussed  Sitz baths and tucks pads

## 2024-11-07 LAB
HIGH RISK HPV: NEGATIVE
PSYCHE PATHOLOGY RESULT: NORMAL

## 2024-11-08 LAB
A1AT SERPL NEPH-MCNC: 183 MG/DL (ref 100–190)
CERULOPLASMIN SERPL-MCNC: 27.2 MG/DL (ref 20–51)
LKM-1 IGG SER IA-ACNC: 1.1 U
MITOCHONDRIA M2 AB SER IA-ACNC: <0.1 U
TTG IGA SER IA-ACNC: <1.02 FLU

## 2024-11-11 ENCOUNTER — TELEPHONE (OUTPATIENT)
Dept: GYNECOLOGY | Facility: CLINIC | Age: 44
End: 2024-11-11
Payer: MEDICAID

## 2024-11-11 DIAGNOSIS — N64.52 PURULENT DISCHARGE FROM NIPPLE: Primary | ICD-10-CM

## 2024-11-11 LAB
ANTINUCLEAR ANTIBODY SCREEN (OHS): NEGATIVE
DSDNA AB QUANT (OHS): 2 IU/ML
SMA IGG SER-ACNC: 7.9 U

## 2024-11-11 RX ORDER — CLINDAMYCIN HYDROCHLORIDE 300 MG/1
300 CAPSULE ORAL 3 TIMES DAILY
Qty: 15 CAPSULE | Refills: 0 | Status: SHIPPED | OUTPATIENT
Start: 2024-11-11

## 2024-11-11 NOTE — TELEPHONE ENCOUNTER
" verified. Informed patient different antibiotic for the purulent nipple discharge sent to pharmacy. Stop Bactrim, and start Clindamycin. Stated awaiting results to figure if nipple discharge is truly purulent or milky discharge. Pt stated Bactrim made her "violently ill" with vomiting, upset stomach.  Currently awaiting results from Dr. Bishop [facility] in [place], [place]. Will address Prolactin level once receive from office. Pt stated has appt with Dr. Bishop tomorrow about labs and hormone levels. Pt mentioned Dr. Bishop will order MRI pituitary d/t elevated Prolactin level.  "

## 2024-11-13 LAB
GENETIC VARIANT DETAILS BLD/T: NORMAL
GENETICIST REVIEW: NORMAL
LAB TEST METHOD: NORMAL
MOL DX INTERP BLD/T QL: NORMAL
PROVIDER SIGNING NAME: NORMAL
SPECIMEN SOURCE: NORMAL

## 2024-11-13 NOTE — PROGRESS NOTES
Please notify HFE testing revealed possible carrier status for hereditary hemochromatosis but unlikely to be of clinical significance in the absence of other disease causing variance.  Thanks

## 2024-11-14 ENCOUNTER — HOSPITAL ENCOUNTER (OUTPATIENT)
Dept: RADIOLOGY | Facility: HOSPITAL | Age: 44
Discharge: HOME | End: 2024-11-14
Attending: NURSE PRACTITIONER
Payer: MEDICAID

## 2024-11-14 DIAGNOSIS — R79.89 ELEVATED LFTS: ICD-10-CM

## 2024-11-14 DIAGNOSIS — K76.0 HEPATIC STEATOSIS: ICD-10-CM

## 2024-11-14 PROCEDURE — 76705 ECHO EXAM OF ABDOMEN: CPT | Mod: TC

## 2024-11-14 NOTE — PROGRESS NOTES
Please notify findings of hepatomegaly with steatosis and status post cholecystectomy with no significant biliary ductal dilatation noted.  Thanks

## 2024-11-21 ENCOUNTER — TELEPHONE (OUTPATIENT)
Dept: GYNECOLOGY | Facility: CLINIC | Age: 44
End: 2024-11-21
Payer: MEDICAID

## 2024-11-21 NOTE — TELEPHONE ENCOUNTER
----- Message from ALIYA Baird sent at 11/20/2024  2:36 PM CST -----  Please call patient and let her know she has an upcoming appt with GYN residents/docs in December upcoming to discuss hormones and treatment, keep scheduled appointment.  ----- Message -----  From: Branden Dover  Sent: 11/20/2024   2:28 PM CST  To: ALIYA Baird; [facility] Staff    The above pt called and wants to know if she needs to start hormones again based on her lab work. Please advise, thanks

## 2024-12-02 DIAGNOSIS — G47.33 OSA (OBSTRUCTIVE SLEEP APNEA): Primary | ICD-10-CM

## 2024-12-11 ENCOUNTER — OFFICE VISIT (OUTPATIENT)
Dept: GYNECOLOGY | Facility: CLINIC | Age: 44
End: 2024-12-11
Payer: MEDICAID

## 2024-12-11 VITALS
HEART RATE: 71 BPM | RESPIRATION RATE: 18 BRPM | OXYGEN SATURATION: 93 % | SYSTOLIC BLOOD PRESSURE: 109 MMHG | HEIGHT: 68 IN | WEIGHT: 270.63 LBS | TEMPERATURE: 98 F | DIASTOLIC BLOOD PRESSURE: 76 MMHG | BODY MASS INDEX: 41.02 KG/M2

## 2024-12-11 DIAGNOSIS — N39.41 URGE INCONTINENCE: Primary | ICD-10-CM

## 2024-12-11 DIAGNOSIS — N95.1 MENOPAUSAL HOT FLUSHES: ICD-10-CM

## 2024-12-11 DIAGNOSIS — N30.00 ACUTE CYSTITIS WITHOUT HEMATURIA: ICD-10-CM

## 2024-12-11 LAB
BACTERIA #/AREA URNS AUTO: ABNORMAL /HPF
BILIRUB UR QL STRIP.AUTO: NEGATIVE
CLARITY UR: CLEAR
COLOR UR AUTO: YELLOW
GLUCOSE UR QL STRIP: ABNORMAL
HGB UR QL STRIP: NEGATIVE
HYALINE CASTS #/AREA URNS LPF: ABNORMAL /LPF
KETONES UR QL STRIP: NEGATIVE
LEUKOCYTE ESTERASE UR QL STRIP: NEGATIVE
MUCOUS THREADS URNS QL MICRO: ABNORMAL /LPF
NITRITE UR QL STRIP: NEGATIVE
PH UR STRIP: 5 [PH]
PROT UR QL STRIP: NEGATIVE
RBC #/AREA URNS AUTO: ABNORMAL /HPF
SP GR UR STRIP.AUTO: 1.03 (ref 1–1.03)
SQUAMOUS #/AREA URNS LPF: ABNORMAL /HPF
UROBILINOGEN UR STRIP-ACNC: NORMAL
WBC #/AREA URNS AUTO: ABNORMAL /HPF

## 2024-12-11 PROCEDURE — 81001 URINALYSIS AUTO W/SCOPE: CPT

## 2024-12-11 PROCEDURE — 87077 CULTURE AEROBIC IDENTIFY: CPT

## 2024-12-11 PROCEDURE — 99214 OFFICE O/P EST MOD 30 MIN: CPT | Mod: PBBFAC

## 2024-12-11 NOTE — PROGRESS NOTES
"    [facility] GYNECOLOGY CLINIC NOTE     Fred Berman is a 44 y.o.  w/ hx of diffuse large B-cell lymphoma presenting to GYN clinic for discussion of hot flushes.     Pt with hx of endometrial ablation in , states that afterwards she still had light spotting lasting 2-3 days monthly with dysmenorrhea, however last cycle was in . Work-up at that time notable for low estradiol and FSH. 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 Primary gyn had started her on a compounded vaginal hormone cream that she had been using daily until NP instructed her to discontinue at her last visit in . Reports that hot flashes have persisted, pt states that she has not noticed any vaginal dryness or irritation however is not sexually active at this time. Notes occasional urinary leakage that has started within the past year, states that when she feels the urge to urinate she will often leak urine on her way to the bathroom. No dysuria or hematuria noted.    Separately, reports a recent onset of milky nipple discharge from R breast, breast US BIRADS 2 from . Follows with endocrinology in [place], Dr Molina, reports hx of intermittently elevated PRL levels.     Gynecology  Last pap : NILM, HPV negative   Hx of ablation in . Reports dysmenorrhea and lighter spotting after until ,   OB History          5    Para   3    Term   1       2    AB   2    Living   0         SAB   2    IAB        Ectopic        Multiple        Live Births   0                Past Medical History:   Diagnosis Date    Adrenal mass     Anxiety     Arthritis     Bradycardia     Depression     Diabetes mellitus, type 2     Gastroparesis     General anesthetics causing adverse effect in therapeutic use     "hard time waking up"    GERD (gastroesophageal reflux disease)     Hip pain  "    Hypertension     Menstrual cramps     Ovarian cyst     Palpitations     Pyosalpingitis     Thyroid disease       Past Surgical History:   Procedure Laterality Date    ABLATION OF VAGINAL TISSUE USING LASER      ADENOIDECTOMY      BONE MARROW ASPIRATION N/A 2024    Procedure: ASPIRATION, BONE MARROW;  Surgeon: Patrizia Bhatt MD;  Location: [facility] ENDOSCOPY;  Service: General;  Laterality: N/A;    BONE MARROW BIOPSY N/A 2024    Procedure: Biopsy-bone marrow;  Surgeon: Patrizia Bhatt MD;  Location: [facility] ENDOSCOPY;  Service: General;  Laterality: N/A;     SECTION      CHOLECYSTECTOMY      DISSECTION OF NECK Left 05/10/2024    Procedure: DISSECTION, NECK;  Surgeon: Cali Trujillo MD;  Location: [facility] OR;  Service: ENT;  Laterality: Left;    INSERTION OF TUNNELED CENTRAL VENOUS CATHETER (CVC) WITH SUBCUTANEOUS PORT Right 2024    Procedure: [id number];  Surgeon: Lio Storey Jr., MD;  Location: [facility] OR;  Service: General;  Laterality: Right;    THYROIDECTOMY Left 05/10/2024    Procedure: THYROIDECTOMY;  Surgeon: Cali Trujillo MD;  Location: [facility] OR;  Service: ENT;  Laterality: Left;    TONSILLECTOMY  1985    TUBAL LIGATION        Current Outpatient Medications   Medication Instructions    acetaminophen (TYLENOL) 1,000 mg, Oral, Every 6 hours    buprenorphine HCL (SUBUTEX) 8 mg, 3 times daily    calcium carbonate/vitamin D3 (CALCIUM 600 + D,3, ORAL) 1 tablet, 2 times daily    cholecalciferol (vitamin D3) (VITAMIN D3) 1,000 Units, Oral, Daily    cholecalciferol (vitamin D3) 50,000 Units, Oral, Every 7 days    ciprofloxacin HCl (CIPRO) 500 mg, Oral, Every 12 hours    clindamycin (CLEOCIN) 300 mg, Oral, 3 times daily    COMPOUND HORMONE REPLACEMENT Daily    conjugated estrogens (PREMARIN) 0.5 g, Vaginal, Daily, Use two weeks nightly, then twice weekly from there on    diphenhydrAMINE-aluminum-magnesium hydroxide-simethicone-LIDOcaine viscous HCl 2% 15 mLs, Swish  "& Spit, Every 4 hours PRN    HUMULIN R U-500, CONC, KWIKPEN 500 unit/mL (3 mL) insulin pen Inject into the skin.    hydrocortisone (ANUSOL-HC) 2.5 % rectal cream 1 application , 2 times daily    insulin lispro 35 Units, 3 times daily    LANTUS SOLOSTAR U-100 INSULIN 15 Units, Subcutaneous, 2 times daily    levothyroxine (SYNTHROID) 200 mcg, Oral, Before breakfast    LORazepam (ATIVAN) 1 mg, Every 6 hours PRN    LORazepam (ATIVAN) 1 mg, 2 times daily    losartan (COZAAR) 50 mg, Oral, Daily    nitrofurantoin, macrocrystal-monohydrate, (MACROBID) 100 MG capsule 100 mg, Oral, 2 times daily    OLANZapine (ZYPREXA) 5 MG tablet Take 1 tablet by mouth nightly on days 1-3 of each chemotherapy cycle.    ondansetron (ZOFRAN-ODT) 4 mg, Oral, Every 8 hours PRN    pantoprazole (PROTONIX) 40 mg, Every morning    pen needle, diabetic (BD ULTRA-FINE ROSALEE PEN NEEDLE) 32 gauge x 5/32" Ndle 1 each, Misc.(Non-Drug; Combo Route), 2 times daily    polyethylene glycol (GLYCOLAX) 17 g, Oral, 2 times daily    predniSONE (DELTASONE) 100 mg, Oral, Daily, On days 2-5 of your chemotherapy cycles. Take with food.    prochlorperazine (COMPAZINE) 10 mg, Oral, Every 6 hours PRN    rosuvastatin (CRESTOR) 20 mg, Oral, Daily     Social History     Tobacco Use    Smoking status: Former     Current packs/day: 0.00     Average packs/day: 1.5 packs/day for 15.0 years (22.5 ttl pk-yrs)     Types: Cigarettes     Quit date: 3/5/2003     Years since quittin.7    Smokeless tobacco: Never    Tobacco comments:     Quit over a year ago.   Substance Use Topics    Alcohol use: Never     Alcohol/week: 6.0 standard drinks of alcohol     Types: 6 Shots of liquor per week    Drug use: Not Currently     Types: Methamphetamines     Comment: Im on Subutex     Review of Systems  Pertinent items are noted in HPI.     Objective:     /76 (BP Location: Right arm, Patient Position: Sitting)   Pulse 71   Temp 98 °F (36.7 °C) (Oral)   Resp 18   Ht 5' 8" (1.727 m)   " Wt 122.7 kg (270 lb 9.6 oz)   LMP  (LMP Unknown) Comment: Not having menstrual  periods at this time  SpO2 (!) 93%   BMI 41.14 kg/m²   Physical Exam:  Gen: Well-nourished, well-developed female appearing stated age. Alert, cooperative, in no acute distress.  CV: Regular rate   Chest: No conversational dyspnea   Abdomen: Soft, non-tender, no masses.  Extrem: Extremities normal, atraumatic, non-tender calves.  External genitalia: Normal female genetalia without lesion, discharge or tenderness.     Assessment:       44 y.o.  w/ hx of diffuse large B-cell lymphoma presenting to GYN clinic for discussion of hot flushes and sweating, possibly 2/2 B symptoms associated with lymphoma vs. Menopause. Not interested in HRT at this time.   1. Urge incontinence  Urinalysis    Urine Culture High Risk      2. Menopausal hot flushes        3. Acute cystitis without hematuria          Plan:     Hot flushes   - Declines HRT. Not interested in other treatments such as Paxil, Venlafaxine, Gabapentin. States she has tried these medications for mood sxs in the past and is not interested in restarting   - Continue to monitor at this time     Urinary leakage  - Possibly 2/2 GSM of menopause  - UA/Ucx ordered to r/o infection   - Premarin cream initiated     Breast Discharge   - Now resolved, s/p BIRADS 2 imaging of R breast   - Hx of elevated PRLs. Management per endocrinology.    Problem List Items Addressed This Visit    None  Visit Diagnoses       Urge incontinence    -  Primary    Relevant Orders    Urinalysis (Completed)    Urine Culture High Risk (Completed)    Menopausal hot flushes        Acute cystitis without hematuria              Interval Update:   Ucx: >100K E coli. UA without evidence if WBCs, nitrites, RBCs. Possibility that E. Coli is colonizer not pathogenic, however given pt's recent CA and CTX history along with reported new onset urge incontinence, will send Rx for Macrobid 100 mg BID x 5 days. Pt did not  answer phone call, will try again at later time. Rx sent to pharmacy on file.    Return to clinic in 6 months for re-evaluation of hot flushes, response to premarin cream    Discussed patient and plan with Dr. Provost Kristen Triana MD   [employer] [facility] PGY-4

## 2024-12-12 ENCOUNTER — OFFICE VISIT (OUTPATIENT)
Dept: CARDIOLOGY | Facility: CLINIC | Age: 44
End: 2024-12-12
Payer: MEDICAID

## 2024-12-12 VITALS
HEIGHT: 68 IN | OXYGEN SATURATION: 95 % | WEIGHT: 272 LBS | SYSTOLIC BLOOD PRESSURE: 129 MMHG | HEART RATE: 70 BPM | RESPIRATION RATE: 20 BRPM | BODY MASS INDEX: 41.22 KG/M2 | TEMPERATURE: 98 F | DIASTOLIC BLOOD PRESSURE: 87 MMHG

## 2024-12-12 DIAGNOSIS — R00.2 PALPITATIONS: ICD-10-CM

## 2024-12-12 DIAGNOSIS — R42 ORTHOSTATIC LIGHTHEADEDNESS: ICD-10-CM

## 2024-12-12 DIAGNOSIS — E66.811 CLASS 1 OBESITY DUE TO EXCESS CALORIES WITH BODY MASS INDEX (BMI) OF 33.0 TO 33.9 IN ADULT, UNSPECIFIED WHETHER SERIOUS COMORBIDITY PRESENT: ICD-10-CM

## 2024-12-12 DIAGNOSIS — C85.99 MALIGNANT LYMPHOMA OF THYROID GLAND: ICD-10-CM

## 2024-12-12 DIAGNOSIS — G47.33 OSA (OBSTRUCTIVE SLEEP APNEA): ICD-10-CM

## 2024-12-12 DIAGNOSIS — R55 TRANSIENT LOC (LOSS OF CONSCIOUSNESS): ICD-10-CM

## 2024-12-12 DIAGNOSIS — R07.9 CHEST PAIN, UNSPECIFIED TYPE: Primary | ICD-10-CM

## 2024-12-12 DIAGNOSIS — E03.9 HYPOTHYROIDISM, UNSPECIFIED TYPE: ICD-10-CM

## 2024-12-12 DIAGNOSIS — E11.65 UNCONTROLLED TYPE 2 DIABETES MELLITUS WITH HYPERGLYCEMIA: ICD-10-CM

## 2024-12-12 DIAGNOSIS — I10 HYPERTENSION, UNSPECIFIED TYPE: ICD-10-CM

## 2024-12-12 DIAGNOSIS — C83.398 DIFFUSE LARGE B-CELL LYMPHOMA OF SOLID ORGAN EXCLUDING SPLEEN: ICD-10-CM

## 2024-12-12 DIAGNOSIS — E66.09 CLASS 1 OBESITY DUE TO EXCESS CALORIES WITH BODY MASS INDEX (BMI) OF 33.0 TO 33.9 IN ADULT, UNSPECIFIED WHETHER SERIOUS COMORBIDITY PRESENT: ICD-10-CM

## 2024-12-12 PROCEDURE — 99215 OFFICE O/P EST HI 40 MIN: CPT | Mod: PBBFAC | Performed by: INTERNAL MEDICINE

## 2024-12-12 RX ORDER — INSULIN HUMAN 500 [IU]/ML
INJECTION, SOLUTION SUBCUTANEOUS
COMMUNITY
Start: 2024-12-10

## 2024-12-12 RX ORDER — NITROFURANTOIN 25; 75 MG/1; MG/1
100 CAPSULE ORAL 2 TIMES DAILY
Qty: 10 CAPSULE | Refills: 0 | Status: SHIPPED | OUTPATIENT
Start: 2024-12-12 | End: 2024-12-17

## 2024-12-12 RX ORDER — ROSUVASTATIN CALCIUM 20 MG/1
20 TABLET, COATED ORAL DAILY
Qty: 90 TABLET | Refills: 3 | Status: SHIPPED | OUTPATIENT
Start: 2024-12-12 | End: 2025-12-12

## 2024-12-12 NOTE — PATIENT INSTRUCTIONS
Reminders:      Schedule/complete ultrasound of heart (echo) before follow up; contact number to scheduling attached    Go to 1st floor lab for FASTING blood work the last week of December; blood work does not require an appointment.

## 2024-12-12 NOTE — PROGRESS NOTES
CHIEF COMPLAINT:   Chief Complaint   Patient presents with    Referral    Dizziness    Chest Pain    Shortness of Breath                   Review of patient's allergies indicates:  No Known Allergies                                       HPI:  Fred Berman 44 y.o. female - presents to clinic as a new patient establishing care. She has been experiencing worsening shortness of breath, lightheadness, palpitations, and TLOC episodes that occur while sitting and standing. Patient reports occasional sternal pain and left shoulder numbness at rest/exertion. Patient has a PMHx of B cell lymphoma s/p R CHOP, hypothyroidism, hemithyroidectomy, poorly controlled DM2, HTN. Patient initial chemotherapy plan was completed and therapy is on hiatus for evaluation to see if further chemotherapy is warranted. Prior to initial therapy, patient had echo 6/18/2024. Revealed benign findings at that time with an EF of 55-60%. Patient was placed on 14 day cardiac monitor 9/12/2024 which revealed NSR with 0 events noted with 0 events occurring when patient was experiencing symptoms. Patient reports no TLOC occurred while wearing monitor. Recent EKGs normal sinus no abnormalities. TSH has been inconsistent. Patient had increased work of breath on exam. Edematous BLE: 2+. 129/87 BP pulse 70 saturating 95% on room air. BMI 41.36. Patient reports increased weight gain with fluid retention. Patient perceives incoming TLOCs with aura and tinnitus.                                                                                                                                                                                                                                                                                                                                                                                                                                                                                         CARDIAC  TESTING:  Results for orders placed during the hospital encounter of 06/26/24    Echo    Interpretation Summary    Left Ventricle: The left ventricle is dilated. Normal wall thickness. There is normal systolic function with a visually estimated ejection fraction of 55 - 60%. Global longitudinal strain is --18.9%. Global longitudinal strain is normal. There is normal diastolic function.    Right Ventricle: Normal right ventricular cavity size. Systolic function is normal.    Left Atrium: Left atrium is mildly dilated.    IVC/SVC: Normal venous pressure at 3 mmHg.    Pericardium: There is no pericardial effusion.    No results found for this or any previous visit.     No results found for this or any previous visit.       Patient Active Problem List   Diagnosis    Dysmenorrhea    TOA (tubo-ovarian abscess)    Pelvic prolapse    Unexplained weight loss    Gastroparesis    Orthostatic lightheadedness    Volume depletion    Moderate malnutrition    Controlled type 2 diabetes mellitus with complication, with long-term current use of insulin    HTN (hypertension)    Hypothyroidism    Generalized anxiety disorder    Chronic pain syndrome    Vitamin D deficiency    Class 1 obesity in adult    Panic attack    Lesion of adrenal gland    Arthritis    Anxiety    Palpitations    Uncontrolled type 2 diabetes mellitus with hyperglycemia    Pre-op evaluation    Thyroid nodule    Primary thyroid malignancy    Diffuse large B-cell lymphoma of solid organ excluding spleen    Malignant lymphoma of thyroid gland    Family history of von Willebrand disease    Fibroma of tongue    History of opioid abuse    Infected venous access port    Vocal cord paralysis    Neutropenia    Skin lesion of chest wall    Dyspnea    Tobacco abuse    Hepatosplenomegaly    Hepatic steatosis    Elevated LFTs    History of colonic polyps    Internal hemorrhoids without complication    Chronic idiopathic constipation     Past Surgical History:   Procedure  Laterality Date    ABLATION OF VAGINAL TISSUE USING LASER      ADENOIDECTOMY      BONE MARROW ASPIRATION N/A 2024    Procedure: ASPIRATION, BONE MARROW;  Surgeon: Patrizia Bhatt MD;  Location: [facility] ENDOSCOPY;  Service: General;  Laterality: N/A;    BONE MARROW BIOPSY N/A 2024    Procedure: Biopsy-bone marrow;  Surgeon: Patrizia Bhatt MD;  Location: [facility] ENDOSCOPY;  Service: General;  Laterality: N/A;     SECTION      CHOLECYSTECTOMY      DISSECTION OF NECK Left 05/10/2024    Procedure: DISSECTION, NECK;  Surgeon: Cali Trujillo MD;  Location: [facility] OR;  Service: ENT;  Laterality: Left;    INSERTION OF TUNNELED CENTRAL VENOUS CATHETER (CVC) WITH SUBCUTANEOUS PORT Right 2024    Procedure: [id number];  Surgeon: Lio Storey Jr., MD;  Location: [facility] OR;  Service: General;  Laterality: Right;    THYROIDECTOMY Left 05/10/2024    Procedure: THYROIDECTOMY;  Surgeon: Cali Trujillo MD;  Location: [facility] OR;  Service: ENT;  Laterality: Left;    TONSILLECTOMY  1985    TUBAL LIGATION       Social History     Socioeconomic History    Marital status: Single   Tobacco Use    Smoking status: Former     Current packs/day: 0.00     Average packs/day: 1.5 packs/day for 15.0 years (22.5 ttl pk-yrs)     Types: Cigarettes     Quit date: 3/5/2003     Years since quittin.7    Smokeless tobacco: Never    Tobacco comments:     Quit over a year ago.   Substance and Sexual Activity    Alcohol use: Never     Alcohol/week: 6.0 standard drinks of alcohol     Types: 6 Shots of liquor per week    Drug use: Not Currently     Types: Methamphetamines     Comment: Im on Subutex    Sexual activity: Yes     Partners: Male     Birth control/protection: Post-menopausal     Social Drivers of Health     Financial Resource Strain: Medium Risk (2024)    Overall Financial Resource Strain (CARDIA)     Difficulty of Paying Living Expenses: Somewhat hard   Food Insecurity: No Food Insecurity  (6/27/2024)    Hunger Vital Sign     Worried About Running Out of Food in the Last Year: Never true     Ran Out of Food in the Last Year: Never true   Transportation Needs: No Transportation Needs (5/12/2024)    TRANSPORTATION NEEDS     Transportation : No   Physical Activity: Insufficiently Active (6/27/2024)    Exercise Vital Sign     Days of Exercise per Week: 2 days     Minutes of Exercise per Session: 30 min   Stress: No Stress Concern Present (6/27/2024)    [employer] [place] [employer] - Occupational Stress Questionnaire     Feeling of Stress : Only a little   Recent Concern: Stress - Stress Concern Present (5/12/2024)    [employer] [place] [employer] - Occupational Stress Questionnaire     Feeling of Stress : To some extent   Housing Stability: Low Risk  (6/27/2024)    Housing Stability Vital Sign     Unable to Pay for Housing in the Last Year: No     Homeless in the Last Year: No        Family History   Problem Relation Name Age of Onset    Alcohol abuse Mother Pat         Pat    Arthritis Mother Pat     Breast cancer Mother Pat     COPD Mother Pat     Depression Mother Pat     Diabetes Mother Pat     Drug abuse Mother Pat     Hypertension Mother Pat     Miscarriages / Stillbirths Mother Pat     Cancer Mother Pat     Depression Father Juventino     Drug abuse Father Juventino     Hypertension Father Juventino     Prostate cancer Father Juventino     Thyroid cancer Father Juventino     Drug abuse Sister Elana     Heart disease Sister Elana     Miscarriages / Stillbirths Sister Elana     Drug abuse Brother Juventino     Early death Brother Izaiah     Asthma Son Mojgan     Hypertension Son Mojgan     Miscarriages / Stillbirths Maternal Grandmother Arlean     Cancer Maternal Grandfather Janay     Hearing loss Paternal Grandfather Lyod          Current Outpatient Medications:     acetaminophen (TYLENOL) 500 MG tablet, Take 2 tablets (1,000 mg total) by mouth every 6 (six) hours., Disp: 60 tablet, Rfl: 0    buprenorphine HCL  "(SUBUTEX) 8 mg Subl, Place 8 mg under the tongue 3 (three) times daily., Disp: , Rfl:     cholecalciferol, vitamin D3, 1,250 mcg (50,000 unit) capsule, Take 1 capsule (50,000 Units total) by mouth every 7 days., Disp: 12 capsule, Rfl: 0    HUMULIN R U-500, CONC, KWIKPEN 500 unit/mL (3 mL) insulin pen, Inject into the skin., Disp: , Rfl:     insulin lispro 100 unit/mL pen, Inject 35 Units into the skin 3 (three) times daily. With meals, Disp: , Rfl:     levothyroxine (SYNTHROID) 200 MCG tablet, Take 1 tablet (200 mcg total) by mouth before breakfast., Disp: 30 tablet, Rfl: 11    LORazepam (ATIVAN) 1 MG tablet, Take 1 mg by mouth 2 (two) times daily., Disp: , Rfl:     losartan (COZAAR) 50 MG tablet, Take 1 tablet (50 mg total) by mouth once daily., Disp: 90 tablet, Rfl: 3    ondansetron (ZOFRAN-ODT) 4 MG TbDL, Take 1 tablet (4 mg total) by mouth every 8 (eight) hours as needed (NAUSEA)., Disp: 30 tablet, Rfl: 1    pantoprazole (PROTONIX) 40 MG tablet, Take 40 mg by mouth every morning., Disp: , Rfl:     pen needle, diabetic (BD ULTRA-FINE ROSALEE PEN NEEDLE) 32 gauge x 5/32" Ndle, 1 each by Misc.(Non-Drug; Combo Route) route 2 (two) times a day., Disp: 100 each, Rfl: 9    polyethylene glycol (GLYCOLAX) 17 gram PwPk, Take 17 g by mouth 2 (two) times daily., Disp: 30 each, Rfl: 1    calcium carbonate/vitamin D3 (CALCIUM 600 + D,3, ORAL), Take 1 tablet by mouth 2 (two) times a day. (Patient not taking: Reported on 12/12/2024), Disp: , Rfl:     cholecalciferol, vitamin D3, (VITAMIN D3) 25 mcg (1,000 unit) capsule, Take 1 capsule (1,000 Units total) by mouth once daily. (Patient not taking: Reported on 11/6/2024), Disp: 30 capsule, Rfl: 3    ciprofloxacin HCl (CIPRO) 500 MG tablet, Take 1 tablet (500 mg total) by mouth every 12 (twelve) hours. (Patient not taking: Reported on 10/3/2024), Disp: 14 tablet, Rfl: 0    clindamycin (CLEOCIN) 300 MG capsule, Take 1 capsule (300 mg total) by mouth 3 (three) times daily. (Patient not " taking: Reported on 12/12/2024), Disp: 15 capsule, Rfl: 0    COMPOUND HORMONE REPLACEMENT, Take by mouth once daily. (Patient not taking: Reported on 11/6/2024), Disp: , Rfl:     conjugated estrogens (PREMARIN) vaginal cream, Place 0.5 g vaginally once daily. Use two weeks nightly, then twice weekly from there on (Patient not taking: Reported on 12/12/2024), Disp: 1 applicator, Rfl: 5    diphenhydrAMINE-aluminum-magnesium hydroxide-simethicone-LIDOcaine viscous HCl 2%, Swish and spit 15 mLs every 4 (four) hours as needed (Mouth sores). (Patient not taking: Reported on 12/12/2024), Disp: 240 each, Rfl: 0    hydrocortisone (ANUSOL-HC) 2.5 % rectal cream, Place 1 application  rectally 2 (two) times daily. (Patient not taking: Reported on 8/22/2024), Disp: , Rfl:     insulin (LANTUS SOLOSTAR U-100 INSULIN) glargine 100 units/mL SubQ pen, Inject 15 Units into the skin 2 (two) times daily. (Patient not taking: Reported on 12/12/2024), Disp: 27 mL, Rfl: 3    LORazepam (ATIVAN) 0.5 MG tablet, Take 1 mg by mouth every 6 (six) hours as needed for Anxiety. (Patient not taking: Reported on 12/12/2024), Disp: , Rfl:     OLANZapine (ZYPREXA) 5 MG tablet, Take 1 tablet by mouth nightly on days 1-3 of each chemotherapy cycle. (Patient not taking: Reported on 12/12/2024), Disp: 3 tablet, Rfl: 5    predniSONE (DELTASONE) 50 MG Tab, Take 2 tablets (100 mg total) by mouth once daily. On days 2-5 of your chemotherapy cycles. Take with food. (Patient not taking: Reported on 11/6/2024), Disp: 8 tablet, Rfl: 5    prochlorperazine (COMPAZINE) 5 MG tablet, Take 2 tablets (10 mg total) by mouth every 6 (six) hours as needed for Nausea. (Patient not taking: Reported on 12/12/2024), Disp: 20 tablet, Rfl: 5  No current facility-administered medications for this visit.    Facility-Administered Medications Ordered in Other Visits:     dextrose 10% bolus 125 mL 125 mL, 12.5 g, Intravenous, PRN, Chuckie Saenz MD    dextrose 10% bolus 250 mL 250  "mL, 25 g, Intravenous, PRN, Chuckie Saenz MD    glucagon (human recombinant) injection 1 mg, 1 mg, Intramuscular, PRN, Chuckie Saenz MD     ROS:                                                                                                                                                                             Review of Systems   Constitutional:  Negative for chills and fever.   HENT:  Positive for tinnitus. Negative for ear pain and hearing loss.         Tinnitus with TLOC aura    Eyes:  Negative for pain and redness.   Respiratory:  Positive for shortness of breath. Negative for cough.    Cardiovascular:  Positive for chest pain, palpitations and leg swelling.   Gastrointestinal:  Negative for constipation, diarrhea, nausea and vomiting.   Genitourinary:  Negative for dysuria and hematuria.   Musculoskeletal:  Positive for joint pain. Negative for myalgias.   Skin:  Negative for rash.   Neurological:  Positive for loss of consciousness. Negative for seizures and headaches.   Psychiatric/Behavioral:  Negative for depression.         Blood pressure 129/87, pulse 70, temperature 98.3 °F (36.8 °C), temperature source Oral, resp. rate 20, height 5' 8" (1.727 m), weight 123.4 kg (272 lb), SpO2 95%.   PE:  Physical Exam  Vitals reviewed.   HENT:      Head: Normocephalic and atraumatic.   Eyes:      General: No scleral icterus.     Extraocular Movements: Extraocular movements intact.      Pupils: Pupils are equal, round, and reactive to light.   Neck:      Vascular: No carotid bruit.   Cardiovascular:      Rate and Rhythm: Normal rate and regular rhythm.      Pulses: Normal pulses.      Heart sounds: Normal heart sounds. No murmur heard.     No friction rub. No gallop.      Comments: No concerning or new heart findings on auscultation   Pulmonary:      Breath sounds: Normal breath sounds. No stridor.      Comments: Increased work of breath. CTAB   Abdominal:      General: Abdomen is flat. There is no " distension.   Musculoskeletal:         General: Swelling and tenderness present. Normal range of motion.      Cervical back: Normal range of motion and neck supple. No tenderness.      Right lower leg: Edema present.      Left lower leg: Edema present.   Skin:     General: Skin is warm and dry.      Findings: No erythema.   Neurological:      General: No focal deficit present.      Mental Status: She is alert and oriented to person, place, and time.   Psychiatric:         Mood and Affect: Mood normal.         Behavior: Behavior normal.                ASSESSMENT/PLAN:    Diffuse large B-cell lymphoma status post R-CHOP  [facility]  Orthostatic lightheadedness, TLOC  Chest pain, palpitations  Hypertension, uncontrolled type 2 diabetes  History of hypothyroidism, hypothyroidism status post hemithyroidectomy     -patient's main complaint is palpitations, shortness of breath, fatigue that started around the time of her metastatic disease and noticeably worsened after R-CHOP/doxorubicin.    -TTE was obtained in July prior to the initiation of R-CHOP.    -no other TTE ease at this time.  We will order TTE outpatient today.  -patient is associated symptoms with transient loss of consciousness is likely vasovagal in origin.  -we will continue to monitor at this time.  If frequency increases were vasovagal symptoms change, we will consider outpatient monitor at that time.    -patient's last A1c was 10.4.  She is following up with Endocrinology for better control this time.    -patient is TSH has been inconsistent in the past.  She is on 200 mcg of Synthroid daily.  Seeing endocrinology.    -patient is experiencing obstructive sleep apnea.  Patient has had 1st sleep study completed.  Patient reports that she was found to have extreme obstructive sleep apnea.  Patient is following up for CPAP.    -given last lipid panel and patient's A1c of 10.4, we will prescribe Crestor 20 at this time.  We will repeat lipid panel.  LDL goal less  than 70.        -return to clinic in 3 months     DO MELBA Bob, Internal Medicine, PGY-I

## 2024-12-13 LAB — BACTERIA UR CULT: ABNORMAL

## 2024-12-20 ENCOUNTER — HOSPITAL ENCOUNTER (OUTPATIENT)
Dept: CARDIOLOGY | Facility: HOSPITAL | Age: 44
Discharge: HOME | End: 2024-12-20
Payer: MEDICAID

## 2024-12-20 DIAGNOSIS — R07.9 CHEST PAIN, UNSPECIFIED TYPE: ICD-10-CM

## 2024-12-20 LAB
APICAL FOUR CHAMBER EJECTION FRACTION: 55 %
APICAL TWO CHAMBER EJECTION FRACTION: 41 %
AV INDEX (PROSTH): 0.86
AV MEAN GRADIENT: 3 MMHG
AV PEAK GRADIENT: 5.8 MMHG
AV VALVE AREA BY VELOCITY RATIO: 2.4 CM²
AV VALVE AREA: 2.4 CM²
AV VELOCITY RATIO: 0.83
CV ECHO LV RWT: 0.53 CM
DOP CALC AO PEAK VEL: 1.2 M/S
DOP CALC AO VTI: 25.5 CM
DOP CALC LVOT AREA: 2.8 CM2
DOP CALC LVOT DIAMETER: 1.9 CM
DOP CALC LVOT PEAK VEL: 1 M/S
DOP CALC LVOT STROKE VOLUME: 62.3 CM3
DOP CALC MV VTI: 30.1 CM
DOP CALCLVOT PEAK VEL VTI: 22 CM
E WAVE DECELERATION TIME: 206 MSEC
E/A RATIO: 1.5
E/E' RATIO: 6.75 M/S
ECHO LV POSTERIOR WALL: 1.3 CM (ref 0.6–1.1)
FRACTIONAL SHORTENING: 26.5 % (ref 28–44)
GLOBAL LONGITUIDAL STRAIN: 19 %
INTERVENTRICULAR SEPTUM: 1.2 CM (ref 0.6–1.1)
LEFT ATRIUM AREA SYSTOLIC (APICAL 2 CHAMBER): 18.3 CM2
LEFT ATRIUM AREA SYSTOLIC (APICAL 4 CHAMBER): 19.4 CM2
LEFT ATRIUM SIZE: 4.6 CM
LEFT ATRIUM VOLUME MOD: 53.9 ML
LEFT INTERNAL DIMENSION IN SYSTOLE: 3.6 CM (ref 2.1–4)
LEFT VENTRICLE DIASTOLIC VOLUME: 113 ML
LEFT VENTRICLE END DIASTOLIC VOLUME APICAL 2 CHAMBER: 68.7 ML
LEFT VENTRICLE END DIASTOLIC VOLUME APICAL 4 CHAMBER: 69.1 ML
LEFT VENTRICLE END SYSTOLIC VOLUME APICAL 2 CHAMBER: 49.6 ML
LEFT VENTRICLE END SYSTOLIC VOLUME APICAL 4 CHAMBER: 54.8 ML
LEFT VENTRICLE SYSTOLIC VOLUME: 54.4 ML
LEFT VENTRICULAR INTERNAL DIMENSION IN DIASTOLE: 4.9 CM (ref 3.5–6)
LEFT VENTRICULAR MASS: 239.9 G
LV LATERAL E/E' RATIO: 7.36 M/S
LV SEPTAL E/E' RATIO: 6.23 M/S
LVED V (TEICH): 113 ML
LVES V (TEICH): 54.4 ML
LVOT MG: 2 MMHG
LVOT MV: 0.63 CM/S
MV MEAN GRADIENT: 1 MMHG
MV PEAK A VEL: 0.54 M/S
MV PEAK E VEL: 0.81 M/S
MV PEAK GRADIENT: 4 MMHG
MV STENOSIS PRESSURE HALF TIME: 101 MS
MV VALVE AREA BY CONTINUITY EQUATION: 2.07 CM2
MV VALVE AREA P 1/2 METHOD: 2.18 CM2
OHS CV RV/LV RATIO: 0.84 CM
OHS LV EJECTION FRACTION SIMPSONS BIPLANE MOD: 50 %
PISA TR MAX VEL: 1.49 M/S
PV PEAK GRADIENT: 5 MMHG
PV PEAK VELOCITY: 1.09 M/S
RA PRESSURE ESTIMATED: 8 MMHG
RIGHT VENTRICLE DIASTOLIC BASEL DIMENSION: 4.1 CM
RIGHT VENTRICULAR END-DIASTOLIC DIMENSION: 4.1 CM
RV TB RVSP: 9 MMHG
TDI LATERAL: 0.11 M/S
TDI SEPTAL: 0.13 M/S
TDI: 0.12 M/S
TR MAX PG: 9 MMHG
TV REST PULMONARY ARTERY PRESSURE: 17 MMHG

## 2024-12-20 PROCEDURE — 93306 TTE W/DOPPLER COMPLETE: CPT | Mod: 26,,, | Performed by: INTERNAL MEDICINE

## 2024-12-20 PROCEDURE — 93356 MYOCRD STRAIN IMG SPCKL TRCK: CPT | Mod: ,,, | Performed by: INTERNAL MEDICINE

## 2024-12-20 PROCEDURE — 93356 MYOCRD STRAIN IMG SPCKL TRCK: CPT

## 2025-01-15 ENCOUNTER — PROCEDURE VISIT (OUTPATIENT)
Dept: GASTROENTEROLOGY | Facility: CLINIC | Age: 45
End: 2025-01-15
Payer: MEDICAID

## 2025-01-15 ENCOUNTER — LAB VISIT (OUTPATIENT)
Dept: LAB | Facility: HOSPITAL | Age: 45
End: 2025-01-15
Payer: MEDICAID

## 2025-01-15 DIAGNOSIS — E55.9 VITAMIN D DEFICIENCY: ICD-10-CM

## 2025-01-15 DIAGNOSIS — E03.9 HYPOTHYROIDISM, UNSPECIFIED TYPE: ICD-10-CM

## 2025-01-15 DIAGNOSIS — E11.65 UNCONTROLLED TYPE 2 DIABETES MELLITUS WITH HYPERGLYCEMIA: ICD-10-CM

## 2025-01-15 DIAGNOSIS — C83.398 DIFFUSE LARGE B-CELL LYMPHOMA OF SOLID ORGAN EXCLUDING SPLEEN: ICD-10-CM

## 2025-01-15 DIAGNOSIS — K76.0 HEPATIC STEATOSIS: ICD-10-CM

## 2025-01-15 DIAGNOSIS — R07.9 CHEST PAIN, UNSPECIFIED TYPE: ICD-10-CM

## 2025-01-15 DIAGNOSIS — R79.89 ELEVATED LFTS: ICD-10-CM

## 2025-01-15 LAB
ALBUMIN SERPL-MCNC: 3.8 G/DL (ref 3.5–5)
ALBUMIN/GLOB SERPL: 1.2 RATIO (ref 1.1–2)
ALP SERPL-CCNC: 76 UNIT/L (ref 40–150)
ALT SERPL-CCNC: 55 UNIT/L (ref 0–55)
ANION GAP SERPL CALC-SCNC: 6 MEQ/L
AST SERPL-CCNC: 56 UNIT/L (ref 5–34)
BASOPHILS # BLD AUTO: 0.01 X10(3)/MCL
BASOPHILS NFR BLD AUTO: 0.2 %
BILIRUB SERPL-MCNC: 0.8 MG/DL
BUN SERPL-MCNC: 12 MG/DL (ref 7–18.7)
CALCIUM SERPL-MCNC: 9.4 MG/DL (ref 8.4–10.2)
CHLORIDE SERPL-SCNC: 100 MMOL/L (ref 98–107)
CHOLEST SERPL-MCNC: 128 MG/DL
CHOLEST/HDLC SERPL: 4 {RATIO} (ref 0–5)
CO2 SERPL-SCNC: 32 MMOL/L (ref 22–29)
CREAT SERPL-MCNC: 0.73 MG/DL (ref 0.55–1.02)
CREAT/UREA NIT SERPL: 16
EOSINOPHIL # BLD AUTO: 0.12 X10(3)/MCL (ref 0–0.9)
EOSINOPHIL NFR BLD AUTO: 2.4 %
ERYTHROCYTE [DISTWIDTH] IN BLOOD BY AUTOMATED COUNT: 12.2 % (ref 11.5–17)
EST. AVERAGE GLUCOSE BLD GHB EST-MCNC: 191.5 MG/DL
GFR SERPLBLD CREATININE-BSD FMLA CKD-EPI: >60 ML/MIN/1.73/M2
GLOBULIN SER-MCNC: 3.2 GM/DL (ref 2.4–3.5)
GLUCOSE SERPL-MCNC: 260 MG/DL (ref 74–100)
HBA1C MFR BLD: 8.3 %
HCT VFR BLD AUTO: 38.8 % (ref 37–47)
HDLC SERPL-MCNC: 35 MG/DL (ref 35–60)
HGB BLD-MCNC: 12.6 G/DL (ref 12–16)
IMM GRANULOCYTES # BLD AUTO: 0.01 X10(3)/MCL (ref 0–0.04)
IMM GRANULOCYTES NFR BLD AUTO: 0.2 %
LDH SERPL-CCNC: 191 U/L (ref 125–220)
LDLC SERPL CALC-MCNC: 79 MG/DL (ref 50–140)
LYMPHOCYTES # BLD AUTO: 1.17 X10(3)/MCL (ref 0.6–4.6)
LYMPHOCYTES NFR BLD AUTO: 23 %
MAGNESIUM SERPL-MCNC: 1.8 MG/DL (ref 1.6–2.6)
MCH RBC QN AUTO: 29.2 PG (ref 27–31)
MCHC RBC AUTO-ENTMCNC: 32.5 G/DL (ref 33–36)
MCV RBC AUTO: 89.8 FL (ref 80–94)
MONOCYTES # BLD AUTO: 0.52 X10(3)/MCL (ref 0.1–1.3)
MONOCYTES NFR BLD AUTO: 10.2 %
NEUTROPHILS # BLD AUTO: 3.26 X10(3)/MCL (ref 2.1–9.2)
NEUTROPHILS NFR BLD AUTO: 64 %
NRBC BLD AUTO-RTO: 0 %
PLATELET # BLD AUTO: 209 X10(3)/MCL (ref 130–400)
PMV BLD AUTO: 10 FL (ref 7.4–10.4)
POTASSIUM SERPL-SCNC: 4.5 MMOL/L (ref 3.5–5.1)
PROT SERPL-MCNC: 7 GM/DL (ref 6.4–8.3)
RBC # BLD AUTO: 4.32 X10(6)/MCL (ref 4.2–5.4)
SODIUM SERPL-SCNC: 138 MMOL/L (ref 136–145)
T4 FREE SERPL-MCNC: 1.33 NG/DL (ref 0.7–1.48)
TRIGL SERPL-MCNC: 71 MG/DL (ref 37–140)
TSH SERPL-ACNC: 2.41 UIU/ML (ref 0.35–4.94)
VLDLC SERPL CALC-MCNC: 14 MG/DL
WBC # BLD AUTO: 5.09 X10(3)/MCL (ref 4.5–11.5)

## 2025-01-15 PROCEDURE — 83735 ASSAY OF MAGNESIUM: CPT

## 2025-01-15 PROCEDURE — 83036 HEMOGLOBIN GLYCOSYLATED A1C: CPT

## 2025-01-15 PROCEDURE — 84443 ASSAY THYROID STIM HORMONE: CPT

## 2025-01-15 PROCEDURE — 36415 COLL VENOUS BLD VENIPUNCTURE: CPT

## 2025-01-15 PROCEDURE — 83615 LACTATE (LD) (LDH) ENZYME: CPT

## 2025-01-15 PROCEDURE — 80061 LIPID PANEL: CPT

## 2025-01-15 PROCEDURE — 86800 THYROGLOBULIN ANTIBODY: CPT

## 2025-01-15 PROCEDURE — 80053 COMPREHEN METABOLIC PANEL: CPT

## 2025-01-15 PROCEDURE — 85025 COMPLETE CBC W/AUTO DIFF WBC: CPT

## 2025-01-15 PROCEDURE — 84439 ASSAY OF FREE THYROXINE: CPT

## 2025-01-15 NOTE — PROGRESS NOTES
Patient here for FibroScan visit. Reports NPO X3 hours . Position patient for the procedure to expose the right rib cage. Explained procedure to the patient. FibroScan exam complete and was tolerated without any problems.

## 2025-01-17 LAB
ENDOCRINOLOGIST REVIEW: ABNORMAL
THYROGLOB AB SERPL IA-ACNC: 137 IU/ML
THYROGLOB AB SERPL IA-ACNC: 29 IU/ML
THYROGLOB SERPL-MCNC: <0.1 NG/ML

## 2025-01-28 ENCOUNTER — TELEPHONE (OUTPATIENT)
Dept: HEMATOLOGY/ONCOLOGY | Facility: CLINIC | Age: 45
End: 2025-01-28
Payer: MEDICAID

## 2025-01-28 ENCOUNTER — OFFICE VISIT (OUTPATIENT)
Dept: OTOLARYNGOLOGY | Facility: CLINIC | Age: 45
End: 2025-01-28
Payer: MEDICAID

## 2025-01-28 VITALS
TEMPERATURE: 98 F | HEART RATE: 69 BPM | BODY MASS INDEX: 40.92 KG/M2 | DIASTOLIC BLOOD PRESSURE: 77 MMHG | OXYGEN SATURATION: 98 % | RESPIRATION RATE: 20 BRPM | WEIGHT: 270 LBS | HEIGHT: 68 IN | SYSTOLIC BLOOD PRESSURE: 113 MMHG

## 2025-01-28 DIAGNOSIS — C73 PRIMARY THYROID MALIGNANCY: Primary | ICD-10-CM

## 2025-01-28 DIAGNOSIS — R49.9 HOARSENESS OR CHANGING VOICE: ICD-10-CM

## 2025-01-28 DIAGNOSIS — J38.01 COMPLETE PARALYSIS OF LEFT VOCAL CORD: ICD-10-CM

## 2025-01-28 DIAGNOSIS — C83.398 DIFFUSE LARGE B-CELL LYMPHOMA OF SOLID ORGAN EXCLUDING SPLEEN: Primary | ICD-10-CM

## 2025-01-28 DIAGNOSIS — R06.00 DYSPNEA, UNSPECIFIED TYPE: ICD-10-CM

## 2025-01-28 DIAGNOSIS — R13.10 DYSPHAGIA, UNSPECIFIED TYPE: ICD-10-CM

## 2025-01-28 PROCEDURE — 31575 DIAGNOSTIC LARYNGOSCOPY: CPT | Mod: PBBFAC

## 2025-01-28 PROCEDURE — 99213 OFFICE O/P EST LOW 20 MIN: CPT | Mod: PBBFAC | Performed by: OTOLARYNGOLOGY

## 2025-01-28 NOTE — PROGRESS NOTES
[facility]  Otolaryngology Clinic Note    Fred Berman  Encounter Date: 1/28/2025  YOB: 1980    Chief Complaint: tongue lesion    HPI: Fred Berman is a 44 y.o. female referred for a tongue lesion. She reports that she first noticed it about a year ago and says that it has slowly been enlarging since then. It has never bled. She doesn't think it hurts but does bite it occasionally which makes it sore. It's never been biopsied. She says that her dentist told her that he was not going to do any more work on her teeth until she got it checked out. She also feels like she has developed allergies over the past few months.  She endorses frequent tearing and redness of her eyes in feels like her nose is running frequently.  She also endorses nasal congestion.  She is been using Allegra and does not think it is helped any.  She has not tried Flonase.  She does use Afrin she thinks maybe once a month.  She was diagnosed with a sinus infection over the summer and got a few days of antibiotics.  She denies facial pain or pressure but does note that when she wakes up in the morning she feels like the outside of her nose is swollen over the bridge of her nose.  She also endorses facial numbness bilaterally in all 3 distributions which has been going on for some time.  She reports that her PCP is work this up with an MRI scan.  She is also been dealing with intermittent dysphagia.  She feels like things are not going down, both solids and liquids.  This does not happen with every meal but comes and goes.  She follows with Gastroenterology and has had EGDs and dilations in the past.  She reports that she has another 1 scheduled in November.  As far as her voice goes, she notes intermittent dysphonia where her voice gets roughed and then returns to normal.  It usually comes back to normal on its own.  She is on Protonix daily for gastritis that has been seen on prior scopes she  reports.  As far as other medical problems she has diabetes, hypertension, gastroparesis, anxiety and depression.  For past surgical history she is had a cholecystectomy and .  She is had her tonsils removed but no other history of head and neck surgery.  She is a former smoker and quit 1 year ago.  She does not drink and denies any other drug use.      2024:  Presents today in follow up.  Eager for total thyroidectomy given results of FNA.  Has multiple concerns for surgical standpoint.  She would like to stay at least 1 night due to family history of von Willebrand's disease.  She also takes Subutex and would like to talk to the anesthesia team regarding use of opioids.  She will stop taking this she does prior to surgery and is only allowed 1 week of narcotics postop.  She states that she sees endocrinology for diabetes and thyroid dysfunction.  She is on at least 200 mcg of Synthroid already and her sugars are often uncontrolled.    2024:  Presents today for 1st postop appointment after a left hemithyroidectomy in recurrent nerve sacrificed on May 10th.  Reports healing well with no pain. Hasn't taken any narcotics. Anxious about pathology results. Still having issues with oral mucositis.     24:  Patient presents today for a follow-up.  She got a MediPort in July.  She is currently undergoing chop treatment.  She is expected to undergo PET scan in October.  Patient has left vocal cord paralysis from sacrificing the left recurrent laryngeal nerve.  Discussed that she may have swallowing issues because of this.  She says she is coughing and choking on water.  She is unable to undergo speech therapy as she has many appointments for her chemo treatment    25: Patient presents for follow up. Symptoms are unchanged. Voice is strong but raspy. Occasionally choking on food but no specific consistency. Denies recent lung infection/aspiration pneumonia. Also reports worsening breathing. Had  "sleep study that showed severe apnea but cannot get into pulm clinic until July. Also with difficulty breathing during the day but patient does not clarify. Of note, she is breathing comfortably and quietly during exam while conversational. Completed chemotherapy in October and will get surveillance PET scan this month. Would like to wait full year after thyroid surgery before deciding on surgical management for L TVC paralysis.     ROS:   General: Negative except per HPI  Skin: Denies rash, ulcer, or lesion.  Eyes: Denies vision changes or diplopia.  Ears: Negative except per HPI  Nose: Negative except per HPI  Throat/mouth: Negative except per HPI  Cardiovascular: Negative except per HPI  Respiratory: Negative except per HPI  Neck: Negative except per HPI  Endocrine: Negative except per HPI  Neurologic: Negative except per HPI    Other 10-point review of systems negative except per HPI      Review of patient's allergies indicates:  No Known Allergies      Past Medical History:   Diagnosis Date    Adrenal mass     Anxiety     Arthritis     Bradycardia     Depression     Diabetes mellitus, type 2     Gastroparesis     General anesthetics causing adverse effect in therapeutic use     "hard time waking up"    GERD (gastroesophageal reflux disease)     Hip pain     Hypertension     Menstrual cramps     Ovarian cyst     Palpitations     Pyosalpingitis     Thyroid disease        Past Surgical History:   Procedure Laterality Date    ABLATION OF VAGINAL TISSUE USING LASER      ADENOIDECTOMY      BONE MARROW ASPIRATION N/A 2024    Procedure: ASPIRATION, BONE MARROW;  Surgeon: Patrizia Bhatt MD;  Location: [facility] ENDOSCOPY;  Service: General;  Laterality: N/A;    BONE MARROW BIOPSY N/A 2024    Procedure: Biopsy-bone marrow;  Surgeon: Patrizia Bhatt MD;  Location: [facility] ENDOSCOPY;  Service: General;  Laterality: N/A;     SECTION      CHOLECYSTECTOMY      DISSECTION OF NECK Left 05/10/2024    " Procedure: DISSECTION, NECK;  Surgeon: Cali Trujillo MD;  Location: [facility] OR;  Service: ENT;  Laterality: Left;    INSERTION OF TUNNELED CENTRAL VENOUS CATHETER (CVC) WITH SUBCUTANEOUS PORT Right 2024    Procedure: [id number];  Surgeon: Lio Storey Jr., MD;  Location: [facility] OR;  Service: General;  Laterality: Right;    THYROIDECTOMY Left 05/10/2024    Procedure: THYROIDECTOMY;  Surgeon: Cali Trujillo MD;  Location: [facility] OR;  Service: ENT;  Laterality: Left;    TONSILLECTOMY  1985    TUBAL LIGATION         Social History     Socioeconomic History    Marital status: Single   Tobacco Use    Smoking status: Former     Current packs/day: 0.00     Average packs/day: 1.5 packs/day for 15.0 years (22.5 ttl pk-yrs)     Types: Cigarettes     Quit date: 3/5/2003     Years since quittin.9    Smokeless tobacco: Never    Tobacco comments:     Quit over a year ago.   Substance and Sexual Activity    Alcohol use: Never     Alcohol/week: 6.0 standard drinks of alcohol     Types: 6 Shots of liquor per week    Drug use: Not Currently     Types: Methamphetamines     Comment: Im on Subutex    Sexual activity: Yes     Partners: Male     Birth control/protection: Post-menopausal     Social Drivers of Health     Financial Resource Strain: Medium Risk (2024)    Overall Financial Resource Strain (CARDIA)     Difficulty of Paying Living Expenses: Somewhat hard   Food Insecurity: No Food Insecurity (2024)    Hunger Vital Sign     Worried About Running Out of Food in the Last Year: Never true     Ran Out of Food in the Last Year: Never true   Transportation Needs: No Transportation Needs (2024)    TRANSPORTATION NEEDS     Transportation : No   Physical Activity: Insufficiently Active (2024)    Exercise Vital Sign     Days of Exercise per Week: 2 days     Minutes of Exercise per Session: 30 min   Stress: No Stress Concern Present (2024)    [employer] -  Occupational Stress Questionnaire     Feeling of Stress : Only a little   Recent Concern: Stress - Stress Concern Present (5/12/2024)    [employer] [employer] - Occupational Stress Questionnaire     Feeling of Stress : To some extent   Housing Stability: Low Risk  (6/27/2024)    Housing Stability Vital Sign     Unable to Pay for Housing in the Last Year: No     Homeless in the Last Year: No       Family History   Problem Relation Name Age of Onset    Alcohol abuse Mother Pat         Pat    Arthritis Mother Pat     Breast cancer Mother Pat     COPD Mother Pat     Depression Mother Pat     Diabetes Mother Pat     Drug abuse Mother Pat     Hypertension Mother Pat     Miscarriages / Stillbirths Mother Pat     Cancer Mother Pat     Depression Father Juventino     Drug abuse Father Juventino     Hypertension Father Juventino     Prostate cancer Father Juventino     Thyroid cancer Father Juventino     Drug abuse Sister Elana     Heart disease Sister Elana     Miscarriages / Stillbirths Sister Elana     Drug abuse Brother Juventino     Early death Brother Izaiah     Asthma Son Mojgan     Hypertension Son Mojgan     Miscarriages / Stillbirths Maternal Grandmother Arlean     Cancer Maternal Grandfather Janay     Hearing loss Paternal Grandfather Lyod        Outpatient Encounter Medications as of 1/28/2025   Medication Sig Dispense Refill    acetaminophen (TYLENOL) 500 MG tablet Take 2 tablets (1,000 mg total) by mouth every 6 (six) hours. 60 tablet 0    buprenorphine HCL (SUBUTEX) 8 mg Subl Place 8 mg under the tongue 3 (three) times daily.      calcium carbonate/vitamin D3 (CALCIUM 600 + D,3, ORAL) Take 1 tablet by mouth 2 (two) times a day. (Patient not taking: Reported on 12/12/2024)      cholecalciferol, vitamin D3, (VITAMIN D3) 25 mcg (1,000 unit) capsule Take 1 capsule (1,000 Units total) by mouth once daily. (Patient not taking: Reported on 11/6/2024) 30 capsule 3    cholecalciferol, vitamin D3, 1,250 mcg (50,000 unit) capsule Take 1  capsule (50,000 Units total) by mouth every 7 days. 12 capsule 0    ciprofloxacin HCl (CIPRO) 500 MG tablet Take 1 tablet (500 mg total) by mouth every 12 (twelve) hours. (Patient not taking: Reported on 10/3/2024) 14 tablet 0    clindamycin (CLEOCIN) 300 MG capsule Take 1 capsule (300 mg total) by mouth 3 (three) times daily. (Patient not taking: Reported on 12/12/2024) 15 capsule 0    COMPOUND HORMONE REPLACEMENT Take by mouth once daily. (Patient not taking: Reported on 11/6/2024)      conjugated estrogens (PREMARIN) vaginal cream Place 0.5 g vaginally once daily. Use two weeks nightly, then twice weekly from there on (Patient not taking: Reported on 12/12/2024) 1 applicator 5    diphenhydrAMINE-aluminum-magnesium hydroxide-simethicone-LIDOcaine viscous HCl 2% Swish and spit 15 mLs every 4 (four) hours as needed (Mouth sores). (Patient not taking: Reported on 12/12/2024) 240 each 0    HUMULIN R U-500, CONC, KWIKPEN 500 unit/mL (3 mL) insulin pen Inject into the skin.      hydrocortisone (ANUSOL-HC) 2.5 % rectal cream Place 1 application  rectally 2 (two) times daily. (Patient not taking: Reported on 8/22/2024)      insulin (LANTUS SOLOSTAR U-100 INSULIN) glargine 100 units/mL SubQ pen Inject 15 Units into the skin 2 (two) times daily. (Patient not taking: Reported on 12/12/2024) 27 mL 3    insulin lispro 100 unit/mL pen Inject 35 Units into the skin 3 (three) times daily. With meals      levothyroxine (SYNTHROID) 200 MCG tablet Take 1 tablet (200 mcg total) by mouth before breakfast. 30 tablet 11    LORazepam (ATIVAN) 0.5 MG tablet Take 1 mg by mouth every 6 (six) hours as needed for Anxiety. (Patient not taking: Reported on 12/12/2024)      LORazepam (ATIVAN) 1 MG tablet Take 1 mg by mouth 2 (two) times daily.      losartan (COZAAR) 50 MG tablet Take 1 tablet (50 mg total) by mouth once daily. 90 tablet 3    OLANZapine (ZYPREXA) 5 MG tablet Take 1 tablet by mouth nightly on days 1-3 of each chemotherapy cycle.  "(Patient not taking: Reported on 12/12/2024) 3 tablet 5    ondansetron (ZOFRAN-ODT) 4 MG TbDL Take 1 tablet (4 mg total) by mouth every 8 (eight) hours as needed (NAUSEA). 30 tablet 1    pantoprazole (PROTONIX) 40 MG tablet Take 40 mg by mouth every morning.      pen needle, diabetic (BD ULTRA-FINE ROSALEE PEN NEEDLE) 32 gauge x 5/32" Ndle 1 each by Misc.(Non-Drug; Combo Route) route 2 (two) times a day. 100 each 9    polyethylene glycol (GLYCOLAX) 17 gram PwPk Take 17 g by mouth 2 (two) times daily. 30 each 1    predniSONE (DELTASONE) 50 MG Tab Take 2 tablets (100 mg total) by mouth once daily. On days 2-5 of your chemotherapy cycles. Take with food. (Patient not taking: Reported on 11/6/2024) 8 tablet 5    prochlorperazine (COMPAZINE) 5 MG tablet Take 2 tablets (10 mg total) by mouth every 6 (six) hours as needed for Nausea. (Patient not taking: Reported on 12/12/2024) 20 tablet 5    rosuvastatin (CRESTOR) 20 MG tablet Take 1 tablet (20 mg total) by mouth once daily. 90 tablet 3     Facility-Administered Encounter Medications as of 1/28/2025   Medication Dose Route Frequency Provider Last Rate Last Admin    dextrose 10% bolus 125 mL 125 mL  12.5 g Intravenous PRN Chuckie Saenz MD        dextrose 10% bolus 250 mL 250 mL  25 g Intravenous PRN Chuckie Saenz MD        glucagon (human recombinant) injection 1 mg  1 mg Intramuscular PRN Chuckie Saenz MD           Physical Exam:  Vitals:    01/28/25 1452   BP: 113/77   BP Location: Right arm   Patient Position: Sitting   Pulse: 69   Resp: 20   Temp: 98.1 °F (36.7 °C)   TempSrc: Oral   SpO2: 98%   Weight: 122.5 kg (270 lb)   Height: 5' 8" (1.727 m)       Constitutional  General Appearance: well nourished, well-developed, AAO x3, NAD  HEENT  Eyes: PEERLA, EOMI, normal conjunctivae  Ears: Hearing well at conversation level  Nose: septum midline deviated to the left anteriorly,   OC/OP: dentition moderate, soft, nontender, tongue/FOM- soft, no " leukoplakia/ulcerations/ tenderness, tonsils absent  Nasopharynx, Hypopharynx, and Larynx:    Indirect: attempted, limited view due to patient intolerance  Neck: soft, incision c/d/I. aTTP.   Neuro: CN II - XII intact   Cardiovascular: peripheral pulses palpable  Respiratory: non-labored respirations  Psychiatric: oriented to time, place and person, no depression, anxiety or agitation      Procedure: Flexible Fiberoptic Laryngoscopy/Nasopharyngoscopy via L naris  Informed consent was obtained from the patient after explanation of procedure, indications, risks and benefits. Flexible endoscopy was performed on Fred Berman through the nasal passages. The nasal cavity, nasopharynx, oropharynx, hypopharynx and larynx were adequately visualized. The true vocal cords and arytenoids were examined during phonation and repose.    Anesthesia: Topical 2% mucosal lidocaine  Adverse Events: None  Resident Surgeon: Dayami Aaron MD  Blood loss: none  Condition: good    Findings:  NP/OP: no masses/lesions of NC, eustachian tube, fossa of Rosenmuller, no adenoid hypertrophy  BOT/vallecula: no lingual hypertrophy, no masses/lesions, no secretions obscuring visualization  Piriform sinuses/post-cricoid: no masses/lesions, no pooling of secretions  Epiglottis: lingual and laryngeal surfaces within normal limits  Arytenoids/FVFs: no masses/lesions, no edema, bilateral mobility  TVCs: L TVC paralysis with cord resting in the paramedian position, near-complete glottic closure d/y R TVC compensation, otherwise no lesions or masses  No aspiration, no pooled secretions  No sign of malignancy      Pertinent Data:  ? LABS:  ? AUDIO:  ? CT Scan:    Imaging:   I personally reviewed the following images: CT neck from June reviewed at which point her sinuses were clear. In July, her MRI noted pansinusitis.    US Thyroid: 2/14/2024  Impression:     Large, lobulated hypoechoic mass lesion(s) at the level of the left lobe of the thyroid,  new from previous exam. This may be related to thyroid mass or conglomerate lymphadenopathy. .  Recommend contrast enhanced CT of the neck to evaluate further.    CT Neck: 3/8/2024:  Impression:     1. Increasing hypodense soft tissue in the left thyroid bed compared to 08/04/2023.  2. No definite cervical lymphadenopathy.     Pathology:  Supplemental Report   Thyroseq V3 GC Results Summary     Left Thyroid, 4.2 CM Nodule, FNA Smear     Test Result Probability of Cancer or NIFTP Potential Management   POSITIVE Intermediate-high (50-60%) Surgical consultation*  *See interpretation below for details         Final Diagnosis      Fine-needle aspiration of left thyroid nodule:      - Atypical cells of undetermined significance.    See comment.      Electronically signed by Patrizia Bhatt MD on 3/28/2024 at 1424   Specimen 1 Interpretation    Abnormal   [place] System Thyroid Cytology Category III: Atypia Undetermined Significance (AUS)   Electronically signed by Patrizia Bhatt MD on 3/28/2024 at 1424   Comment     The specimen shows numerous lymphocytes, with a significant large lymphocyte population, admixed with follicular oncocytic cells and atypical follicular cell clusters. The specimen is suspicious for Hashimoto's thyroiditis. The lymphoid infiltrate has atypical features, which may represent a lymphoid germinal center or other lymphoid entity, and some of the follicular cells show atypia.  The specimen is sent for ThyroSeq testing which will be reported in an addendum.       Pathology:  Prelim:  Intraoperative Consult P   1. Thyroid: left hemithyroid  Poorly differentiated malignancy.  Defer to permanent for final diagnosis.     Results are reported by phone to Dr. Saenz at 1045 by Dr. Cruz.     2. Thyroid: Paratracheal tissue  Reactive lymphoid tissue.       Assessment/Plan:  Fred Berman is a 44 y.o. female with 4 cm thyroid nodule, FNA with a [place] 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     Patient has diffuse B-cell lymphoma, s/p chemotherapy completed in October  Patient has dysphagia, most likely secondary to her left vocal cord paralysis  Now reporting breathing issues; severe ARLEY on PSG completed 11/2024; FFL today showing persistent L TVC paralysis in the paramedian position     - Continue reflux medication   - F/u pulmonology referral for ARLEY and "breathing issues"  - F/u surveillance PET scan that will be ordered this month   - Patient would like to wait until 1 year postop (5/2025) for definitive management; explained that with paralyzed TVC stuck in paramedian position associated with breathing issues, may need to see laryngologist in [place] to discuss treatment options, as injection augmentation may not be helpful    RTC in 5/2025      Dayami Aaron, PGY3  [facility]  1/28/2025 4:09 PM        "

## 2025-01-29 ENCOUNTER — OFFICE VISIT (OUTPATIENT)
Dept: HEMATOLOGY/ONCOLOGY | Facility: CLINIC | Age: 45
End: 2025-01-29
Payer: MEDICAID

## 2025-01-29 ENCOUNTER — INFUSION (OUTPATIENT)
Dept: INFUSION THERAPY | Facility: HOSPITAL | Age: 45
End: 2025-01-29
Attending: INTERNAL MEDICINE
Payer: MEDICAID

## 2025-01-29 VITALS
BODY MASS INDEX: 39.06 KG/M2 | HEART RATE: 69 BPM | HEART RATE: 71 BPM | HEIGHT: 68 IN | WEIGHT: 257.69 LBS | RESPIRATION RATE: 16 BRPM | TEMPERATURE: 98 F | DIASTOLIC BLOOD PRESSURE: 75 MMHG | OXYGEN SATURATION: 95 % | RESPIRATION RATE: 20 BRPM | DIASTOLIC BLOOD PRESSURE: 69 MMHG | SYSTOLIC BLOOD PRESSURE: 110 MMHG | TEMPERATURE: 98 F | SYSTOLIC BLOOD PRESSURE: 100 MMHG | OXYGEN SATURATION: 96 %

## 2025-01-29 DIAGNOSIS — C83.398 DIFFUSE LARGE B-CELL LYMPHOMA OF SOLID ORGAN EXCLUDING SPLEEN: Primary | ICD-10-CM

## 2025-01-29 DIAGNOSIS — C73 PRIMARY THYROID MALIGNANCY: ICD-10-CM

## 2025-01-29 PROCEDURE — 3052F HG A1C>EQUAL 8.0%<EQUAL 9.0%: CPT | Mod: CPTII,,,

## 2025-01-29 PROCEDURE — 99215 OFFICE O/P EST HI 40 MIN: CPT | Mod: PBBFAC,25

## 2025-01-29 PROCEDURE — 3008F BODY MASS INDEX DOCD: CPT | Mod: CPTII,,,

## 2025-01-29 PROCEDURE — 99214 OFFICE O/P EST MOD 30 MIN: CPT | Mod: S$PBB,,,

## 2025-01-29 PROCEDURE — 3078F DIAST BP <80 MM HG: CPT | Mod: CPTII,,,

## 2025-01-29 PROCEDURE — 63600175 PHARM REV CODE 636 W HCPCS

## 2025-01-29 PROCEDURE — 96523 IRRIG DRUG DELIVERY DEVICE: CPT

## 2025-01-29 PROCEDURE — 25000003 PHARM REV CODE 250

## 2025-01-29 PROCEDURE — A4216 STERILE WATER/SALINE, 10 ML: HCPCS

## 2025-01-29 PROCEDURE — 3074F SYST BP LT 130 MM HG: CPT | Mod: CPTII,,,

## 2025-01-29 PROCEDURE — 4010F ACE/ARB THERAPY RXD/TAKEN: CPT | Mod: CPTII,,,

## 2025-01-29 RX ORDER — SODIUM CHLORIDE 0.9 % (FLUSH) 0.9 %
10 SYRINGE (ML) INJECTION
Status: DISCONTINUED | OUTPATIENT
Start: 2025-01-29 | End: 2025-01-29 | Stop reason: HOSPADM

## 2025-01-29 RX ORDER — SODIUM CHLORIDE 0.9 % (FLUSH) 0.9 %
10 SYRINGE (ML) INJECTION
OUTPATIENT
Start: 2025-01-29

## 2025-01-29 RX ORDER — HEPARIN 100 UNIT/ML
500 SYRINGE INTRAVENOUS
OUTPATIENT
Start: 2025-01-29

## 2025-01-29 RX ORDER — HEPARIN 100 UNIT/ML
500 SYRINGE INTRAVENOUS
Status: DISCONTINUED | OUTPATIENT
Start: 2025-01-29 | End: 2025-01-29 | Stop reason: HOSPADM

## 2025-01-29 RX ADMIN — HEPARIN 500 UNITS: 100 SYRINGE at 02:01

## 2025-01-29 RX ADMIN — SODIUM CHLORIDE, PRESERVATIVE FREE 10 ML: 5 INJECTION INTRAVENOUS at 02:01

## 2025-01-29 NOTE — Clinical Note
Proceed with MediPort flushed today and in infusion Schedule CT chest and CT neck around March 17th Schedule an appointment for patient to follow up with MD after CT scans in March with labs prior (CBC/CMP/Mag level/LDH/uric acid level) followed by infusion for MediPort flush

## 2025-01-29 NOTE — PROGRESS NOTES
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    History:  Past medical history:    Adrenal mass; anxiety; arthritis; bradycardia; depression; NIDDM; gastroparesis; GERD; hip pain; hypertension; menstrual cramps; ovarian cyst; palpitations; bile salpingitis; thyroid disease    Procedure/surgical history:    Ablation of vaginal tissue using laser; ; cholecystectomy; dissection of neck, left 05/10/2024; thyroidectomy 05/10/2024; tubal ligation        History of Present Illness:   Follow-up (Pt reports numbness and tingling in both hands. Patient also stated that she's experiencing fatigue and has mouth soars and suffers with confusion and lack of balance in her left leg.)        Oncologic/Hematologic History:  Oncology History   Diffuse large B-cell lymphoma of solid organ excluding spleen   2024 Initial Diagnosis    Diffuse large B-cell lymphoma of solid organ excluding spleen     2024 -  Chemotherapy    Treatment Summary   Plan Name: OP LYM RCHOP (rituximab, cycloPHOSphamide, DOXOrubicin, vinCRIStine, predniSONE) Q3W  Treatment Goal: Curative  Status: Active  Start Date: 2024  End Date: 10/8/2024  Provider: Virgil Collins MD  Chemotherapy: DOXOrubicin chemo injection 118 mg, 50 mg/m2 = 118 mg, Intravenous, Clinic/HOD 1 time, 4 of 4 cycles  Administration: 118 mg (2024), 118 mg (2024), 118 mg (2024), 118 mg (10/7/2024)  vinCRIStine (ONCOVIN) 2 mg in 0.9% NaCl 65 mL chemo infusion, 2 mg (100 % of original dose 2 mg), Intravenous, Clinic/HOD 1 time, 4 of 4 cycles  Dose modification: 2 mg (original dose 2 mg, Cycle 1)  Administration: 2 mg (2024), 2 mg (2024), 2 mg (2024), 2 mg (10/7/2024)  cycloPHOSphamide 750 mg/m2 = 1,760 mg in 0.9% NaCl 293.8 mL chemo infusion, 750 mg/m2 = 1,760 mg,  Intravenous, Clinic/HOD 1 time, 4 of 4 cycles  Administration: 1,760 mg (7/29/2024), 1,760 mg (8/19/2024), 1,760 mg (9/9/2024), 1,760 mg (10/7/2024)  riTUXimab-pvvr (RUXIENCE) 900 mg in 0.9% NaCl 900 mL infusion (conc: 1 mg/mL), 881 mg, Intravenous, Clinic/HOD 1 time, 4 of 4 cycles  Administration: 900 mg (7/29/2024), 900 mg (8/19/2024), 900 mg (9/9/2024), 900 mg (10/7/2024)     43-year-old lady, referred from [facility], with diffuse large B-cell lymphoma of thyroid.          Records reviewed:  05/21/2024:  [facility] office note:  Fred Berman is a 43 y.o. female referred for a tongue lesion.   She reports that she first noticed it about a year ago and says that it has slowly been enlarging since then. It has never bled. She doesn't think it hurts but does bite it occasionally which makes it sore. It's never been biopsied. She says that her dentist told her that he was not going to do any more work on her teeth until she got it checked out.   She also feels like she has developed allergies over the past few months.  She endorses frequent tearing and redness of her eyes in feels like her nose is running frequently.  She also endorses nasal congestion.  She is been using Allegra and does not think it is helped any.  She has not tried Flonase.  She does use Afrin she thinks maybe once a month.  She was diagnosed with a sinus infection over the summer and got a few days of antibiotics.  She denies facial pain or pressure but does note that when she wakes up in the morning she feels like the outside of her nose is swollen over the bridge of her nose.    She also endorses facial numbness bilaterally in all 3 distributions which has been going on for some time.  She reports that her PCP is work this up with an MRI scan.    She is also been dealing with intermittent dysphagia.  She feels like things are not going down, both solids and liquids.  This does not happen with every meal but comes and goes.    She follows with  Gastroenterology and has had EGDs and dilations in the past.  She reports that she has another 1 scheduled in November.    As far as her voice goes, she notes intermittent dysphonia where her voice gets roughed and then returns to normal.  It usually comes back to normal on its own.  She is on Protonix daily for gastritis that has been seen on prior scopes she reports.    As far as other medical problems she has diabetes, hypertension, gastroparesis, anxiety and depression.  For past surgical history she is had a cholecystectomy and .  She is had her tonsils removed but no other history of head and neck surgery.  She is a former smoker and quit 1 year ago.  She does not drink and denies any other drug use.     2024:  Presents today in follow up.  Eager for total thyroidectomy given results of FNA.  Has multiple concerns for surgical standpoint.  She would like to stay at least 1 night due to family history of von Willebrand's disease.  She also takes Suboxone and would like to talk to the anesthesia team regarding use of opioids.  She will stop taking this she does prior to surgery and is only allowed 1 week of narcotics postop.  She states that she sees endocrinology for diabetes and thyroid dysfunction.  She is on at least 200 mcg of Synthroid already and her sugars are often uncontrolled.   2024:  Presents today for 1st postop appointment after a left hemithyroidectomy in recurrent nerve sacrificed on May 10th.  Reports healing well with no pain. Hasn't taken any narcotics. Anxious about pathology results. Still having issues with oral mucositis.   Postop FFL:  Left TVC paralysis (preop FFL documented vocal cord motion)      Clinical events/investigations reviewed:  -2023:  CT neck and chest with contrast (evaluate thyroid mass) right lobe thyroid gland markedly atrophic; left lobe of the thyroid demonstrates a heterogeneous appearance with heterogeneous contrast enhancement with no  obvious, dominant, or worrisome mass appreciated  -02/08/2024:  Tongue, biopsy: Oral fibroma  -02/14/2024: Ultrasound thyroid (nontoxic single thyroid nodule) (comparison: Thyroid ultrasound 06/12/2023, CT neck 08/04/2023):  Large lobulated hypoechoic mass lesion at the level of the left lobe of the thyroid, new from prior exam (maybe related to thyroid mass or conglomerate lymphadenopathy)  -03/08/2024: CT soft tissues of the neck with contrast (localized swelling, mass, lumps) (comparison:  Ultrasound 02/14/2024; CT neck and chest): Increasing hypodense soft tissue in the left thyroid bed compared to 08/04/2023 (2.1 x 2.7 x 4.2 cm); minimal mass effect on the trachea; no definite cervical lymphadenopathy  -03/28/2024:  FNA left thyroid nodule:  Atypical cells of undetermined significance (numerous lymphocytes with a significant large lymphocytes population, admixed with follicular oncocytic cells and atypical follicle cell clusters, etc., suspicious for Hashimoto's thyroiditis, etc.) (Thyroseq V3 GC positive; probability of cancer or NIFTP intermediate-high, 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 Thyroid, left hemithyroid, excision:  Diffuse large B-cell lymphoma, germinal center B-cell phenotype; BCL 2 positive/MYC negative by IHC (not a double expressor); negative for MYC gene rearrangement by interface FISH (not double hit lymphoma) (morphologically, the thyroid is extensive involved by a diffuse proliferation of large atypical lymphoid cells with irregular nuclear contours, vesicular chromatin, and ample cytoplasm)  2. Paratracheal tissue, biopsy:  Reactive lymphoid tissue   3. Left central neck dissection:  13 benign lymph nodes; no malignancy      06/18/2024:  Pleasant lady who presents for initial medical oncology  "consultation.  In no acute discomfort.  Feels poorly.  Main complaint is in in the back and hips for last 1 year.  History of opioid abuse.  On buprenorphine with a psychiatrist for last years.  Drenching sweats all the time.  Has to change clothes.  Lost 50 lb unintentionally in 2023, apparently due to gastroparesis side effects of Ozempic; regained her weight back after Ozempic was discontinued.    ECOG 1.  No recurrent fevers.  Fatigue.  Generalized weakness.  Night sweats and hot flashes.  Sweats all the time for 1 year.  Bones hurt all the time.  Occasional chest pains and leg swelling as well.  Exertional dyspnea.  Some constipation.  Some nausea.  Great appetite.    Interval History 1/29/24:  Patient presented to the clinic today for a scheduled 3 month surveillance visit. She reports       clinic visit after starting R-CHOP on 07/29. Today she is due to receive C3D1 in infusion.  She reports this has been the worst time ever She reports having a fall on 8/31 and only remembers being picked up by her son. She did not go to the ER because "she is tired" of the ED. She states after her last cycle her neck was swollen and the ER physician suggested that she get a dosage reduction this time. She denies having mouth sores, bleeding gums, sore throat and states she has no appetite.  She denies any diarrhea.  Denies any nausea.  Denies any fever, chills, shortness for breath or any other signs and symptoms associated with infection. Labwork was reviewed with the patient. All future appointments were discussed with the patient.     Review of Systems:   All systems reviewed and found to be negative except for the symptoms detailed above    Physical Examination:   VITAL SIGNS:   Vitals:    01/29/25 1327   BP: 110/75   Pulse: 69   Resp: 16   Temp: 98.4 °F (36.9 °C)       Physical Exam  Vitals reviewed.   Constitutional:       Appearance: Normal appearance.   HENT:      Head: Normocephalic and atraumatic.      " Mouth/Throat:      Mouth: Mucous membranes are moist.   Cardiovascular:      Rate and Rhythm: Normal rate and regular rhythm.      Pulses: Normal pulses.      Heart sounds: Normal heart sounds.   Pulmonary:      Effort: Pulmonary effort is normal.      Breath sounds: Normal breath sounds.   Abdominal:      General: Bowel sounds are normal.      Palpations: Abdomen is soft.   Skin:     General: Skin is warm and dry.   Neurological:      Mental Status: She is alert and oriented to person, place, and time.   Psychiatric:         Mood and Affect: Mood normal.         Behavior: Behavior normal.         Thought Content: Thought content normal.         Judgment: Judgment normal.            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   -03/28/2024:  Thyroglobulin antibody 150 IU/mL, elevated (reference Range:  < 1.8)  -03/28/2024: Thyroglobulin, tumor marker: < 0.1 (reference Range:  Athyroid < 0.1; intact thyroid </= 33)  -left hemithyroidectomy with left central neck dissection level 6 (05/10/2024):   Diffuse large B-cell lymphoma left oscar thyroid, germinal center B-cell type, not a double expressor, not double hit lymphoma; morphologically, thyroid extensively involved; paratracheal tissue biopsy negative; left central neck dissection 13 benign lymph nodes,  -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 during left hemithyroidectomy 05/10/2024, noted on FFL exam by ENT  -06/18/2024: ECOG 1; chronic fatigue; sweats all the time; no  consistent weight loss; appetite is great  -06/18/2024: CBC, CMP essentially unremarkable   -06/18/2024: LDH normal, beta 2 microglobulin normal  -06/18/2024: Hep B core antibody total, hep B surface antigen, hep C antibody, HIV: Nonreactive  -FDG PET-CT 06/18/2024:  No other sites of disease  -TTE 06/26/2024: LVEF 55-60%  -right IJ MediPort placed 07/08/2024      Family history of von Willebrand disease:  -04/25/2024:  Von Willebrand factor activity 75%, normal      Oral fibroma of tongue:   -tongue lesion: 1st noted around 02/2023; slowly enlarging since; no bleeding; no pain  -tongue biopsy 02/08/2024: Oral fibroma      Comorbid medical conditions:  Anxiety, depression, NIDDM, gastroparesis, hypertension, history of PID  Hypothyroidism  History of opioid abuse, maintained on Suboxone  History of dysphagia, S/P endoscopic dilatation         Plan:   Diffuse Large B-Cell Lymphoma  Continue R-CHOP every 3 weeks   Proceed with C3D1 today in infusion   Return to infusion on 9/10 for C3D2 of Fylnetra injection   Check CBC and CMP weekly   Check LDH and uric acid level weekly   Allopurinol 100 mg p.o. b.i.d.   Re-stage with FDG PET-CT after 3 cycles of chemotherapy- orders placed today  RTC with MD  approximately 3 days after PET- CT is scheduled with labs prior (CBC/CMP/Ma Mag level) to discuss PET-CT results and for post completion of chemotherapy     -diffuse large B-cell lymphoma of left thyroid lobe, S/P left thyroidectomy 05/10/2024  -06/18/2024: CBC, CMP essentially unremarkable   -06/18/2024: LDH normal, beta 2 microglobulin normal  -06/18/2024: Hep B core antibody total, hep B surface antigen, hep C antibody, HIV: Nonreactive  -FDG PET-CT 06/18/2024:  No other sites of disease  -06/26/2024: Bilateral diagnostic mammogram, bilateral limited ultrasound (comparison: 11/16/2022) (indication: Bilateral lateral breast pain, right nipple pain, right nipple white/yellow discharge):  Right breast BI-RADS 2; left breast  BI-RADS 1  -TTE 06/26/2024: LVEF 55-60%  -right IJ MediPort placed 07/08/2024  -S/P tubal ligation in the past  -bone marrow evaluation 07/23/2024: Report pending   -R-CHOP started 07/29/2024; experienced allergic reaction to rituximab; managed with corticosteroids and antihistamines  >>>  -continue R-CHOP every 3 weeks   -awaiting bone marrow aspiration and core biopsy report  -S/P tubal ligation in the past   -check CBC and CMP weekly  -check LDH and uric acid level now  -allopurinol 200 mg p.o. b.i.d. for TLS prophylaxis  Re-stage with FDG PET-CT after 3 cycles of R-CHOP  -aggressive oral hydration prevent hemorrhagic cystitis with cyclophosphamide  -monitor for neurotoxicity with vincristine  -treatment should be delayed until ANC is> 1500 mm3 and platelet count> 100 K mm3    R-CHOP: Monitoring parameters:  CBC with differential and platelet count weekly during treatment.  Assess basic metabolic panel (creatinine and electrolytes) and liver function prior to each subsequent treatment cycle.  LVEF should be evaluated periodically based on LVEF at initiation of therapy and cumulative dose of doxorubicin.  Carriers of hepatitis B or C should be monitored for clinical and laboratory signs of active infection during and following completion of therapy. Rituximab should be discontinued if reactivation occurs.    If bone marrow examination is negative, then, for stage I disease, treatment plan will be as follows:  -06/18/2024: Initial consultation:  ECOG 1; sweats all the time  -IPI:  Low (0)  -age adjusted IP!:  Low (0)  -stage modified IPI (smIPI) low (0)  -NCCN IPI:  Low (1, by virtue of age 43)  -prognostic model to assess the risk of CNS disease (CNS IPI):  Low risk (0)    Assuming stage I disease:  -nonbulky  -smIPI 0  -recommendation:  R-CHOP x3 cycles;   -followed by interim staging with PET-CT:   1. If complete response (PET negative, i.e., 5-PS 1-3), then:  R-CHOP x1 cycle (total of 4 cycles); or ISRT,  followed by surveillance  2. If partial response (PET positive, i.e., 5-PS for), then:  Repeat biopsy, etc.  3. If progressive disease (PET positive, i.e.,5-PS 5), then:  Repeat biopsy, etc.    TLS prophylaxis:  Allopurinol beginning 2-3 days prior to chemo immunotherapy and continued for 10-14 days  (Allopurinol: 100 mg per m2 every 8 hours, maximum 800 mg per day)    R-CHOP:  -Patients receiving cyclophosphamide should maintain adequate oral hydration (2 to 3 L/day) and void frequently to reduce risk of hemorrhagic cystitis.  -The risk of febrile neutropenia with this regimen is 10 to 20%; primary prophylaxis with hematopoietic growth factors should be considered on an individual basis, particularly for high-risk patients such as those with preexisting neutropenia, advanced disease, poor performance status, or patients age 65 years or older.  -Adjustment of initial cyclophosphamide, doxorubicin, and vincristine doses may be needed for preexisting liver dysfunction.  In addition, dose adjustment of cyclophosphamide may be required for kidney dysfunction.  -LVEF should be evaluated prior to initiation of therapy. Dose alterations should be considered for LVEF <50%, and doxorubicin therapy is contraindicated in patients with LVEF <30% at initiation. Infusion times and schedule may be adjusted to decrease the risk of cardiotoxicity in individuals at high risk for its development.  -Neurotoxicity: Vincristine may cause constipation, and in severe cases, paralytic ileus. A routine prophylactic regimen against constipation is recommended in all patients receiving vincristine.    R-CHOP:  Dose modifications for myelotoxicity and neuropathy:  # Treatment should be delayed until ANC is >1500/microL and platelet count is >100,000/microL.  # if a patient develops grade 4 (ANC <500/microL) neutropenia or febrile neutropenia with any cycle, G-CSF support is added to the regimen for subsequent cycles.  # If grade 4 neutropenia  or febrile neutropenia occurs despite G-CSF support, or if the patient develops grade 3 (25,000 to 50,000/microL) or 4 (<25,000/microL) thrombocytopenia with any cycle, the doses of cyclophosphamide and doxorubicin should be decreased by 50% for subsequent cycles.  # Neuropathy: Dose adjustment of vincristine may be necessary if the severity of neuropathy persists or worsens. No specific guidelines are available for dose adjustments     If bone marrow is negative for lymphoma, then, stage I disease    Family history of von Willebrand disease   -04/25/2024: Von Willebrand factor activity 75%, normal    Above discussed at length with the patient.  All questions answered.    Plan of management discussed.  She understands and agrees with this plan.      Follow-up:  Follow up in about 1 month (around 2/28/2025) for Labs, With NEIL Bills, hans.

## 2025-01-29 NOTE — NURSING
Pt arrived for labs.MADELINE Blakely FNP/q3 month Mediport Flush. She is tearful on arrival. Blood return noted from mediport w/o issues. F/U appts reviewed.

## 2025-02-05 ENCOUNTER — PATIENT MESSAGE (OUTPATIENT)
Dept: GASTROENTEROLOGY | Facility: CLINIC | Age: 45
End: 2025-02-05
Payer: MEDICAID

## 2025-02-13 ENCOUNTER — HOSPITAL ENCOUNTER (OUTPATIENT)
Dept: RADIOLOGY | Facility: HOSPITAL | Age: 45
Discharge: HOME | End: 2025-02-13
Payer: MEDICAID

## 2025-02-13 DIAGNOSIS — C83.398 DIFFUSE LARGE B-CELL LYMPHOMA OF SOLID ORGAN EXCLUDING SPLEEN: ICD-10-CM

## 2025-02-13 LAB — POCT GLUCOSE: 194 MG/DL (ref 70–110)

## 2025-02-13 PROCEDURE — 25500020 PHARM REV CODE 255

## 2025-02-13 PROCEDURE — 71270 CT THORAX DX C-/C+: CPT | Mod: TC

## 2025-02-13 PROCEDURE — A9552 F18 FDG: HCPCS

## 2025-02-13 PROCEDURE — 70492 CT SFT TSUE NCK W/O & W/DYE: CPT | Mod: TC

## 2025-02-13 PROCEDURE — 78815 PET IMAGE W/CT SKULL-THIGH: CPT | Mod: TC

## 2025-02-13 RX ORDER — FLUDEOXYGLUCOSE F18 500 MCI/ML
10 INJECTION INTRAVENOUS
Status: COMPLETED | OUTPATIENT
Start: 2025-02-13 | End: 2025-02-13

## 2025-02-13 RX ADMIN — FLUDEOXYGLUCOSE F-18 10.4 MILLICURIE: 500 INJECTION INTRAVENOUS at 11:02

## 2025-02-13 RX ADMIN — IOHEXOL 50 ML: 350 INJECTION, SOLUTION INTRAVENOUS at 02:02

## 2025-02-17 DIAGNOSIS — C83.398 DIFFUSE LARGE B-CELL LYMPHOMA OF SOLID ORGAN EXCLUDING SPLEEN: Primary | ICD-10-CM

## 2025-02-20 ENCOUNTER — OFFICE VISIT (OUTPATIENT)
Dept: GASTROENTEROLOGY | Facility: CLINIC | Age: 45
End: 2025-02-20
Payer: MEDICAID

## 2025-02-20 ENCOUNTER — OFFICE VISIT (OUTPATIENT)
Dept: INTERNAL MEDICINE | Facility: CLINIC | Age: 45
End: 2025-02-20
Payer: MEDICAID

## 2025-02-20 VITALS
TEMPERATURE: 98 F | BODY MASS INDEX: 38.8 KG/M2 | RESPIRATION RATE: 16 BRPM | HEART RATE: 73 BPM | SYSTOLIC BLOOD PRESSURE: 112 MMHG | HEIGHT: 68 IN | DIASTOLIC BLOOD PRESSURE: 71 MMHG | OXYGEN SATURATION: 93 % | WEIGHT: 256 LBS

## 2025-02-20 VITALS
HEIGHT: 68 IN | RESPIRATION RATE: 20 BRPM | OXYGEN SATURATION: 95 % | TEMPERATURE: 98 F | HEART RATE: 76 BPM | WEIGHT: 257.63 LBS | BODY MASS INDEX: 39.05 KG/M2 | SYSTOLIC BLOOD PRESSURE: 120 MMHG | DIASTOLIC BLOOD PRESSURE: 78 MMHG

## 2025-02-20 DIAGNOSIS — C83.398 DIFFUSE LARGE B-CELL LYMPHOMA OF SOLID ORGAN EXCLUDING SPLEEN: ICD-10-CM

## 2025-02-20 DIAGNOSIS — K74.60 HEPATIC CIRRHOSIS, UNSPECIFIED HEPATIC CIRRHOSIS TYPE, UNSPECIFIED WHETHER ASCITES PRESENT: Primary | ICD-10-CM

## 2025-02-20 DIAGNOSIS — F41.1 GENERALIZED ANXIETY DISORDER: ICD-10-CM

## 2025-02-20 DIAGNOSIS — Z86.0100 HISTORY OF COLONIC POLYPS: ICD-10-CM

## 2025-02-20 DIAGNOSIS — G89.4 CHRONIC PAIN SYNDROME: Primary | ICD-10-CM

## 2025-02-20 DIAGNOSIS — E78.5 HYPERLIPIDEMIA, UNSPECIFIED HYPERLIPIDEMIA TYPE: ICD-10-CM

## 2025-02-20 DIAGNOSIS — C85.99 MALIGNANT LYMPHOMA OF THYROID GLAND: ICD-10-CM

## 2025-02-20 DIAGNOSIS — K64.8 INTERNAL HEMORRHOIDS WITHOUT COMPLICATION: ICD-10-CM

## 2025-02-20 DIAGNOSIS — I10 HYPERTENSION, UNSPECIFIED TYPE: ICD-10-CM

## 2025-02-20 DIAGNOSIS — K59.04 CHRONIC IDIOPATHIC CONSTIPATION: ICD-10-CM

## 2025-02-20 DIAGNOSIS — K31.84 GASTROPARESIS: ICD-10-CM

## 2025-02-20 DIAGNOSIS — G47.33 OSA (OBSTRUCTIVE SLEEP APNEA): ICD-10-CM

## 2025-02-20 PROBLEM — K76.0 HEPATIC STEATOSIS: Status: RESOLVED | Noted: 2024-10-03 | Resolved: 2025-02-20

## 2025-02-20 PROBLEM — R79.89 ELEVATED LFTS: Status: RESOLVED | Noted: 2024-11-06 | Resolved: 2025-02-20

## 2025-02-20 PROCEDURE — 99215 OFFICE O/P EST HI 40 MIN: CPT | Mod: PBBFAC | Performed by: NURSE PRACTITIONER

## 2025-02-20 PROCEDURE — 99214 OFFICE O/P EST MOD 30 MIN: CPT | Mod: PBBFAC,27 | Performed by: INTERNAL MEDICINE

## 2025-02-20 RX ORDER — LEVOTHYROXINE SODIUM 125 UG/1
250 TABLET ORAL
COMMUNITY
Start: 2025-02-10

## 2025-02-20 RX ORDER — ATORVASTATIN CALCIUM 20 MG/1
20 TABLET, FILM COATED ORAL DAILY
Qty: 30 TABLET | Refills: 11 | Status: SHIPPED | OUTPATIENT
Start: 2025-02-20 | End: 2026-02-20

## 2025-02-20 NOTE — ASSESSMENT & PLAN NOTE
She underwent colonoscopy September 5, 2023 with findings of adenomatous polyp in the sigmoid colon with polypectomy and grade 2 internal hemorrhoids with infrared coagulation.  Recommend soluble fiber supplementation  Avoid straining or sitting on the toilet for long periods of time  Continue MiraLax 17 g 1-2 times daily as directed  Hemorrhoidal banding discussed  Sitz baths and tucks pads

## 2025-02-20 NOTE — PROGRESS NOTES
[facility] INTERNAL MEDICINE  OUTPATIENT OFFICE VISIT NOTE    SUBJECTIVE:      HPI: Fred Berman is a 44 y.o. female here today for f/u  43 y.o. yo female w/ PMH of DM II, HTN, gastroparesis, hypothyroidism, who presents today for for a preop visit.  Recently seen in ENT- patient recently underwent a left hemithyroidectomy on May 10th-she is healing well.  Pathology revealed a diffuse large B-cell lymphoma.  She has already established care with Dr. Flannery in Hematology-Oncology-an extensive workup has been initiated and she tells me today that she has completed her chemotherapy cycle.  Was seen by Oncology recently   The patient sees an endocrinologist for control of her diabetes.  She is aware the present time diabetes is under sub- optimal control- she had seen her endocrinologist outside recently and her insulin dose was increased- last month or A1c was 8.3     She underwent a sleep study and was diagnosed with severe sleep apnea-CPAP arrangement is in progress    She was also recently seen by ENT and Gastroenterology    She discontinued her Crestor-did not like the way it made her feel-she is willing to try atorvastatin     Current Outpatient Medications   Medication Instructions    acetaminophen (TYLENOL) 1,000 mg, Oral, Every 6 hours    buprenorphine HCL (SUBUTEX) 8 mg, 3 times daily    HUMULIN R U-500, CONC, KWIKPEN 500 unit/mL (3 mL) insulin pen Inject into the skin.    LANTUS SOLOSTAR U-100 INSULIN 15 Units, Subcutaneous, 2 times daily    levothyroxine (SYNTHROID) 250 mcg    LORazepam (ATIVAN) 1 mg, 2 times daily    losartan (COZAAR) 50 mg, Oral, Daily    ondansetron (ZOFRAN-ODT) 4 mg, Oral, Every 8 hours PRN    pantoprazole (PROTONIX) 40 mg, Every morning    polyethylene glycol (GLYCOLAX) 17 g, Oral, 2 times daily    rosuvastatin (CRESTOR) 20 mg, Oral, Daily       ROS:  CONSTITUTIONAL: Denies weight loss, fever and chills.  HEENT: Denies changes in vision and hearing.  ?RESPIRATORY: Denies SOB and  "cough.?  CV: Denies palpitations and CP. ?  GI: Denies abdominal pain, nausea, vomiting and diarrhea.?  : Denies dysuria and urinary frequency.?  MSK: Denies myalgia and joint pain.?  SKIN: Denies rash and pruritus.  ?NEUROLOGICAL: Denies headache and syncope.?  PSYCHIATRIC: Denies recent changes in mood. Denies anxiety and depression.     OBJECTIVE:       Visit Vitals  /78 (BP Location: Right arm, Patient Position: Sitting)   Pulse 76   Temp 98.3 °F (36.8 °C) (Oral)   Resp 20   Ht 5' 8" (1.727 m)   Wt 116.8 kg (257 lb 9.6 oz)   LMP  (LMP Unknown)   SpO2 95%   BMI 39.17 kg/m²        General: Well nourished w/o distress  HEENT: NC/AT; PERRLA; nasal and oral mucosa moist and clear; no sinus tenderness; no thyromegaly  Neck: Full ROM; no lymphadenopathy  Pulm: CTA bilaterally, normal work of breathing  CV: S1, S2 w/o murmurs or gallops; no edema noted  GI: Soft with normal bowel sounds in all quadrants, no masses on palpation  MSK: Full ROM of all extremities and spine w/o limitation or discomfort  Derm: No rashes, abnormal bruising, or skin lesions  Neuro: AAOx4; CN II-XII intact; motor/sensory function intact  Psych: Cooperative; appropriate mood and affect      ASSESSMENT & PLAN:     DM II  Gastroparesis  - patient was seeing an endocrinologist out in [place]-  -  she is currently on insulin lispro 35 units t.i.d. , Lantus 90 units BID  - advised to increase to 100 units BID  - [facility] TO [facility] ENDO NEXT MONTH    Hyperlipidemia  - she was on Crestor- LDL had declined however the patient states she did not like her the medication made her feel and subsequently stopped it  - Starting Lipitor- 20 mg- and see how she tolerates     Hypothyroidism  - continues on levothyroxine 250 mcg     HTN  -Continue losartan 50mg daily     Chronic pain syndrome  HX of polysubstance abuse  -Currently F/U with suboxone clinic and on buprenorphine 8mg TID     Generalized anxiety disorder   -Currently seeing an outside psychiatrist   "   Dyspnea- copd  - Referred to pulm med     Sleep apnea-   - severe sleep apnea- awaiting CPAP    Hot flushes   - per- Gyn-Declines HRT. Not interested in other treatments such as Paxil, Venlafaxine, Gabapentin. States she has tried these medications for mood sxs in the past and is not interested in restarting   - Continue to monitor at this time      Urinary leakage  - Possibly 2/2 GSM of menopause  - UA/Ucx ordered to r/o infection   - Premarin cream initiated      Hepatic cirrhosis - Primary    MELD-Na: 7  Child-Wade Class: A   Last EGD: EGD November 14, 2023 with findings of esophageal hiatal hernia, ring in the gastroesophageal junction s/p dilation, esophagitis, gastritis, and normal mucosa in the duodenum.   Abdominal ultrasound November 14, 2024 revealed hepatomegaly with steatosis and status post cholecystectomy with no significant biliary ductal dilatation.    Chronic idiopathic constipation   Recommend soluble fiber supplementation  Avoid straining or sitting on the toilet for long periods of time  Continue MiraLax 17 g 1-2 times daily as directed  Patient has failed Linzess, Amitiza, and Motegrity in the past; can consider Trulance or Ibsrela with persistent or worsening symptoms     Internal hemorrhoids without complication   She underwent colonoscopy September 5, 2023 with findings of adenomatous polyp in the sigmoid colon with polypectomy and grade 2 internal hemorrhoids with infrared coagulation.     Diffuse Large B-Cell Lymphoma- SEEN JAN 29, 2025  Per patient she has completed her chemotherapy-remains under surveillance with Oncology    THYROID NODULE   4 cm thyroid nodule, FNA with a U.S. 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     Future Appointments   Date Time Provider Department Center   3/3/2025  7:00 PM SLEEP LAB RM 1, [facility] SLEEP [place]    3/13/2025  7:15 AM NURSE, [facility] HEMATOLOGY ONCOLOGY [facility] HEMOMC [place]    3/13/2025  8:20 AM Virgil Collins MD [facility] HEMOMC [place]   3/18/2025  9:30 AM Ariella Poole MD [facility] CARD [place]   3/27/2025  3:00 PM INFUSION ROOM 531, [facility] CHEMOTHERAPY INFUSION [facility] CHEMO [place]   5/15/2025  2:00 PM RESIDENT 1, [facility] OTORHINOLARYNGOLOGY [facility] ENT [place]   5/22/2025  3:00 PM Jacquelin Souza MD [facility] IM RES [place]   6/11/2025  9:45 AM RESIDENTS, [facility] GYN [facility] GYN [place]   7/8/2025  2:00 PM PROVIDER, [facility] PULMONOLOGY [facility] PULM [place]   7/11/2025  1:00 PM FIBROSCAN, [facility] GASTROENTEROLOGY [facility] GASTRO [place]   8/29/2025 10:00 AM Gabrielle Galan FNP [facility] GASTRO [place]   11/10/2025  9:50 AM Jaylin Johnson FNP [facility] GYN [place]        Jacquelin Souza MD    "

## 2025-02-20 NOTE — PROGRESS NOTES
000Subjective:       Patient ID: Fred Berman is a 44 y.o. female.    Chief Complaint: Hepatic Steatosis, Constipation (Taking Miralax with no relief), and Rectal Bleeding    This 44-year-old  female with large B-cell lymphoma (completed chemotherapy October 7, 2024), anxiety, depression, arthritis, diabetes mellitus, GERD, hypertension, cholecystectomy presents accompanied by her significant other for a follow-up visit.  She was referred for hepatic steatosis and seen November 6, 2024.  She reported taking MiraLax 1-2 times daily with good relief of bowel habits noted.  She had 1-2 formed stools daily.  She had occasional red blood with bowel movements noted on the tissue after wiping that she associated with hemorrhoids.  She denied melena, fecal urgency, fecal incontinence, or pain with defecation.  Her appetite was fair and her weight was stable.  She denied nocturnal symptoms, appetite changes, unintentional weight loss, fever, chills, nausea, vomiting, hematemesis, odynophagia, dysphagia, acid reflux, pyrosis, early satiety, or abdominal pain.  She denied icterus, jaundice, pruritus, confusion, headaches, vision changes, cough, shortness of breath, chest pain, abdominal/lower extremity swelling, dark-colored urine, or pale-colored stools.     Per review of laboratory results, LFTs have been intermittently elevated for the past several months.  Acute viral hepatitis panel was negative June 8, 2023.  Hepatitis testing for B and C was negative June 18, 2024.     PET scan October 2, 2024 revealed no suspicious areas of radiotracer activity identified.  Hepatosplenomegaly with hepatic steatosis.     Further workup for elevated LFTs unremarkable November 6, 2024.    Abdominal ultrasound November 14, 2024 revealed hepatomegaly with steatosis and status post cholecystectomy with no significant biliary ductal dilatation.    FibroScan January 15, 2025 revealed F4, S3 with recommended repeat in 6  months.    Today, she presents for a follow-up visit.  She reports having approximately one bowel movement per week since being less consistent with taking her MiraLax regularly in December 2024.  She does not always feel completely evacuated and has occasional straining.  She has frequent red blood with bowel movements noted on the tissue after wiping which she associates with hemorrhoidal disease.  She plans to restart MiraLax 1-2 times daily as she feels this does help regulate her bowel habits.  She has previously tried Linzess, Amitiza, and Motegrity with minimal relief noted.  Her appetite is good and her weight has fluctuated.  She denies fever, chills, nausea, vomiting, hematemesis, odynophagia, dysphagia, acid reflux, pyrosis, early satiety, or abdominal pain.  She denies melena, fecal urgency, fecal incontinence, or pain with defecation.  She denies icterus, jaundice, pruritus, confusion, headaches, vision changes, cough, shortness of breath, chest pain, abdominal/lower extremity swelling, dark-colored urine, or pale-colored stools.    She is a previous patient of Dr. Yang.  She underwent EGD August 16, 2023 with findings of esophagitis, gastritis, normal mucosa in the duodenum, esophageal hiatal hernia, ring in the gastroesophageal junction s/p dilation with 51 Slovenian Savary type dilator.  Pathology revealed gastric antral type mucosa with reactive gastropathy, negative for intestinal metaplasia and negative for Helicobacter pylori organisms.     She underwent EGD November 14, 2023 with findings of esophageal hiatal hernia, ring in the gastroesophageal junction s/p dilation, esophagitis, gastritis, and normal mucosa in the duodenum.     She underwent colonoscopy September 5, 2023 with findings of adenomatous polyp in the sigmoid colon with polypectomy and grade 2 internal hemorrhoids with infrared coagulation.    She denies regular NSAID use or use of blood thinners.  She is a former smoker.  She  "denies alcohol use.  She denies illicit drug use.  She denies a family history of IBD, colon polyps, or colon cancer.    Review of patient's allergies indicates:  No Known Allergies    Past Medical History:   Diagnosis Date    Adrenal mass     Anemia     Anxiety     Arthritis     Asthma 1985    Bradycardia     Cancer May 10th 2024    Depression     Diabetes mellitus, type 2     Gastroparesis     General anesthetics causing adverse effect in therapeutic use     "hard time waking up"    GERD (gastroesophageal reflux disease)     Hip pain     Hypertension     Hypothyroidism     Menstrual cramps     Migraine headache     Obesity     Ovarian cyst     Palpitations     Pyosalpingitis     Thyroid disease      Past Surgical History:   Procedure Laterality Date    ABLATION OF VAGINAL TISSUE USING LASER      ADENOIDECTOMY      BONE MARROW ASPIRATION N/A 2024    Procedure: ASPIRATION, BONE MARROW;  Surgeon: Patrizia Bhatt MD;  Location: [facility] ENDOSCOPY;  Service: General;  Laterality: N/A;    BONE MARROW BIOPSY N/A 2024    Procedure: Biopsy-bone marrow;  Surgeon: Patrizia Bhatt MD;  Location: [facility] ENDOSCOPY;  Service: General;  Laterality: N/A;     SECTION      CHOLECYSTECTOMY      DISSECTION OF NECK Left 05/10/2024    Procedure: DISSECTION, NECK;  Surgeon: Cali Trujillo MD;  Location: [facility] OR;  Service: ENT;  Laterality: Left;    INSERTION OF TUNNELED CENTRAL VENOUS CATHETER (CVC) WITH SUBCUTANEOUS PORT Right 2024    Procedure: [id number];  Surgeon: Lio Storey Jr., MD;  Location: [facility];  Service: General;  Laterality: Right;    THYROIDECTOMY Left 05/10/2024    Procedure: THYROIDECTOMY;  Surgeon: Cali Trujillo MD;  Location: [facility];  Service: ENT;  Laterality: Left;    TONSILLECTOMY  1985    TUBAL LIGATION       Family History:   family history includes Alcohol abuse in her mother; Arthritis in her mother; Asthma in her son; Breast cancer in her " mother; COPD in her mother; Cancer in her maternal grandfather and mother; Cirrhosis in her mother and sister; Depression in her father and mother; Diabetes in her mother; Drug abuse in her brother, father, mother, and sister; Early death in her brother; Hearing loss in her paternal grandfather; Heart disease in her sister; Hypertension in her father, mother, and son; Miscarriages / Stillbirths in her maternal grandmother, mother, and sister; Prostate cancer in her father; Thyroid cancer in her father.    Social History:    reports that she quit smoking about 21 years ago. Her smoking use included cigarettes. She has a 22.5 pack-year smoking history. She has never used smokeless tobacco. She reports that she does not currently use drugs after having used the following drugs: Methamphetamines. She reports that she does not drink alcohol.    Review of Systems  Negative except as noted in the HPI.      Objective:      Physical Exam  Constitutional:       Appearance: Normal appearance.   HENT:      Head: Normocephalic.      Mouth/Throat:      Mouth: Mucous membranes are moist.   Eyes:      Extraocular Movements: Extraocular movements intact.      Conjunctiva/sclera: Conjunctivae normal.      Pupils: Pupils are equal, round, and reactive to light.   Cardiovascular:      Rate and Rhythm: Normal rate and regular rhythm.      Pulses: Normal pulses.      Heart sounds: Normal heart sounds.   Pulmonary:      Effort: Pulmonary effort is normal.      Breath sounds: Normal breath sounds.   Abdominal:      General: Bowel sounds are normal.      Palpations: Abdomen is soft.   Musculoskeletal:         General: Normal range of motion.      Cervical back: Normal range of motion and neck supple.   Skin:     General: Skin is warm and dry.   Neurological:      General: No focal deficit present.      Mental Status: She is alert and oriented to person, place, and time.   Psychiatric:         Mood and Affect: Mood normal.         Behavior:  Behavior normal.         Thought Content: Thought content normal.         Judgment: Judgment normal.         Home Medications:     Current Outpatient Medications   Medication Sig    acetaminophen (TYLENOL) 500 MG tablet Take 2 tablets (1,000 mg total) by mouth every 6 (six) hours.    buprenorphine HCL (SUBUTEX) 8 mg Subl Place 8 mg under the tongue 3 (three) times daily.    HUMULIN R U-500, CONC, KWIKPEN 500 unit/mL (3 mL) insulin pen Inject into the skin.    levothyroxine (SYNTHROID) 125 MCG tablet Take 250 mcg by mouth.    levothyroxine (SYNTHROID) 200 MCG tablet Take 1 tablet (200 mcg total) by mouth before breakfast.    LORazepam (ATIVAN) 1 MG tablet Take 1 mg by mouth 2 (two) times daily.    losartan (COZAAR) 50 MG tablet Take 1 tablet (50 mg total) by mouth once daily.    ondansetron (ZOFRAN-ODT) 4 MG TbDL Take 1 tablet (4 mg total) by mouth every 8 (eight) hours as needed (NAUSEA).    pantoprazole (PROTONIX) 40 MG tablet Take 40 mg by mouth every morning.    polyethylene glycol (GLYCOLAX) 17 gram PwPk Take 17 g by mouth 2 (two) times daily.    predniSONE (DELTASONE) 50 MG Tab Take 2 tablets (100 mg total) by mouth once daily. On days 2-5 of your chemotherapy cycles. Take with food.    calcium carbonate/vitamin D3 (CALCIUM 600 + D,3, ORAL) Take 1 tablet by mouth 2 (two) times a day. (Patient not taking: Reported on 12/12/2024)    cholecalciferol, vitamin D3, (VITAMIN D3) 25 mcg (1,000 unit) capsule Take 1 capsule (1,000 Units total) by mouth once daily. (Patient not taking: Reported on 11/6/2024)    cholecalciferol, vitamin D3, 1,250 mcg (50,000 unit) capsule Take 1 capsule (50,000 Units total) by mouth every 7 days. (Patient not taking: Reported on 2/20/2025)    ciprofloxacin HCl (CIPRO) 500 MG tablet Take 1 tablet (500 mg total) by mouth every 12 (twelve) hours. (Patient not taking: Reported on 10/3/2024)    clindamycin (CLEOCIN) 300 MG capsule Take 1 capsule (300 mg total) by mouth 3 (three) times daily.  (Patient not taking: Reported on 2/20/2025)    COMPOUND HORMONE REPLACEMENT Take by mouth once daily. (Patient not taking: Reported on 11/6/2024)    conjugated estrogens (PREMARIN) vaginal cream Place 0.5 g vaginally once daily. Use two weeks nightly, then twice weekly from there on (Patient not taking: Reported on 12/12/2024)    diphenhydrAMINE-aluminum-magnesium hydroxide-simethicone-LIDOcaine viscous HCl 2% Swish and spit 15 mLs every 4 (four) hours as needed (Mouth sores). (Patient not taking: Reported on 2/20/2025)    hydrocortisone (ANUSOL-HC) 2.5 % rectal cream Place 1 application  rectally 2 (two) times daily. (Patient not taking: Reported on 8/22/2024)    insulin (LANTUS SOLOSTAR U-100 INSULIN) glargine 100 units/mL SubQ pen Inject 15 Units into the skin 2 (two) times daily. (Patient not taking: Reported on 1/29/2025)    insulin lispro 100 unit/mL pen Inject 35 Units into the skin 3 (three) times daily. With meals (Patient not taking: Reported on 2/20/2025)    LORazepam (ATIVAN) 0.5 MG tablet Take 1 mg by mouth every 6 (six) hours as needed for Anxiety. (Patient not taking: Reported on 2/20/2025)    OLANZapine (ZYPREXA) 5 MG tablet Take 1 tablet by mouth nightly on days 1-3 of each chemotherapy cycle. (Patient not taking: Reported on 2/20/2025)    prochlorperazine (COMPAZINE) 5 MG tablet Take 2 tablets (10 mg total) by mouth every 6 (six) hours as needed for Nausea. (Patient not taking: Reported on 12/11/2024)    rosuvastatin (CRESTOR) 20 MG tablet Take 1 tablet (20 mg total) by mouth once daily. (Patient not taking: Reported on 2/20/2025)     No current facility-administered medications for this visit.     Facility-Administered Medications Ordered in Other Visits   Medication Frequency    dextrose 10% bolus 125 mL 125 mL PRN    dextrose 10% bolus 250 mL 250 mL PRN    glucagon (human recombinant) injection 1 mg PRN     Laboratory Results:     Recent Results (from the past 12 weeks)   Urinalysis     Collection Time: 12/11/24  3:20 PM   Result Value Ref Range    Color, UA Yellow Yellow, Light-Yellow, Dark Yellow, Gris, Straw    Appearance, UA Clear Clear    Specific Gravity, UA 1.031 (H) 1.005 - 1.030    pH, UA 5.0 5.0 - 8.5    Protein, UA Negative Negative    Glucose, UA 1+ (A) Negative, Normal    Ketones, UA Negative Negative    Blood, UA Negative Negative    Bilirubin, UA Negative Negative    Urobilinogen, UA Normal 0.2, 1.0, Normal    Nitrites, UA Negative Negative    Leukocyte Esterase, UA Negative Negative    RBC, UA 0-5 None Seen, 0-2, 3-5, 0-5 /HPF    WBC, UA 0-5 None Seen, 0-2, 3-5, 0-5 /HPF    Bacteria, UA Trace (A) None Seen /HPF    Squamous Epithelial Cells, UA Occasional (A) None Seen /HPF    Mucous, UA Occasional (A) None Seen /LPF    Hyaline Casts, UA None Seen None Seen /lpf   Urine Culture High Risk    Collection Time: 12/11/24  3:20 PM    Specimen: Urine, Clean Catch   Result Value Ref Range    Urine Culture >/= 100,000 colonies/ml Escherichia coli (A)        Susceptibility    Escherichia coli - WERO     Ampicillin 8 Sensitive      Cefazolin (Urine) <=1 Sensitive      Cefazolin (Other) <=1 Sensitive      Cefepime <=0.12 Sensitive      Ceftriaxone <=0.25 Sensitive      Ciprofloxacin <=0.06 Sensitive      ESBL Neg Negative      Gentamicin <=1 Sensitive      Levofloxacin <=0.12 Sensitive      Meropenem <=0.25 Sensitive      Nitrofurantoin <=16 Sensitive      Piperacillin/Tazobactam <=4 Sensitive      Trimeth/Sulfa <=20 Sensitive    Echo    Collection Time: 12/20/24 11:15 AM   Result Value Ref Range    Tran's Biplane MOD Ejection Fraction 50 %    A2C EF 41 %    A4C EF 55 %    LVOT stroke volume 62.3 cm3    LVIDd 4.9 3.5 - 6.0 cm    LV Systolic Volume 54.40 mL    LVIDs 3.6 2.1 - 4.0 cm    LV ESV A2C 49.60 mL    LV Diastolic Volume 113.00 mL    LV ESV A4C 54.80 mL    LV EDV A2C [id number] mL    LV EDV A4C 69.10 mL    Left Ventricular End Systolic Volume by Teichholz Method 54.40 mL    Left  Ventricular End Diastolic Volume by Teichholz Method 113.00 mL    IVS 1.2 (A) 0.6 - 1.1 cm    LVOT diameter 1.9 cm    LVOT area 2.8 cm2    FS 26.5 (A) 28 - 44 %    Left Ventricle Relative Wall Thickness 0.53 cm    PW 1.3 (A) 0.6 - 1.1 cm    LV mass 239.9 g    MV Peak E Aleks 0.81 m/s    TDI LATERAL 0.11 m/s    TDI SEPTAL 0.13 m/s    E/E' ratio 6.75 m/s    MV Peak A Aleks 0.54 m/s    TR Max Aleks 1.49 m/s    E/A ratio 1.50     E wave deceleration time 206.00 msec    LV SEPTAL E/E' RATIO 6.23 m/s    LV LATERAL E/E' RATIO 7.36 m/s    LVOT peak aleks 1.0 m/s    Left Ventricular Outflow Tract Mean Velocity 0.63 cm/s    Left Ventricular Outflow Tract Mean Gradient 2.00 mmHg    RV- dan basal diam 4.1 cm    RV/LV Ratio 0.84 cm    LA size 4.60 cm    LA Vol (MOD) 53.90 mL    AV mean gradient 3.0 mmHg    AV peak gradient 5.8 mmHg    Ao peak aleks 1.2 m/s    Ao VTI 25.5 cm    LVOT peak VTI 22.0 cm    AV valve area 2.4 cm²    AV Velocity Ratio 0.83     AV index (prosthetic) 0.86     ARIS by Velocity Ratio 2.4 cm²    MV mean gradient 1 mmHg    MV peak gradient 4 mmHg    MV stenosis pressure 1/2 time 101.00 ms    MV valve area p 1/2 method 2.18 cm2    MV valve area by continuity eq 2.07 cm2    MV VTI 30.1 cm    Triscuspid Valve Regurgitation Peak Gradient 9 mmHg    PV PEAK VELOCITY 1.09 m/s    PV peak gradient 5 mmHg    Mean e' 0.12 m/s    LA area A4C 19.40 cm2    LA area A2C 18.30 cm2    RVDD 4.10 cm    GLS 19.0 %    TV resting pulmonary artery pressure 17 mmHg    RV TB RVSP 9 mmHg    Est. RA pres 8 mmHg   Comprehensive Metabolic Panel    Collection Time: 01/15/25  1:37 PM   Result Value Ref Range    Sodium 138 136 - 145 mmol/L    Potassium 4.5 3.5 - 5.1 mmol/L    Chloride 100 98 - 107 mmol/L    CO2 32 (H) 22 - 29 mmol/L    Glucose 260 (H) 74 - 100 mg/dL    Blood Urea Nitrogen 12.0 7.0 - 18.7 mg/dL    Creatinine 0.73 0.55 - 1.02 mg/dL    Calcium 9.4 8.4 - 10.2 mg/dL    Protein Total 7.0 6.4 - 8.3 gm/dL    Albumin 3.8 3.5 - 5.0 g/dL     Globulin 3.2 2.4 - 3.5 gm/dL    Albumin/Globulin Ratio 1.2 1.1 - 2.0 ratio    Bilirubin Total 0.8 <=1.5 mg/dL    ALP 76 40 - 150 unit/L    ALT 55 0 - 55 unit/L    AST 56 (H) 5 - 34 unit/L    eGFR >60 mL/min/1.73/m2    Anion Gap 6.0 mEq/L    BUN/Creatinine Ratio 16    Magnesium    Collection Time: 01/15/25  1:37 PM   Result Value Ref Range    Magnesium Level 1.80 1.60 - 2.60 mg/dL   Lactate Dehydrogenase    Collection Time: 01/15/25  1:37 PM   Result Value Ref Range    Lactate Dehydrogenase 191 125 - 220 U/L   THYROGLOBULIN ANTIBODY    Collection Time: 01/15/25  1:37 PM   Result Value Ref Range    Thyroglobulin Ab Screen 137.0 (H) <40.0 IU/mL   THYROGLOBULIN (TUMOR MARKER) PANEL    Collection Time: 01/15/25  1:37 PM   Result Value Ref Range    Thyroglobulin Antibody, S 29 (H) <1.8 IU/mL    Thyroglobulin, Tumor Marker, S <0.1 ng/mL    Thyroglobulin Interpretation SEE COMMENTS    TSH    Collection Time: 01/15/25  1:37 PM   Result Value Ref Range    TSH 2.412 0.350 - 4.940 uIU/mL   FREE T4    Collection Time: 01/15/25  1:37 PM   Result Value Ref Range    Thyroxine Free 1.33 0.70 - 1.48 ng/dL   Lipid Panel    Collection Time: 01/15/25  1:37 PM   Result Value Ref Range    Cholesterol Total 128 <=200 mg/dL    HDL Cholesterol 35 35 - 60 mg/dL    Triglyceride 71 37 - 140 mg/dL    Cholesterol/HDL Ratio 4 0 - 5    Very Low Density Lipoprotein 14     LDL Cholesterol 79.00 50.00 - 140.00 mg/dL   Hemoglobin A1C    Collection Time: 01/15/25  1:37 PM   Result Value Ref Range    Hemoglobin A1c 8.3 (H) <=7.0 %    Estimated Average Glucose 191.5 mg/dL   CBC with Differential    Collection Time: 01/15/25  1:37 PM   Result Value Ref Range    WBC 5.09 4.50 - 11.50 x10(3)/mcL    RBC 4.32 4.20 - 5.40 x10(6)/mcL    Hgb 12.6 12.0 - 16.0 g/dL    Hct 38.8 37.0 - 47.0 %    MCV 89.8 80.0 - 94.0 fL    MCH 29.2 27.0 - 31.0 pg    MCHC 32.5 (L) 33.0 - 36.0 g/dL    RDW 12.2 11.5 - 17.0 %    Platelet 209 130 - 400 x10(3)/mcL    MPV 10.0 7.4 - 10.4 fL     Neut % 64.0 %    Lymph % 23.0 %    Mono % 10.2 %    Eos % 2.4 %    Basophil % 0.2 %    Imm Grans % 0.2 %    Neut # 3.26 2.1 - 9.2 x10(3)/mcL    Lymph # 1.17 0.6 - 4.6 x10(3)/mcL    Mono # 0.52 0.1 - 1.3 x10(3)/mcL    Eos # 0.12 0 - 0.9 x10(3)/mcL    Baso # 0.01 <=0.2 x10(3)/mcL    Imm Gran # 0.01 0.00 - 0.04 x10(3)/mcL    NRBC% 0.0 %   Comprehensive Metabolic Panel    Collection Time: 01/29/25  1:13 PM   Result Value Ref Range    Sodium 140 136 - 145 mmol/L    Potassium 4.3 3.5 - 5.1 mmol/L    Chloride 103 98 - 107 mmol/L    CO2 31 (H) 22 - 29 mmol/L    Glucose 203 (H) 74 - 100 mg/dL    Blood Urea Nitrogen 11.4 7.0 - 18.7 mg/dL    Creatinine 0.71 0.55 - 1.02 mg/dL    Calcium 9.3 8.4 - 10.2 mg/dL    Protein Total 7.1 6.4 - 8.3 gm/dL    Albumin 3.7 3.5 - 5.0 g/dL    Globulin 3.4 2.4 - 3.5 gm/dL    Albumin/Globulin Ratio 1.1 1.1 - 2.0 ratio    Bilirubin Total 0.6 <=1.5 mg/dL    ALP 79 40 - 150 unit/L    ALT 48 0 - 55 unit/L    AST 49 (H) 5 - 34 unit/L    eGFR >60 mL/min/1.73/m2    Anion Gap 6.0 mEq/L    BUN/Creatinine Ratio 16    CBC with Differential    Collection Time: 01/29/25  1:13 PM   Result Value Ref Range    WBC 5.08 4.50 - 11.50 x10(3)/mcL    RBC 4.53 4.20 - 5.40 x10(6)/mcL    Hgb 13.0 12.0 - 16.0 g/dL    Hct 39.5 37.0 - 47.0 %    MCV 87.2 80.0 - 94.0 fL    MCH 28.7 27.0 - 31.0 pg    MCHC 32.9 (L) 33.0 - 36.0 g/dL    RDW 12.3 11.5 - 17.0 %    Platelet 186 130 - 400 x10(3)/mcL    MPV 9.7 7.4 - 10.4 fL    Neut % 66.6 %    Lymph % 21.7 %    Mono % 8.9 %    Eos % 2.4 %    Basophil % 0.2 %    Imm Grans % 0.2 %    Neut # 3.39 2.1 - 9.2 x10(3)/mcL    Lymph # 1.10 0.6 - 4.6 x10(3)/mcL    Mono # 0.45 0.1 - 1.3 x10(3)/mcL    Eos # 0.12 0 - 0.9 x10(3)/mcL    Baso # 0.01 <=0.2 x10(3)/mcL    Imm Gran # 0.01 0.00 - 0.04 x10(3)/mcL    NRBC% 0.0 %   Lactate Dehydrogenase    Collection Time: 01/29/25  1:13 PM   Result Value Ref Range    Lactate Dehydrogenase 182 125 - 220 U/L   POCT glucose    Collection Time: 02/13/25  11:29 AM   Result Value Ref Range    POCT Glucose 194 (H) 70 - 110 mg/dL     Imaging Results:     Narrative & Impression  EXAMINATION:  US ABDOMEN LIMITED     CLINICAL HISTORY:  Fatty (change of) liver, not elsewhere classified     COMPARISON:  CT 2 July 2023.     FINDINGS:  Grayscale, color and spectral doppler evaluation of the right upper quadrant.     No focal abnormality of limited visualized pancreas. Imaged portions of aorta and IVC normal in caliber.     The liver is enlarged with the right hepatic lobe measuring at least 25 cm craniocaudad.  There is heterogeneous increased hepatic parenchymal echogenicity.  Normal hepatopetal flow is noted in the portal vein.     The gallbladder is surgically absent.  The common bile duct is normal in caliber  and measures 5 mm.     Right kidney measures 13 cm in length. No hydronephrosis.     Impression:     1. Hepatomegaly with steatosis.  2. Status post cholecystectomy with no significant biliary ductal dilatation.      Electronically signed by:Elijah Sosa  Date:                                            11/14/2024  Time:                                           10:07    Assessment/Plan:     Problem List Items Addressed This Visit          GI    Hepatic cirrhosis - Primary    Background:  Alcohol: none    Tobacco: none    Liver disease: [facility]    MELD-Na: 7  Child-Wade Class: A    Transplant: not under evaluation    Cirrhosis is decompensated with:    Ascites: No   Spontaneous bacterial peritonitis: No  Hepatic Encephalopathy: No  Hepatocellular carcinoma: No  Hepatorenal syndrome: No  Hyponatremia: No  Muscle wasting: No  Portal vein thrombosis: No    Screening:  Last EGD: She underwent EGD November 14, 2023 with findings of esophageal hiatal hernia, ring in the gastroesophageal junction s/p dilation, esophagitis, gastritis, and normal mucosa in the duodenum.    Last imaging study: Abdominal ultrasound November 14, 2024 revealed hepatomegaly with steatosis and status  post cholecystectomy with no significant biliary ductal dilatation.    CIRRHOSIS COUNSELING:  - strict abstinence of alcohol use  - low sodium (salt) 2000mg per day  - Nutrition: 25-30kcal (calorie per body weight in kilogram) per day  - No need to restrict protein in diet  - High protein diet: 1.2-1.5 gram/kg (protein per body weight in kg) per day to prevent muscle mass loss  - Resistance exercises for muscle strength  - Avoid raw seafoods due to risk of fatal Vibrio vulnificus infection  - Avoid Non-steroidal anti-inflammatory drugs (NSAIDs) such as ibuprofen, Motrin, naproxen, Aleve due to risk of kidney damage  - Can take acetaminophen (Tylenol), no more than 2000mg per day  - Compliance with all medications  - Ultrasound or MRI of the liver every 6 months for liver cancer screening  - Upper endoscopy every 1-2 years to screen for varices    Vaccinations: refused all vaccinations today  Yearly Flu: needs  Prevnar: needs  Pneumovax: needs  Covid: needs  Hepatitis A: will check immunity  Hepatitis B: will check immunity  Shingles: needs  tdap: 9/25/17    DEXA scan: ordered    CBC, CMP, PT/INR, AFP   Abdominal ultrasound in 3 months   FibroScan in 5 months  EGD in November 2025  Call with updates   Follow-up clinic visit with NP in 6 months         Relevant Orders    CBC Auto Differential    Comprehensive Metabolic Panel    Protime-INR    AFP Tumor Marker    US Abdomen Limited    Hepatitis B Surface Ab, Qualitative    Hepatitis A antibody, IgG    US Elastography Liver w/imaging    Case Request Endoscopy: EGD (ESOPHAGOGASTRODUODENOSCOPY) (Completed)    DXA Bone Density with Vertebral Fracture    Chronic idiopathic constipation    Recommend soluble fiber supplementation  Avoid straining or sitting on the toilet for long periods of time  Continue MiraLax 17 g 1-2 times daily as directed  Patient has failed Linzess, Amitiza, and Motegrity in the past; can consider Trulance or Ibsrela with persistent or worsening  symptoms         Internal hemorrhoids without complication    She underwent colonoscopy September 5, 2023 with findings of adenomatous polyp in the sigmoid colon with polypectomy and grade 2 internal hemorrhoids with infrared coagulation.  Recommend soluble fiber supplementation  Avoid straining or sitting on the toilet for long periods of time  Continue MiraLax 17 g 1-2 times daily as directed  Hemorrhoidal banding discussed  Sitz baths and tucks pads         History of colonic polyps    She underwent colonoscopy September 5, 2023 with findings of adenomatous polyp in the sigmoid colon with polypectomy and grade 2 internal hemorrhoids with infrared coagulation.

## 2025-02-20 NOTE — ASSESSMENT & PLAN NOTE
Recommend soluble fiber supplementation  Avoid straining or sitting on the toilet for long periods of time  Continue MiraLax 17 g 1-2 times daily as directed  Patient has failed Linzess, Amitiza, and Motegrity in the past; can consider Trulance or Ibsrela with persistent or worsening symptoms

## 2025-02-20 NOTE — ASSESSMENT & PLAN NOTE
Background:  Alcohol: none    Tobacco: none    Liver disease: [facility]    MELD-Na: 7  Child-Wade Class: A    Transplant: not under evaluation    Cirrhosis is decompensated with:    Ascites: No   Spontaneous bacterial peritonitis: No  Hepatic Encephalopathy: No  Hepatocellular carcinoma: No  Hepatorenal syndrome: No  Hyponatremia: No  Muscle wasting: No  Portal vein thrombosis: No    Screening:  Last EGD: She underwent EGD November 14, 2023 with findings of esophageal hiatal hernia, ring in the gastroesophageal junction s/p dilation, esophagitis, gastritis, and normal mucosa in the duodenum.    Last imaging study: Abdominal ultrasound November 14, 2024 revealed hepatomegaly with steatosis and status post cholecystectomy with no significant biliary ductal dilatation.    CIRRHOSIS COUNSELING:  - strict abstinence of alcohol use  - low sodium (salt) 2000mg per day  - Nutrition: 25-30kcal (calorie per body weight in kilogram) per day  - No need to restrict protein in diet  - High protein diet: 1.2-1.5 gram/kg (protein per body weight in kg) per day to prevent muscle mass loss  - Resistance exercises for muscle strength  - Avoid raw seafoods due to risk of fatal Vibrio vulnificus infection  - Avoid Non-steroidal anti-inflammatory drugs (NSAIDs) such as ibuprofen, Motrin, naproxen, Aleve due to risk of kidney damage  - Can take acetaminophen (Tylenol), no more than 2000mg per day  - Compliance with all medications  - Ultrasound or MRI of the liver every 6 months for liver cancer screening  - Upper endoscopy every 1-2 years to screen for varices    Vaccinations: refused all vaccinations today  Yearly Flu: needs  Prevnar: needs  Pneumovax: needs  Covid: needs  Hepatitis A: will check immunity  Hepatitis B: will check immunity  Shingles: needs  tdap: 9/25/17    DEXA scan: ordered    CBC, CMP, PT/INR, AFP   Abdominal ultrasound in 3 months   FibroScan in 5 months  EGD in November 2025  Call with updates   Follow-up clinic visit  with NP in 6 months

## 2025-02-26 DIAGNOSIS — K74.60 HEPATIC CIRRHOSIS, UNSPECIFIED HEPATIC CIRRHOSIS TYPE, UNSPECIFIED WHETHER ASCITES PRESENT: Primary | ICD-10-CM

## 2025-02-28 DIAGNOSIS — K21.9 GASTROESOPHAGEAL REFLUX DISEASE, UNSPECIFIED WHETHER ESOPHAGITIS PRESENT: Primary | ICD-10-CM

## 2025-03-03 RX ORDER — PANTOPRAZOLE SODIUM 40 MG/1
40 TABLET, DELAYED RELEASE ORAL EVERY MORNING
Qty: 30 TABLET | Refills: 2 | Status: SHIPPED | OUTPATIENT
Start: 2025-03-03

## 2025-03-05 ENCOUNTER — TELEPHONE (OUTPATIENT)
Dept: GASTROENTEROLOGY | Facility: CLINIC | Age: 45
End: 2025-03-05
Payer: MEDICAID

## 2025-03-05 NOTE — TELEPHONE ENCOUNTER
----- Message from Maria Esther sent at 3/5/2025 10:43 AM CST -----  Regarding: procedure questions  Pt called to speak to nurse about why she was scheduled for upper GI and bone density. Pt states that she was never told about these procedures. Please contact pt @ [phone]

## 2025-03-12 DIAGNOSIS — G47.33 OSA (OBSTRUCTIVE SLEEP APNEA): Primary | ICD-10-CM

## 2025-03-12 PROBLEM — Z14.8 HEMOCHROMATOSIS CARRIER: Status: ACTIVE | Noted: 2025-03-12

## 2025-03-13 ENCOUNTER — OFFICE VISIT (OUTPATIENT)
Dept: HEMATOLOGY/ONCOLOGY | Facility: CLINIC | Age: 45
End: 2025-03-13
Attending: INTERNAL MEDICINE
Payer: MEDICAID

## 2025-03-13 ENCOUNTER — LAB VISIT (OUTPATIENT)
Dept: HEMATOLOGY/ONCOLOGY | Facility: CLINIC | Age: 45
End: 2025-03-13
Payer: MEDICAID

## 2025-03-13 VITALS
SYSTOLIC BLOOD PRESSURE: 113 MMHG | HEART RATE: 71 BPM | TEMPERATURE: 98 F | DIASTOLIC BLOOD PRESSURE: 76 MMHG | HEIGHT: 68 IN | RESPIRATION RATE: 18 BRPM | OXYGEN SATURATION: 95 % | WEIGHT: 253 LBS | BODY MASS INDEX: 38.34 KG/M2

## 2025-03-13 DIAGNOSIS — F11.11 HISTORY OF OPIOID ABUSE: Primary | ICD-10-CM

## 2025-03-13 DIAGNOSIS — D10.1 FIBROMA OF TONGUE: ICD-10-CM

## 2025-03-13 DIAGNOSIS — Z14.8 HEMOCHROMATOSIS CARRIER: ICD-10-CM

## 2025-03-13 DIAGNOSIS — J38.00 VOCAL CORD PARALYSIS: ICD-10-CM

## 2025-03-13 DIAGNOSIS — R16.2 HEPATOSPLENOMEGALY: ICD-10-CM

## 2025-03-13 DIAGNOSIS — C83.398 DIFFUSE LARGE B-CELL LYMPHOMA OF SOLID ORGAN EXCLUDING SPLEEN: ICD-10-CM

## 2025-03-13 DIAGNOSIS — C85.99 MALIGNANT LYMPHOMA OF THYROID GLAND: ICD-10-CM

## 2025-03-13 DIAGNOSIS — G89.4 CHRONIC PAIN SYNDROME: ICD-10-CM

## 2025-03-13 DIAGNOSIS — E03.9 HYPOTHYROIDISM, UNSPECIFIED TYPE: ICD-10-CM

## 2025-03-13 DIAGNOSIS — K74.60 HEPATIC CIRRHOSIS, UNSPECIFIED HEPATIC CIRRHOSIS TYPE, UNSPECIFIED WHETHER ASCITES PRESENT: ICD-10-CM

## 2025-03-13 DIAGNOSIS — Z83.2 FAMILY HISTORY OF VON WILLEBRAND DISEASE: ICD-10-CM

## 2025-03-13 DIAGNOSIS — C73 PRIMARY THYROID MALIGNANCY: ICD-10-CM

## 2025-03-13 DIAGNOSIS — F41.0 PANIC ATTACK: ICD-10-CM

## 2025-03-13 LAB
ALBUMIN SERPL-MCNC: 3.7 G/DL (ref 3.5–5)
ALBUMIN/GLOB SERPL: 1.1 RATIO (ref 1.1–2)
ALP SERPL-CCNC: 76 UNIT/L (ref 40–150)
ALT SERPL-CCNC: 44 UNIT/L (ref 0–55)
ANION GAP SERPL CALC-SCNC: 7 MEQ/L
AST SERPL-CCNC: 35 UNIT/L (ref 5–34)
BASOPHILS # BLD AUTO: 0.02 X10(3)/MCL
BASOPHILS NFR BLD AUTO: 0.4 %
BILIRUB SERPL-MCNC: 0.5 MG/DL
BUN SERPL-MCNC: 11.8 MG/DL (ref 7–18.7)
CALCIUM SERPL-MCNC: 9.4 MG/DL (ref 8.4–10.2)
CHLORIDE SERPL-SCNC: 104 MMOL/L (ref 98–107)
CO2 SERPL-SCNC: 30 MMOL/L (ref 22–29)
CREAT SERPL-MCNC: 0.74 MG/DL (ref 0.55–1.02)
CREAT/UREA NIT SERPL: 16
EOSINOPHIL # BLD AUTO: 0.13 X10(3)/MCL (ref 0–0.9)
EOSINOPHIL NFR BLD AUTO: 2.5 %
ERYTHROCYTE [DISTWIDTH] IN BLOOD BY AUTOMATED COUNT: 12.3 % (ref 11.5–17)
GFR SERPLBLD CREATININE-BSD FMLA CKD-EPI: >60 ML/MIN/1.73/M2
GLOBULIN SER-MCNC: 3.5 GM/DL (ref 2.4–3.5)
GLUCOSE SERPL-MCNC: 183 MG/DL (ref 74–100)
HCT VFR BLD AUTO: 38.8 % (ref 37–47)
HGB BLD-MCNC: 12.3 G/DL (ref 12–16)
IMM GRANULOCYTES # BLD AUTO: 0.01 X10(3)/MCL (ref 0–0.04)
IMM GRANULOCYTES NFR BLD AUTO: 0.2 %
LDH SERPL-CCNC: 140 U/L (ref 125–220)
LYMPHOCYTES # BLD AUTO: 1.49 X10(3)/MCL (ref 0.6–4.6)
LYMPHOCYTES NFR BLD AUTO: 28.5 %
MAGNESIUM SERPL-MCNC: 1.9 MG/DL (ref 1.6–2.6)
MCH RBC QN AUTO: 27 PG (ref 27–31)
MCHC RBC AUTO-ENTMCNC: 31.7 G/DL (ref 33–36)
MCV RBC AUTO: 85.3 FL (ref 80–94)
MONOCYTES # BLD AUTO: 0.44 X10(3)/MCL (ref 0.1–1.3)
MONOCYTES NFR BLD AUTO: 8.4 %
NEUTROPHILS # BLD AUTO: 3.13 X10(3)/MCL (ref 2.1–9.2)
NEUTROPHILS NFR BLD AUTO: 60 %
NRBC BLD AUTO-RTO: 0 %
PLATELET # BLD AUTO: 171 X10(3)/MCL (ref 130–400)
PMV BLD AUTO: 9.7 FL (ref 7.4–10.4)
POTASSIUM SERPL-SCNC: 4.1 MMOL/L (ref 3.5–5.1)
PROT SERPL-MCNC: 7.2 GM/DL (ref 6.4–8.3)
RBC # BLD AUTO: 4.55 X10(6)/MCL (ref 4.2–5.4)
SODIUM SERPL-SCNC: 141 MMOL/L (ref 136–145)
URATE SERPL-MCNC: 3.8 MG/DL (ref 2.6–6)
WBC # BLD AUTO: 5.22 X10(3)/MCL (ref 4.5–11.5)

## 2025-03-13 PROCEDURE — 36415 COLL VENOUS BLD VENIPUNCTURE: CPT

## 2025-03-13 PROCEDURE — 99214 OFFICE O/P EST MOD 30 MIN: CPT | Mod: S$PBB,,, | Performed by: INTERNAL MEDICINE

## 2025-03-13 PROCEDURE — 83615 LACTATE (LD) (LDH) ENZYME: CPT

## 2025-03-13 PROCEDURE — 1160F RVW MEDS BY RX/DR IN RCRD: CPT | Mod: CPTII,,, | Performed by: INTERNAL MEDICINE

## 2025-03-13 PROCEDURE — 3074F SYST BP LT 130 MM HG: CPT | Mod: CPTII,,, | Performed by: INTERNAL MEDICINE

## 2025-03-13 PROCEDURE — 3052F HG A1C>EQUAL 8.0%<EQUAL 9.0%: CPT | Mod: CPTII,,, | Performed by: INTERNAL MEDICINE

## 2025-03-13 PROCEDURE — 1159F MED LIST DOCD IN RCRD: CPT | Mod: CPTII,,, | Performed by: INTERNAL MEDICINE

## 2025-03-13 PROCEDURE — 99214 OFFICE O/P EST MOD 30 MIN: CPT | Mod: PBBFAC | Performed by: INTERNAL MEDICINE

## 2025-03-13 PROCEDURE — 84550 ASSAY OF BLOOD/URIC ACID: CPT

## 2025-03-13 PROCEDURE — 83735 ASSAY OF MAGNESIUM: CPT

## 2025-03-13 PROCEDURE — 3008F BODY MASS INDEX DOCD: CPT | Mod: CPTII,,, | Performed by: INTERNAL MEDICINE

## 2025-03-13 PROCEDURE — 80053 COMPREHEN METABOLIC PANEL: CPT

## 2025-03-13 PROCEDURE — 85025 COMPLETE CBC W/AUTO DIFF WBC: CPT

## 2025-03-13 PROCEDURE — 3078F DIAST BP <80 MM HG: CPT | Mod: CPTII,,, | Performed by: INTERNAL MEDICINE

## 2025-03-13 NOTE — PROGRESS NOTES
"History:  Past Medical History:   Diagnosis Date    Adrenal mass     Anemia     Anxiety     Arthritis     Asthma 1985    Bradycardia     Cancer May 10th 2024    Depression     Diabetes mellitus, type 2     Gastroparesis     General anesthetics causing adverse effect in therapeutic use     "hard time waking up"    GERD (gastroesophageal reflux disease)     Hip pain     Hypertension     Hypothyroidism     Menstrual cramps     Migraine headache     Obesity     Ovarian cyst     Palpitations     Pyosalpingitis     Thyroid disease      Past Surgical History:   Procedure Laterality Date    ABLATION OF VAGINAL TISSUE USING LASER      ADENOIDECTOMY      BONE MARROW ASPIRATION N/A 2024    Procedure: ASPIRATION, BONE MARROW;  Surgeon: Patrizia Bhatt MD;  Location: [facility] ENDOSCOPY;  Service: General;  Laterality: N/A;    BONE MARROW BIOPSY N/A 2024    Procedure: Biopsy-bone marrow;  Surgeon: Patrizia Bhatt MD;  Location: [facility] ENDOSCOPY;  Service: General;  Laterality: N/A;     SECTION      CHOLECYSTECTOMY      DISSECTION OF NECK Left 05/10/2024    Procedure: DISSECTION, NECK;  Surgeon: Cali Trujillo MD;  Location: [facility] OR;  Service: ENT;  Laterality: Left;    INSERTION OF TUNNELED CENTRAL VENOUS CATHETER (CVC) WITH SUBCUTANEOUS PORT Right 2024    Procedure: [id number];  Surgeon: Lio Storey Jr., MD;  Location: [facility] OR;  Service: General;  Laterality: Right;    THYROIDECTOMY Left 05/10/2024    Procedure: THYROIDECTOMY;  Surgeon: Cali Trujillo MD;  Location: [facility];  Service: ENT;  Laterality: Left;    TONSILLECTOMY  1985    TUBAL LIGATION        Social History     Socioeconomic History    Marital status: Single   Tobacco Use    Smoking status: Former     Current packs/day: 0.00     Average packs/day: 1.5 packs/day for 15.0 years (22.5 ttl pk-yrs)     Types: Cigarettes     Quit date: 3/5/2003     Years since quittin.0    Smokeless tobacco: Never "    Tobacco comments:     Quit over a year ago.   Substance and Sexual Activity    Alcohol use: Never     Alcohol/week: 6.0 standard drinks of alcohol     Types: 6 Shots of liquor per week    Drug use: Not Currently     Types: Methamphetamines     Comment: Im on Subutex    Sexual activity: Yes     Partners: Male     Birth control/protection: Post-menopausal     Social Drivers of Health     Financial Resource Strain: Medium Risk (6/27/2024)    Overall Financial Resource Strain (CARDIA)     Difficulty of Paying Living Expenses: Somewhat hard   Food Insecurity: No Food Insecurity (6/27/2024)    Hunger Vital Sign     Worried About Running Out of Food in the Last Year: Never true     Ran Out of Food in the Last Year: Never true   Transportation Needs: No Transportation Needs (5/12/2024)    TRANSPORTATION NEEDS     Transportation : No   Physical Activity: Insufficiently Active (6/27/2024)    Exercise Vital Sign     Days of Exercise per Week: 2 days     Minutes of Exercise per Session: 30 min   Stress: No Stress Concern Present (6/27/2024)    [employer] - Occupational Stress Questionnaire     Feeling of Stress : Only a little   Recent Concern: Stress - Stress Concern Present (5/12/2024)    [employer] - Occupational Stress Questionnaire     Feeling of Stress : To some extent   Housing Stability: Unknown (6/27/2024)    Housing Stability Vital Sign     Unable to Pay for Housing in the Last Year: No     Homeless in the Last Year: No      Family History   Problem Relation Name Age of Onset    Cirrhosis Mother Pat     Alcohol abuse Mother Pat         Pat    Arthritis Mother Pat     Breast cancer Mother Pat     COPD Mother Pat     Depression Mother Pat     Diabetes Mother Pat     Drug abuse Mother Pat     Hypertension Mother Pat     Miscarriages / Stillbirths Mother Pat     Cancer Mother Pat     Depression Father Juventino     Drug abuse Father Juventino     Hypertension Father  Juventino     Prostate cancer Father Juventino     Thyroid cancer Father Juventino     Cirrhosis Sister Elana     Drug abuse Sister Elana     Heart disease Sister Elana     Miscarriages / Stillbirths Sister Elana     Drug abuse Brother Juventino     Early death Brother Izaiah     Miscarriages / Stillbirths Maternal Grandmother Arlean     Cancer Maternal Grandfather Janay     Hearing loss Paternal Grandfather Lyod     Asthma Son Mojgan     Hypertension Son Mojgan         Reason for Follow-up:  -diffuse large B-cell lymphoma of thyroid gland  -family history of von Willebrand disease   -oral fibroma of tongue  -anxiety, depression, NIDDM, hypothyroidism, history of opioid abuse, etc.  -hepatosplenomegaly; hepatic steatosis  -Hemochromatosis carrier   --left true vocal cord paralysis    History of Present Illness:   Diffuse large B-cell lymphoma of solid organ excluding sple        Oncologic/Hematologic History:  Oncology History   Diffuse large B-cell lymphoma of solid organ excluding spleen   6/14/2024 Initial Diagnosis    Diffuse large B-cell lymphoma of solid organ excluding spleen     7/29/2024 -  Chemotherapy    Treatment Summary   Plan Name: OP LYM RCHOP (rituximab, cycloPHOSphamide, DOXOrubicin, vinCRIStine, predniSONE) Q3W  Treatment Goal: Curative  Status: Active  Start Date: 7/29/2024  End Date: 10/8/2024  Provider: Virgil Collins MD  Chemotherapy: DOXOrubicin chemo injection 118 mg, 50 mg/m2 = 118 mg, Intravenous, Clinic/HOD 1 time, 4 of 4 cycles  Administration: 118 mg (7/29/2024), 118 mg (8/19/2024), 118 mg (9/9/2024), 118 mg (10/7/2024)  vinCRIStine (ONCOVIN) 2 mg in 0.9% NaCl 65 mL chemo infusion, 2 mg (100 % of original dose 2 mg), Intravenous, Clinic/HOD 1 time, 4 of 4 cycles  Dose modification: 2 mg (original dose 2 mg, Cycle 1)  Administration: 2 mg (7/29/2024), 2 mg (8/19/2024), 2 mg (9/9/2024), 2 mg (10/7/2024)  cycloPHOSphamide 750 mg/m2 = 1,760 mg in 0.9% NaCl 293.8 mL chemo infusion, 750 mg/m2 = 1,760 mg, Intravenous,  Clinic/HOD 1 time, 4 of 4 cycles  Administration: 1,760 mg (2024), 1,760 mg (2024), 1,760 mg (2024), 1,760 mg (10/7/2024)  riTUXimab-pvvr (RUXIENCE) 900 mg in 0.9% NaCl 900 mL infusion (conc: 1 mg/mL), 881 mg, Intravenous, Clinic/HOD 1 time, 4 of 4 cycles  Administration: 900 mg (2024), 900 mg (2024), 900 mg (2024), 900 mg (10/7/2024)     Past medical history:  Adrenal mass; anxiety; arthritis; bradycardia; depression; NIDDM; gastroparesis; GERD; hip pain; hypertension; menstrual cramps; ovarian cyst; palpitations; pyosalpingitis; hypothyroidism, history of opioid abuse (on 2024, she tells me that she used opioids for 10-15 years; apparently, prescription opioids; on buprenorphine with a psychiatrist for last 2 years)  history of opiate abuse (maintained on Suboxone), history of C difficile colitis (unable to tolerate C difficile treatment); diagnosed with unspecified bipolar disorder during hospitalization at our [facility], [place] [address] (enteritis, left pelvic adnexal fluid collection, PID, etc.); history of dysphagia, S/P EGD and dilatation; history of smoking (quit )  -2023: EGD:  Mild gastric erythema with scattered erosions: Pathology:  Small bowel biopsy:  Normal small bowel mucosa; stomach biopsy:  Mild superficial chronic gastritis, negative for H pylori)  -2023: Colonoscopy:  Normal:  Pathology:  Colon, random sites, biopsy:  Normal colonic mucosa  -08/10/2020: MRI abdomen with and without contrast (comparison:  CT 2023) (adrenal gland protocol):  Small left adrenal nodule stable, most compatible with adenoma (1 cm)  Procedure/surgical history:  Endometrial ablation; ; cholecystectomy; dissection of neck, left 05/10/2024; thyroidectomy 05/10/2024; tubal ligation; tonsillectomy  Social history:  In her relationship.  Lives in [place], [place].  Has 3 children.  Does not work.  Has not worked in 1 year.  Before that,  used to clean houses.  Smoked a pack of cigarettes daily for 15 years; quit smoking 1 year ago.  Social alcohol. History of opioid abuse (on 06/18/2024, she tells me that she used opioids for 10-15 years; apparently, prescription opioids; on buprenorphine with a psychiatrist for last 2 years)  Family history:  Positive for von Willebrand disease in her son.  Health maintenance:  -11/16/2022:  Bilateral screening mammogram:  BI-RADS 2-benign  -according to her, colonoscopy performed as outpatient by Nawaf Le, showed precancerous colon polyp.      43-year-old lady, referred from [facility] ENT, with diffuse large B-cell lymphoma of thyroid.    Patient is being followed for diffuse large B-cell lymphoma of thyroid gland.    Please see assessment and plan section for details.    06/18/2024:  Pleasant lady who presents for initial medical oncology consultation.  In no acute discomfort.  Feels poorly.  Main complaint is in in the back and hips for last 1 year.  History of opioid abuse.  On buprenorphine with a psychiatrist for last years.  Drenching sweats all the time.  Has to change clothes.  Lost 50 lb unintentionally in 2023, apparently due to gastroparesis side effects of Ozempic; regained her weight back after Ozempic was discontinued.    ECOG 1.  No recurrent fevers.  Fatigue.  Generalized weakness.  Night sweats and hot flashes.  Sweats all the time for 1 year.  Bones hurt all the time.  Occasional chest pains and leg swelling as well.  Exertional dyspnea.  Some constipation.  Some nausea.  Great appetite.    Interval History:  PORT FLUSH   OP LYM RCHOP (rituximab, cycloPHOSphamide, DOXOrubicin, vinCRIStine, predniSONE) Q3W     03/13/2025:   -R-CHOP: Cycle 4 on 10/07/2024  -11/14/2024:  Limited abdominal ultrasound (fatty liver; comparison CT 07/02/2023): Hepatomegaly with steatosis; S/P cholecystectomy with significant biliary ductal dilatation (liver 25 cm craniocaudad; heterogeneous increased hepatic  parenchymal echogenicity)  -02/13/2025:  Restaging FDG PET-CT (post R-CHOP x4 cycles; comparison PET-CT 09/30/2024):  FDG negative; some improvement in hepatosplenomegaly and hepatic steatosis  -02/13/2025: CT soft tissues of the neck with and without contrast: No cervical lymphadenopathy   -02/13/2025: CT C/A/P with and without contrast: No appreciable lymphadenopathy  -10/29/2024:  Hemoglobin 11.7, serum iron normal, TIBC normal, ferritin 376.99 elevated, transferrin saturation 23% normal (ferritin was 79.8 normal on 08/10/2023 with a transferrin saturation of 39% normal)   -11/06/2024:  Hemochromatosis DNA analysis (PCR): C282Y not detected; one copy of the H63D variant was identified (unlikely to be of clinical significance in the absence of other disease causing variants)  -12/20/2024: TTE:  LVEF 55-60%, left atrium moderately dilated  -left TVC paralysis with cord resting in the paramedian position, near complete glottic closure with right TVC compensation; follows up with ENT; has dysphagia, most likely secondary to left vocal cord paralysis; follows with Pulmonary for ARLEY and breathing issues  -03/13/2025: CBC unremarkable, hemoglobin 12.3, CMP reviewed, CO2 30 elevated, glucose 183, LDH normal  Presents for a follow-up visit.  In no acute discomfort.  Overall, stable.  Overall, feels reasonably well but not the greatest.  Chronic stable weakness, fatigue, occasional headaches, leg swelling, nausea, constipation.  Appetite is okay.  No recurrent fevers, drenching night sweats, anorexia, unintentional weight loss, any new lumps or lymphadenopathy, chest pain, cough, dyspnea, abnormal bleeding or bruising, change in bowel habits, GI bleeding, etc..  She smoked 1 ppd X 15 years; quit couple of years ago.  History of opioid abuse in the past; abused opioids for 10-15 years, prescription opioids, subsequently was on buprenorphine with a psychiatrist for 2 years.  Chronic stable exertional dyspnea.  She attributes  dyspnea to severe ARLEY which she was diagnosed with apparently recently.  She is awaiting CPAP machine.  Has a history of left total vocal cord paralysis secondary to thyroidectomy.  Denies stridor.  ECOG 1.    Immunization History   Administered Date(s) Administered    Influenza - Trivalent - Afluria, Fluzone MDV 02/10/2014    Influenza - Trivalent - Fluarix, Flulaval, Fluzone, Afluria - PF 02/23/2017, 09/25/2017    Td (ADULT) 01/22/2010    Tdap 07/12/2014, 09/25/2017     Review of patient's allergies indicates:  No Known Allergies  Medications:  Current Outpatient Medications on File Prior to Visit   Medication Sig Dispense Refill    acetaminophen (TYLENOL) 500 MG tablet Take 2 tablets (1,000 mg total) by mouth every 6 (six) hours. 60 tablet 0    atorvastatin (LIPITOR) 20 MG tablet Take 1 tablet (20 mg total) by mouth once daily. 30 tablet 11    buprenorphine HCL (SUBUTEX) 8 mg Subl Place 8 mg under the tongue 3 (three) times daily.      HUMULIN R U-500, CONC, KWIKPEN 500 unit/mL (3 mL) insulin pen Inject into the skin.      levothyroxine (SYNTHROID) 125 MCG tablet Take 250 mcg by mouth. (Patient taking differently: Take 250 mcg by mouth 2 (two) times a day.)      LORazepam (ATIVAN) 1 MG tablet Take 1 mg by mouth 2 (two) times daily.      ondansetron (ZOFRAN-ODT) 4 MG TbDL Take 1 tablet (4 mg total) by mouth every 8 (eight) hours as needed (NAUSEA). 30 tablet 1    pantoprazole (PROTONIX) 40 MG tablet TAKE 1 TABLET BY MOUTH EVERY MORNING 30 tablet 2    polyethylene glycol (GLYCOLAX) 17 gram PwPk Take 17 g by mouth 2 (two) times daily. 30 each 1    insulin (LANTUS SOLOSTAR U-100 INSULIN) glargine 100 units/mL SubQ pen Inject 15 Units into the skin 2 (two) times daily. (Patient not taking: Reported on 1/29/2025) 27 mL 3    losartan (COZAAR) 50 MG tablet Take 1 tablet (50 mg total) by mouth once daily. 90 tablet 3     Current Facility-Administered Medications on File Prior to Visit   Medication Dose Route Frequency  Provider Last Rate Last Admin    dextrose 10% bolus 125 mL 125 mL  12.5 g Intravenous PRN Chuckie Saenz MD        dextrose 10% bolus 250 mL 250 mL  25 g Intravenous PRN Chuckie Saenz MD        glucagon (human recombinant) injection 1 mg  1 mg Intramuscular PRN Chuckie Saenz MD         Review of Systems:   All systems reviewed and found to be negative except for the symptoms detailed above    Physical Examination:   VITAL SIGNS:   Vitals:    03/13/25 0826   BP: 113/76   Pulse: 71   Resp: 18   Temp: 98.4 °F (36.9 °C)       GENERAL:  In no apparent distress.    HEAD:  No signs of head trauma.  EYES:  Pupils are equal.  Extraocular motions intact.    EARS:  Hearing grossly intact.  MOUTH:  Oropharynx is normal.   NECK:  No adenopathy, no JVD.     CHEST:  Chest with clear breath sounds bilaterally.  No wheezes, rales, rhonchi.    CARDIAC:  Regular rate and rhythm.  S1 and S2, without murmurs, gallops, rubs.  VASCULAR:  No Edema.  Peripheral pulses normal and equal in all extremities.  ABDOMEN:  Soft, without detectable tenderness.  No sign of distention.  No   rebound or guarding, and no masses palpated.   Bowel Sounds normal.  MUSCULOSKELETAL:  Good range of motion of all major joints. Extremities without clubbing, cyanosis or edema.    NEUROLOGIC EXAM:  Alert and oriented x 3.  No focal sensory or strength deficits.   Speech normal.  Follows commands.  PSYCHIATRIC:  Mood normal.  -07/09/2024:  Erythema around MediPort site; mild tenderness; no fluctuance; no tunnelitis    Assessment:  Problem List Items Addressed This Visit       Hypothyroidism    Panic attack    Primary thyroid malignancy    Diffuse large B-cell lymphoma of solid organ excluding spleen    Overview   Diffuse large B-cell lymphoma of thyroid gland.         Malignant lymphoma of thyroid gland    Family history of von Willebrand disease    Fibroma of tongue    History of opioid abuse - Primary    Vocal cord paralysis    Hepatosplenomegaly     Hepatic cirrhosis    Hemochromatosis carrier     Orders for 03/13/2025:   In August, surveillance CTs C/A/P/neck with contrast  In 3 months, CBC, CMP, LDH  Follow-up with behavioral health for anxiety  Management of hepatic steatosis per GI  Follow-up with ENT for vocal cord paralysis  Follow-up with Pulmonary for dyspnea  Follow-up with NP in 3 months with CBC, CMP, LDH     Above discussed at length with the patient.  All questions answered.    Discussed labs and scans and gave her copies of relevant results.  She understands and agrees with this plan.  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   -03/28/2024:  Thyroglobulin antibody 150 IU/mL, elevated (reference Range:  < 1.8)  -03/28/2024: Thyroglobulin, tumor marker: < 0.1 (reference Range:  Athyroid < 0.1; intact thyroid </= 33)  -left hemithyroidectomy with left central neck dissection level 6 (05/10/2024):   Diffuse large B-cell lymphoma left oscar thyroid, germinal center B-cell type, not a double expressor, not double hit lymphoma; morphologically, thyroid extensively involved; paratracheal tissue biopsy negative; left central neck dissection 13 benign lymph nodes,  -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 during left hemithyroidectomy 05/10/2024, noted on FFL exam by ENT  -06/18/2024: ECOG 1; chronic fatigue; sweats all the time; no consistent weight loss; appetite is great  -06/18/2024: CBC, CMP essentially unremarkable   -06/18/2024: LDH  normal, beta 2 microglobulin normal  -06/18/2024: Hep B core antibody total, hep B surface antigen, hep C antibody, HIV: Nonreactive  -FDG PET-CT 06/18/2024:  No other sites of disease  -TTE 06/26/2024: LVEF 55-60%  -right IJ MediPort placed 07/08/2024  -S/P tubal ligation in the past  -bone marrow evaluation 07/23/2024: Report pending   -R-CHOP started 07/29/2024; experienced allergic reaction to rituximab; managed with corticosteroids and antihistamines  -bone marrow exam 07/23/2024: Negative for lymphoma  >>>  (Diffuse large B-cell lymphoma of thyroid gland, stage IE)  # Stage I disease:  -nonbulky  -smIPI 0  #Stage I disease:  -06/18/2024: Initial consultation:  ECOG 1; sweats all the time  -IPI:  Low (0)  -age adjusted IP!:  Low (0)  -stage modified IPI (smIPI) low (0)  -NCCN IPI:  Low (1, by virtue of age 43)  -prognostic model to assess the risk of CNS disease (CNS IPI):  Low risk (0)  >>>  -dysphagia, most likely secondary to postoperative left vocal cord paralysis (per ENT)  -her ANC dropped 0.22 on 08/12/2024 (14 days post cycle 1 of R-CHOP); treated with Neupogen x2 doses)  -R-CHOP:  Cycle 2 on 08/19/2024; cycle 3 on 09/09/2024  -interim restaging FDG PET-CT 09/30/2024, post R-CHOP x3 cycles:  No lymphoma; hepatosplenomegaly with hepatic steatosis(hepatomegaly 23 cm; severe generalized hepatic steatosis; splenomegaly 15 cm)  -R-CHOP: Cycle 4 on 10/07/2024 (done with chemotherapy)  -restaging FDG PET-CT 02/13/2025, post R-CHOP x4 cycles: TIMBO  -CTs C/A/P/neck 02/13/2025:  Post R-CHOP x4 cycles: TIMBO  >>>  Plan:  -continue surveillance (see below)  In August, surveillance CTs C/A/P/neck with contrast  In 3 months, CBC, CMP, LDH    Discussion:  After completion of 4 cycles of R-CHOP, follow-up:  1. History and physical and labs, every 3-6 months for 5 years (10/2024-10/2029), then annually or as clinically indicated  2. Imaging:  CTs C/A/P with contrast no more often than every 6 months for 2 years after  "completion of treatment (10/2024-10/2026), then only as clinically indicated    -08/19/2024: Super anxious; no psychotic count, homicidal, or suicidal ideation; a multitude of unrelated symptoms; I asked her if she wanted to start chemotherapy, she has had "no"; she wishes to continue with chemotherapy  >>> we referred her to behavioral health for management of anxiety    # Hepatosplenomegaly and hepatic steatosis:  # hemochromatosis carrier state (H63D), incidentally detected:  -identified on FDG PET-CT 09/30/2024  -abdominal ultrasound 11/14/2024:  Fatty liver; hepatomegaly with steatosis; liver 25 cm craniocaudad  -10/29/2024:  Hemoglobin 11.7, serum iron normal, TIBC normal, ferritin 376.99 elevated, transferrin saturation 23% normal (ferritin was 79.8 normal on 08/10/2023 with a transferrin saturation of 39% normal)   -11/06/2024:  Hemochromatosis DNA analysis (PCR) (ordered by GI): C282Y not detected; one copy of the H63D variant was identified (unlikely to be of clinical significance in the absence of other disease causing variants)  >>>  -10/03/2024:  Referred to GI for management  -hepatic steatosis/YARBROUGH  -hemochromatosis carrier state is of no clinical significance    # Family history of von Willebrand disease:  -04/25/2024:  Von Willebrand factor activity 75%, normal    # Oral fibroma of tongue:   -tongue lesion: 1st noted around 02/2023; slowly enlarging since; no bleeding; no pain  -tongue biopsy 02/08/2024: Oral fibroma    # Left true vocal cord paralysis post left hemithyroidectomy cycle 5 of left recurrent laryngeal nerve:  -left hemithyroidectomy with left central neck dissection level 6 (05/10/2024):   Diffuse large B-cell lymphoma left oscar thyroid, germinal center B-cell type, etc.  -postop left TVC paralysis secondary to sacrifice of left recurrent laryngeal nerve during left hemithyroidectomy 05/10/2024, noted on FFL exam by ENT  -left TVC paralysis with cord resting in the paramedian position, " near complete glottic closure with right TVC compensation; follows up with ENT; has dysphagia, most likely secondary to left vocal cord paralysis; follows with Pulmonary for ARLEY and breathing issues  >>>  -follow-up with ENT    -08/19/2024:  Tells me that her O2 sats are dropping to high 80s at night; history of smoking; refer to PCP ASAP to assess for supplemental oxygen   -08/19/2024:  6 minute walk test:  Per respiratory therapist:  Pulse oximetry at rest: 95%   Pulse oximetry after 6 minute walk: 90%  My understanding is the patient qualifies for supplemental oxygen only when the pulse oximetry drops to 88%.    Therefore, per my understanding, she does not qualify for supplemental oxygen.  >>>  -follow-up with PCP/pulmonary for further evaluation  -08/30/2024: PFTs (need report)    -she has an erythematous spot on her chest; she is concerned; refer her to Dermatology for biopsy, etc.  -skin lesion, left breast, biopsy 09/24/2024: Capillary hemangioma    # Comorbid medical conditions:  Anxiety, depression, NIDDM, gastroparesis, hypertension, history of PID  Hypothyroidism  History of opioid abuse, maintained on Suboxone  History of dysphagia, S/P endoscopic dilatation     ====================================    Discussion:   TLS prophylaxis:  Allopurinol beginning 2-3 days prior to chemo immunotherapy and continued for 10-14 days  (Allopurinol: 100 mg per m2 every 8 hours, maximum 800 mg per day)    R-CHOP:  -Patients receiving cyclophosphamide should maintain adequate oral hydration (2 to 3 L/day) and void frequently to reduce risk of hemorrhagic cystitis.  -The risk of febrile neutropenia with this regimen is 10 to 20%; primary prophylaxis with hematopoietic growth factors should be considered on an individual basis, particularly for high-risk patients such as those with preexisting neutropenia, advanced disease, poor performance status, or patients age 65 years or older.  -Adjustment of initial  cyclophosphamide, doxorubicin, and vincristine doses may be needed for preexisting liver dysfunction.  In addition, dose adjustment of cyclophosphamide may be required for kidney dysfunction.  -LVEF should be evaluated prior to initiation of therapy. Dose alterations should be considered for LVEF <50%, and doxorubicin therapy is contraindicated in patients with LVEF <30% at initiation. Infusion times and schedule may be adjusted to decrease the risk of cardiotoxicity in individuals at high risk for its development.  -Neurotoxicity: Vincristine may cause constipation, and in severe cases, paralytic ileus. A routine prophylactic regimen against constipation is recommended in all patients receiving vincristine.    R-CHOP:  Dose modifications for myelotoxicity and neuropathy:  # Treatment should be delayed until ANC is >1500/microL and platelet count is >100,000/microL.  # if a patient develops grade 4 (ANC <500/microL) neutropenia or febrile neutropenia with any cycle, G-CSF support is added to the regimen for subsequent cycles.  # If grade 4 neutropenia or febrile neutropenia occurs despite G-CSF support, or if the patient develops grade 3 (25,000 to 50,000/microL) or 4 (<25,000/microL) thrombocytopenia with any cycle, the doses of cyclophosphamide and doxorubicin should be decreased by 50% for subsequent cycles.  # Neuropathy: Dose adjustment of vincristine may be necessary if the severity of neuropathy persists or worsens. No specific guidelines are available for dose adjustments     Follow-up:

## 2025-03-13 NOTE — Clinical Note
Orders for 03/13/2025:  In August, surveillance CTs C/A/P/neck with contrast In 3 months, CBC, CMP, LDH Follow-up with behavioral health for anxiety Management of hepatic steatosis per GI Follow-up with ENT for vocal cord paralysis Follow-up with Pulmonary for dyspnea Follow-up with NP in 3 months with CBC, CMP, LDH

## 2025-03-21 ENCOUNTER — EDUCATION (OUTPATIENT)
Dept: SLEEP MEDICINE | Facility: CLINIC | Age: 45
End: 2025-03-21
Payer: MEDICAID

## 2025-03-21 NOTE — PROGRESS NOTES
Patient was setup with: Resmed AS 10 autoset at cpap 12cm, slim tube, HH    Patient was fitted with: Resmed airfit N30 small w/ premium chinstrap  Patient was instructed on the proper use and operation of equipment. Patient verbalized understanding of instructions given. We will follow as needed.

## 2025-03-24 RX ORDER — LOSARTAN POTASSIUM 50 MG/1
50 TABLET ORAL DAILY
Qty: 90 TABLET | Refills: 3 | Status: SHIPPED | OUTPATIENT
Start: 2025-03-24 | End: 2026-03-24

## 2025-03-27 ENCOUNTER — INFUSION (OUTPATIENT)
Dept: INFUSION THERAPY | Facility: HOSPITAL | Age: 45
End: 2025-03-27
Attending: INTERNAL MEDICINE
Payer: MEDICAID

## 2025-03-27 VITALS
BODY MASS INDEX: 39.44 KG/M2 | HEART RATE: 65 BPM | TEMPERATURE: 98 F | HEIGHT: 67 IN | RESPIRATION RATE: 20 BRPM | OXYGEN SATURATION: 94 % | SYSTOLIC BLOOD PRESSURE: 112 MMHG | DIASTOLIC BLOOD PRESSURE: 64 MMHG | WEIGHT: 251.31 LBS

## 2025-03-27 DIAGNOSIS — C83.398 DIFFUSE LARGE B-CELL LYMPHOMA OF SOLID ORGAN EXCLUDING SPLEEN: Primary | ICD-10-CM

## 2025-03-27 PROCEDURE — 25000003 PHARM REV CODE 250

## 2025-03-27 PROCEDURE — 63600175 PHARM REV CODE 636 W HCPCS

## 2025-03-27 PROCEDURE — 96523 IRRIG DRUG DELIVERY DEVICE: CPT

## 2025-03-27 PROCEDURE — A4216 STERILE WATER/SALINE, 10 ML: HCPCS

## 2025-03-27 RX ORDER — SODIUM CHLORIDE 0.9 % (FLUSH) 0.9 %
10 SYRINGE (ML) INJECTION
OUTPATIENT
Start: 2025-03-27

## 2025-03-27 RX ORDER — HEPARIN 100 UNIT/ML
500 SYRINGE INTRAVENOUS
Status: DISCONTINUED | OUTPATIENT
Start: 2025-03-27 | End: 2025-03-27 | Stop reason: HOSPADM

## 2025-03-27 RX ORDER — SODIUM CHLORIDE 0.9 % (FLUSH) 0.9 %
10 SYRINGE (ML) INJECTION
Status: DISCONTINUED | OUTPATIENT
Start: 2025-03-27 | End: 2025-03-27 | Stop reason: HOSPADM

## 2025-03-27 RX ORDER — HEPARIN 100 UNIT/ML
500 SYRINGE INTRAVENOUS
OUTPATIENT
Start: 2025-03-27

## 2025-03-27 RX ADMIN — Medication 10 ML: at 03:03

## 2025-03-27 RX ADMIN — HEPARIN 500 UNITS: 100 SYRINGE at 03:03

## 2025-03-27 NOTE — NURSING
1440 Arrive for tx 2 MPF q 3 mos c/o of back and leg pain level 7/10 take home meds for relief of pain.

## 2025-04-04 ENCOUNTER — HOSPITAL ENCOUNTER (OUTPATIENT)
Dept: RADIOLOGY | Facility: HOSPITAL | Age: 45
Discharge: HOME | End: 2025-04-04
Attending: NURSE PRACTITIONER
Payer: MEDICAID

## 2025-04-04 DIAGNOSIS — K74.60 HEPATIC CIRRHOSIS, UNSPECIFIED HEPATIC CIRRHOSIS TYPE, UNSPECIFIED WHETHER ASCITES PRESENT: ICD-10-CM

## 2025-04-04 PROCEDURE — 77080 DXA BONE DENSITY AXIAL: CPT | Mod: TC

## 2025-04-07 ENCOUNTER — RESULTS FOLLOW-UP (OUTPATIENT)
Dept: GASTROENTEROLOGY | Facility: CLINIC | Age: 45
End: 2025-04-07

## 2025-04-21 ENCOUNTER — OFFICE VISIT (OUTPATIENT)
Dept: CARDIOLOGY | Facility: CLINIC | Age: 45
End: 2025-04-21
Payer: MEDICAID

## 2025-04-21 VITALS
HEIGHT: 67 IN | SYSTOLIC BLOOD PRESSURE: 101 MMHG | WEIGHT: 249.38 LBS | OXYGEN SATURATION: 95 % | RESPIRATION RATE: 20 BRPM | DIASTOLIC BLOOD PRESSURE: 64 MMHG | HEART RATE: 64 BPM | TEMPERATURE: 98 F | BODY MASS INDEX: 39.14 KG/M2

## 2025-04-21 DIAGNOSIS — R42 ORTHOSTATIC LIGHTHEADEDNESS: ICD-10-CM

## 2025-04-21 DIAGNOSIS — E78.5 HYPERLIPIDEMIA LDL GOAL <70: ICD-10-CM

## 2025-04-21 DIAGNOSIS — G47.33 OSA (OBSTRUCTIVE SLEEP APNEA): ICD-10-CM

## 2025-04-21 DIAGNOSIS — R55 TRANSIENT LOC (LOSS OF CONSCIOUSNESS): ICD-10-CM

## 2025-04-21 DIAGNOSIS — I10 HYPERTENSION, UNSPECIFIED TYPE: ICD-10-CM

## 2025-04-21 DIAGNOSIS — E03.9 HYPOTHYROIDISM, UNSPECIFIED TYPE: ICD-10-CM

## 2025-04-21 DIAGNOSIS — C83.398 DIFFUSE LARGE B-CELL LYMPHOMA OF SOLID ORGAN EXCLUDING SPLEEN: Primary | ICD-10-CM

## 2025-04-21 PROCEDURE — 99214 OFFICE O/P EST MOD 30 MIN: CPT | Mod: PBBFAC | Performed by: INTERNAL MEDICINE

## 2025-04-21 RX ORDER — IBUPROFEN 200 MG
200 TABLET ORAL
COMMUNITY

## 2025-04-21 RX ORDER — LOSARTAN POTASSIUM 25 MG/1
25 TABLET ORAL DAILY
Qty: 90 TABLET | Refills: 3 | Status: SHIPPED | OUTPATIENT
Start: 2025-04-21 | End: 2026-04-21

## 2025-04-21 RX ORDER — LOSARTAN POTASSIUM 25 MG/1
25 TABLET ORAL DAILY
Qty: 90 TABLET | Refills: 3 | Status: CANCELLED | OUTPATIENT
Start: 2025-04-21 | End: 2026-04-21

## 2025-04-21 NOTE — PROGRESS NOTES
CHIEF COMPLAINT:   Chief Complaint   Patient presents with    f/u states has chest pain daily and SOB all the time due to                   Review of patient's allergies indicates:  No Known Allergies                                       HPI:  Fred Berman 44 y.o. 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    Patient was initially referred to Cardiology for further evaluation of lightheadedness and syncopal episodes.  Patient reported symptoms started with initiation of chemotherapy.  She underwent a 14 day event monitor that only showed normal sinus rhythm with no major events.  After last visit an echocardiogram was ordered. I have personally reviewed it and it reveals normal EF, no major abnormalities otherwise.  Patient completed chemotherapy in October 2024 and continues to have side effects including arm pain that keeps her up at night.    Since last visit she has had a few more episodes of syncope.  She had a syncopal episode yesterday that was due to hypoglycemia  During previous episodes she did not test her sugar level but she did not think her symptoms were because of hypoglycemia.  Patient had prodromal symptoms before all syncopal episodes.  She has been losing weight slowly.  She has lost 7 lb in the last 2 months.  She was recently diagnosed with ARLEY and start using a CPAP about a month ago.  She has not been able to tolerate it well but continues to try.  She recently underwent a FibroScan that revealed liver cirrhosis.                                                                                                                                                                                                                                                                                                    CARDIAC TESTING:  Results for orders placed during the hospital encounter of  06/26/24    Echo    Interpretation Summary    Left Ventricle: The left ventricle is dilated. Normal wall thickness. There is normal systolic function with a visually estimated ejection fraction of 55 - 60%. Global longitudinal strain is --18.9%. Global longitudinal strain is normal. There is normal diastolic function.    Right Ventricle: Normal right ventricular cavity size. Systolic function is normal.    Left Atrium: Left atrium is mildly dilated.    IVC/SVC: Normal venous pressure at 3 mmHg.    Pericardium: There is no pericardial effusion.    No results found for this or any previous visit.     No results found for this or any previous visit.       Patient Active Problem List   Diagnosis    Dysmenorrhea    TOA (tubo-ovarian abscess)    Pelvic prolapse    Unexplained weight loss    Gastroparesis    Orthostatic lightheadedness    Volume depletion    Moderate malnutrition    Controlled type 2 diabetes mellitus with complication, with long-term current use of insulin    HTN (hypertension)    Hypothyroidism    Generalized anxiety disorder    Chronic pain syndrome    Vitamin D deficiency    Class 1 obesity in adult    Panic attack    Lesion of adrenal gland    Arthritis    Anxiety    Palpitations    Uncontrolled type 2 diabetes mellitus with hyperglycemia    Pre-op evaluation    Thyroid nodule    Primary thyroid malignancy    Diffuse large B-cell lymphoma of solid organ excluding spleen    Malignant lymphoma of thyroid gland    Family history of von Willebrand disease    Fibroma of tongue    History of opioid abuse    Infected venous access port    Vocal cord paralysis    Neutropenia    Skin lesion of chest wall    Dyspnea    Tobacco abuse    Hepatosplenomegaly    History of colonic polyps    Internal hemorrhoids without complication    Chronic idiopathic constipation    ARLEY (obstructive sleep apnea)    Transient LOC (loss of consciousness)    Hepatic cirrhosis    Hemochromatosis carrier     Past Surgical History:    Procedure Laterality Date    ABLATION OF VAGINAL TISSUE USING LASER      ADENOIDECTOMY      BONE MARROW ASPIRATION N/A 2024    Procedure: ASPIRATION, BONE MARROW;  Surgeon: Patrizia Bhatt MD;  Location: [facility] ENDOSCOPY;  Service: General;  Laterality: N/A;    BONE MARROW BIOPSY N/A 2024    Procedure: Biopsy-bone marrow;  Surgeon: Patrizia Bhatt MD;  Location: [facility] ENDOSCOPY;  Service: General;  Laterality: N/A;     SECTION      CHOLECYSTECTOMY      DISSECTION OF NECK Left 05/10/2024    Procedure: DISSECTION, NECK;  Surgeon: Cali Trujillo MD;  Location: [facility] OR;  Service: ENT;  Laterality: Left;    INSERTION OF TUNNELED CENTRAL VENOUS CATHETER (CVC) WITH SUBCUTANEOUS PORT Right 2024    Procedure: [id number]-D-CATH;  Surgeon: Lio Storey Jr., MD;  Location: [facility] OR;  Service: General;  Laterality: Right;    THYROIDECTOMY Left 05/10/2024    Procedure: THYROIDECTOMY;  Surgeon: Cali Trujillo MD;  Location: [facility] OR;  Service: ENT;  Laterality: Left;    TONSILLECTOMY  1985    TUBAL LIGATION       Social History     Socioeconomic History    Marital status: Single   Tobacco Use    Smoking status: Former     Current packs/day: 0.00     Average packs/day: 1.5 packs/day for 15.0 years (22.5 ttl pk-yrs)     Types: Cigarettes     Quit date: 3/5/2003     Years since quittin.1    Smokeless tobacco: Never    Tobacco comments:     Quit over a year ago.   Substance and Sexual Activity    Alcohol use: Never     Alcohol/week: 6.0 standard drinks of alcohol     Types: 6 Shots of liquor per week    Drug use: Not Currently     Types: Methamphetamines     Comment: Im on Subutex    Sexual activity: Yes     Partners: Male     Birth control/protection: Post-menopausal     Social Drivers of Health     Financial Resource Strain: Medium Risk (2024)    Overall Financial Resource Strain (CARDIA)     Difficulty of Paying Living Expenses: Somewhat hard   Food Insecurity: No Food  Insecurity (6/27/2024)    Hunger Vital Sign     Worried About Running Out of Food in the Last Year: Never true     Ran Out of Food in the Last Year: Never true   Transportation Needs: No Transportation Needs (5/12/2024)    TRANSPORTATION NEEDS     Transportation : No   Physical Activity: Insufficiently Active (6/27/2024)    Exercise Vital Sign     Days of Exercise per Week: 2 days     Minutes of Exercise per Session: 30 min   Stress: No Stress Concern Present (6/27/2024)    [employer] - Occupational Stress Questionnaire     Feeling of Stress : Only a little   Recent Concern: Stress - Stress Concern Present (5/12/2024)    [employer] - Occupational Stress Questionnaire     Feeling of Stress : To some extent   Housing Stability: Unknown (6/27/2024)    Housing Stability Vital Sign     Unable to Pay for Housing in the Last Year: No     Homeless in the Last Year: No        Family History   Problem Relation Name Age of Onset    Cirrhosis Mother Pat     Alcohol abuse Mother Pat         Pat    Arthritis Mother Pat     Breast cancer Mother Pat     COPD Mother Pat     Depression Mother Pat     Diabetes Mother Pat     Drug abuse Mother Pat     Hypertension Mother Pat     Miscarriages / Stillbirths Mother Pat     Cancer Mother Pat     Depression Father Juventino     Drug abuse Father Juventino     Hypertension Father Juventino     Prostate cancer Father Juventino     Thyroid cancer Father Juventino     Cirrhosis Sister Elana     Drug abuse Sister Elana     Heart disease Sister Elana     Miscarriages / Stillbirths Sister Elana     Drug abuse Brother Juventino     Early death Brother Izaiah     Miscarriages / Stillbirths Maternal Grandmother Arlean     Cancer Maternal Grandfather Janay     Hearing loss Paternal Grandfather Lyod     Asthma Son Mojgan     Hypertension Son [place]          Current Outpatient Medications:     acetaminophen (TYLENOL) 500 MG tablet, Take 2 tablets (1,000 mg total) by mouth every 6  (six) hours., Disp: 60 tablet, Rfl: 0    buprenorphine HCL (SUBUTEX) 8 mg Subl, Place 8 mg under the tongue 3 (three) times daily., Disp: , Rfl:     HUMULIN R U-500, CONC, KWIKPEN 500 unit/mL (3 mL) insulin pen, Inject into the skin., Disp: , Rfl:     ibuprofen (ADVIL,MOTRIN) 200 MG tablet, Take 200 mg by mouth as needed., Disp: , Rfl:     levothyroxine (SYNTHROID) 125 MCG tablet, Take 250 mcg by mouth., Disp: , Rfl:     LORazepam (ATIVAN) 1 MG tablet, Take 1 mg by mouth 2 (two) times daily., Disp: , Rfl:     losartan (COZAAR) 50 MG tablet, Take 1 tablet (50 mg total) by mouth once daily., Disp: 90 tablet, Rfl: 3    ondansetron (ZOFRAN-ODT) 4 MG TbDL, Take 1 tablet (4 mg total) by mouth every 8 (eight) hours as needed (NAUSEA)., Disp: 30 tablet, Rfl: 1    pantoprazole (PROTONIX) 40 MG tablet, TAKE 1 TABLET BY MOUTH EVERY MORNING, Disp: 30 tablet, Rfl: 2    polyethylene glycol (GLYCOLAX) 17 gram PwPk, Take 17 g by mouth 2 (two) times daily., Disp: 30 each, Rfl: 1    atorvastatin (LIPITOR) 20 MG tablet, Take 1 tablet (20 mg total) by mouth once daily. (Patient not taking: Reported on 4/21/2025), Disp: 30 tablet, Rfl: 11    insulin (LANTUS SOLOSTAR U-100 INSULIN) glargine 100 units/mL SubQ pen, Inject 15 Units into the skin 2 (two) times daily. (Patient not taking: Reported on 1/29/2025), Disp: 27 mL, Rfl: 3  No current facility-administered medications for this visit.    Facility-Administered Medications Ordered in Other Visits:     dextrose 10% bolus 125 mL 125 mL, 12.5 g, Intravenous, PRN, Chuckie Saenz MD    dextrose 10% bolus 250 mL 250 mL, 25 g, Intravenous, PRN, Chuckie Saenz MD    glucagon (human recombinant) injection 1 mg, 1 mg, Intramuscular, PRN, Chuckie Saenz MD     ROS:                                                                                                                                                                             As per HPI     Blood pressure 101/64, pulse 64,  "temperature 98.4 °F (36.9 °C), temperature source Oral, resp. rate 20, height 5' 7" (1.702 m), weight 113.1 kg (249 lb 6.4 oz), SpO2 95%.   PE:  General: alert and oriented/no acute distress  Eye: EOMI/normal conjunctiva/no xanthelasma  HENT: normocephalic/moist oral mucosa  Neck: supple/nontender/no carotid bruit  Respiratory: lungs CTA/nonlabored respirations/BS equal/symmetrical expansion/no  chest wall tenderness  Cardiovascular: normal rate/normal rhythm/no murmur/normal peripheral perfusion/trace  Edema bilaterally/no JVD  Gastrointestinal: soft/nontender  Musculoskeletal: normal ROM  Integumentary: warm/dry/pink/intact  Neurologic: alert/oriented/normal sensory/no focal deficits  Psychiatric: cooperative/appropriate mood and affect/normal judgment             ASSESSMENT/PLAN:    Syncopal episodes  Patient has had syncopal episodes since initiation of chemotherapy   She continues to have some side effects from the chemotherapy despite having completed it 6 months ago.  All syncopal episodes have prodromal symptoms.  Most recently patient had hypoglycemia causing syncope.  She has not been testing her sugars consistently with a syncopal episodes.  Cardiac workup has been reassuring.  Echo reveals no structural abnormalities and a 14 day monitor was nonrevealing   BP today 101/64.  Recent values all under 120/80.  We will decrease losartan to 25 mg daily to see if patient develops less lightheadedness.  She has been losing weight which could be contributing to lower blood pressure.  We will have patient keep a BP log and will schedule a nurse visit.    HTN  /64  As above will decrease losartan to 25 mg daily    ARLEY  Pt still adjusting to using CPAP    HLP  Goal LDL less than 70 given diabetes   Patient was placed on Crestor 20 mg previously but did not tolerate it well.  She was switch to Lipitor 20 mg and has some side effects but better tolerated.  I advised her to take Lipitor 20 mg every other day " until her side effects resolve after which she can take it on a daily basis   Last lipid panel in 1/2025 shows LDL 79, near goal.  The patient was likely on Crestor 20 mg at that time.  We will continue to monitor    DM  Continue management per endo  A1c improved recently    Diffuse large B-cell lymphoma status post R-CHOP  Continue management per onc    Liver cirrhosis  Based on a recent fibroscan  Continue management per GI    Orthostatic lightheadedness, TLOC  Chest pain, palpitations  Hypertension, uncontrolled type 2 diabetes  History of hypothyroidism, hypothyroidism status post hemithyroidectomy     -patient's main complaint is palpitations, shortness of breath, fatigue that started around the time of her metastatic disease and noticeably worsened after R-CHOP/doxorubicin.    -TTE was obtained in July prior to the initiation of R-CHOP.    -no other TTE ease at this time.  We will order TTE outpatient today.  -patient is associated symptoms with transient loss of consciousness is likely vasovagal in origin.  -we will continue to monitor at this time.  If frequency increases were vasovagal symptoms change, we will consider outpatient monitor at that time.    -patient's last A1c was 10.4.  She is following up with Endocrinology for better control this time.    -patient is TSH has been inconsistent in the past.  She is on 200 mcg of Synthroid daily.  Seeing endocrinology.    -patient is experiencing obstructive sleep apnea.  Patient has had 1st sleep study completed.  Patient reports that she was found to have extreme obstructive sleep apnea.  Patient is following up for CPAP.    -given last lipid panel and patient's A1c of 10.4, we will prescribe Crestor 20 at this time.  We will repeat lipid panel.  LDL goal less than 70.        -return to clinic in 3 months     DO VIRY BobU, Internal Medicine, PGY-I

## 2025-05-14 DIAGNOSIS — K21.9 GASTROESOPHAGEAL REFLUX DISEASE, UNSPECIFIED WHETHER ESOPHAGITIS PRESENT: Primary | ICD-10-CM

## 2025-05-14 RX ORDER — PANTOPRAZOLE SODIUM 40 MG/1
40 TABLET, DELAYED RELEASE ORAL EVERY MORNING
Qty: 90 TABLET | Refills: 3 | Status: SHIPPED | OUTPATIENT
Start: 2025-05-14

## 2025-05-19 ENCOUNTER — HOSPITAL ENCOUNTER (OUTPATIENT)
Dept: RADIOLOGY | Facility: HOSPITAL | Age: 45
Discharge: HOME | End: 2025-05-19
Attending: NURSE PRACTITIONER
Payer: MEDICAID

## 2025-05-19 DIAGNOSIS — K74.60 HEPATIC CIRRHOSIS, UNSPECIFIED HEPATIC CIRRHOSIS TYPE, UNSPECIFIED WHETHER ASCITES PRESENT: ICD-10-CM

## 2025-05-19 PROCEDURE — 76705 ECHO EXAM OF ABDOMEN: CPT | Mod: TC

## 2025-05-19 RX ORDER — CONJUGATED ESTROGENS 0.62 MG/G
0.5 CREAM VAGINAL DAILY
Qty: 1 APPLICATOR | Refills: 5 | Status: SHIPPED | OUTPATIENT
Start: 2025-05-19 | End: 2026-05-19

## 2025-05-20 ENCOUNTER — RESULTS FOLLOW-UP (OUTPATIENT)
Dept: GASTROENTEROLOGY | Facility: CLINIC | Age: 45
End: 2025-05-20

## 2025-05-20 NOTE — PROGRESS NOTES
Please notify coarse echotexture of liver parenchyma, hepatic steatosis, status post cholecystectomy.  Thanks

## 2025-06-10 ENCOUNTER — TELEPHONE (OUTPATIENT)
Dept: HEMATOLOGY/ONCOLOGY | Facility: CLINIC | Age: 45
End: 2025-06-10
Payer: MEDICAID

## 2025-06-11 ENCOUNTER — OFFICE VISIT (OUTPATIENT)
Dept: GYNECOLOGY | Facility: CLINIC | Age: 45
End: 2025-06-11
Payer: MEDICAID

## 2025-06-11 ENCOUNTER — TELEPHONE (OUTPATIENT)
Dept: CARDIOLOGY | Facility: CLINIC | Age: 45
End: 2025-06-11
Payer: MEDICAID

## 2025-06-11 VITALS
HEIGHT: 67 IN | WEIGHT: 239 LBS | DIASTOLIC BLOOD PRESSURE: 72 MMHG | HEART RATE: 60 BPM | TEMPERATURE: 99 F | BODY MASS INDEX: 37.51 KG/M2 | SYSTOLIC BLOOD PRESSURE: 105 MMHG | RESPIRATION RATE: 18 BRPM | OXYGEN SATURATION: 96 %

## 2025-06-11 DIAGNOSIS — R68.82 LOW LIBIDO: ICD-10-CM

## 2025-06-11 DIAGNOSIS — R10.2 PELVIC PAIN: Primary | ICD-10-CM

## 2025-06-11 DIAGNOSIS — Z12.31 BREAST CANCER SCREENING BY MAMMOGRAM: ICD-10-CM

## 2025-06-11 DIAGNOSIS — M79.18 MYALGIA OF PELVIC FLOOR: ICD-10-CM

## 2025-06-11 PROBLEM — E44.0 MODERATE MALNUTRITION: Chronic | Status: ACTIVE | Noted: 2023-07-07

## 2025-06-11 PROCEDURE — 99214 OFFICE O/P EST MOD 30 MIN: CPT | Mod: PBBFAC

## 2025-06-11 NOTE — TELEPHONE ENCOUNTER
Pt report last visit her losartan was decreased. Her bp has been running in 170-180s. She switched back to her original dose of losartan 50mg. BP is now stable and running 110/72 at home. At gyn apt this am bp 105/72. She had to cancel her nurse visit d/t mom on  hospice.

## 2025-06-11 NOTE — TELEPHONE ENCOUNTER
----- Message from Zahira sent at 6/11/2025 10:47 AM CDT -----  Please reach out to patient regarding her blood pressure call back # [phone]

## 2025-06-11 NOTE — PROGRESS NOTES
"[facility] OBSTETRICS AND GYNECOLOGY CLINIC NOTE     Fred Berman is a 44 y.o.  with complex medical history including DLBCL, T2DM, gastroparesis, liver cirhossis, presenting to GYN clinic for re-evaluation of hot flashes and response to premarin cream prescribed at last visit (2024)  CHRISTIE.   She declined HRT and SSRI/SNRI because worried about risk VTE (no personal hx but +famhily hx in sister) and tried them previously and did not like.    Today in clinic, pt says that premarin cream has not helped relived urge incontinence. Sometimes dysuria. No hematuria. No fevers/chills. Her main complaint today is low desire for intercourse with her , which brought her to tears talking about it,    Reports the hot flashes have become minimal and is no longer worried about them. No bladder complaints today.     Menopause in . Reports some intermittent recurring lower abdominal/ suprapubic pain that she describes as "stabbing, sharp", started 3 months ago. Approximately 1-2 weeks between episodes of pain. When the pain comes on, lasts for a few days before it goes away. Episodes vary in intensity. Not associated with defecation or voiding. Sometimes radiates to lower back. No relief with Tylenol or NSAIDs. Mild relief on one occasion while assuming fetal position with pillow pressed against her abdomen. Has not yet discussed sx with Oncologist or PCP 2/2 difficulty scheduling apt. Of note she has complicated GI history including Gastroparesis. Constipation,  Liver cirrhosis. Follows with GI.     Pt follows with Psychiatry, not currently on medication although she "has tried everything" without relief of mood sx.     Past Medical History:   Diagnosis Date    Adrenal mass     Anemia     Anxiety     Arthritis     Asthma 1985    Bradycardia     Cancer May 10th 2024    Depression     Diabetes mellitus, type 2     Gastroparesis     General anesthetics causing adverse effect in therapeutic use     "hard time " "waking up"    GERD (gastroesophageal reflux disease)     Hip pain     Hypertension     Hypothyroidism     Menstrual cramps     Migraine headache     Obesity     Ovarian cyst     Palpitations     Pyosalpingitis     Thyroid disease       Past Surgical History:   Procedure Laterality Date    ABLATION OF VAGINAL TISSUE USING LASER      ADENOIDECTOMY      BONE MARROW ASPIRATION N/A 2024    Procedure: ASPIRATION, BONE MARROW;  Surgeon: Patrizia Bhatt MD;  Location: [facility] ENDOSCOPY;  Service: General;  Laterality: N/A;    BONE MARROW BIOPSY N/A 2024    Procedure: Biopsy-bone marrow;  Surgeon: Patrizia Bhatt MD;  Location: [facility] ENDOSCOPY;  Service: General;  Laterality: N/A;     SECTION      CHOLECYSTECTOMY      DISSECTION OF NECK Left 05/10/2024    Procedure: DISSECTION, NECK;  Surgeon: Cali Trujillo MD;  Location: [facility] OR;  Service: ENT;  Laterality: Left;    INSERTION OF TUNNELED CENTRAL VENOUS CATHETER (CVC) WITH SUBCUTANEOUS PORT Right 2024    Procedure: [id number]-SCVI-H-CATH;  Surgeon: Lio Storey Jr., MD;  Location: [facility] OR;  Service: General;  Laterality: Right;    THYROIDECTOMY Left 05/10/2024    Procedure: THYROIDECTOMY;  Surgeon: Cali Trujillo MD;  Location: [facility] OR;  Service: ENT;  Laterality: Left;    TONSILLECTOMY  1985    TUBAL LIGATION        OB History    Para Term  AB Living   5 3 1 2 2 0   SAB IAB Ectopic Multiple Live Births   2    0      # Outcome Date GA Lbr Blas/2nd Weight Sex Type Anes PTL Lv   5       CS-LTranv      4 SAB            3       CS-LTranv      2 Term      Vag-Spont      1 SAB                Gyn History:  LMP: No LMP recorded (lmp unknown). Patient is postmenopausal. .   Last pap : NILM, HPV negative   Hx of ablation in     Family History   Problem Relation Name Age of Onset    Cirrhosis Mother Pat     Alcohol abuse Mother Pat         Pat    Arthritis Mother Pat     Breast cancer Mother " "Pat     COPD Mother Pat     Depression Mother Pat     Diabetes Mother Pat     Drug abuse Mother Pat     Hypertension Mother Pat     Miscarriages / Stillbirths Mother Pat     Cancer Mother Pat     Depression Father Juventino     Drug abuse Father Juventino     Hypertension Father Juventino     Prostate cancer Father Juventino     Thyroid cancer Father Juventino     Cirrhosis Sister Elana     Drug abuse Sister Elana     Heart disease Sister Elana     Miscarriages / Stillbirths Sister Elana     Drug abuse Brother Juventino     Early death Brother Izaiah     Miscarriages / Stillbirths Maternal Grandmother Arlean     Cancer Maternal Grandfather Janay     Hearing loss Paternal Grandfather Lyod     Asthma Son Barry     Hypertension Son Barry        Meds:   Medications Ordered Prior to Encounter[1]    Allergies: Review of patient's allergies indicates:  No Known Allergies    Social History[2]    Review of Systems  Negative unless in HPI    Objective:     /72 (BP Location: Right arm, Patient Position: Sitting)   Pulse 60   Temp 98.5 °F (36.9 °C) (Oral)   Resp 18   Ht 5' 7" (1.702 m)   Wt 108.4 kg (239 lb)   LMP  (LMP Unknown) Comment: Not having menstrual  periods at this time  SpO2 96%   BMI 37.43 kg/m²     Physical Exam:  Gen: Well-nourished, well-developed female appearing stated age. Alert, cooperative. Intermittently tearful.  CV:Regular rate  Chest: No increased work of breathing  Abdomen: mild ttp in lower abdomen without guarding or rebound tenderness. Negative carnett sign.   Extrem: Extremities normal, atraumatic, non-tender calves.  External genitalia: Normal female genitalia without lesion, discharge or tenderness.  Speculum Exam: Vaginal vault without discharge, nonodorous, no lesions/masses seen.  Cervical os visualized as closed, no lesions/masses.   Bimanual Exam: ttp bilateral obturator and piriformis muscles left worse than right, quite severe. Bladder nontender. No cervical motion tenderness.  Uterus and adnexa nontender.    Note: " RN chaperone present for entirety of exam.    Assessment/Plan:     44 y.o.  here with multiple concerns including decreased libido, lower abdomen/pelvic pain.     Pelvic  pain  - significant pelvic floor myalgia on exam. Patient then endorsed dyspareunia similar to pain felt on exam. Discussed this may be contributing to low desire.   - Pelvic floor physical therapy referral sent today  -Pelvic US to assess for other additional etiology of the pain she described    Low libido  - transdermal testosterone is an option for postmenopausal patients but contraindicated for her because of liver disease  - discussed Wellbutrin for mood sx,  she also tried in past did not like these medications and does not wish to resume.   - recommend PFPT  which may help if pain is component      Problem List Items Addressed This Visit    None  Visit Diagnoses         Pelvic pain    -  Primary    Relevant Orders    US Pelvis Comp with Transvag NON-OB (xpd      Breast cancer screening by mammogram        Relevant Orders    Mammo Digital Screening Bilat      Myalgia of pelvic floor          Low libido                Return to clinic 6 months virtual-visit f/up symptoms after PFPT    Discussed patient and plan with Dr. Steffanie Cooper MD.     Scarlet Woodward MS3  [facility] [employer]     Pau Sotelo MD  [facility] PGY4         [1]   Current Outpatient Medications on File Prior to Visit   Medication Sig Dispense Refill    buprenorphine HCL (SUBUTEX) 8 mg Subl Place 8 mg under the tongue 3 (three) times daily.      HUMULIN R U-500, CONC, KWIKPEN 500 unit/mL (3 mL) insulin pen Inject into the skin.      ibuprofen (ADVIL,MOTRIN) 200 MG tablet Take 200 mg by mouth as needed.      levothyroxine (SYNTHROID) 125 MCG tablet Take 250 mcg by mouth.      LORazepam (ATIVAN) 1 MG tablet Take 1 mg by mouth 2 (two) times daily.      losartan (COZAAR) 25 MG tablet Take 1 tablet (25 mg total) by mouth once daily. 90 tablet 3    ondansetron  (ZOFRAN-ODT) 4 MG TbDL Take 1 tablet (4 mg total) by mouth every 8 (eight) hours as needed (NAUSEA). 30 tablet 1    pantoprazole (PROTONIX) 40 MG tablet TAKE 1 TABLET BY MOUTH EVERY MORNING 90 tablet 3    polyethylene glycol (GLYCOLAX) 17 gram PwPk Take 17 g by mouth 2 (two) times daily. 30 each 1    acetaminophen (TYLENOL) 500 MG tablet Take 2 tablets (1,000 mg total) by mouth every 6 (six) hours. (Patient not taking: Reported on 2025) 60 tablet 0    atorvastatin (LIPITOR) 20 MG tablet Take 1 tablet (20 mg total) by mouth once daily. (Patient not taking: Reported on 2025) 30 tablet 11    conjugated estrogens (PREMARIN) vaginal cream Place 0.5 g vaginally once daily. (Patient not taking: Reported on 2025) 1 applicator 5    insulin (LANTUS SOLOSTAR U-100 INSULIN) glargine 100 units/mL SubQ pen Inject 15 Units into the skin 2 (two) times daily. (Patient not taking: Reported on 2025) 27 mL 3     Current Facility-Administered Medications on File Prior to Visit   Medication Dose Route Frequency Provider Last Rate Last Admin    dextrose 10% bolus 125 mL 125 mL  12.5 g Intravenous PRN Chuckie Saenz MD        dextrose 10% bolus 250 mL 250 mL  25 g Intravenous PRN Chuckie Saenz MD        glucagon (human recombinant) injection 1 mg  1 mg Intramuscular PRN Chuckie Saenz MD       [2]   Social History  Tobacco Use    Smoking status: Former     Current packs/day: 0.00     Average packs/day: 1.5 packs/day for 15.0 years (22.5 ttl pk-yrs)     Types: Cigarettes     Quit date: 3/5/2003     Years since quittin.2    Smokeless tobacco: Never    Tobacco comments:     Quit over a year ago.   Substance Use Topics    Alcohol use: Never     Alcohol/week: 6.0 standard drinks of alcohol     Types: 6 Shots of liquor per week    Drug use: Not Currently     Types: Methamphetamines     Comment: Im on Subutex

## 2025-06-16 ENCOUNTER — INFUSION (OUTPATIENT)
Dept: INFUSION THERAPY | Facility: HOSPITAL | Age: 45
End: 2025-06-16
Attending: INTERNAL MEDICINE
Payer: MEDICAID

## 2025-06-16 VITALS
HEART RATE: 71 BPM | RESPIRATION RATE: 18 BRPM | SYSTOLIC BLOOD PRESSURE: 120 MMHG | TEMPERATURE: 99 F | OXYGEN SATURATION: 96 % | DIASTOLIC BLOOD PRESSURE: 71 MMHG

## 2025-06-16 DIAGNOSIS — C83.398 DIFFUSE LARGE B-CELL LYMPHOMA OF SOLID ORGAN EXCLUDING SPLEEN: Primary | ICD-10-CM

## 2025-06-16 PROCEDURE — 36593 DECLOT VASCULAR DEVICE: CPT

## 2025-06-16 PROCEDURE — 25000003 PHARM REV CODE 250

## 2025-06-16 PROCEDURE — A4216 STERILE WATER/SALINE, 10 ML: HCPCS

## 2025-06-16 PROCEDURE — 63600175 PHARM REV CODE 636 W HCPCS

## 2025-06-16 RX ORDER — SODIUM CHLORIDE 0.9 % (FLUSH) 0.9 %
10 SYRINGE (ML) INJECTION
Status: DISCONTINUED | OUTPATIENT
Start: 2025-06-16 | End: 2025-06-16 | Stop reason: HOSPADM

## 2025-06-16 RX ORDER — HEPARIN 100 UNIT/ML
500 SYRINGE INTRAVENOUS
Status: DISCONTINUED | OUTPATIENT
Start: 2025-06-16 | End: 2025-06-16 | Stop reason: HOSPADM

## 2025-06-16 RX ORDER — SODIUM CHLORIDE 0.9 % (FLUSH) 0.9 %
10 SYRINGE (ML) INJECTION
OUTPATIENT
Start: 2025-06-16

## 2025-06-16 RX ORDER — HEPARIN 100 UNIT/ML
500 SYRINGE INTRAVENOUS
OUTPATIENT
Start: 2025-06-16

## 2025-06-16 RX ADMIN — SODIUM CHLORIDE, PRESERVATIVE FREE 10 ML: 5 INJECTION INTRAVENOUS at 09:06

## 2025-06-16 RX ADMIN — HEPARIN 500 UNITS: 100 SYRINGE at 09:06

## 2025-06-18 ENCOUNTER — TELEPHONE (OUTPATIENT)
Dept: GYNECOLOGY | Facility: CLINIC | Age: 45
End: 2025-06-18
Payer: MEDICAID

## 2025-06-18 NOTE — TELEPHONE ENCOUNTER
Pelvic floor therapy referral faxed to [facility] - Outpatient Therapy Department. Fax confirmation will be scanned into patients chart once received.

## 2025-06-24 ENCOUNTER — HOSPITAL ENCOUNTER (OUTPATIENT)
Dept: RADIOLOGY | Facility: HOSPITAL | Age: 45
Discharge: HOME | End: 2025-06-24
Attending: STUDENT IN AN ORGANIZED HEALTH CARE EDUCATION/TRAINING PROGRAM
Payer: MEDICAID

## 2025-06-24 DIAGNOSIS — Z12.31 BREAST CANCER SCREENING BY MAMMOGRAM: ICD-10-CM

## 2025-06-24 DIAGNOSIS — R10.2 PELVIC PAIN: ICD-10-CM

## 2025-06-24 PROCEDURE — 77063 BREAST TOMOSYNTHESIS BI: CPT | Mod: TC

## 2025-06-24 PROCEDURE — 76856 US EXAM PELVIC COMPLETE: CPT | Mod: TC

## 2025-06-24 PROCEDURE — 77067 SCR MAMMO BI INCL CAD: CPT | Mod: 26,,, | Performed by: RADIOLOGY

## 2025-06-24 PROCEDURE — 77063 BREAST TOMOSYNTHESIS BI: CPT | Mod: 26,,, | Performed by: RADIOLOGY

## 2025-06-30 ENCOUNTER — TELEPHONE (OUTPATIENT)
Dept: HEMATOLOGY/ONCOLOGY | Facility: CLINIC | Age: 45
End: 2025-06-30
Payer: MEDICAID

## 2025-06-30 NOTE — TELEPHONE ENCOUNTER
Called patient to confirm appointment for 7/1/25. Patient did not answer the phone and was unable to leave a voice message. Called patient significant other Chinedu Byrd did not answer the phone and unable to leave a voice message. Called patient mother Adelina Santos left message about patient moving up appointment to an earlier time 12:00 labs and 1:00 NP visit. Adelina Santos stated patient is sleeping right now and will have her call you back once she gets up. Patient called clinic to confirm earlier appointment 12:00 labs and 1:00 NP visit.

## 2025-07-01 ENCOUNTER — LAB VISIT (OUTPATIENT)
Dept: HEMATOLOGY/ONCOLOGY | Facility: CLINIC | Age: 45
End: 2025-07-01
Attending: INTERNAL MEDICINE
Payer: MEDICAID

## 2025-07-01 VITALS
BODY MASS INDEX: 37.64 KG/M2 | HEART RATE: 61 BPM | RESPIRATION RATE: 16 BRPM | SYSTOLIC BLOOD PRESSURE: 106 MMHG | OXYGEN SATURATION: 94 % | HEIGHT: 67 IN | DIASTOLIC BLOOD PRESSURE: 67 MMHG | TEMPERATURE: 98 F | WEIGHT: 239.81 LBS

## 2025-07-01 DIAGNOSIS — C85.99 MALIGNANT LYMPHOMA OF THYROID GLAND: ICD-10-CM

## 2025-07-01 DIAGNOSIS — Z08 ENCOUNTER FOR FOLLOW-UP SURVEILLANCE OF LYMPHOMA: Primary | ICD-10-CM

## 2025-07-01 DIAGNOSIS — C83.398 DIFFUSE LARGE B-CELL LYMPHOMA OF SOLID ORGAN EXCLUDING SPLEEN: ICD-10-CM

## 2025-07-01 DIAGNOSIS — G89.4 CHRONIC PAIN SYNDROME: ICD-10-CM

## 2025-07-01 DIAGNOSIS — Z85.72 ENCOUNTER FOR FOLLOW-UP SURVEILLANCE OF LYMPHOMA: Primary | ICD-10-CM

## 2025-07-01 LAB
ALBUMIN SERPL-MCNC: 3.8 G/DL (ref 3.5–5)
ALBUMIN/GLOB SERPL: 1.1 RATIO (ref 1.1–2)
ALP SERPL-CCNC: 85 UNIT/L (ref 40–150)
ALT SERPL-CCNC: 28 UNIT/L (ref 0–55)
ANION GAP SERPL CALC-SCNC: 6 MEQ/L
AST SERPL-CCNC: 21 UNIT/L (ref 11–45)
BASOPHILS # BLD AUTO: 0.01 X10(3)/MCL
BASOPHILS NFR BLD AUTO: 0.2 %
BILIRUB SERPL-MCNC: 0.7 MG/DL
BUN SERPL-MCNC: 13.7 MG/DL (ref 7–18.7)
CALCIUM SERPL-MCNC: 9.1 MG/DL (ref 8.4–10.2)
CHLORIDE SERPL-SCNC: 100 MMOL/L (ref 98–107)
CO2 SERPL-SCNC: 31 MMOL/L (ref 22–29)
CREAT SERPL-MCNC: 0.69 MG/DL (ref 0.55–1.02)
CREAT/UREA NIT SERPL: 20
EOSINOPHIL # BLD AUTO: 0.11 X10(3)/MCL (ref 0–0.9)
EOSINOPHIL NFR BLD AUTO: 2.3 %
ERYTHROCYTE [DISTWIDTH] IN BLOOD BY AUTOMATED COUNT: 13 % (ref 11.5–17)
GFR SERPLBLD CREATININE-BSD FMLA CKD-EPI: >60 ML/MIN/1.73/M2
GLOBULIN SER-MCNC: 3.6 GM/DL (ref 2.4–3.5)
GLUCOSE SERPL-MCNC: 312 MG/DL (ref 74–100)
HCT VFR BLD AUTO: 38.2 % (ref 37–47)
HGB BLD-MCNC: 12.5 G/DL (ref 12–16)
IMM GRANULOCYTES # BLD AUTO: 0 X10(3)/MCL (ref 0–0.04)
IMM GRANULOCYTES NFR BLD AUTO: 0 %
LDH SERPL-CCNC: 150 U/L (ref 125–220)
LYMPHOCYTES # BLD AUTO: 1.75 X10(3)/MCL (ref 0.6–4.6)
LYMPHOCYTES NFR BLD AUTO: 36.2 %
MCH RBC QN AUTO: 27.3 PG (ref 27–31)
MCHC RBC AUTO-ENTMCNC: 32.7 G/DL (ref 33–36)
MCV RBC AUTO: 83.4 FL (ref 80–94)
MONOCYTES # BLD AUTO: 0.3 X10(3)/MCL (ref 0.1–1.3)
MONOCYTES NFR BLD AUTO: 6.2 %
NEUTROPHILS # BLD AUTO: 2.67 X10(3)/MCL (ref 2.1–9.2)
NEUTROPHILS NFR BLD AUTO: 55.1 %
NRBC BLD AUTO-RTO: 0 %
PLATELET # BLD AUTO: 153 X10(3)/MCL (ref 130–400)
PMV BLD AUTO: 9.8 FL (ref 7.4–10.4)
POTASSIUM SERPL-SCNC: 4.1 MMOL/L (ref 3.5–5.1)
PROT SERPL-MCNC: 7.4 GM/DL (ref 6.4–8.3)
RBC # BLD AUTO: 4.58 X10(6)/MCL (ref 4.2–5.4)
SODIUM SERPL-SCNC: 137 MMOL/L (ref 136–145)
WBC # BLD AUTO: 4.84 X10(3)/MCL (ref 4.5–11.5)

## 2025-07-01 PROCEDURE — 99214 OFFICE O/P EST MOD 30 MIN: CPT | Mod: PBBFAC

## 2025-07-01 PROCEDURE — 83615 LACTATE (LD) (LDH) ENZYME: CPT

## 2025-07-01 PROCEDURE — 85025 COMPLETE CBC W/AUTO DIFF WBC: CPT

## 2025-07-01 PROCEDURE — 80053 COMPREHEN METABOLIC PANEL: CPT

## 2025-07-01 NOTE — PROGRESS NOTES
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    History:  Past medical history:    Adrenal mass; anxiety; arthritis; bradycardia; depression; NIDDM; gastroparesis; GERD; hip pain; hypertension; menstrual cramps; ovarian cyst; palpitations; bile salpingitis; thyroid disease    Procedure/surgical history:    Ablation of vaginal tissue using laser; ; cholecystectomy; dissection of neck, left 05/10/2024; thyroidectomy 05/10/2024; tubal ligation        History of Present Illness:   Follow-up (Patient weakness,fatigue, night sweats, hot flashes, headaches, chest pain, leg swelling, SOB, trouble swallowing , nausea, constipation, numbness and tingling in hands and feet, no appetite, patient fell 2 weeks hurt knee but did not go to the ER.)        Oncologic/Hematologic History:  Oncology History   Diffuse large B-cell lymphoma of solid organ excluding spleen   2024 Initial Diagnosis    Diffuse large B-cell lymphoma of solid organ excluding spleen     2024 -  Chemotherapy    Treatment Summary   Plan Name: OP LYM RCHOP (rituximab, cycloPHOSphamide, DOXOrubicin, vinCRIStine, predniSONE) Q3W  Treatment Goal: Curative  Status: Active  Start Date: 2024  End Date: 10/8/2024  Provider: Virgil Collins MD  Chemotherapy: DOXOrubicin chemo injection 118 mg, 50 mg/m2 = 118 mg, Intravenous, Clinic/HOD 1 time, 4 of 4 cycles  Administration: 118 mg (2024), 118 mg (2024), 118 mg (2024), 118 mg (10/7/2024)  vinCRIStine (ONCOVIN) 2 mg in 0.9% NaCl 65 mL chemo infusion, 2 mg (100 % of original dose 2 mg), Intravenous, Clinic/HOD 1 time, 4 of 4 cycles  Dose modification: 2 mg (original dose 2 mg, Cycle 1)  Administration: 2 mg (2024), 2 mg (2024), 2 mg (2024), 2 mg (10/7/2024)  cycloPHOSphamide 750 mg/m2 = 1,760 mg in  0.9% NaCl 293.8 mL chemo infusion, 750 mg/m2 = 1,760 mg, Intravenous, Clinic/HOD 1 time, 4 of 4 cycles  Administration: 1,760 mg (7/29/2024), 1,760 mg (8/19/2024), 1,760 mg (9/9/2024), 1,760 mg (10/7/2024)  riTUXimab-pvvr (RUXIENCE) 900 mg in 0.9% NaCl 900 mL infusion (conc: 1 mg/mL), 881 mg, Intravenous, Clinic/HOD 1 time, 4 of 4 cycles  Administration: 900 mg (7/29/2024), 900 mg (8/19/2024), 900 mg (9/9/2024), 900 mg (10/7/2024)     43-year-old lady, referred from [facility], with diffuse large B-cell lymphoma of thyroid.          Records reviewed:  05/21/2024:  [facility] office note:  Fred Berman is a 43 y.o. female referred for a tongue lesion.   She reports that she first noticed it about a year ago and says that it has slowly been enlarging since then. It has never bled. She doesn't think it hurts but does bite it occasionally which makes it sore. It's never been biopsied. She says that her dentist told her that he was not going to do any more work on her teeth until she got it checked out.   She also feels like she has developed allergies over the past few months.  She endorses frequent tearing and redness of her eyes in feels like her nose is running frequently.  She also endorses nasal congestion.  She is been using Allegra and does not think it is helped any.  She has not tried Flonase.  She does use Afrin she thinks maybe once a month.  She was diagnosed with a sinus infection over the summer and got a few days of antibiotics.  She denies facial pain or pressure but does note that when she wakes up in the morning she feels like the outside of her nose is swollen over the bridge of her nose.    She also endorses facial numbness bilaterally in all 3 distributions which has been going on for some time.  She reports that her PCP is work this up with an MRI scan.    She is also been dealing with intermittent dysphagia.  She feels like things are not going down, both solids and liquids.  This does not  happen with every meal but comes and goes.    She follows with Gastroenterology and has had EGDs and dilations in the past.  She reports that she has another 1 scheduled in November.    As far as her voice goes, she notes intermittent dysphonia where her voice gets roughed and then returns to normal.  It usually comes back to normal on its own.  She is on Protonix daily for gastritis that has been seen on prior scopes she reports.    As far as other medical problems she has diabetes, hypertension, gastroparesis, anxiety and depression.  For past surgical history she is had a cholecystectomy and .  She is had her tonsils removed but no other history of head and neck surgery.  She is a former smoker and quit 1 year ago.  She does not drink and denies any other drug use.     2024:  Presents today in follow up.  Eager for total thyroidectomy given results of FNA.  Has multiple concerns for surgical standpoint.  She would like to stay at least 1 night due to family history of von Willebrand's disease.  She also takes Suboxone and would like to talk to the anesthesia team regarding use of opioids.  She will stop taking this she does prior to surgery and is only allowed 1 week of narcotics postop.  She states that she sees endocrinology for diabetes and thyroid dysfunction.  She is on at least 200 mcg of Synthroid already and her sugars are often uncontrolled.   2024:  Presents today for 1st postop appointment after a left hemithyroidectomy in recurrent nerve sacrificed on May 10th.  Reports healing well with no pain. Hasn't taken any narcotics. Anxious about pathology results. Still having issues with oral mucositis.   Postop FFL:  Left TVC paralysis (preop FFL documented vocal cord motion)      Clinical events/investigations reviewed:  -2023:  CT neck and chest with contrast (evaluate thyroid mass) right lobe thyroid gland markedly atrophic; left lobe of the thyroid demonstrates a heterogeneous  appearance with heterogeneous contrast enhancement with no obvious, dominant, or worrisome mass appreciated  -02/08/2024:  Tongue, biopsy: Oral fibroma  -02/14/2024: Ultrasound thyroid (nontoxic single thyroid nodule) (comparison: Thyroid ultrasound 06/12/2023, CT neck 08/04/2023):  Large lobulated hypoechoic mass lesion at the level of the left lobe of the thyroid, new from prior exam (maybe related to thyroid mass or conglomerate lymphadenopathy)  -03/08/2024: CT soft tissues of the neck with contrast (localized swelling, mass, lumps) (comparison:  Ultrasound 02/14/2024; CT neck and chest): Increasing hypodense soft tissue in the left thyroid bed compared to 08/04/2023 (2.1 x 2.7 x 4.2 cm); minimal mass effect on the trachea; no definite cervical lymphadenopathy  -03/28/2024:  FNA left thyroid nodule:  Atypical cells of undetermined significance (numerous lymphocytes with a significant large lymphocytes population, admixed with follicular oncocytic cells and atypical follicle cell clusters, etc., suspicious for Hashimoto's thyroiditis, etc.) (Thyroseq V3 GC positive; probability of cancer or NIFTP intermediate-high, 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 Thyroid, left hemithyroid, excision:  Diffuse large B-cell lymphoma, germinal center B-cell phenotype; BCL 2 positive/MYC negative by IHC (not a double expressor); negative for MYC gene rearrangement by interface FISH (not double hit lymphoma) (morphologically, the thyroid is extensive involved by a diffuse proliferation of large atypical lymphoid cells with irregular nuclear contours, vesicular chromatin, and ample cytoplasm)  2. Paratracheal tissue, biopsy:  Reactive lymphoid tissue   3. Left central neck dissection:  13 benign lymph nodes; no malignancy      06/18/2024:  Pleasant  lady who presents for initial medical oncology consultation.  In no acute discomfort.  Feels poorly.  Main complaint is in in the back and hips for last 1 year.  History of opioid abuse.  On buprenorphine with a psychiatrist for last years.  Drenching sweats all the time.  Has to change clothes.  Lost 50 lb unintentionally in 2023, apparently due to gastroparesis side effects of Ozempic; regained her weight back after Ozempic was discontinued.    ECOG 1.  No recurrent fevers.  Fatigue.  Generalized weakness.  Night sweats and hot flashes.  Sweats all the time for 1 year.  Bones hurt all the time.  Occasional chest pains and leg swelling as well.  Exertional dyspnea.  Some constipation.  Some nausea.  Great appetite.    Interval History 7/1/25:  Patient presented to the clinic today for a scheduled 3 month surveillance visit. She reports that she still doesn't feel like herself again. She reports continued fatigue, leg swelling in the evenings and she is not able to do the task like she use too. She reports having night sweats but contributes this to hormonal changes. Denies lymphadenopathy. Denies any nausea.  Denies any fever, chills, shortness for breath or any other signs and symptoms associated with infection. Labwork was reviewed with the patient. All future appointments were discussed with the patient.     Review of Systems:   All systems reviewed and found to be negative except for the symptoms detailed above    Physical Examination:   VITAL SIGNS:   Vitals:    07/01/25 1256   BP: 106/67   Pulse:    Resp:    Temp:          Physical Exam  Vitals reviewed.   Constitutional:       Appearance: Normal appearance.   HENT:      Head: Normocephalic and atraumatic.      Mouth/Throat:      Mouth: Mucous membranes are moist.   Cardiovascular:      Rate and Rhythm: Normal rate and regular rhythm.      Pulses: Normal pulses.      Heart sounds: Normal heart sounds.   Pulmonary:      Effort: Pulmonary effort is normal.       Breath sounds: Normal breath sounds.   Abdominal:      General: Bowel sounds are normal.      Palpations: Abdomen is soft.   Skin:     General: Skin is warm and dry.   Neurological:      Mental Status: She is alert and oriented to person, place, and time.   Psychiatric:         Mood and Affect: Mood normal.         Behavior: Behavior normal.         Thought Content: Thought content normal.         Judgment: Judgment normal.            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   -03/28/2024:  Thyroglobulin antibody 150 IU/mL, elevated (reference Range:  < 1.8)  -03/28/2024: Thyroglobulin, tumor marker: < 0.1 (reference Range:  Athyroid < 0.1; intact thyroid </= 33)  -left hemithyroidectomy with left central neck dissection level 6 (05/10/2024):   Diffuse large B-cell lymphoma left oscar thyroid, germinal center B-cell type, not a double expressor, not double hit lymphoma; morphologically, thyroid extensively involved; paratracheal tissue biopsy negative; left central neck dissection 13 benign lymph nodes,  -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 during left hemithyroidectomy 05/10/2024, noted on FFL exam by ENT  -06/18/2024: ECOG 1; chronic fatigue; sweats all the time; no consistent weight loss; appetite is great  -06/18/2024: CBC, CMP essentially unremarkable   -06/18/2024: LDH normal, beta 2 microglobulin normal  -06/18/2024: Hep B core antibody total, hep B surface antigen, hep C antibody, HIV: Nonreactive  -FDG PET-CT  06/18/2024:  No other sites of disease  -TTE 06/26/2024: LVEF 55-60%  -right IJ MediPort placed 07/08/2024      Family history of von Willebrand disease:  -04/25/2024:  Von Willebrand factor activity 75%, normal      Oral fibroma of tongue:   -tongue lesion: 1st noted around 02/2023; slowly enlarging since; no bleeding; no pain  -tongue biopsy 02/08/2024: Oral fibroma      Comorbid medical conditions:  Anxiety, depression, NIDDM, gastroparesis, hypertension, history of PID  Hypothyroidism  History of opioid abuse, maintained on Suboxone  History of dysphagia, S/P endoscopic dilatation         Plan:   Diffuse Large B-Cell Lymphoma  In August, surveillance CTs C/A/P/neck with contrast  RTC with MD in 1 month with labs prior  CBC, CMP, LDH to discuss CT scans   Follow-up with behavioral health for anxiety  Management of hepatic steatosis/cirrhosis per GI  Follow-up with ENT for vocal cord paralysis  Follow-up with Pulmonary for dyspnea    -diffuse large B-cell lymphoma of left thyroid lobe, S/P left thyroidectomy 05/10/2024  -06/18/2024: CBC, CMP essentially unremarkable   -06/18/2024: LDH normal, beta 2 microglobulin normal  -06/18/2024: Hep B core antibody total, hep B surface antigen, hep C antibody, HIV: Nonreactive  -FDG PET-CT 06/18/2024:  No other sites of disease  -06/26/2024: Bilateral diagnostic mammogram, bilateral limited ultrasound (comparison: 11/16/2022) (indication: Bilateral lateral breast pain, right nipple pain, right nipple white/yellow discharge):  Right breast BI-RADS 2; left breast BI-RADS 1  -TTE 06/26/2024: LVEF 55-60%  -right IJ MediPort placed 07/08/2024  -S/P tubal ligation in the past  -bone marrow evaluation 07/23/2024: Report pending   -R-CHOP started 07/29/2024; experienced allergic reaction to rituximab; managed with corticosteroids and antihistamines  >>>  -continue R-CHOP every 3 weeks   -awaiting bone marrow aspiration and core biopsy report  -S/P tubal ligation in the past   -check  CBC and CMP weekly  -check LDH and uric acid level now  -allopurinol 200 mg p.o. b.i.d. for TLS prophylaxis  Re-stage with FDG PET-CT after 3 cycles of R-CHOP  -aggressive oral hydration prevent hemorrhagic cystitis with cyclophosphamide  -monitor for neurotoxicity with vincristine  -treatment should be delayed until ANC is> 1500 mm3 and platelet count> 100 K mm3    R-CHOP: Monitoring parameters:  CBC with differential and platelet count weekly during treatment.  Assess basic metabolic panel (creatinine and electrolytes) and liver function prior to each subsequent treatment cycle.  LVEF should be evaluated periodically based on LVEF at initiation of therapy and cumulative dose of doxorubicin.  Carriers of hepatitis B or C should be monitored for clinical and laboratory signs of active infection during and following completion of therapy. Rituximab should be discontinued if reactivation occurs.    If bone marrow examination is negative, then, for stage I disease, treatment plan will be as follows:  -06/18/2024: Initial consultation:  ECOG 1; sweats all the time  -IPI:  Low (0)  -age adjusted IP!:  Low (0)  -stage modified IPI (smIPI) low (0)  -NCCN IPI:  Low (1, by virtue of age 43)  -prognostic model to assess the risk of CNS disease (CNS IPI):  Low risk (0)    Assuming stage I disease:  -nonbulky  -smIPI 0  -recommendation:  R-CHOP x3 cycles;   -followed by interim staging with PET-CT:   1. If complete response (PET negative, i.e., 5-PS 1-3), then:  R-CHOP x1 cycle (total of 4 cycles); or ISRT, followed by surveillance  2. If partial response (PET positive, i.e., 5-PS for), then:  Repeat biopsy, etc.  3. If progressive disease (PET positive, i.e.,5-PS 5), then:  Repeat biopsy, etc.    TLS prophylaxis:  Allopurinol beginning 2-3 days prior to chemo immunotherapy and continued for 10-14 days  (Allopurinol: 100 mg per m2 every 8 hours, maximum 800 mg per day)    R-CHOP:  -Patients receiving cyclophosphamide should  maintain adequate oral hydration (2 to 3 L/day) and void frequently to reduce risk of hemorrhagic cystitis.  -The risk of febrile neutropenia with this regimen is 10 to 20%; primary prophylaxis with hematopoietic growth factors should be considered on an individual basis, particularly for high-risk patients such as those with preexisting neutropenia, advanced disease, poor performance status, or patients age 65 years or older.  -Adjustment of initial cyclophosphamide, doxorubicin, and vincristine doses may be needed for preexisting liver dysfunction.  In addition, dose adjustment of cyclophosphamide may be required for kidney dysfunction.  -LVEF should be evaluated prior to initiation of therapy. Dose alterations should be considered for LVEF <50%, and doxorubicin therapy is contraindicated in patients with LVEF <30% at initiation. Infusion times and schedule may be adjusted to decrease the risk of cardiotoxicity in individuals at high risk for its development.  -Neurotoxicity: Vincristine may cause constipation, and in severe cases, paralytic ileus. A routine prophylactic regimen against constipation is recommended in all patients receiving vincristine.    R-CHOP:  Dose modifications for myelotoxicity and neuropathy:  # Treatment should be delayed until ANC is >1500/microL and platelet count is >100,000/microL.  # if a patient develops grade 4 (ANC <500/microL) neutropenia or febrile neutropenia with any cycle, G-CSF support is added to the regimen for subsequent cycles.  # If grade 4 neutropenia or febrile neutropenia occurs despite G-CSF support, or if the patient develops grade 3 (25,000 to 50,000/microL) or 4 (<25,000/microL) thrombocytopenia with any cycle, the doses of cyclophosphamide and doxorubicin should be decreased by 50% for subsequent cycles.  # Neuropathy: Dose adjustment of vincristine may be necessary if the severity of neuropathy persists or worsens. No specific guidelines are available for dose  adjustments     If bone marrow is negative for lymphoma, then, stage I disease    Family history of von Willebrand disease   -04/25/2024: Von Willebrand factor activity 75%, normal    Above discussed at length with the patient.  All questions answered.    Plan of management discussed.  She understands and agrees with this plan.      Follow-up:  Follow up in about 7 weeks (around 8/18/2025) for Labs, W/MD, For CT Results (CBC/CMP/Mag level/LDH/uric acid) .

## 2025-07-08 ENCOUNTER — OFFICE VISIT (OUTPATIENT)
Dept: PULMONOLOGY | Facility: CLINIC | Age: 45
End: 2025-07-08
Payer: MEDICAID

## 2025-07-08 VITALS
BODY MASS INDEX: 37.54 KG/M2 | TEMPERATURE: 98 F | WEIGHT: 239.19 LBS | OXYGEN SATURATION: 97 % | DIASTOLIC BLOOD PRESSURE: 78 MMHG | HEIGHT: 67 IN | HEART RATE: 64 BPM | SYSTOLIC BLOOD PRESSURE: 116 MMHG | RESPIRATION RATE: 20 BRPM

## 2025-07-08 DIAGNOSIS — Z72.0 TOBACCO ABUSE: ICD-10-CM

## 2025-07-08 DIAGNOSIS — J44.9 CHRONIC OBSTRUCTIVE PULMONARY DISEASE, UNSPECIFIED COPD TYPE: Primary | ICD-10-CM

## 2025-07-08 PROCEDURE — 99215 OFFICE O/P EST HI 40 MIN: CPT | Mod: PBBFAC

## 2025-07-08 RX ORDER — ALBUTEROL SULFATE 90 UG/1
1-2 INHALANT RESPIRATORY (INHALATION) EVERY 6 HOURS PRN
Qty: 8 G | Refills: 3 | Status: SHIPPED | OUTPATIENT
Start: 2025-07-08

## 2025-07-08 NOTE — PROGRESS NOTES
"[facility] Visit    Chief Complaint:      COPD (New referral)     Subjective:     HPI:  Fred Berman is a 44 y.o. female with past history of diffuse large B cell lymphoma of thyroid with large neck dissection last year with vocal cord paralysis, tobacco use  .  She presents to Pulmonology clinic today for COPD (New referral).  Patient with history of smoking two ppd for 25 years. After her cancer started she started experiencing sob at rest and shukla. Has become worse since surgery. Follows with cardiology with normal echo. Has regular ct and pet scans with heme/onc. Most recently in Feb 2025 with no suspicious lung findings. PFT with mild obstructive disease. No other complaints today.      Review of Systems  A comprehensive 12 point review of systems was completed.  Please see above for pertinent positives and negatives.     Objective:   Last 24 Hour Vital Signs:  Vitals  BP: 116/78  Temp: 98.2 °F (36.8 °C)  Temp Source: Oral  Pulse: 64  Resp: 20  SpO2: 97 %  Height: 5' 7" (170.2 cm)  Weight: 108.5 kg (239 lb 3.2 oz)    Physical Examination:  General: Awake, alert, & oriented to person, place & time. No acute distress  Psychiatric: Mood and affect normal  HEENT: Normocephalic, atraumatic. Face symmetric.  Cardiovascular: Regular rate & rhythm, Normal S1 & S2 w/out murmurs, rubs or gallops  Pulmonary: Bilateral symmetric chest rise, Non-labored, no wheezes, rhonchi or crackles are appreciated  Abdominal:  nondistended  Extremities: No clubbing, cyanosis or edema.  Skin:  Exposed skin is warm & dry.  Neuro:   Patient moves all extremities equally. Sensation intact bilateraly.    Labs  Recent labs have been reviewed     Last PFTs:  Pre FVC   Date/Time Value Ref Range Status   08/30/2024 08:57 AM 3.99 3.25 - 5.00 L Preliminary     Pre FEV1   Date/Time Value Ref Range Status   08/30/2024 08:57 AM 2.61 2.60 - 3.97 L Preliminary     Pre FEV1 FVC   Date/Time Value Ref Range Status   08/30/2024 08:57 AM " 65.50 (L) 69.92 - 89.89 % Preliminary     Pre TLC   Date/Time Value Ref Range Status   08/30/2024 08:57 AM 5.57 4.62 - 6.60 L Preliminary     Pre DLCO   Date/Time Value Ref Range Status   08/30/2024 08:57 AM 18.99 (L) 21.84 - 33.30 ml/(min*mmHg) Preliminary       Radiology:  No results found in the last 24 hours.     Assessment & Plan:     Mild COPD?  History of Diffuse lymphoma of thyroid with extensive neck involvement   -at this time feel patient has minimal lung disease contributing to sob  -will prescribe albuterol inhaler prn  -continue imaging with heme/onc  -rpt PFT's ordered per patient's request       Follow-ups  -Follow-up in 6 months       Karie Salinas MD  Internal Medicine - PGY-3

## 2025-07-11 ENCOUNTER — PROCEDURE VISIT (OUTPATIENT)
Dept: GASTROENTEROLOGY | Facility: CLINIC | Age: 45
End: 2025-07-11
Payer: MEDICAID

## 2025-07-11 DIAGNOSIS — K74.60 HEPATIC CIRRHOSIS, UNSPECIFIED HEPATIC CIRRHOSIS TYPE, UNSPECIFIED WHETHER ASCITES PRESENT: ICD-10-CM

## 2025-07-11 NOTE — Clinical Note
Airway  Performed by: Osman Oreilly CRNA  Authorized by: Nick Calderon MD     Final Airway Type:  Endotracheal airway  Final Endotracheal Airway*:  ETT  ETT Size (mm)*:  8.0  Cuff*:  Regular  Technique Used for Successful ETT Placement:  Direct laryngoscopy  Devices/Methods Used in Placement*:  Mask  Intubation Procedure*:  Preoxygenation, ETCO2, Atraumatic, Dentition Unchanged and Pharynx Clear  Insertion Site:  Oral  Blade Type*:  Martin  Blade Size*:  2  Cuff Volume (mL):  7  Secured at (cm)*:  25  Placement Verified by: auscultation and capnometry    Glottic View*:  1 - full view of glottis  Attempts*:  1   Patient Identified, Procedure confirmed, Emergency equipment available and Safety protocols followed  Location:  OR  Urgency:  Elective  Difficult Airway: No    Indications for Airway Management:  Anesthesia  Mask Difficulty Assessment:  1 - vent by mask  Performed By:  CRNA  CRNA:  Osman Oreilly CRNA         Please notify FibroScan with F4, S3 with recommended repeat in 1 year.  Thanks

## 2025-07-11 NOTE — PROCEDURES
Fibroscan Procedure     Name: Fred Berman  Date of Procedure : 2025  Interpreting Physician: Gabrielle Galan NP  Diagnosis: MASH (YARBROUGH)    Probe: XL    Fibroscan readin.1 kPa    Fibrosis: F4     CAP readin dB/m    Steatosis: S3      Miscellaneous:   2025: Fibroscan: 32.4 kPa, S3, F4    Repeat: 1 year

## 2025-07-18 ENCOUNTER — PATIENT MESSAGE (OUTPATIENT)
Dept: GASTROENTEROLOGY | Facility: CLINIC | Age: 45
End: 2025-07-18
Payer: MEDICAID

## 2025-07-30 RX ORDER — LOSARTAN POTASSIUM 50 MG/1
50 TABLET ORAL DAILY
COMMUNITY
End: 2025-07-30 | Stop reason: SDUPTHER

## 2025-07-31 RX ORDER — LOSARTAN POTASSIUM 50 MG/1
50 TABLET ORAL DAILY
Qty: 90 TABLET | Refills: 3 | Status: SHIPPED | OUTPATIENT
Start: 2025-07-31

## 2025-08-05 DIAGNOSIS — K21.9 GASTROESOPHAGEAL REFLUX DISEASE, UNSPECIFIED WHETHER ESOPHAGITIS PRESENT: Primary | ICD-10-CM

## 2025-08-05 RX ORDER — PANTOPRAZOLE SODIUM 40 MG/1
40 TABLET, DELAYED RELEASE ORAL EVERY MORNING
Qty: 90 TABLET | Refills: 3 | Status: SHIPPED | OUTPATIENT
Start: 2025-08-05

## 2025-08-05 NOTE — TELEPHONE ENCOUNTER
----- Message from Tiffani sent at 8/5/2025 10:13 AM CDT -----  Pt wants a callback and medication refill .       Medication refill- pantoprazole (PROTONIX) 40 MG tablet    [id number] [address] [place] [address] [address]     Callback [phone]

## 2025-08-10 ENCOUNTER — PATIENT MESSAGE (OUTPATIENT)
Dept: ENDOSCOPY | Facility: HOSPITAL | Age: 45
End: 2025-08-10
Payer: MEDICAID

## 2025-08-11 ENCOUNTER — PATIENT MESSAGE (OUTPATIENT)
Dept: ENDOSCOPY | Facility: HOSPITAL | Age: 45
End: 2025-08-11
Payer: MEDICAID

## 2025-08-11 ENCOUNTER — HOSPITAL ENCOUNTER (OUTPATIENT)
Dept: RADIOLOGY | Facility: HOSPITAL | Age: 45
Discharge: HOME | End: 2025-08-11
Attending: INTERNAL MEDICINE
Payer: MEDICAID

## 2025-08-11 DIAGNOSIS — C83.398 DIFFUSE LARGE B-CELL LYMPHOMA OF SOLID ORGAN EXCLUDING SPLEEN: ICD-10-CM

## 2025-08-11 DIAGNOSIS — F41.0 PANIC ATTACK: ICD-10-CM

## 2025-08-11 DIAGNOSIS — F11.11 HISTORY OF OPIOID ABUSE: ICD-10-CM

## 2025-08-11 DIAGNOSIS — C85.99 MALIGNANT LYMPHOMA OF THYROID GLAND: ICD-10-CM

## 2025-08-11 LAB
CREAT SERPL-MCNC: 0.69 MG/DL (ref 0.55–1.02)
GFR SERPLBLD CREATININE-BSD FMLA CKD-EPI: >60 ML/MIN/1.73/M2

## 2025-08-11 PROCEDURE — 70491 CT SOFT TISSUE NECK W/DYE: CPT | Mod: TC

## 2025-08-11 PROCEDURE — 82565 ASSAY OF CREATININE: CPT | Performed by: INTERNAL MEDICINE

## 2025-08-11 PROCEDURE — 71260 CT THORAX DX C+: CPT | Mod: TC

## 2025-08-11 PROCEDURE — 25500020 PHARM REV CODE 255: Performed by: INTERNAL MEDICINE

## 2025-08-11 RX ADMIN — IOHEXOL 100 ML: 350 INJECTION, SOLUTION INTRAVENOUS at 10:08

## 2025-08-12 ENCOUNTER — PATIENT MESSAGE (OUTPATIENT)
Dept: ENDOSCOPY | Facility: HOSPITAL | Age: 45
End: 2025-08-12
Payer: MEDICAID

## 2025-08-15 ENCOUNTER — TELEPHONE (OUTPATIENT)
Dept: HEMATOLOGY/ONCOLOGY | Facility: CLINIC | Age: 45
End: 2025-08-15
Payer: MEDICAID

## 2025-08-16 PROBLEM — Z08 ENCOUNTER FOR FOLLOW-UP SURVEILLANCE OF DIFFUSE LARGE B-CELL LYMPHOMA: Status: ACTIVE | Noted: 2025-08-16

## 2025-08-16 PROBLEM — C83.30 DIFFUSE LARGE B-CELL LYMPHOMA: Status: ACTIVE | Noted: 2025-08-16

## 2025-08-16 PROBLEM — Z85.79 ENCOUNTER FOR FOLLOW-UP SURVEILLANCE OF DIFFUSE LARGE B-CELL LYMPHOMA: Status: ACTIVE | Noted: 2025-08-16

## 2025-08-18 ENCOUNTER — TELEPHONE (OUTPATIENT)
Dept: HEMATOLOGY/ONCOLOGY | Facility: CLINIC | Age: 45
End: 2025-08-18
Payer: MEDICAID

## 2025-08-20 ENCOUNTER — TELEPHONE (OUTPATIENT)
Dept: GYNECOLOGY | Facility: CLINIC | Age: 45
End: 2025-08-20
Payer: MEDICAID

## 2025-08-20 DIAGNOSIS — R10.2 PELVIC PAIN: Primary | ICD-10-CM

## 2025-08-20 DIAGNOSIS — M54.9 BACK PAIN, UNSPECIFIED BACK LOCATION, UNSPECIFIED BACK PAIN LATERALITY, UNSPECIFIED CHRONICITY: ICD-10-CM

## 2025-08-22 ENCOUNTER — TELEPHONE (OUTPATIENT)
Dept: GYNECOLOGY | Facility: CLINIC | Age: 45
End: 2025-08-22
Payer: MEDICAID

## (undated) DEVICE — SUT SILK 2.0 BLK 18

## (undated) DEVICE — SPONGE KITTNER 1/4X 5/8 L STRL

## (undated) DEVICE — SYR 10CC LUER LOCK

## (undated) DEVICE — SPONGE LAP 18X18 PREWASHED

## (undated) DEVICE — KIT SURGICAL TURNOVER

## (undated) DEVICE — CONTAINER SPECIMEN OR STER 4OZ

## (undated) DEVICE — GLOVE SENSICARE PI GRN 7.5

## (undated) DEVICE — SPONGE GAUZE 16PLY 4X4

## (undated) DEVICE — Device

## (undated) DEVICE — PROBE SIMULATOR KRAFF

## (undated) DEVICE — APPLICATOR CHLORAPREP ORN 26ML

## (undated) DEVICE — MANIFOLD 4 PORT

## (undated) DEVICE — NDL HYPO REG 25G X 1 1/2

## (undated) DEVICE — SUT SILK BLK. BR. 3-0 10

## (undated) DEVICE — SUT 2/0 30IN PROLENE MONO

## (undated) DEVICE — SUT MCRYL PLUS 4-0 PS2 27IN

## (undated) DEVICE — SUT GUT PL. 4-0 27 FS-2

## (undated) DEVICE — DRAPE C-ARM COVER EZ 36X28IN

## (undated) DEVICE — HEMOSTAT SURGICEL 2X3IN

## (undated) DEVICE — SPONGE PATTY SURGICAL .5X3IN

## (undated) DEVICE — SUT CTD VICRYL 3-0 CR/SH

## (undated) DEVICE — TOWEL OR DISP STRL BLUE 4/PK

## (undated) DEVICE — GEL AQUASONIC 100 STERILE20GM

## (undated) DEVICE — GLOVE SENSICARE PI GRN 6.5

## (undated) DEVICE — SUT SA85H SILK 2-0

## (undated) DEVICE — TRAY JAMSHIDI BONE MAR 11GX4

## (undated) DEVICE — GLOVE SIGNATURE MICRO LTX 7

## (undated) DEVICE — APPLIER LIGACLIP SM 9.38IN

## (undated) DEVICE — SUTURE SA83H SILK 4-0

## (undated) DEVICE — CORD CAUTERY BIPOLAR STERILE

## (undated) DEVICE — SUT 2/0 30IN SILK BLK BRAI

## (undated) DEVICE — GLOVE SIGNATURE ESSNTL LTX 7.5

## (undated) DEVICE — GLOVE SIGNATURE MICRO LTX 6.5

## (undated) DEVICE — TRAY SKIN SCRUB WET PREMIUM

## (undated) DEVICE — STAPLER SKIN COUNT 35 W STPL

## (undated) DEVICE — MARKER WRITESITE SKIN CHLRAPRP

## (undated) DEVICE — SYR DISP LL 5CC

## (undated) DEVICE — ADHESIVE DERMABOND ADVANCED

## (undated) DEVICE — ADHESIVE MASTISOL VIAL 48/BX

## (undated) DEVICE — SUT PDSII 4-0 PS-2 CLEAR MO

## (undated) DEVICE — NDL SPINAL 20GX3.5 HUB

## (undated) DEVICE — SHEARS HARMONIC CRVD 9 CM

## (undated) DEVICE — DRSNG POLYSKIN TRNSPAR 4X4.75

## (undated) DEVICE — STRIP MEDI WND CLSR 1/2X4IN

## (undated) DEVICE — SOL 9P NACL IRR PIC IL

## (undated) DEVICE — NDL 27G X 1 1/4

## (undated) DEVICE — NDL ECLIPSE SAFETY 18GX1-1/2IN

## (undated) DEVICE — GOWN POLY REINF BRTH SLV XL

## (undated) DEVICE — GLOVE SIGNATURE MICRO LTX 6

## (undated) DEVICE — DECANTER FLUID TRNSF WHITE 9IN

## (undated) DEVICE — CAUTERY TIP POLISHER

## (undated) DEVICE — SUT 3-0 VICRYL / SH (J416)

## (undated) DEVICE — ELECTRODE BLADE INSULATED 1 IN

## (undated) DEVICE — DRAIN JACKSON PRATT 10MM

## (undated) DEVICE — GOWN POLY REINF X-LONG 2XL

## (undated) DEVICE — COVER SITE-RITE PROBE 96IN

## (undated) DEVICE — BLADE SURG STAINLESS STEEL #11

## (undated) DEVICE — GLOVE SENSICARE PI GRN 7